# Patient Record
Sex: FEMALE | Race: WHITE
[De-identification: names, ages, dates, MRNs, and addresses within clinical notes are randomized per-mention and may not be internally consistent; named-entity substitution may affect disease eponyms.]

---

## 2017-12-26 ENCOUNTER — HOSPITAL ENCOUNTER (OUTPATIENT)
Dept: HOSPITAL 75 - RAD | Age: 52
End: 2017-12-26
Attending: INTERNAL MEDICINE
Payer: COMMERCIAL

## 2017-12-26 DIAGNOSIS — R51: Primary | ICD-10-CM

## 2017-12-26 DIAGNOSIS — H54.7: ICD-10-CM

## 2017-12-26 DIAGNOSIS — I10: ICD-10-CM

## 2017-12-26 PROCEDURE — 70551 MRI BRAIN STEM W/O DYE: CPT

## 2018-01-19 ENCOUNTER — HOSPITAL ENCOUNTER (OUTPATIENT)
Dept: HOSPITAL 75 - LABNPT | Age: 53
End: 2018-01-19
Attending: INTERNAL MEDICINE
Payer: COMMERCIAL

## 2018-01-19 DIAGNOSIS — R10.32: Primary | ICD-10-CM

## 2018-01-19 PROCEDURE — 87070 CULTURE OTHR SPECIMN AEROBIC: CPT

## 2018-01-19 PROCEDURE — 89051 BODY FLUID CELL COUNT: CPT

## 2018-01-19 PROCEDURE — 87205 SMEAR GRAM STAIN: CPT

## 2018-01-25 LAB
APPEARANCE FLD: COLORLESS
BODY FLUID SOURCE: (no result)
COLOR FLD: CLEAR
LYMPHOCYTES NFR FLD MANUAL: (no result) %
OTHER CELLS FLD MANUAL: (no result) %
RBC # FLD: 1 /UL
WBC # FLD: 4 /UL

## 2019-05-22 ENCOUNTER — HOSPITAL ENCOUNTER (OUTPATIENT)
Dept: HOSPITAL 75 - PREOP | Age: 54
Discharge: HOME | End: 2019-05-22
Attending: ORTHOPAEDIC SURGERY
Payer: MEDICARE

## 2019-05-22 VITALS — BODY MASS INDEX: 31.83 KG/M2 | HEIGHT: 68 IN | WEIGHT: 210 LBS

## 2019-05-22 DIAGNOSIS — Z01.818: Primary | ICD-10-CM

## 2019-09-24 ENCOUNTER — HOSPITAL ENCOUNTER (EMERGENCY)
Dept: HOSPITAL 75 - ER FS | Age: 54
Discharge: ANOTHER HOSPITAL | End: 2019-09-24
Payer: MEDICARE

## 2019-09-24 VITALS — BODY MASS INDEX: 42.93 KG/M2 | WEIGHT: 283.29 LBS | HEIGHT: 67.99 IN

## 2019-09-24 VITALS — DIASTOLIC BLOOD PRESSURE: 71 MMHG | SYSTOLIC BLOOD PRESSURE: 182 MMHG

## 2019-09-24 DIAGNOSIS — Z90.710: ICD-10-CM

## 2019-09-24 DIAGNOSIS — F41.9: ICD-10-CM

## 2019-09-24 DIAGNOSIS — R42: Primary | ICD-10-CM

## 2019-09-24 DIAGNOSIS — N18.6: ICD-10-CM

## 2019-09-24 DIAGNOSIS — Z80.1: ICD-10-CM

## 2019-09-24 DIAGNOSIS — M79.7: ICD-10-CM

## 2019-09-24 DIAGNOSIS — Z79.51: ICD-10-CM

## 2019-09-24 DIAGNOSIS — F32.9: ICD-10-CM

## 2019-09-24 DIAGNOSIS — Z99.2: ICD-10-CM

## 2019-09-24 DIAGNOSIS — Z86.73: ICD-10-CM

## 2019-09-24 DIAGNOSIS — T40.605A: ICD-10-CM

## 2019-09-24 DIAGNOSIS — Z90.89: ICD-10-CM

## 2019-09-24 DIAGNOSIS — Z90.49: ICD-10-CM

## 2019-09-24 DIAGNOSIS — I12.0: ICD-10-CM

## 2019-09-24 DIAGNOSIS — R51: ICD-10-CM

## 2019-09-24 DIAGNOSIS — E03.9: ICD-10-CM

## 2019-09-24 LAB
ALBUMIN SERPL-MCNC: 3.6 GM/DL (ref 3.2–4.5)
ALP SERPL-CCNC: 66 U/L (ref 40–136)
ALT SERPL-CCNC: 18 U/L (ref 0–55)
APTT BLD: 27 SEC (ref 24–35)
BASOPHILS # BLD AUTO: 0 10^3/UL (ref 0–0.1)
BASOPHILS NFR BLD AUTO: 1 % (ref 0–10)
BILIRUB SERPL-MCNC: 0.2 MG/DL (ref 0.1–1)
BUN/CREAT SERPL: 8
CALCIUM SERPL-MCNC: 10.5 MG/DL (ref 8.5–10.1)
CHLORIDE SERPL-SCNC: 99 MMOL/L (ref 98–107)
CO2 SERPL-SCNC: 24 MMOL/L (ref 21–32)
CREAT SERPL-MCNC: 8.43 MG/DL (ref 0.6–1.3)
EOSINOPHIL # BLD AUTO: 0.3 10^3/UL (ref 0–0.3)
EOSINOPHIL NFR BLD AUTO: 4 % (ref 0–10)
ERYTHROCYTE [DISTWIDTH] IN BLOOD BY AUTOMATED COUNT: 13.3 % (ref 10–14.5)
GFR SERPLBLD BASED ON 1.73 SQ M-ARVRAT: 5 ML/MIN
GLUCOSE SERPL-MCNC: 118 MG/DL (ref 70–105)
HCT VFR BLD CALC: 28 % (ref 35–52)
HGB BLD-MCNC: 9 G/DL (ref 11.5–16)
INR PPP: 1 (ref 0.8–1.4)
LYMPHOCYTES # BLD AUTO: 1.7 X 10^3 (ref 1–4)
LYMPHOCYTES NFR BLD AUTO: 26 % (ref 12–44)
MAGNESIUM SERPL-MCNC: 2.7 MG/DL (ref 1.6–2.4)
MANUAL DIFFERENTIAL PERFORMED BLD QL: NO
MCH RBC QN AUTO: 33 PG (ref 25–34)
MCHC RBC AUTO-ENTMCNC: 32 G/DL (ref 32–36)
MCV RBC AUTO: 99 FL (ref 80–99)
MONOCYTES # BLD AUTO: 0.6 X 10^3 (ref 0–1)
MONOCYTES NFR BLD AUTO: 9 % (ref 0–12)
NEUTROPHILS # BLD AUTO: 3.9 X 10^3 (ref 1.8–7.8)
NEUTROPHILS NFR BLD AUTO: 59 % (ref 42–75)
PHOSPHATE SERPL-MCNC: 6.3 MG/DL (ref 2.3–4.7)
PLATELET # BLD: 214 10^3/UL (ref 130–400)
PMV BLD AUTO: 10.3 FL (ref 7.4–10.4)
POTASSIUM SERPL-SCNC: 3.9 MMOL/L (ref 3.6–5)
PROT SERPL-MCNC: 6.5 GM/DL (ref 6.4–8.2)
PROTHROMBIN TIME: 13.3 SEC (ref 12.2–14.7)
SODIUM SERPL-SCNC: 135 MMOL/L (ref 135–145)
WBC # BLD AUTO: 6.6 10^3/UL (ref 4.3–11)

## 2019-09-24 PROCEDURE — 83735 ASSAY OF MAGNESIUM: CPT

## 2019-09-24 PROCEDURE — 70450 CT HEAD/BRAIN W/O DYE: CPT

## 2019-09-24 PROCEDURE — 82962 GLUCOSE BLOOD TEST: CPT

## 2019-09-24 PROCEDURE — 84484 ASSAY OF TROPONIN QUANT: CPT

## 2019-09-24 PROCEDURE — 71045 X-RAY EXAM CHEST 1 VIEW: CPT

## 2019-09-24 PROCEDURE — 93005 ELECTROCARDIOGRAM TRACING: CPT

## 2019-09-24 PROCEDURE — 85610 PROTHROMBIN TIME: CPT

## 2019-09-24 PROCEDURE — 72125 CT NECK SPINE W/O DYE: CPT

## 2019-09-24 PROCEDURE — 85730 THROMBOPLASTIN TIME PARTIAL: CPT

## 2019-09-24 PROCEDURE — 96374 THER/PROPH/DIAG INJ IV PUSH: CPT

## 2019-09-24 PROCEDURE — 36415 COLL VENOUS BLD VENIPUNCTURE: CPT

## 2019-09-24 PROCEDURE — 84100 ASSAY OF PHOSPHORUS: CPT

## 2019-09-24 PROCEDURE — 80053 COMPREHEN METABOLIC PANEL: CPT

## 2019-09-24 PROCEDURE — 85025 COMPLETE CBC W/AUTO DIFF WBC: CPT

## 2019-09-24 PROCEDURE — 96375 TX/PRO/DX INJ NEW DRUG ADDON: CPT

## 2020-06-05 ENCOUNTER — HOSPITAL ENCOUNTER (OUTPATIENT)
Dept: HOSPITAL 75 - PREOP | Age: 55
Discharge: HOME | End: 2020-06-05
Attending: ORTHOPAEDIC SURGERY
Payer: MEDICARE

## 2020-06-05 VITALS — WEIGHT: 283.29 LBS | HEIGHT: 70.08 IN | BODY MASS INDEX: 40.56 KG/M2

## 2020-06-05 DIAGNOSIS — Z01.812: Primary | ICD-10-CM

## 2020-06-05 DIAGNOSIS — Z20.828: ICD-10-CM

## 2020-06-05 DIAGNOSIS — M23.200: ICD-10-CM

## 2020-06-05 PROCEDURE — 87635 SARS-COV-2 COVID-19 AMP PRB: CPT

## 2020-06-10 ENCOUNTER — HOSPITAL ENCOUNTER (OUTPATIENT)
Dept: HOSPITAL 75 - SDC | Age: 55
Discharge: HOME | End: 2020-06-10
Attending: ORTHOPAEDIC SURGERY
Payer: MEDICARE

## 2020-06-10 VITALS — DIASTOLIC BLOOD PRESSURE: 72 MMHG | SYSTOLIC BLOOD PRESSURE: 119 MMHG

## 2020-06-10 VITALS — DIASTOLIC BLOOD PRESSURE: 70 MMHG | SYSTOLIC BLOOD PRESSURE: 110 MMHG

## 2020-06-10 VITALS — DIASTOLIC BLOOD PRESSURE: 64 MMHG | SYSTOLIC BLOOD PRESSURE: 109 MMHG

## 2020-06-10 VITALS — BODY MASS INDEX: 40.56 KG/M2 | HEIGHT: 70.08 IN | WEIGHT: 283.29 LBS

## 2020-06-10 VITALS — DIASTOLIC BLOOD PRESSURE: 78 MMHG | SYSTOLIC BLOOD PRESSURE: 151 MMHG

## 2020-06-10 VITALS — SYSTOLIC BLOOD PRESSURE: 126 MMHG | DIASTOLIC BLOOD PRESSURE: 74 MMHG

## 2020-06-10 VITALS — SYSTOLIC BLOOD PRESSURE: 112 MMHG | DIASTOLIC BLOOD PRESSURE: 68 MMHG

## 2020-06-10 VITALS — SYSTOLIC BLOOD PRESSURE: 109 MMHG | DIASTOLIC BLOOD PRESSURE: 64 MMHG

## 2020-06-10 VITALS — DIASTOLIC BLOOD PRESSURE: 68 MMHG | SYSTOLIC BLOOD PRESSURE: 115 MMHG

## 2020-06-10 VITALS — SYSTOLIC BLOOD PRESSURE: 109 MMHG | DIASTOLIC BLOOD PRESSURE: 69 MMHG

## 2020-06-10 VITALS — DIASTOLIC BLOOD PRESSURE: 69 MMHG | SYSTOLIC BLOOD PRESSURE: 115 MMHG

## 2020-06-10 DIAGNOSIS — I12.9: ICD-10-CM

## 2020-06-10 DIAGNOSIS — Z86.73: ICD-10-CM

## 2020-06-10 DIAGNOSIS — E11.22: ICD-10-CM

## 2020-06-10 DIAGNOSIS — G89.29: ICD-10-CM

## 2020-06-10 DIAGNOSIS — F32.9: ICD-10-CM

## 2020-06-10 DIAGNOSIS — M54.9: ICD-10-CM

## 2020-06-10 DIAGNOSIS — E78.5: ICD-10-CM

## 2020-06-10 DIAGNOSIS — S83.271A: Primary | ICD-10-CM

## 2020-06-10 DIAGNOSIS — Z99.89: ICD-10-CM

## 2020-06-10 DIAGNOSIS — Z98.51: ICD-10-CM

## 2020-06-10 DIAGNOSIS — N18.9: ICD-10-CM

## 2020-06-10 DIAGNOSIS — Z90.89: ICD-10-CM

## 2020-06-10 DIAGNOSIS — K58.9: ICD-10-CM

## 2020-06-10 DIAGNOSIS — F41.9: ICD-10-CM

## 2020-06-10 DIAGNOSIS — M79.7: ICD-10-CM

## 2020-06-10 DIAGNOSIS — G47.33: ICD-10-CM

## 2020-06-10 DIAGNOSIS — Z90.710: ICD-10-CM

## 2020-06-10 DIAGNOSIS — E03.9: ICD-10-CM

## 2020-06-10 DIAGNOSIS — M94.261: ICD-10-CM

## 2020-06-10 DIAGNOSIS — M94.8X6: ICD-10-CM

## 2020-06-10 DIAGNOSIS — Z79.899: ICD-10-CM

## 2020-06-10 LAB
ALBUMIN SERPL-MCNC: 3.4 GM/DL (ref 3.2–4.5)
ALP SERPL-CCNC: 84 U/L (ref 40–136)
ALT SERPL-CCNC: 8 U/L (ref 0–55)
BASOPHILS # BLD AUTO: 0.1 10^3/UL (ref 0–0.1)
BASOPHILS NFR BLD AUTO: 1 % (ref 0–10)
BILIRUB SERPL-MCNC: 0.2 MG/DL (ref 0.1–1)
BUN/CREAT SERPL: 6
CALCIUM SERPL-MCNC: 9.5 MG/DL (ref 8.5–10.1)
CHLORIDE SERPL-SCNC: 101 MMOL/L (ref 98–107)
CO2 SERPL-SCNC: 20 MMOL/L (ref 21–32)
CREAT SERPL-MCNC: 10.44 MG/DL (ref 0.6–1.3)
EOSINOPHIL # BLD AUTO: 0.4 10^3/UL (ref 0–0.3)
EOSINOPHIL NFR BLD AUTO: 5 % (ref 0–10)
ERYTHROCYTE [DISTWIDTH] IN BLOOD BY AUTOMATED COUNT: 14.4 % (ref 10–14.5)
GFR SERPLBLD BASED ON 1.73 SQ M-ARVRAT: 4 ML/MIN
GLUCOSE SERPL-MCNC: 109 MG/DL (ref 70–105)
HCT VFR BLD CALC: 29 % (ref 35–52)
HGB BLD-MCNC: 9.3 G/DL (ref 11.5–16)
LYMPHOCYTES # BLD AUTO: 1.4 X 10^3 (ref 1–4)
LYMPHOCYTES NFR BLD AUTO: 22 % (ref 12–44)
MANUAL DIFFERENTIAL PERFORMED BLD QL: NO
MCH RBC QN AUTO: 33 PG (ref 25–34)
MCHC RBC AUTO-ENTMCNC: 33 G/DL (ref 32–36)
MCV RBC AUTO: 102 FL (ref 80–99)
MONOCYTES # BLD AUTO: 0.7 X 10^3 (ref 0–1)
MONOCYTES NFR BLD AUTO: 11 % (ref 0–12)
NEUTROPHILS # BLD AUTO: 4.1 X 10^3 (ref 1.8–7.8)
NEUTROPHILS NFR BLD AUTO: 62 % (ref 42–75)
PLATELET # BLD: 280 10^3/UL (ref 130–400)
PMV BLD AUTO: 9.8 FL (ref 7.4–10.4)
POTASSIUM SERPL-SCNC: 4.5 MMOL/L (ref 3.6–5)
PROT SERPL-MCNC: 7.2 GM/DL (ref 6.4–8.2)
SODIUM SERPL-SCNC: 137 MMOL/L (ref 135–145)
WBC # BLD AUTO: 6.6 10^3/UL (ref 4.3–11)

## 2020-06-10 PROCEDURE — 85025 COMPLETE CBC W/AUTO DIFF WBC: CPT

## 2020-06-10 PROCEDURE — 36415 COLL VENOUS BLD VENIPUNCTURE: CPT

## 2020-06-10 PROCEDURE — 87081 CULTURE SCREEN ONLY: CPT

## 2020-06-10 PROCEDURE — 80053 COMPREHEN METABOLIC PANEL: CPT

## 2023-11-25 ENCOUNTER — HOSPITAL ENCOUNTER (EMERGENCY)
Facility: HOSPITAL | Age: 58
Discharge: HOME | End: 2023-11-25
Attending: STUDENT IN AN ORGANIZED HEALTH CARE EDUCATION/TRAINING PROGRAM
Payer: MEDICARE

## 2023-11-25 VITALS
WEIGHT: 200 LBS | RESPIRATION RATE: 15 BRPM | OXYGEN SATURATION: 96 % | TEMPERATURE: 99 F | SYSTOLIC BLOOD PRESSURE: 124 MMHG | BODY MASS INDEX: 31.39 KG/M2 | DIASTOLIC BLOOD PRESSURE: 84 MMHG | HEIGHT: 67 IN | HEART RATE: 84 BPM

## 2023-11-25 DIAGNOSIS — M25.551 BILATERAL HIP PAIN: ICD-10-CM

## 2023-11-25 DIAGNOSIS — M25.559 HIP PAIN: ICD-10-CM

## 2023-11-25 DIAGNOSIS — M54.32 SCIATICA OF LEFT SIDE: Primary | ICD-10-CM

## 2023-11-25 DIAGNOSIS — M25.552 BILATERAL HIP PAIN: ICD-10-CM

## 2023-11-25 PROCEDURE — 63600175 PHARM REV CODE 636 W HCPCS: Performed by: STUDENT IN AN ORGANIZED HEALTH CARE EDUCATION/TRAINING PROGRAM

## 2023-11-25 PROCEDURE — 99284 EMERGENCY DEPT VISIT MOD MDM: CPT

## 2023-11-25 PROCEDURE — 96372 THER/PROPH/DIAG INJ SC/IM: CPT | Performed by: STUDENT IN AN ORGANIZED HEALTH CARE EDUCATION/TRAINING PROGRAM

## 2023-11-25 RX ORDER — DIAZEPAM 10 MG/2ML
5 INJECTION INTRAMUSCULAR
Status: COMPLETED | OUTPATIENT
Start: 2023-11-25 | End: 2023-11-25

## 2023-11-25 RX ORDER — OXYCODONE AND ACETAMINOPHEN 5; 325 MG/1; MG/1
1 TABLET ORAL EVERY 6 HOURS PRN
Qty: 12 TABLET | Refills: 0 | Status: SHIPPED | OUTPATIENT
Start: 2023-11-25 | End: 2023-11-29

## 2023-11-25 RX ORDER — KETOROLAC TROMETHAMINE 30 MG/ML
15 INJECTION, SOLUTION INTRAMUSCULAR; INTRAVENOUS
Status: COMPLETED | OUTPATIENT
Start: 2023-11-25 | End: 2023-11-25

## 2023-11-25 RX ORDER — DIAZEPAM 2 MG/1
2 TABLET ORAL EVERY 6 HOURS PRN
Qty: 8 TABLET | Refills: 0 | Status: SHIPPED | OUTPATIENT
Start: 2023-11-25 | End: 2023-11-25

## 2023-11-25 RX ADMIN — KETOROLAC TROMETHAMINE 15 MG: 30 INJECTION, SOLUTION INTRAMUSCULAR at 04:11

## 2023-11-25 RX ADMIN — DIAZEPAM 5 MG: 5 INJECTION, SOLUTION INTRAMUSCULAR; INTRAVENOUS at 04:11

## 2023-11-25 NOTE — DISCHARGE INSTRUCTIONS

## 2023-11-25 NOTE — ED PROVIDER NOTES
Encounter Date: 11/25/2023       History     Chief Complaint   Patient presents with    Hip Pain     Bilateral hip and groin pain for about a month. Saw a doctor for it and gave steroids and pain medications and none of that has help.      58-year-old female with history of sciatica presents for 1 month history of ongoing low back pain with radiation down to the left groin.  The pain is sharp, intermittent and it feels like a muscle spasm.  She has a difficult time walking.  She was seen at urgent care, given Percocet, cyclobenzaprine without relief.  She recently moved here from [facility].  She has not established a PCP here.      Review of patient's allergies indicates:  No Known Allergies  No past medical history on file.  No past surgical history on file.  No family history on file.     Review of Systems   Constitutional:  Negative for activity change, appetite change, chills, fever and unexpected weight change.   HENT:  Negative for dental problem and drooling.    Eyes:  Negative for discharge and itching.   Respiratory:  Negative for cough, chest tightness, shortness of breath, wheezing and stridor.    Cardiovascular:  Negative for chest pain, palpitations and leg swelling.   Gastrointestinal:  Negative for abdominal distention, abdominal pain, diarrhea and nausea.   Genitourinary:  Negative for difficulty urinating, dysuria, frequency and urgency.   Musculoskeletal:  Positive for back pain and myalgias. Negative for gait problem and joint swelling.   Neurological:  Negative for dizziness, syncope, numbness and headaches.   Psychiatric/Behavioral:  Negative for agitation, behavioral problems and confusion.        Physical Exam     Initial Vitals [11/25/23 0140]   BP Pulse Resp Temp SpO2   (!) 153/106 100 20 99 °F (37.2 °C) 98 %      MAP       --         Physical Exam    Nursing note and vitals reviewed.  Constitutional: She appears well-developed and well-nourished. She is not diaphoretic.   HENT:   Head:  Normocephalic and atraumatic.   Mouth/Throat: Oropharynx is clear and moist.   Eyes: EOM are normal. Pupils are equal, round, and reactive to light. Right eye exhibits no discharge. Left eye exhibits no discharge.   Neck: No tracheal deviation present.   Normal range of motion.  Cardiovascular:  Normal rate, regular rhythm and intact distal pulses.           Pulmonary/Chest: No respiratory distress. She has no wheezes. She exhibits no tenderness.   Abdominal: Abdomen is soft. She exhibits no distension. There is no abdominal tenderness.   Musculoskeletal:         General: Tenderness present. No edema. Normal range of motion.      Cervical back: Normal range of motion.      Comments: Paraspinal low back tenderness to palpation  Left medial groin tenderness to palpation over the piriformis     Neurological: She is alert and oriented to person, place, and time. She has normal strength. No cranial nerve deficit or sensory deficit. GCS eye subscore is 4. GCS verbal subscore is 5. GCS motor subscore is 6.   Skin: Skin is warm and dry. No rash noted.   Psychiatric: She has a normal mood and affect. Her behavior is normal. Thought content normal.         ED Course   Procedures  Labs Reviewed - No data to display       Imaging Results              X-Ray Hips Bilateral 2 View Incl AP Pelvis (In process)                      Medications   ketorolac injection 15 mg (15 mg Intramuscular Given 11/25/23 0417)   diazePAM injection 5 mg (5 mg Intramuscular Given 11/25/23 0417)     Medical Decision Making  Differential diagnosis includes sciatica, herniated disc, occult fracture.    The patient's symptoms are most likely due to sciatica. There are no concerning features of bilateral weakness, significant new motor/sensory deficits, saddle anesthesia, or bowel/bladder incontinence to suggest acute cauda equina syndrome. On physical exam, there is no focal midline bony tenderness or evidence of significant trauma to suggest acute  fracture or hematoma. There is no fever, history of recent surgery, or erythema/fluctuance to suggest acute diskitis, infection, or abscess. The patient was treated with supportive care and improved. Will provide short course RX of percocet upon D/C. Discussed symptomatic and supportive care instructions, including use of heating pads, stretching and ROM exercises, improving posture, and slowly resuming activity as tolerated. Counseled on need to follow-up with Dr. Simpson for PT, possible MRI v ortho referral, and further evaluation if symptoms are ongoing, including further imaging as an outpatient if symptoms persist.    Risk  Prescription drug management.                                     Clinical Impression:  Final diagnoses:  [M25.559] Hip pain  [M25.551, M25.552] Bilateral hip pain  [M54.32] Sciatica of left side (Primary)          ED Disposition Condition    Discharge Stable          ED Prescriptions       Medication Sig Dispense Start Date End Date Auth. Provider    diazePAM (VALIUM) 2 MG tablet  (Status: Discontinued) Take 1 tablet (2 mg total) by mouth every 6 (six) hours as needed for Anxiety. 8 tablet 11/25/2023 11/25/2023 Jose Antonio Moreira MD    oxyCODONE-acetaminophen (PERCOCET) 5-325 mg per tablet Take 1 tablet by mouth every 6 (six) hours as needed for Pain. 12 tablet 11/25/2023 11/29/2023 Jose Antonio Moreira MD          Follow-up Information       Follow up With Specialties Details Why Contact Info    Ruben Simpson MD Physical Medicine and Rehabilitation Schedule an appointment as soon as possible for a visit on 11/27/2023 To set up evaluation of your sciatica [address] [place] [address] [address], [address]  [facility] [address]  [phone]               Jose Antonio Moreira MD  11/25/23 5161

## 2023-11-30 ENCOUNTER — OFFICE VISIT (OUTPATIENT)
Dept: FAMILY MEDICINE | Facility: CLINIC | Age: 58
End: 2023-11-30
Payer: MEDICARE

## 2023-11-30 VITALS
RESPIRATION RATE: 18 BRPM | BODY MASS INDEX: 35.16 KG/M2 | DIASTOLIC BLOOD PRESSURE: 68 MMHG | OXYGEN SATURATION: 93 % | WEIGHT: 224 LBS | SYSTOLIC BLOOD PRESSURE: 110 MMHG | TEMPERATURE: 98 F | HEIGHT: 67 IN | HEART RATE: 91 BPM

## 2023-11-30 DIAGNOSIS — I69.314 FRONTAL LOBE AND EXECUTIVE FUNCTION DEFICIT FOLLOWING CEREBRAL INFARCTION: Primary | ICD-10-CM

## 2023-11-30 DIAGNOSIS — Z12.31 SCREENING MAMMOGRAM FOR BREAST CANCER: ICD-10-CM

## 2023-11-30 DIAGNOSIS — M54.32 SCIATICA OF LEFT SIDE: ICD-10-CM

## 2023-11-30 DIAGNOSIS — K21.9 GASTROESOPHAGEAL REFLUX DISEASE, UNSPECIFIED WHETHER ESOPHAGITIS PRESENT: ICD-10-CM

## 2023-11-30 DIAGNOSIS — E03.9 ACQUIRED HYPOTHYROIDISM: ICD-10-CM

## 2023-11-30 DIAGNOSIS — G25.81 RESTLESS LEG: ICD-10-CM

## 2023-11-30 DIAGNOSIS — Z00.00 PREVENTATIVE HEALTH CARE: ICD-10-CM

## 2023-11-30 DIAGNOSIS — Z23 IMMUNIZATION DUE: ICD-10-CM

## 2023-11-30 DIAGNOSIS — Z87.820 HISTORY OF TRAUMATIC BRAIN INJURY: ICD-10-CM

## 2023-11-30 DIAGNOSIS — F31.9 BIPOLAR AFFECTIVE DISORDER, REMISSION STATUS UNSPECIFIED: ICD-10-CM

## 2023-11-30 DIAGNOSIS — S61.412D LACERATION OF LEFT HAND WITHOUT FOREIGN BODY, SUBSEQUENT ENCOUNTER: ICD-10-CM

## 2023-11-30 DIAGNOSIS — I25.119 ATHEROSCLEROSIS OF NATIVE CORONARY ARTERY WITH ANGINA PECTORIS, UNSPECIFIED WHETHER NATIVE OR TRANSPLANTED HEART: ICD-10-CM

## 2023-11-30 DIAGNOSIS — I10 HYPERTENSION, UNSPECIFIED TYPE: ICD-10-CM

## 2023-11-30 PROBLEM — J45.909 ASTHMA: Status: ACTIVE | Noted: 2023-11-30

## 2023-11-30 PROCEDURE — 3008F PR BODY MASS INDEX (BMI) DOCUMENTED: ICD-10-PCS | Mod: CPTII,S$GLB,, | Performed by: FAMILY MEDICINE

## 2023-11-30 PROCEDURE — 90714 TD VACCINE GREATER THAN OR EQUAL TO 7YO PRESERVATIVE FREE IM: ICD-10-PCS | Mod: S$GLB,,, | Performed by: FAMILY MEDICINE

## 2023-11-30 PROCEDURE — 99204 PR OFFICE/OUTPT VISIT, NEW, LEVL IV, 45-59 MIN: ICD-10-PCS | Mod: 25,S$GLB,, | Performed by: FAMILY MEDICINE

## 2023-11-30 PROCEDURE — 1159F PR MEDICATION LIST DOCUMENTED IN MEDICAL RECORD: ICD-10-PCS | Mod: CPTII,S$GLB,, | Performed by: FAMILY MEDICINE

## 2023-11-30 PROCEDURE — 99204 OFFICE O/P NEW MOD 45 MIN: CPT | Mod: 25,S$GLB,, | Performed by: FAMILY MEDICINE

## 2023-11-30 PROCEDURE — 3078F DIAST BP <80 MM HG: CPT | Mod: CPTII,S$GLB,, | Performed by: FAMILY MEDICINE

## 2023-11-30 PROCEDURE — 3078F PR MOST RECENT DIASTOLIC BLOOD PRESSURE < 80 MM HG: ICD-10-PCS | Mod: CPTII,S$GLB,, | Performed by: FAMILY MEDICINE

## 2023-11-30 PROCEDURE — 3074F SYST BP LT 130 MM HG: CPT | Mod: CPTII,S$GLB,, | Performed by: FAMILY MEDICINE

## 2023-11-30 PROCEDURE — 3008F BODY MASS INDEX DOCD: CPT | Mod: CPTII,S$GLB,, | Performed by: FAMILY MEDICINE

## 2023-11-30 PROCEDURE — 90714 TD VACC NO PRESV 7 YRS+ IM: CPT | Mod: S$GLB,,, | Performed by: FAMILY MEDICINE

## 2023-11-30 PROCEDURE — 1159F MED LIST DOCD IN RCRD: CPT | Mod: CPTII,S$GLB,, | Performed by: FAMILY MEDICINE

## 2023-11-30 PROCEDURE — 90471 IMMUNIZATION ADMIN: CPT | Mod: S$GLB,,, | Performed by: FAMILY MEDICINE

## 2023-11-30 PROCEDURE — 3074F PR MOST RECENT SYSTOLIC BLOOD PRESSURE < 130 MM HG: ICD-10-PCS | Mod: CPTII,S$GLB,, | Performed by: FAMILY MEDICINE

## 2023-11-30 PROCEDURE — 90471 TD VACCINE GREATER THAN OR EQUAL TO 7YO PRESERVATIVE FREE IM: ICD-10-PCS | Mod: S$GLB,,, | Performed by: FAMILY MEDICINE

## 2023-11-30 RX ORDER — SEVELAMER CARBONATE 800 MG/1
TABLET, FILM COATED ORAL
Status: CANCELLED | OUTPATIENT
Start: 2023-11-30

## 2023-11-30 RX ORDER — BUDESONIDE AND FORMOTEROL FUMARATE DIHYDRATE 160; 4.5 UG/1; UG/1
AEROSOL RESPIRATORY (INHALATION)
COMMUNITY
Start: 2023-10-07 | End: 2024-01-31 | Stop reason: SDUPTHER

## 2023-11-30 RX ORDER — LEVOTHYROXINE SODIUM 150 UG/1
150 TABLET ORAL
COMMUNITY
End: 2023-11-30 | Stop reason: SDUPTHER

## 2023-11-30 RX ORDER — ROPINIROLE 2 MG/1
2 TABLET, FILM COATED ORAL 3 TIMES DAILY
COMMUNITY
Start: 2023-11-11 | End: 2023-12-18 | Stop reason: SDUPTHER

## 2023-11-30 RX ORDER — PANTOPRAZOLE SODIUM 40 MG/1
40 TABLET, DELAYED RELEASE ORAL DAILY
COMMUNITY
Start: 2023-10-15 | End: 2023-11-30 | Stop reason: SDUPTHER

## 2023-11-30 RX ORDER — QUETIAPINE FUMARATE 25 MG/1
25 TABLET, FILM COATED ORAL NIGHTLY
COMMUNITY
Start: 2023-10-15 | End: 2023-11-30 | Stop reason: SDUPTHER

## 2023-11-30 RX ORDER — TORSEMIDE 100 MG/1
100 TABLET ORAL 2 TIMES DAILY
COMMUNITY
Start: 2023-10-03 | End: 2023-12-18 | Stop reason: SDUPTHER

## 2023-11-30 RX ORDER — GABAPENTIN 300 MG/1
300 CAPSULE ORAL 3 TIMES DAILY
COMMUNITY
Start: 2023-09-22 | End: 2023-11-30 | Stop reason: SDUPTHER

## 2023-11-30 RX ORDER — SEVELAMER CARBONATE 800 MG/1
TABLET, FILM COATED ORAL
COMMUNITY
End: 2024-01-05 | Stop reason: SDUPTHER

## 2023-11-30 RX ORDER — CETIRIZINE HYDROCHLORIDE 10 MG/1
10 TABLET ORAL DAILY
COMMUNITY
End: 2023-11-30 | Stop reason: SDUPTHER

## 2023-11-30 RX ORDER — ATORVASTATIN CALCIUM 40 MG/1
40 TABLET, FILM COATED ORAL
COMMUNITY
Start: 2023-10-30 | End: 2023-12-18 | Stop reason: SDUPTHER

## 2023-11-30 RX ORDER — QUETIAPINE FUMARATE 100 MG/1
100 TABLET, FILM COATED ORAL NIGHTLY
COMMUNITY
Start: 2023-10-31 | End: 2024-02-27 | Stop reason: SDUPTHER

## 2023-11-30 RX ORDER — VENLAFAXINE HYDROCHLORIDE 150 MG/1
150 CAPSULE, EXTENDED RELEASE ORAL
COMMUNITY
Start: 2023-10-03 | End: 2023-11-30 | Stop reason: SDUPTHER

## 2023-11-30 RX ORDER — TRAMADOL HYDROCHLORIDE 50 MG/1
50 TABLET ORAL EVERY 8 HOURS PRN
COMMUNITY
End: 2023-11-30

## 2023-11-30 RX ORDER — MIDODRINE HYDROCHLORIDE 10 MG/1
TABLET ORAL
COMMUNITY
Start: 2023-10-13 | End: 2023-12-18 | Stop reason: SDUPTHER

## 2023-11-30 RX ORDER — PRAMIPEXOLE DIHYDROCHLORIDE 0.5 MG/1
TABLET ORAL 2 TIMES DAILY
COMMUNITY
Start: 2023-10-26 | End: 2023-11-30 | Stop reason: SDUPTHER

## 2023-11-30 RX ORDER — FERRIC CITRATE 210 MG/1
420 TABLET, COATED ORAL 3 TIMES DAILY
COMMUNITY
End: 2024-01-05 | Stop reason: SDUPTHER

## 2023-11-30 RX ORDER — NAPROXEN SODIUM 220 MG/1
81 TABLET, FILM COATED ORAL DAILY
Status: ON HOLD | COMMUNITY
End: 2024-02-28

## 2023-11-30 RX ORDER — CEPHALEXIN 500 MG/1
500 CAPSULE ORAL EVERY 8 HOURS
Qty: 30 CAPSULE | Refills: 0 | Status: SHIPPED | OUTPATIENT
Start: 2023-11-30 | End: 2023-12-10

## 2023-11-30 RX ORDER — TORSEMIDE 100 MG/1
100 TABLET ORAL 2 TIMES DAILY
Status: CANCELLED | OUTPATIENT
Start: 2023-11-30

## 2023-11-30 RX ORDER — AMOXICILLIN 250 MG
1 CAPSULE ORAL DAILY PRN
Status: ON HOLD | COMMUNITY
End: 2024-02-28

## 2023-11-30 RX ORDER — CLOPIDOGREL BISULFATE 75 MG/1
75 TABLET ORAL
COMMUNITY
Start: 2023-11-12 | End: 2023-12-18 | Stop reason: SDUPTHER

## 2023-11-30 RX ORDER — FLUTICASONE PROPIONATE 50 MCG
SPRAY, SUSPENSION (ML) NASAL
COMMUNITY
End: 2023-12-18 | Stop reason: SDUPTHER

## 2023-11-30 RX ORDER — ALBUTEROL SULFATE 90 UG/1
AEROSOL, METERED RESPIRATORY (INHALATION)
COMMUNITY
Start: 2023-06-21 | End: 2024-02-06

## 2023-11-30 RX ORDER — MELATONIN 10 MG
10 CAPSULE ORAL NIGHTLY PRN
Status: ON HOLD | COMMUNITY
End: 2024-02-28

## 2023-11-30 NOTE — PROGRESS NOTES
Subjective:       Patient ID: Eugenia Manuel is a 58 y.o. female.    Chief Complaint: Establish Care and renal failure      Patient is here to get established patient is a longstanding end-stage renal insufficiency patient and has started dialysis has not met her nephrologist yet.  Lab Results       Component                Value               Date                       HGBA1C                   6.3 (H)             08/19/2020                      Allergies and Medications:   Review of patient's allergies indicates:   Allergen Reactions    Lisinopril Anaphylaxis     Current Outpatient Medications   Medication Sig Dispense Refill    albuterol (PROVENTIL/VENTOLIN HFA) 90 mcg/actuation inhaler Inhale into the lungs.      aspirin 81 MG Chew Take 81 mg by mouth once daily.      atorvastatin (LIPITOR) 40 MG tablet Take 40 mg by mouth.      cetirizine (ZYRTEC) 10 MG tablet Take 10 mg by mouth once daily.      clopidogreL (PLAVIX) 75 mg tablet Take 75 mg by mouth.      ferric citrate (AURYXIA) 210 mg iron Tab Take 420 mg by mouth 3 (three) times daily.      fluticasone propionate (FLONASE) 50 mcg/actuation nasal spray by Each Nostril route.      gabapentin (NEURONTIN) 300 MG capsule Take 300 mg by mouth 3 (three) times daily.      levothyroxine (SYNTHROID) 150 MCG tablet Take 150 mcg by mouth.      melatonin 10 mg Cap Take by mouth.      midodrine (PROAMATINE) 10 MG tablet Take by mouth.      pantoprazole (PROTONIX) 40 MG tablet Take 40 mg by mouth once daily.      pramipexole (MIRAPEX) 0.5 MG tablet Take by mouth 2 (two) times a day.      QUEtiapine (SEROQUEL) 100 MG Tab Take 100 mg by mouth every evening.      QUEtiapine (SEROQUEL) 25 MG Tab Take 25 mg by mouth every evening.      rOPINIRole (REQUIP) 2 MG tablet Take 2 mg by mouth 3 (three) times daily.      senna-docusate 8.6-50 mg (PERICOLACE) 8.6-50 mg per tablet Take 1 tablet by mouth daily as needed.      sevelamer carbonate (RENVELA) 800 mg Tab Take by mouth.       SYMBICORT 160-4.5 mcg/actuation HFAA Inhale into the lungs.      torsemide (DEMADEX) 100 MG Tab Take 100 mg by mouth 2 (two) times a day.      venlafaxine (EFFEXOR-XR) 150 MG Cp24 Take 150 mg by mouth.      cephALEXin (KEFLEX) 500 MG capsule Take 1 capsule (500 mg total) by mouth every 8 (eight) hours. for 10 days 30 capsule 0     No current facility-administered medications for this visit.       Family History:   History reviewed. No pertinent family history.    Social History:   Social History     Socioeconomic History    Marital status: Single   Tobacco Use    Smoking status: Never    Smokeless tobacco: Never   Substance and Sexual Activity    Alcohol use: Never    Drug use: Never       Review of Systems    Objective:     Vitals:    11/30/23 1400   BP: 110/68   Pulse: 91   Resp: 18   Temp: 98.4 °F (36.9 °C)        Physical Exam  Vitals and nursing note reviewed.   Constitutional:       General: She is not in acute distress.     Appearance: Normal appearance. She is well-developed and normal weight. She is not ill-appearing, toxic-appearing or diaphoretic.   HENT:      Head: Normocephalic and atraumatic.   Eyes:      Pupils: Pupils are equal, round, and reactive to light.   Cardiovascular:      Rate and Rhythm: Normal rate and regular rhythm.      Heart sounds: Normal heart sounds. No murmur heard.     No friction rub. No gallop.   Pulmonary:      Effort: Pulmonary effort is normal. No respiratory distress.      Breath sounds: Normal breath sounds. No stridor. No wheezing, rhonchi or rales.   Chest:      Chest wall: No tenderness.   Musculoskeletal:        Hands:         Back:       Right lower leg: No edema.      Left lower leg: No edema.   Neurological:      Mental Status: She is alert.   Psychiatric:         Behavior: Behavior normal.         Thought Content: Thought content normal.         Judgment: Judgment normal.         Assessment:       1. 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 Hypertension, unspecified type    3. Atherosclerosis of native coronary artery with angina pectoris, unspecified whether native or transplanted heart    4. History of traumatic brain injury    5. Bipolar affective disorder, remission status unspecified    6. Acquired hypothyroidism    7. Sciatica of left side    8. Preventative health care    9. Laceration of left hand without foreign body, subsequent encounter    10. Immunization due    11. Screening mammogram for breast cancer        Plan:       Eugenia was seen today for establish care and renal failure.    Diagnoses and all orders for this visit:    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    Hypertension, unspecified type    Atherosclerosis of native coronary artery with angina pectoris, unspecified whether native or transplanted heart  -     Lipid Panel; Future  -     Ambulatory referral/consult to Cardiology; Future    History of traumatic brain injury    Bipolar affective disorder, remission status unspecified    Acquired hypothyroidism    Sciatica of left side  -     Ambulatory referral/consult to Physical Medicine Rehab; Future    Preventative health care  -     Hepatitis C Antibody; Future  -     HIV 1/2 Ag/Ab (4th Gen) w/Refl; Future    Laceration of left hand without foreign body, subsequent encounter  -     cephALEXin (KEFLEX) 500 MG capsule; Take 1 capsule (500 mg total) by mouth every 8 (eight) hours. for 10 days  -     Td Vaccine (Adult) - Preservative Free    Immunization due  -     Td Vaccine (Adult) - Preservative Free    Screening mammogram for breast cancer  -     Mammo Digital Screening Bilat; Future         No follow-ups on file.

## 2023-12-03 RX ORDER — VENLAFAXINE HYDROCHLORIDE 150 MG/1
150 CAPSULE, EXTENDED RELEASE ORAL DAILY
Qty: 90 CAPSULE | Refills: 3 | Status: ON HOLD | OUTPATIENT
Start: 2023-12-03 | End: 2024-02-28

## 2023-12-03 RX ORDER — GABAPENTIN 300 MG/1
300 CAPSULE ORAL 3 TIMES DAILY
Qty: 270 CAPSULE | Refills: 1 | Status: ON HOLD | OUTPATIENT
Start: 2023-12-03 | End: 2024-02-28 | Stop reason: HOSPADM

## 2023-12-03 RX ORDER — PANTOPRAZOLE SODIUM 40 MG/1
40 TABLET, DELAYED RELEASE ORAL DAILY
Qty: 90 TABLET | Refills: 1 | Status: ON HOLD | OUTPATIENT
Start: 2023-12-03 | End: 2024-02-28

## 2023-12-03 RX ORDER — CETIRIZINE HYDROCHLORIDE 10 MG/1
10 TABLET ORAL DAILY
Qty: 90 TABLET | Refills: 3 | Status: ON HOLD | OUTPATIENT
Start: 2023-12-03 | End: 2024-02-28

## 2023-12-03 RX ORDER — LEVOTHYROXINE SODIUM 150 UG/1
150 TABLET ORAL
Qty: 90 TABLET | Refills: 3 | Status: ON HOLD | OUTPATIENT
Start: 2023-12-03 | End: 2024-02-28

## 2023-12-03 RX ORDER — PRAMIPEXOLE DIHYDROCHLORIDE 0.5 MG/1
0.5 TABLET ORAL 2 TIMES DAILY
Qty: 180 TABLET | Refills: 1 | Status: SHIPPED | OUTPATIENT
Start: 2023-12-03 | End: 2023-12-18

## 2023-12-03 RX ORDER — QUETIAPINE FUMARATE 25 MG/1
25 TABLET, FILM COATED ORAL NIGHTLY
Qty: 90 TABLET | Refills: 1 | Status: ON HOLD | OUTPATIENT
Start: 2023-12-03 | End: 2024-02-28

## 2023-12-04 ENCOUNTER — TELEPHONE (OUTPATIENT)
Dept: CARDIOLOGY | Facility: CLINIC | Age: 58
End: 2023-12-04

## 2023-12-04 NOTE — TELEPHONE ENCOUNTER
----- Message from Trudy Nam sent at 12/4/2023 12:17 PM CST -----  Contact: self  Type:  Patient Returning Call    Who Called:  Patient  Who Left Message for Patient:  Ese  Does the patient know what this is regarding?:  yes  Best Call Back Number:  [phone]    Additional Information:  Pt is ret a call.Please call back

## 2023-12-04 NOTE — TELEPHONE ENCOUNTER
----- Message from Jennifer Calvin sent at 12/4/2023  9:17 AM CST -----  Hello,    I have pt being referred for Atherosclerosis of native coronary artery with angina pectoris.  I have scanned the referral /records in to media mgr within Epic. Please review and contact for scheduling,thanks!           Jennifer Goodson

## 2023-12-06 ENCOUNTER — TELEPHONE (OUTPATIENT)
Dept: FAMILY MEDICINE | Facility: CLINIC | Age: 58
End: 2023-12-06

## 2023-12-07 ENCOUNTER — TELEPHONE (OUTPATIENT)
Dept: FAMILY MEDICINE | Facility: CLINIC | Age: 58
End: 2023-12-07

## 2023-12-07 RX ORDER — QUETIAPINE FUMARATE 100 MG/1
100 TABLET, FILM COATED ORAL DAILY
Qty: 30 TABLET | Refills: 5 | Status: ON HOLD | OUTPATIENT
Start: 2023-12-07 | End: 2024-02-28

## 2023-12-14 ENCOUNTER — TELEPHONE (OUTPATIENT)
Dept: CARDIOLOGY | Facility: CLINIC | Age: 58
End: 2023-12-14

## 2023-12-14 DIAGNOSIS — I25.119 ATHEROSCLEROSIS OF NATIVE CORONARY ARTERY WITH ANGINA PECTORIS, UNSPECIFIED WHETHER NATIVE OR TRANSPLANTED HEART: ICD-10-CM

## 2023-12-14 DIAGNOSIS — G25.81 RESTLESS LEG: ICD-10-CM

## 2023-12-14 DIAGNOSIS — I95.9 HYPOTENSION, UNSPECIFIED HYPOTENSION TYPE: ICD-10-CM

## 2023-12-14 DIAGNOSIS — I10 HYPERTENSION, UNSPECIFIED TYPE: ICD-10-CM

## 2023-12-14 DIAGNOSIS — Z00.00 PREVENTATIVE HEALTH CARE: ICD-10-CM

## 2023-12-14 DIAGNOSIS — J45.909 ASTHMA, UNSPECIFIED ASTHMA SEVERITY, UNSPECIFIED WHETHER COMPLICATED, UNSPECIFIED WHETHER PERSISTENT: Primary | ICD-10-CM

## 2023-12-14 RX ORDER — ATORVASTATIN CALCIUM 40 MG/1
40 TABLET, FILM COATED ORAL NIGHTLY
Qty: 90 TABLET | Refills: 1 | OUTPATIENT
Start: 2023-12-14

## 2023-12-14 RX ORDER — FLUTICASONE PROPIONATE 50 MCG
1 SPRAY, SUSPENSION (ML) NASAL DAILY
Qty: 16 G | Refills: 1 | OUTPATIENT
Start: 2023-12-14

## 2023-12-14 RX ORDER — MIDODRINE HYDROCHLORIDE 10 MG/1
10 TABLET ORAL EVERY 12 HOURS
Qty: 180 TABLET | Refills: 1 | OUTPATIENT
Start: 2023-12-14

## 2023-12-14 RX ORDER — CLOPIDOGREL BISULFATE 75 MG/1
75 TABLET ORAL DAILY
Qty: 90 TABLET | Refills: 1 | OUTPATIENT
Start: 2023-12-14

## 2023-12-14 RX ORDER — ROPINIROLE 2 MG/1
2 TABLET, FILM COATED ORAL 3 TIMES DAILY
Qty: 270 TABLET | Refills: 1 | OUTPATIENT
Start: 2023-12-14

## 2023-12-14 RX ORDER — TORSEMIDE 100 MG/1
100 TABLET ORAL 2 TIMES DAILY
Qty: 180 TABLET | Refills: 1 | OUTPATIENT
Start: 2023-12-14

## 2023-12-14 NOTE — TELEPHONE ENCOUNTER
----- Message from Mary Albert, Patient Care Assistant sent at 12/14/2023  2:33 PM CST -----  Regarding: appointment  Contact: pt  Type: Needs Medical Advice  Who Called:  pt     Best Call Back Number: [phone] (home)     Additional Information: pt states she would like a callback regarding rescheduling her appointment on 12/22/23. Please call pt to advise. Thanks!

## 2023-12-14 NOTE — TELEPHONE ENCOUNTER
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 Please advise.    Thank you!      fluticasone propionate (FLONASE) 50 mcg/actuation nasal spray    rOPINIRole (REQUIP) 2 MG tablet    clopidogreL (PLAVIX) 75 mg tablet    atorvastatin (LIPITOR) 40 MG tablet    torsemide (DEMADEX) 100 MG Tab    midodrine (PROAMATINE) 10 MG tablet

## 2023-12-15 ENCOUNTER — TELEPHONE (OUTPATIENT)
Dept: CARDIOLOGY | Facility: CLINIC | Age: 58
End: 2023-12-15

## 2023-12-15 NOTE — TELEPHONE ENCOUNTER
----- Message from Melody Duffy sent at 12/15/2023 11:53 AM CST -----  Type: Need Medical Advice  Who Called:Patient  Best callback number: [phone]   Additional Information: Patient called to reschedule her appointment   Please call to further assist, Thanks.

## 2023-12-18 DIAGNOSIS — J45.909 ASTHMA: Primary | ICD-10-CM

## 2023-12-18 DIAGNOSIS — I25.119 ATHEROSCLEROSIS OF NATIVE CORONARY ARTERY WITH ANGINA PECTORIS, UNSPECIFIED WHETHER NATIVE OR TRANSPLANTED HEART: ICD-10-CM

## 2023-12-18 DIAGNOSIS — G25.81 RESTLESS LEG: ICD-10-CM

## 2023-12-18 RX ORDER — ATORVASTATIN CALCIUM 40 MG/1
40 TABLET, FILM COATED ORAL DAILY
Qty: 90 TABLET | Refills: 0 | Status: ON HOLD | OUTPATIENT
Start: 2023-12-18 | End: 2024-02-28

## 2023-12-18 RX ORDER — MIDODRINE HYDROCHLORIDE 10 MG/1
10 TABLET ORAL EVERY 12 HOURS
Qty: 180 TABLET | Refills: 0 | Status: SHIPPED | OUTPATIENT
Start: 2023-12-18 | End: 2024-02-06

## 2023-12-18 RX ORDER — TORSEMIDE 100 MG/1
100 TABLET ORAL 2 TIMES DAILY
Qty: 90 TABLET | Refills: 0 | Status: ON HOLD | OUTPATIENT
Start: 2023-12-18 | End: 2024-02-28

## 2023-12-18 RX ORDER — CLOPIDOGREL BISULFATE 75 MG/1
75 TABLET ORAL DAILY
Qty: 90 TABLET | Refills: 0 | Status: ON HOLD | OUTPATIENT
Start: 2023-12-18 | End: 2024-02-12

## 2023-12-18 RX ORDER — ROPINIROLE 2 MG/1
2 TABLET, FILM COATED ORAL 3 TIMES DAILY
Qty: 90 TABLET | Refills: 0 | Status: ON HOLD | OUTPATIENT
Start: 2023-12-18 | End: 2024-02-28

## 2023-12-18 RX ORDER — FLUTICASONE PROPIONATE 50 MCG
2 SPRAY, SUSPENSION (ML) NASAL DAILY
Qty: 16 G | Refills: 1 | Status: ON HOLD | OUTPATIENT
Start: 2023-12-18 | End: 2024-02-28

## 2023-12-19 ENCOUNTER — TELEPHONE (OUTPATIENT)
Dept: PHYSICAL MEDICINE AND REHAB | Facility: CLINIC | Age: 58
End: 2023-12-19

## 2023-12-19 NOTE — TELEPHONE ENCOUNTER
LVM to schedule pt for apt.    ----- Message from Luz Torres MA sent at 12/15/2023  3:59 PM CST -----    ----- Message -----  From: Rosa Paris  Sent: 12/15/2023  12:06 PM CST  To: [facility] Staff    Type:  Appointment Request    Caller is requesting an appointment.      Name of Caller:  Pt    Symptoms:  M54.32 (ICD-10-CM) - Sciatica of left side    Would the patient rather a call back or a response via [facility]?  Call back    Best Call Back Number:  [phone]    Additional Information:  Pt has a referral.    Please call back to advise. Thanks!

## 2023-12-19 NOTE — TELEPHONE ENCOUNTER
Spoke with pt and was able to get her scheduled     ----- Message from Jennifer Calvin sent at 12/19/2023 12:32 PM CST -----  Hello,    I have pt being referred for physical Medicine and Rehab .  I have scanned the referral /records in to media mgr within Epic. Please review and contact for scheduling,thanks!           Jennifer Goodson

## 2023-12-22 ENCOUNTER — TELEPHONE (OUTPATIENT)
Dept: FAMILY MEDICINE | Facility: CLINIC | Age: 58
End: 2023-12-22

## 2023-12-22 NOTE — TELEPHONE ENCOUNTER
----- Message from Penny Corbett sent at 12/22/2023  2:53 PM CST -----  Contact: self  Type:  Sooner Appointment Request    Caller is requesting a sooner appointment.  Caller declined first available appointment listed below.  Caller will not accept being placed on the waitlist and is requesting a message be sent to doctor.    Name of Caller:  pt  When is the first available appointment?  N/a  Symptoms:  n/a  Would the patient rather a call back or a response via [facility]? call  Best Call Back Number:  [phone]   Additional Information:  please call pt would like to establish with your office

## 2023-12-22 NOTE — TELEPHONE ENCOUNTER
Patient informed no new patient appt come with DR. Castellon. DR. Castellon is no taking Medicaid patient at the moment

## 2024-01-05 DIAGNOSIS — D50.8 IRON DEFICIENCY ANEMIA SECONDARY TO INADEQUATE DIETARY IRON INTAKE: Primary | ICD-10-CM

## 2024-01-05 RX ORDER — FERRIC CITRATE 210 MG/1
420 TABLET, COATED ORAL 3 TIMES DAILY
Qty: 540 TABLET | Refills: 3 | Status: SHIPPED | OUTPATIENT
Start: 2024-01-05

## 2024-01-05 RX ORDER — SEVELAMER CARBONATE 800 MG/1
4000 TABLET, FILM COATED ORAL
Qty: 1350 TABLET | Refills: 3 | Status: SHIPPED | OUTPATIENT
Start: 2024-01-05

## 2024-01-11 ENCOUNTER — TELEPHONE (OUTPATIENT)
Dept: FAMILY MEDICINE | Facility: CLINIC | Age: 59
End: 2024-01-11
Payer: MEDICARE

## 2024-01-26 ENCOUNTER — TELEPHONE (OUTPATIENT)
Dept: FAMILY MEDICINE | Facility: CLINIC | Age: 59
End: 2024-01-26
Payer: MEDICARE

## 2024-01-26 ENCOUNTER — HOSPITAL ENCOUNTER (OUTPATIENT)
Dept: RADIOLOGY | Facility: HOSPITAL | Age: 59
Discharge: HOME | End: 2024-01-26
Attending: FAMILY MEDICINE
Payer: MEDICARE

## 2024-01-26 DIAGNOSIS — Z12.31 SCREENING MAMMOGRAM FOR BREAST CANCER: ICD-10-CM

## 2024-01-26 PROCEDURE — 77067 SCR MAMMO BI INCL CAD: CPT | Mod: TC,PO

## 2024-01-26 NOTE — TELEPHONE ENCOUNTER
Called patient's insurance co.  Did a verbal PA for Gayle.  Insurance co. Will fax a message today.

## 2024-01-29 NOTE — PROGRESS NOTES
Subjective:       Patient ID: Eugenia Manuel is a 58 y.o. female.    Chief Complaint: Cough and Shortness of Breath    Patient is a new patient to me and established patient of Dr. Castro. Patient presents today with wheezing, cough, and shortness of breath. Patient has a history of asthma and recently moved to the area. She is not established with pulmonology.  Her symptoms have worsened over the last 3 weeks.   Patient is obese with a BMI of 35.55 and has multiple co-morbidities.       Cough  This is a new problem. The current episode started 1 to 4 weeks ago. The problem has been waxing and waning. The problem occurs every few minutes. The cough is Non-productive. Associated symptoms include nasal congestion, postnasal drip, rhinorrhea, shortness of breath and wheezing. Pertinent negatives include no chest pain, ear congestion, ear pain, headaches, heartburn or rash. The symptoms are aggravated by stress. She has tried rest and body position changes for the symptoms. The treatment provided moderate relief. Her past medical history is significant for asthma.   Wheezing   This is a new problem. The current episode started 1 to 4 weeks ago. The problem occurs daily. The problem has been waxing and waning. Associated symptoms include coughing, rhinorrhea and shortness of breath. Pertinent negatives include no abdominal pain, chest pain, ear pain, headaches or rash. The symptoms are aggravated by any activity. She has tried rest and beta agonist inhalers for the symptoms. The treatment provided mild relief. Her past medical history is significant for asthma.     Review of Systems   Constitutional:  Positive for activity change and fatigue. Negative for diaphoresis and unexpected weight change.        Obesity   HENT:  Positive for postnasal drip and rhinorrhea. Negative for ear pain and trouble swallowing.    Eyes:  Negative for pain, discharge and itching.   Respiratory:  Positive for cough, shortness of breath and  wheezing.    Cardiovascular:  Negative for chest pain.   Gastrointestinal:  Negative for abdominal distention, abdominal pain and heartburn.   Endocrine: Negative for cold intolerance and heat intolerance.   Genitourinary:  Negative for dysuria, enuresis, flank pain and frequency.   Musculoskeletal:  Negative for gait problem and joint swelling.   Skin:  Negative for color change and rash.   Neurological:  Negative for headaches.   Hematological:  Negative for adenopathy.   Psychiatric/Behavioral:  Negative for behavioral problems, dysphoric mood and sleep disturbance. The patient is not nervous/anxious.        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 No pertinent family history.    Social History     Socioeconomic History    Marital status: Single   Tobacco Use    Smoking status: Never    Smokeless tobacco: Never   Substance and Sexual Activity    Alcohol use: Never    Drug use: Never       Current Outpatient Medications   Medication Sig Dispense Refill    albuterol (PROVENTIL/VENTOLIN  HFA) 90 mcg/actuation inhaler Inhale into the lungs.      aspirin 81 MG Chew Take 81 mg by mouth once daily.      atorvastatin (LIPITOR) 40 MG tablet Take 1 tablet (40 mg total) by mouth once daily. 90 tablet 0    cetirizine (ZYRTEC) 10 MG tablet Take 1 tablet (10 mg total) by mouth once daily. 90 tablet 3    clopidogreL (PLAVIX) 75 mg tablet Take 1 tablet (75 mg total) by mouth once daily. 90 tablet 0    ferric citrate (AURYXIA) 210 mg iron Tab Take 420 mg by mouth 3 (three) times daily. 540 tablet 3    fluticasone propionate (FLONASE) 50 mcg/actuation nasal spray 2 sprays (100 mcg total) by Each Nostril route once daily. 16 g 1    gabapentin (NEURONTIN) 300 MG capsule Take 1 capsule (300 mg total) by mouth 3 (three) times daily. 270 capsule 1    levothyroxine (SYNTHROID) 150 MCG tablet Take 1 tablet (150 mcg total) by mouth before breakfast. 90 tablet 3    melatonin 10 mg Cap Take by mouth.      midodrine (PROAMATINE) 10 MG tablet Take 1 tablet (10 mg total) by mouth every 12 (twelve) hours. 180 tablet 0    pantoprazole (PROTONIX) 40 MG tablet Take 1 tablet (40 mg total) by mouth once daily. 90 tablet 1    pramipexole (MIRAPEX) 0.5 MG tablet Take 0.5 mg by mouth 2 (two) times daily.      QUEtiapine (SEROQUEL) 100 MG Tab Take 100 mg by mouth every evening.      QUEtiapine (SEROQUEL) 25 MG Tab Take 1 tablet (25 mg total) by mouth every evening. 90 tablet 1    rOPINIRole (REQUIP) 2 MG tablet Take 1 tablet (2 mg total) by mouth 3 (three) times daily. 90 tablet 0    senna-docusate 8.6-50 mg (PERICOLACE) 8.6-50 mg per tablet Take 1 tablet by mouth daily as needed.      sevelamer carbonate (RENVELA) 800 mg Tab Take 5 tablets (4,000 mg total) by mouth 3 (three) times daily with meals. 1350 tablet 3    SYMBICORT 160-4.5 mcg/actuation HFAA Inhale into the lungs.      torsemide (DEMADEX) 100 MG Tab Take 1 tablet (100 mg total) by mouth 2 (two) times a day. 90 tablet 0    venlafaxine (EFFEXOR-XR) 150 MG Cp24 Take 1 capsule  "(150 mg total) by mouth once daily. 90 capsule 3    azelastine (ASTELIN) 137 mcg (0.1 %) nasal spray 1 spray (137 mcg total) by Nasal route 2 (two) times daily. 30 mL 2    doxycycline (VIBRA-TABS) 100 MG tablet Take 1 tablet (100 mg total) by mouth 2 (two) times daily. 20 tablet 0    promethazine-dextromethorphan (PROMETHAZINE-DM) 6.25-15 mg/5 mL Syrp Take 5 mLs by mouth nightly as needed (cough). 118 mL 0    QUEtiapine (SEROQUEL) 100 MG Tab Take 1 tablet (100 mg total) by mouth once daily. (Patient not taking: Reported on 1/30/2024) 30 tablet 5     No current facility-administered medications for this visit.       Review of patient's allergies indicates:   Allergen Reactions    Lisinopril Anaphylaxis     Objective:      Blood pressure 134/70, pulse 83, temperature 99.1 °F (37.3 °C), height 5' 7" (1.702 m), weight 103 kg (227 lb), SpO2 (!) 92 %. Body mass index is 35.55 kg/m².   Physical Exam  Constitutional:       General: She is not in acute distress.     Appearance: Normal appearance. She is obese. She is not ill-appearing.   HENT:      Right Ear: External ear normal. A middle ear effusion is present. Tympanic membrane is bulging.      Left Ear: External ear normal. A middle ear effusion is present. Tympanic membrane is bulging.      Nose: Rhinorrhea present. No congestion. Rhinorrhea is clear.      Left Turbinates: Swollen.      Right Sinus: Maxillary sinus tenderness and frontal sinus tenderness present.      Left Sinus: Maxillary sinus tenderness and frontal sinus tenderness present.      Mouth/Throat:      Mouth: Mucous membranes are moist.      Pharynx: Oropharyngeal exudate present. No pharyngeal swelling, posterior oropharyngeal erythema or uvula swelling.   Eyes:      General:         Right eye: No discharge.         Left eye: No discharge.      Extraocular Movements: Extraocular movements intact.      Conjunctiva/sclera: Conjunctivae normal.   Cardiovascular:      Heart sounds: Normal heart sounds. "   Pulmonary:      Effort: Pulmonary effort is normal. No respiratory distress.      Breath sounds: No stridor. Wheezing present. No rhonchi or rales.      Comments: End exp wheezing throughout   Chest:      Chest wall: No tenderness.   Abdominal:      General: Bowel sounds are normal. There is no distension.      Palpations: Abdomen is soft.   Musculoskeletal:         General: No swelling or signs of injury.      Cervical back: Normal range of motion.   Skin:     Coloration: Skin is not jaundiced or pale.      Findings: No bruising or erythema.   Neurological:      Mental Status: She is alert. Mental status is at baseline.             Assessment:       1. Acute cough    2. Moderate persistent asthma with status asthmaticus    3. Acute maxillary sinusitis, recurrence not specified        Plan:       Eugenia was seen today for cough and shortness of breath.    Diagnoses and all orders for this visit:    Acute cough  -     POCT COVID-19 Rapid Screening (neg)  -     POCT Influenza A/B Molecular (neg)  -     promethazine-dextromethorphan (PROMETHAZINE-DM) 6.25-15 mg/5 mL Syrp; Take 5 mLs by mouth nightly as needed (cough).    Moderate persistent asthma with status asthmaticus  -     Ambulatory referral/consult to Pulmonology; Future    Acute maxillary sinusitis, recurrence not specified  -     doxycycline (VIBRA-TABS) 100 MG tablet; Take 1 tablet (100 mg total) by mouth 2 (two) times daily.  -     azelastine (ASTELIN) 137 mcg (0.1 %) nasal spray; 1 spray (137 mcg total) by Nasal route 2 (two) times daily.

## 2024-01-30 ENCOUNTER — OFFICE VISIT (OUTPATIENT)
Dept: FAMILY MEDICINE | Facility: CLINIC | Age: 59
End: 2024-01-30
Payer: MEDICARE

## 2024-01-30 VITALS
SYSTOLIC BLOOD PRESSURE: 134 MMHG | TEMPERATURE: 99 F | HEIGHT: 67 IN | OXYGEN SATURATION: 92 % | BODY MASS INDEX: 35.63 KG/M2 | HEART RATE: 83 BPM | DIASTOLIC BLOOD PRESSURE: 70 MMHG | WEIGHT: 227 LBS

## 2024-01-30 DIAGNOSIS — J01.00 ACUTE MAXILLARY SINUSITIS, RECURRENCE NOT SPECIFIED: ICD-10-CM

## 2024-01-30 DIAGNOSIS — R05.1 ACUTE COUGH: Primary | ICD-10-CM

## 2024-01-30 DIAGNOSIS — J45.42 MODERATE PERSISTENT ASTHMA WITH STATUS ASTHMATICUS: ICD-10-CM

## 2024-01-30 LAB
CTP QC/QA: YES
CTP QC/QA: YES
POC MOLECULAR INFLUENZA A AGN: NEGATIVE
POC MOLECULAR INFLUENZA B AGN: NEGATIVE
SARS-COV-2 RDRP RESP QL NAA+PROBE: NEGATIVE

## 2024-01-30 PROCEDURE — 99215 OFFICE O/P EST HI 40 MIN: CPT | Mod: PBBFAC,PN | Performed by: NURSE PRACTITIONER

## 2024-01-30 PROCEDURE — 87502 INFLUENZA DNA AMP PROBE: CPT | Mod: PBBFAC,PN | Performed by: NURSE PRACTITIONER

## 2024-01-30 PROCEDURE — 87502 INFLUENZA DNA AMP PROBE: CPT | Mod: QW,S$GLB,, | Performed by: NURSE PRACTITIONER

## 2024-01-30 PROCEDURE — 99213 OFFICE O/P EST LOW 20 MIN: CPT | Mod: S$GLB,,, | Performed by: NURSE PRACTITIONER

## 2024-01-30 PROCEDURE — 99999 PR PBB SHADOW E&M-EST. PATIENT-LVL V: CPT | Mod: PBBFAC,,, | Performed by: NURSE PRACTITIONER

## 2024-01-30 PROCEDURE — 87635 SARS-COV-2 COVID-19 AMP PRB: CPT | Mod: PBBFAC,PN | Performed by: NURSE PRACTITIONER

## 2024-01-30 PROCEDURE — 87635 SARS-COV-2 COVID-19 AMP PRB: CPT | Mod: QW,S$GLB,, | Performed by: NURSE PRACTITIONER

## 2024-01-30 RX ORDER — PRAMIPEXOLE DIHYDROCHLORIDE 0.5 MG/1
0.5 TABLET ORAL 2 TIMES DAILY
Status: ON HOLD | COMMUNITY
Start: 2024-01-01 | End: 2024-02-28

## 2024-01-30 RX ORDER — PROMETHAZINE HYDROCHLORIDE AND DEXTROMETHORPHAN HYDROBROMIDE 6.25; 15 MG/5ML; MG/5ML
5 SYRUP ORAL NIGHTLY PRN
Qty: 118 ML | Refills: 0 | Status: SHIPPED | OUTPATIENT
Start: 2024-01-30 | End: 2024-02-27

## 2024-01-30 RX ORDER — DOXYCYCLINE HYCLATE 100 MG
100 TABLET ORAL 2 TIMES DAILY
Qty: 20 TABLET | Refills: 0 | Status: SHIPPED | OUTPATIENT
Start: 2024-01-30 | End: 2024-02-27

## 2024-01-30 RX ORDER — AZELASTINE 1 MG/ML
1 SPRAY, METERED NASAL 2 TIMES DAILY
Qty: 30 ML | Refills: 2 | Status: ON HOLD | OUTPATIENT
Start: 2024-01-30 | End: 2024-02-28

## 2024-01-31 ENCOUNTER — HOSPITAL ENCOUNTER (EMERGENCY)
Facility: HOSPITAL | Age: 59
Discharge: HOME | End: 2024-01-31
Attending: STUDENT IN AN ORGANIZED HEALTH CARE EDUCATION/TRAINING PROGRAM
Payer: MEDICARE

## 2024-01-31 VITALS
HEART RATE: 80 BPM | RESPIRATION RATE: 18 BRPM | HEIGHT: 67 IN | SYSTOLIC BLOOD PRESSURE: 165 MMHG | DIASTOLIC BLOOD PRESSURE: 80 MMHG | OXYGEN SATURATION: 94 % | TEMPERATURE: 98 F | WEIGHT: 200 LBS | BODY MASS INDEX: 31.39 KG/M2
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
## 2024-01-31 DIAGNOSIS — J45.909 ASTHMA, UNSPECIFIED ASTHMA SEVERITY, UNSPECIFIED WHETHER COMPLICATED, UNSPECIFIED WHETHER PERSISTENT: Primary | ICD-10-CM

## 2024-01-31 DIAGNOSIS — S69.90XA WRIST INJURY: ICD-10-CM

## 2024-01-31 DIAGNOSIS — S59.909A ELBOW INJURY: ICD-10-CM

## 2024-01-31 PROCEDURE — 99285 EMERGENCY DEPT VISIT HI MDM: CPT | Mod: 25

## 2024-01-31 PROCEDURE — 12001 RPR S/N/AX/GEN/TRNK 2.5CM/<: CPT

## 2024-01-31 PROCEDURE — 25000003 PHARM REV CODE 250

## 2024-01-31 PROCEDURE — 29125 APPL SHORT ARM SPLINT STATIC: CPT | Mod: RT

## 2024-01-31 RX ORDER — LIDOCAINE HYDROCHLORIDE 10 MG/ML
10 INJECTION, SOLUTION EPIDURAL; INFILTRATION; INTRACAUDAL; PERINEURAL
Status: DISCONTINUED | OUTPATIENT
Start: 2024-01-31 | End: 2024-01-31

## 2024-01-31 RX ORDER — BUDESONIDE AND FORMOTEROL FUMARATE DIHYDRATE 160; 4.5 UG/1; UG/1
1 AEROSOL RESPIRATORY (INHALATION) EVERY 12 HOURS
Qty: 9 G | Refills: 11 | Status: SHIPPED | OUTPATIENT
Start: 2024-01-31 | End: 2024-02-06

## 2024-01-31 RX ORDER — IBUPROFEN 400 MG/1
800 TABLET ORAL
Status: COMPLETED | OUTPATIENT
Start: 2024-01-31 | End: 2024-01-31

## 2024-01-31 RX ADMIN — IBUPROFEN 800 MG: 400 TABLET, FILM COATED ORAL at 09:01

## 2024-01-31 NOTE — Clinical Note
"Eugenia Osborngretchen Manuel was seen and treated in our emergency department on 1/31/2024.  She may return to work on 02/05/2024.       If you have any questions or concerns, please don't hesitate to call.      Gabby Nath NP"

## 2024-02-01 NOTE — DISCHARGE INSTRUCTIONS
Please follow up Orthopedics, Neurosurgery, and your primary care provider.  You need to schedule an outpatient appointment with your primary care provider to follow up on these results: Partially visualized streaky opacities in the right lung apex are nonspecific and could represent aspiration, pneumonia, or interstitial lung disease among numerous possibilities. Consider a follow-up chest CT for further assessment, as this finding extends inferior to the visualized field-of-view.   You also and he was to follow up with your primary Dr. about the incidental finding: Severe atherosclerosis of the aorta.    Please return to the ED for worsening pain, chest pain, shortness breath, difficulty breathing, fever, worsening headaches, lightheaded dizziness, passing out, worsening elbow pain, redness swelling and warmth of the elbow, or any new or worsening concerns.

## 2024-02-01 NOTE — ED PROVIDER NOTES
Encounter Date: 1/31/2024       History     Chief Complaint   Patient presents with    Arm Injury     Ground level trip and fall 30 minutes pta, fell onto left elbow, skin tear present, deformity noted to elbow     Patient is a 58 y.o. female with past medical history of hypertension, hypothyroidism, chronic kidney disease, dialysis patient, previous cervical surgeries, and fibromyalgia who presents to ED via self for concern for trip and fall which began approximately 30 minutes prior to arrival.  Patient reports she was walking when she tripped over a curb and fell onto her left side.  Patient reports her elbow caught most event and she was swelling and pain to the left elbow.  Patient reports she was has pain in the right wrist from trying to catch herself.  Patient endorses that she does not know she hit her head or but reports a headache.  Patient denies loss of consciousness.  Patient reporting back pain after the fall.  Patient reports she was currently on doxycycline for a sinus infection in his had a cough x3 weeks.  Patient denies fever.  Patient was chest pain or shortness of breath.  Patient reports she was to dialysis every Monday Wednesday Friday.  Patient declines wanting any narcotic pain medication.  Patient reports she takes Tylenol and ibuprofen as needed for pain.  Patient is awake and alert in no acute distress.      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  Social History     Tobacco Use    Smoking status: Never    Smokeless tobacco: Never   Substance Use Topics    Alcohol use: Never    Drug use: Never     Review of Systems   Constitutional: Negative.  Negative for fever.   Respiratory: Negative.  Negative for shortness of breath.    Cardiovascular:  Negative for chest pain.   Gastrointestinal: Negative.  Negative for abdominal pain, nausea and vomiting.   Genitourinary: Negative.    Musculoskeletal:  Positive for back pain, joint swelling (left elbow) and neck pain. Negative for neck stiffness.        Right wrist pain   Skin:  Positive for wound. Negative for color change, pallor and rash.   Neurological:  Positive for headaches. Negative for dizziness, seizures, syncope, facial asymmetry, speech difficulty, weakness, light-headedness and numbness.   Hematological:  Does not bruise/bleed easily.   Psychiatric/Behavioral: Negative.         Physical Exam     Initial Vitals [01/31/24 1933]   BP Pulse Resp Temp SpO2   (!) 142/102 91 16 98.3 °F (36.8 °C) (!) 94 %      MAP       --         Physical Exam    Nursing note and vitals reviewed.  Constitutional: She appears well-developed and well-nourished. She is not diaphoretic.  Non-toxic appearance. She does not have a sickly appearance. She does not appear ill. No distress.   HENT:   Head: Normocephalic and atraumatic.   Right Ear: External ear normal.   Left Ear: External ear normal.   Nose: Nose normal.   Eyes: Conjunctivae and EOM  are normal.   Neck: Trachea normal. Neck supple. There are no signs of injury. No crepitus.   Normal range of motion.  Cardiovascular:  Normal rate, regular rhythm, normal heart sounds and intact distal pulses.     Exam reveals no gallop and no friction rub.       No murmur heard.  Pulmonary/Chest: Breath sounds normal. No respiratory distress. She has no wheezes. She has no rhonchi. She has no rales. She exhibits no tenderness.   Musculoskeletal:      Left elbow: Swelling present. No deformity, effusion or lacerations. Decreased range of motion. Tenderness present in radial head.      Right wrist: Tenderness, bony tenderness and snuff box tenderness present. No swelling, deformity, effusion, lacerations or crepitus. Decreased range of motion. Normal pulse.      Left wrist: Normal.      Right hand: Normal.      Left hand: Normal.        Arms:       Cervical back: Normal range of motion and neck supple. Bony tenderness present. No swelling, edema, deformity, erythema, signs of trauma, rigidity, spasms, torticollis, tenderness or crepitus. Pain with movement and spinous process tenderness present. Normal range of motion.      Thoracic back: Bony tenderness present. No swelling, edema, deformity, signs of trauma, lacerations, spasms or tenderness. Normal range of motion.      Lumbar back: Bony tenderness present. No swelling, edema, deformity, signs of trauma, lacerations, spasms or tenderness. Normal range of motion.     Neurological: She is alert and oriented to person, place, and time. She has normal strength. She is not disoriented. Coordination and gait normal. GCS score is 15. GCS eye subscore is 4. GCS verbal subscore is 5. GCS motor subscore is 6.   Skin: Skin is warm and dry. Capillary refill takes less than 2 seconds.   Psychiatric: She has a normal mood and affect. Her behavior is normal. Judgment and thought content normal.         ED Course   Lac Repair    Date/Time: 2/1/2024 12:45 AM    Performed by:  Gabby Nath NP  Authorized by: Sarah Sanchez MD    Consent:     Consent obtained:  Verbal    Consent given by:  Patient    Risks discussed:  Infection, pain, poor cosmetic result and retained foreign body    Alternatives discussed:  No treatment  Universal protocol:     Procedure explained and questions answered to patient or proxy's satisfaction: yes      Patient identity confirmed:  Verbally with patient  Anesthesia:     Anesthesia method:  None  Laceration details:     Location:  Shoulder/arm    Shoulder/arm location:  L lower arm    Length (cm):  2  Pre-procedure details:     Preparation:  Patient was prepped and draped in usual sterile fashion  Exploration:     Hemostasis achieved with:  Direct pressure    Imaging obtained: x-ray      Wound exploration: wound explored through full range of motion and entire depth of wound visualized      Wound extent: no foreign bodies/material noted    Treatment:     Area cleansed with:  Povidone-iodine and saline    Amount of cleaning:  Standard    Irrigation solution:  Sterile saline    Irrigation method:  Syringe  Skin repair:     Repair method:  Steri-Strips    Number of Steri-Strips:  3  Approximation:     Approximation:  Close  Repair type:     Repair type:  Simple  Post-procedure details:     Dressing:  Open (no dressing)    Procedure completion:  Tolerated well, no immediate complications    Labs Reviewed - No data to display       Imaging Results              X-Ray Thoracic Spine AP And Lateral (Final result)  Result time 01/31/24 22:34:41      Final result by Rajani Riggs DO (01/31/24 22:34:41)                   Narrative:    EXAM:  XR Thoracic Spine, 3 Views    CLINICAL HISTORY:  Arm Injury (Ground level trip and fall 30 minutes pta, fell onto left elbow, skin tear present, deformity noted to elbow).    TECHNIQUE:  Frontal, lateral and swimmer's views of the thoracic spine.    COMPARISON:  No relevant prior studies available.    FINDINGS:  Vertebrae:   No acute fracture. No traumatic subluxation. Mineralization appears decreased.  Disc spaces:  Mild to moderate multilevel disc space narrowing with small osteophytes. Cervical spine hardware partially visible.  Soft tissues:  Right upper quadrant surgical clips.    IMPRESSION:  No acute osseous abnormality of the thoracic spine is radiographically apparent.    Electronically signed by:  Trisha Celis MD  01/31/2024 10:34 PM CST Workstation: [id number]                                     X-Ray Lumbar Spine 5 View (Final result)  Result time 01/31/24 22:35:53   Procedure changed from X-Ray Lumbar Spine Ap And Lateral     Final result by Steven Greer MD (01/31/24 22:35:53)                   Narrative:    XR LUMBAR SPINE 4 OR MORE VIEWS    INDICATION: 58 years Female; low back pain    Technique: 4 views of the lumbar spine.    COMPARISON: None.    FINDINGS:  Bones: Normal alignment of the lumbar spine. No acute fracture or subluxation. Severe disc height loss at L4-5 and L5-S1 with endplate sclerosis and endplate spurring. Facet arthropathy at L4-S1. Moderate disc height loss at L3-4.  Soft tissues: Unremarkable.  Incidental findings: Severe atherosclerosis of the aorta.    IMPRESSION:  1.  No acute fracture or subluxation.  2.  Severe degenerative disc disease at L4-5 and L5-S1.    Electronically signed by:  Steven Greer MD  01/31/2024 10:35 PM CST Workstation: [id number]                                     X-Ray Chest PA And Lateral (Final result)  Result time 01/31/24 22:34:25      Final result by Braden Tran MD (01/31/24 22:34:25)                   Narrative:    EXAM DESCRIPTION:    XR CHEST PA AND LATERAL    CLINICAL HISTORY:    58 years  Female; ; Arm Injury (Ground level trip and fall 30 minutes pta, fell onto left elbow, skin tear present, deformity noted to elbow)    COMPARISON:    None    TECHNIQUE:    PA and Lateral views of the chest    FINDINGS:    No support devices visualized    Cardiac silhouette is  within normal limits.    There is no consolidation or pleural disease.    There is no acute osseous process visualized.    IMPRESSION:    No Acute Cardiopulmonary Abnormality      Electronically signed by:  Braden Tran MD  01/31/2024 10:34 PM CST Workstation: [id number]                                     X-Ray Wrist Complete Right (Final result)  Result time 01/31/24 20:46:08      Final result by Steven Greer MD (01/31/24 20:46:08)                   Narrative:    XR WRIST 3 OR MORE VIEWS RIGHT    INDICATION: 58 years Female; Arm Injury    Technique: 4 views of the right wrist.    COMPARISON: None.    FINDINGS:  Bones: No acute fracture or subluxation or significant degenerative changes.  Soft tissues: Unremarkable.  Incidental findings: None.    IMPRESSION:  No acute fracture or subluxation.    Electronically signed by:  Steven Greer MD  01/31/2024 08:46 PM CST Workstation: [id number]                                     X-Ray Elbow Complete Left (Final result)  Result time 01/31/24 20:48:02      Final result by Noe Salas MD (01/31/24 20:48:02)                   Narrative:    EXAM DESCRIPTION:  XR ELBOW COMPLETE 3 VIEW LEFT    CLINICAL HISTORY:  Arm injury, fall.;    COMPARISON:  None.    FINDINGS:  No acute fracture or dislocation. No evidence of an elbow joint effusion. No destructive osseous lesion. Soft tissue swelling posterior to the olecranon.    IMPRESSION:  Soft tissue swelling posterior to the olecranon. No acute osseous findings.    Electronically signed by:  Noe Salas MD  01/31/2024 08:48 PM CST Workstation: [id number]                                     CT Cervical Spine Without Contrast (Final result)  Result time 01/31/24 20:37:55      Final result by Felix Medrano MD (01/31/24 20:37:55)                   Narrative:    CT Head  CT Cervical Spine    INDICATION: Head trauma, neck pain, fall    COMPARISON: None    TECHNIQUE: CT images of the head and cervical spine were obtained  without IV contrast.    This exam was performed according to our departmental dose-optimization program which includes use of Automated Exposure Control, adjustment of the mA and/or kV according to patient size and/or use of iterative reconstruction technique.    FINDINGS:    CT Head:    There is no evidence of an acute intracranial hemorrhage.    There is a chronic lacunar infarct in the left periventricular region on series 3 image 31 measuring approximately 1 cm with associated ex vacuo dilatation of the adjacent left lateral ventricle.    There are confluent and scattered periventricular and subcortical hypodensities, which are nonspecific but can represent chronic microangiopathy among numerous possibilities.    There is a cutaneous nodule measuring 3 mm in the left face on series 3 image 3.    There is no midline shift.    The visualized paranasal sinuses and mastoid air cells are well-aerated.    CT Cervical Spine:    The exam is significantly limited due to includes observation artifact in the mid to lower cervical spine and, to a lesser extent, motion artifact and streak artifact.    Alignment: There is mild retrolisthesis of C3 on C4. This is age indeterminate in the absence of priors.    Bones: There is postsurgical change from anterior cervical discectomy and fusion at C3-C4. One of the interbody grafts extends to the anterior most aspect of the C3-C4 intervertebral region, series 6 image 33.    No gross evidence of an acute cervical spine fracture, within the significant limitations of the exam. There is multilevel degenerative disc disease manifest as disc space narrowing, disc osteophyte complexes, and endplate changes. Additionally, there is apparent fusion of the anterior longitudinal ligament in the mid cervical spine.    There is incomplete fusion of the posterior arch of C1, a normal anatomic variant.    Soft tissues: The visualized neck soft tissues are unremarkable.    Upper thorax: There are  streaky opacities in the right lung apex which are only completely visualized.    IMPRESSION:      1. CT cervical spine is significantly limited by numerous artifacts. There is age indeterminate mild retrolisthesis of C3 on C4. Additionally, one of the interbody grafts extends to the anterior most aspect of the C3-C4 intervertebral region. It is difficult to entirely exclude early or impending hardware failure. Please correlate with prior imaging at an outside facility to assess for acuity versus chronicity, as well as with point tenderness.    2. Partially visualized streaky opacities in the right lung apex are nonspecific and could represent aspiration, pneumonia, or interstitial lung disease among numerous possibilities. Consider a follow-up chest CT for further assessment, as this finding extends inferior to the visualized field-of-view. Additionally, please correlate with any prior outside imaging, if it exists    3. No evidence of an acute intracranial hemorrhage.    4. Confluent and scattered periventricular and subcortical hypodensities are nonspecific but can represent chronic microangiopathy among numerous possible etiologies    5. Cutaneous nodule measuring 3 mm in the left face, please correlate with physical examination    Electronically signed by:  Felix Medrano MD  01/31/2024 08:37 PM CST Workstation: [id number]                                     CT Head Without Contrast (Final result)  Result time 01/31/24 20:37:55      Final result by Felix Medrano MD (01/31/24 20:37:55)                   Narrative:    CT Head  CT Cervical Spine    INDICATION: Head trauma, neck pain, fall    COMPARISON: None    TECHNIQUE: CT images of the head and cervical spine were obtained without IV contrast.    This exam was performed according to our departmental dose-optimization program which includes use of Automated Exposure Control, adjustment of the mA and/or kV according to patient size and/or use of iterative  reconstruction technique.    FINDINGS:    CT Head:    There is no evidence of an acute intracranial hemorrhage.    There is a chronic lacunar infarct in the left periventricular region on series 3 image 31 measuring approximately 1 cm with associated ex vacuo dilatation of the adjacent left lateral ventricle.    There are confluent and scattered periventricular and subcortical hypodensities, which are nonspecific but can represent chronic microangiopathy among numerous possibilities.    There is a cutaneous nodule measuring 3 mm in the left face on series 3 image 3.    There is no midline shift.    The visualized paranasal sinuses and mastoid air cells are well-aerated.    CT Cervical Spine:    The exam is significantly limited due to includes observation artifact in the mid to lower cervical spine and, to a lesser extent, motion artifact and streak artifact.    Alignment: There is mild retrolisthesis of C3 on C4. This is age indeterminate in the absence of priors.    Bones: There is postsurgical change from anterior cervical discectomy and fusion at C3-C4. One of the interbody grafts extends to the anterior most aspect of the C3-C4 intervertebral region, series 6 image 33.    No gross evidence of an acute cervical spine fracture, within the significant limitations of the exam. There is multilevel degenerative disc disease manifest as disc space narrowing, disc osteophyte complexes, and endplate changes. Additionally, there is apparent fusion of the anterior longitudinal ligament in the mid cervical spine.    There is incomplete fusion of the posterior arch of C1, a normal anatomic variant.    Soft tissues: The visualized neck soft tissues are unremarkable.    Upper thorax: There are streaky opacities in the right lung apex which are only completely visualized.    IMPRESSION:      1. CT cervical spine is significantly limited by numerous artifacts. There is age indeterminate mild retrolisthesis of C3 on C4.  Additionally, one of the interbody grafts extends to the anterior most aspect of the C3-C4 intervertebral region. It is difficult to entirely exclude early or impending hardware failure. Please correlate with prior imaging at an outside facility to assess for acuity versus chronicity, as well as with point tenderness.    2. Partially visualized streaky opacities in the right lung apex are nonspecific and could represent aspiration, pneumonia, or interstitial lung disease among numerous possibilities. Consider a follow-up chest CT for further assessment, as this finding extends inferior to the visualized field-of-view. Additionally, please correlate with any prior outside imaging, if it exists    3. No evidence of an acute intracranial hemorrhage.    4. Confluent and scattered periventricular and subcortical hypodensities are nonspecific but can represent chronic microangiopathy among numerous possible etiologies    5. Cutaneous nodule measuring 3 mm in the left face, please correlate with physical examination    Electronically signed by:  Felix Medrano MD  01/31/2024 08:37 PM CST Workstation: [id number]                                     Medications   ibuprofen tablet 800 mg (800 mg Oral Given 1/31/24 2107)     Medical Decision Making  MDM    Patient presents for emergent evaluation of acute mechanical fall that poses a possible threat to life and/or bodily function.    Differential diagnosis included but not limited to fracture, dislocation, sprain, intracranial bleed, skull fracture, spine subluxation, musculoskeletal pain, skin tear, laceration.  In the ED patient found to have acute clear lung sounds bilaterally increased work of breathing.  Patient was history of multiple cervical surgeries that took place in another state.  Patient has pain to palpation cervical spine.  Patient was limited range of motion which reports is her baseline in his not any worse than normal.  Patient reports she always has some  degree of neck pain.  Patient reports after the fall she had a brief episode of numbness and tingling of her right arm but reports this has resolved.  Patient was normal strength in bilateral upper extremities.  Patient has pain to palpation of the left elbow with moderate amount of swelling to the posterior elbow.  Patient has a superficial skin tear noted to the left forearm with no active bleeding.  Patient was normal distal radial pulse and normal cap refill and sensation was normal strength of the left arm.  Patient was limited range of motion of the left elbow due to pain.  Patient has pain to palpation of the right wrist and snuffbox tenderness.  There is no obvious swelling or signs of injury to the right wrist.  Patient has normal cap refill and sensation distally with normal strength of the right hand.  Patient reports headache and reports she was unsure if she hit her head.  Patient was awake and alert and oriented x3 and neuro intact in no acute distress. Patient has pain to palpation of lumbar spine and thoracic spine.  There is no obvious signs of injury noted to patient's back.  Patient able to get up and walk around without any significant difficulty.  Patient has normal gait.    Discharge MDM  I discussed the patient presentation labs, ekg, X-rays, CT findings with my attending Dr. Sanchez.   I discussed patient's CT cervical findings with Dr. Moffett (neurosurgery) who advised that it does not look like patient was having acute hardware failure in her cervical spine and that patient can follow up outpatient.  All incidental findings on patient's imaging discussed with the patient at length and discussed with her that she needs close follow up with her primary care provider, pulmonologist, and cardiologist.  Patient states understanding reports she was appointments with all of her providers later this month.  Patient was managed in the ED with oral ibuprofen x1 dose.  Patient offered Norco and  declined.  Discussed with the patient with her kidney function that she needs to be careful about taking Tylenol and Motrin at home.  Patient reports she only takes it when needed for severe pain and has discussed this with her physician.  Discussed with the patient that Tylenol would be better long-term than Motrin due to her underlying kidney disease.  Patient states understanding.    Patient's skin tear was copiously irrigated with Betadine and normal saline and Steri-Strips applied to the wound.  No obvious signs of foreign body noted within the wound.  I discussed the risk of small retained foreign patient states understanding.  Patient was placed in his sling and given Velcro thumb spica for her right wrist due to having snuffbox tenderness.  Discussed with the patient was aware this can follow up with Orthopedics.  Patient states understanding.  Discussed with the patient to take her elbow out of the sling and range her elbow multiple times a day.  Patient states understanding.  The response to treatment was good.    Patient was discharged in stable condition with close follow up.  Detailed return precautions discussed to return to the ED for worsening pain in his swelling of the joints, fever, worsening headaches, lightheaded dizziness, passing out, chest pain, shortness breath, worsening back pain, or any new or worsening concerns.  Patient states understanding.    NP uses Epic and voice recognition software prone to occasional and minor errors that may persist in the medical record.     Amount and/or Complexity of Data Reviewed  Radiology: ordered and independent interpretation performed.     Details: CT head and cervical spine significant for IMPRESSION: 1. CT cervical spine is significantly limited by numerous artifacts. There is age indeterminate mild retrolisthesis of C3 on C4. Additionally, one of the interbody grafts extends to the anterior most aspect of the C3-C4 intervertebral region. It is  difficult to entirely exclude early or impending hardware failure. Please correlate with prior imaging at an outside facility to assess for acuity versus chronicity, as well as with point tenderness. 2. Partially visualized streaky opacities in the right lung apex are nonspecific and could represent aspiration, pneumonia, or interstitial lung disease among numerous possibilities. Consider a follow-up chest CT for further assessment, as this finding extends inferior to the visualized field-of-view. Additionally, please correlate with any prior outside imaging, if it exists. 3. No evidence of an acute intracranial hemorrhage. 4. Confluent and scattered periventricular and subcortical hypodensities are nonspecific but can represent chronic microangiopathy among numerous possible etiologies. 5. Cutaneous nodule measuring 3 mm in the left face, please correlate with physical examination    X-ray left elbow significant for IMPRESSION: Soft tissue swelling posterior to the olecranon. No acute osseous findings.  X-ray right wrist significant for IMPRESSION: No acute fracture or subluxation.  X-ray chest significant for IMPRESSION: No Acute Cardiopulmonary Abnormality  X-ray lumbar spine IMPRESSION: 1.  No acute fracture or subluxation. 2.  Severe degenerative disc disease at L4-5 and L5-S1.  X-ray thoracic spine significant for IMPRESSION: No acute osseous abnormality of the thoracic spine is radiographically apparent.    Risk  OTC drugs.  Prescription drug management.                                      Clinical Impression:  Final diagnoses:  [S59.909A] Elbow injury  [S69.90XA] Wrist injury  [W19.XXXA] Fall, initial encounter (Primary)          ED Disposition Condition    Discharge Stable          ED Prescriptions    None       Follow-up Information       Follow up With Specialties Details Why Contact Info Additional Information    John Castro MD Family Medicine Schedule an appointment as soon as possible for a  visit  For recheck/continuing care [address]  [address]  [facility] [address]  [phone]       Noe Castillo MD Sports Medicine, Orthopedic Surgery Schedule an appointment as soon as possible for a visit  For recheck/continuing care [address] [place] [address] [address]  [facility] [address]  [phone]       Christie Moffett MD Neurosurgery Schedule an appointment as soon as possible for a visit  For recheck/continuing care [address] [facility] [address]  [phone]       [facility] - Emergency Dept Emergency Medicine  If symptoms worsen [address] [facility] [address]  [phone] 1st floor             Gabby Nath NP  02/01/24 0053       Gabby Nath NP  02/01/24 0053

## 2024-02-06 ENCOUNTER — OFFICE VISIT (OUTPATIENT)
Dept: PULMONOLOGY | Facility: CLINIC | Age: 59
End: 2024-02-06
Payer: MEDICARE

## 2024-02-06 ENCOUNTER — OFFICE VISIT (OUTPATIENT)
Dept: CARDIOLOGY | Facility: CLINIC | Age: 59
End: 2024-02-06
Payer: MEDICARE

## 2024-02-06 ENCOUNTER — TELEPHONE (OUTPATIENT)
Dept: CARDIOLOGY | Facility: CLINIC | Age: 59
End: 2024-02-06

## 2024-02-06 ENCOUNTER — LAB VISIT (OUTPATIENT)
Dept: LAB | Facility: HOSPITAL | Age: 59
End: 2024-02-06
Attending: NURSE PRACTITIONER
Payer: MEDICARE

## 2024-02-06 VITALS
BODY MASS INDEX: 35.9 KG/M2 | SYSTOLIC BLOOD PRESSURE: 152 MMHG | WEIGHT: 229.19 LBS | HEART RATE: 89 BPM | DIASTOLIC BLOOD PRESSURE: 89 MMHG | OXYGEN SATURATION: 93 %

## 2024-02-06 VITALS
WEIGHT: 231.94 LBS | OXYGEN SATURATION: 96 % | HEIGHT: 67 IN | DIASTOLIC BLOOD PRESSURE: 76 MMHG | BODY MASS INDEX: 36.4 KG/M2 | HEART RATE: 86 BPM | SYSTOLIC BLOOD PRESSURE: 144 MMHG

## 2024-02-06 DIAGNOSIS — R06.02 SHORTNESS OF BREATH: ICD-10-CM

## 2024-02-06 DIAGNOSIS — Z95.5 HX OF HEART ARTERY STENT: ICD-10-CM

## 2024-02-06 DIAGNOSIS — R01.1 SYSTOLIC MURMUR: ICD-10-CM

## 2024-02-06 DIAGNOSIS — J45.42 MODERATE PERSISTENT ASTHMA WITH STATUS ASTHMATICUS: Primary | ICD-10-CM

## 2024-02-06 DIAGNOSIS — R07.9 CHEST PAIN, UNSPECIFIED TYPE: ICD-10-CM

## 2024-02-06 DIAGNOSIS — R42 DIZZINESS: ICD-10-CM

## 2024-02-06 DIAGNOSIS — R06.00 DYSPNEA, UNSPECIFIED TYPE: ICD-10-CM

## 2024-02-06 DIAGNOSIS — N18.6 ESRD (END STAGE RENAL DISEASE): ICD-10-CM

## 2024-02-06 DIAGNOSIS — J45.42 MODERATE PERSISTENT ASTHMA WITH STATUS ASTHMATICUS: ICD-10-CM

## 2024-02-06 DIAGNOSIS — I25.110 CORONARY ARTERY DISEASE INVOLVING NATIVE CORONARY ARTERY OF NATIVE HEART WITH UNSTABLE ANGINA PECTORIS: Primary | ICD-10-CM

## 2024-02-06 LAB
BASOPHILS # BLD AUTO: 0.03 K/UL (ref 0–0.2)
BASOPHILS NFR BLD: 0.5 % (ref 0–1.9)
DIFFERENTIAL METHOD BLD: ABNORMAL
EOSINOPHIL # BLD AUTO: 0.1 K/UL (ref 0–0.5)
EOSINOPHIL NFR BLD: 1.9 % (ref 0–8)
ERYTHROCYTE [DISTWIDTH] IN BLOOD BY AUTOMATED COUNT: 13.8 % (ref 11.5–14.5)
HCT VFR BLD AUTO: 28.3 % (ref 37–48.5)
HGB BLD-MCNC: 9.1 G/DL (ref 12–16)
IMM GRANULOCYTES # BLD AUTO: 0.01 K/UL (ref 0–0.04)
IMM GRANULOCYTES NFR BLD AUTO: 0.2 % (ref 0–0.5)
LYMPHOCYTES # BLD AUTO: 1.6 K/UL (ref 1–4.8)
LYMPHOCYTES NFR BLD: 28.2 % (ref 18–48)
MCH RBC QN AUTO: 33.2 PG (ref 27–31)
MCHC RBC AUTO-ENTMCNC: 32.2 G/DL (ref 32–36)
MCV RBC AUTO: 103 FL (ref 82–98)
MONOCYTES # BLD AUTO: 0.4 K/UL (ref 0.3–1)
MONOCYTES NFR BLD: 7 % (ref 4–15)
NEUTROPHILS # BLD AUTO: 3.5 K/UL (ref 1.8–7.7)
NEUTROPHILS NFR BLD: 62.2 % (ref 38–73)
NRBC BLD-RTO: 0 /100 WBC
PLATELET # BLD AUTO: 206 K/UL (ref 150–450)
PMV BLD AUTO: 10.5 FL (ref 9.2–12.9)
RBC # BLD AUTO: 2.74 M/UL (ref 4–5.4)
WBC # BLD AUTO: 5.7 K/UL (ref 3.9–12.7)

## 2024-02-06 PROCEDURE — 93000 ELECTROCARDIOGRAM COMPLETE: CPT | Mod: S$GLB,,, | Performed by: GENERAL PRACTICE

## 2024-02-06 PROCEDURE — 99203 OFFICE O/P NEW LOW 30 MIN: CPT | Mod: S$GLB,,, | Performed by: NURSE PRACTITIONER

## 2024-02-06 PROCEDURE — 82785 ASSAY OF IGE: CPT | Performed by: NURSE PRACTITIONER

## 2024-02-06 PROCEDURE — 99999 PR PBB SHADOW E&M-EST. PATIENT-LVL V: CPT | Mod: PBBFAC,,, | Performed by: INTERNAL MEDICINE

## 2024-02-06 PROCEDURE — 99204 OFFICE O/P NEW MOD 45 MIN: CPT | Mod: S$GLB,,, | Performed by: INTERNAL MEDICINE

## 2024-02-06 PROCEDURE — 36415 COLL VENOUS BLD VENIPUNCTURE: CPT | Performed by: NURSE PRACTITIONER

## 2024-02-06 PROCEDURE — 85025 COMPLETE CBC W/AUTO DIFF WBC: CPT | Performed by: NURSE PRACTITIONER

## 2024-02-06 RX ORDER — SODIUM CHLORIDE 9 MG/ML
INJECTION, SOLUTION INTRAVENOUS ONCE
Status: CANCELLED | OUTPATIENT
Start: 2024-02-06 | End: 2024-02-06

## 2024-02-06 RX ORDER — BUDESONIDE, GLYCOPYRROLATE, AND FORMOTEROL FUMARATE 160; 9; 4.8 UG/1; UG/1; UG/1
2 AEROSOL, METERED RESPIRATORY (INHALATION) 2 TIMES DAILY
Qty: 10.7 G | Refills: 6 | Status: ON HOLD | OUTPATIENT
Start: 2024-02-06 | End: 2024-02-28

## 2024-02-06 RX ORDER — NITROGLYCERIN 0.4 MG/1
0.4 TABLET SUBLINGUAL EVERY 5 MIN PRN
Qty: 10 TABLET | Refills: 5 | Status: ON HOLD | OUTPATIENT
Start: 2024-02-06 | End: 2024-02-28

## 2024-02-06 RX ORDER — MONTELUKAST SODIUM 10 MG/1
10 TABLET ORAL NIGHTLY
Qty: 30 TABLET | Refills: 6 | Status: ON HOLD | OUTPATIENT
Start: 2024-02-06 | End: 2024-02-28

## 2024-02-06 RX ORDER — DIPHENHYDRAMINE HCL 50 MG
50 CAPSULE ORAL
Status: CANCELLED | OUTPATIENT
Start: 2024-02-06

## 2024-02-06 RX ORDER — RANOLAZINE 500 MG/1
500 TABLET, EXTENDED RELEASE ORAL 2 TIMES DAILY
Qty: 180 TABLET | Refills: 3 | Status: SHIPPED | OUTPATIENT
Start: 2024-02-06 | End: 2024-02-27

## 2024-02-06 RX ORDER — MIDODRINE HYDROCHLORIDE 10 MG/1
10 TABLET ORAL DAILY PRN
Qty: 180 TABLET | Refills: 0 | Status: ON HOLD
Start: 2024-02-06 | End: 2024-02-28

## 2024-02-06 RX ORDER — ALBUTEROL SULFATE 90 UG/1
2 AEROSOL, METERED RESPIRATORY (INHALATION) EVERY 6 HOURS PRN
Qty: 18 G | Refills: 6 | Status: ON HOLD | OUTPATIENT
Start: 2024-02-06 | End: 2024-02-28

## 2024-02-06 NOTE — H&P (VIEW-ONLY)
[facility]    Subjective:     Patient ID:  Eugenia Manuel is a 58 y.o. female patient here for evaluation Hospital Follow Up (On 01/31/2024 was at [facility] , had a fall . Patient has one heart stent. Chest pain and SOB  while walking )      HPI:  71-year-old female here to establish care.  Reports history of coronary artery disease status post PCI.  Reports over the past few months has been developing chest tightness requiring her to sit down after walking about 1 block along with shortness of breath.  She has end-stage renal disease and is on dialysis.  Reports getting dizzy on bending down and sitting up.  She has not on any antihypertensive agents, takes p.r.n. midodrine rarely.    Review of Systems   All other systems reviewed and are negative.       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    Social History     Socioeconomic History    Marital status: Single   Tobacco Use    Smoking status: Never    Smokeless tobacco:  Never   Substance and Sexual Activity    Alcohol use: Never    Drug use: Never       Current Outpatient Medications   Medication Sig Dispense Refill    albuterol (PROVENTIL/VENTOLIN HFA) 90 mcg/actuation inhaler Inhale 2 puffs into the lungs every 6 (six) hours as needed. Rescue 18 g 6    aspirin 81 MG Chew Take 81 mg by mouth once daily.      atorvastatin (LIPITOR) 40 MG tablet Take 1 tablet (40 mg total) by mouth once daily. 90 tablet 0    azelastine (ASTELIN) 137 mcg (0.1 %) nasal spray 1 spray (137 mcg total) by Nasal route 2 (two) times daily. 30 mL 2    budesonide-glycopyr-formoterol (BREZTRI AEROSPHERE) 160-9-4.8 mcg/actuation HFAA Inhale 2 puffs into the lungs 2 (two) times daily. 10.7 g 6    cetirizine (ZYRTEC) 10 MG tablet Take 1 tablet (10 mg total) by mouth once daily. 90 tablet 3    clopidogreL (PLAVIX) 75 mg tablet Take 1 tablet (75 mg total) by mouth once daily. 90 tablet 0    doxycycline (VIBRA-TABS) 100 MG tablet Take 1 tablet (100 mg total) by mouth 2 (two) times daily. 20 tablet 0    ferric citrate (AURYXIA) 210 mg iron Tab Take 420 mg by mouth 3 (three) times daily. 540 tablet 3    fluticasone propionate (FLONASE) 50 mcg/actuation nasal spray 2 sprays (100 mcg total) by Each Nostril route once daily. 16 g 1    gabapentin (NEURONTIN) 300 MG capsule Take 1 capsule (300 mg total) by mouth 3 (three) times daily. 270 capsule 1    levothyroxine (SYNTHROID) 150 MCG tablet Take 1 tablet (150 mcg total) by mouth before breakfast. 90 tablet 3    melatonin 10 mg Cap Take by mouth.      montelukast (SINGULAIR) 10 mg tablet Take 1 tablet (10 mg total) by mouth every evening. 30 tablet 6    pantoprazole (PROTONIX) 40 MG tablet Take 1 tablet (40 mg total) by mouth once daily. 90 tablet 1    pramipexole (MIRAPEX) 0.5 MG tablet Take 0.5 mg by mouth 2 (two) times daily.      promethazine-dextromethorphan (PROMETHAZINE-DM) 6.25-15 mg/5 mL Syrp Take 5 mLs by mouth nightly as needed (cough). 118 mL 0    QUEtiapine  (SEROQUEL) 100 MG Tab Take 100 mg by mouth every evening.      QUEtiapine (SEROQUEL) 100 MG Tab Take 1 tablet (100 mg total) by mouth once daily. 30 tablet 5    QUEtiapine (SEROQUEL) 25 MG Tab Take 1 tablet (25 mg total) by mouth every evening. 90 tablet 1    rOPINIRole (REQUIP) 2 MG tablet Take 1 tablet (2 mg total) by mouth 3 (three) times daily. 90 tablet 0    senna-docusate 8.6-50 mg (PERICOLACE) 8.6-50 mg per tablet Take 1 tablet by mouth daily as needed.      sevelamer carbonate (RENVELA) 800 mg Tab Take 5 tablets (4,000 mg total) by mouth 3 (three) times daily with meals. 1350 tablet 3    torsemide (DEMADEX) 100 MG Tab Take 1 tablet (100 mg total) by mouth 2 (two) times a day. 90 tablet 0    venlafaxine (EFFEXOR-XR) 150 MG Cp24 Take 1 capsule (150 mg total) by mouth once daily. 90 capsule 3    midodrine (PROAMATINE) 10 MG tablet Take 1 tablet (10 mg total) by mouth daily as needed (hypotension). 180 tablet 0    nitroGLYCERIN (NITROSTAT) 0.4 MG SL tablet Place 1 tablet (0.4 mg total) under the tongue every 5 (five) minutes as needed for Chest pain. 10 tablet 5    ranolazine (RANEXA) 500 MG Tb12 Take 1 tablet (500 mg total) by mouth 2 (two) times daily. 180 tablet 3     No current facility-administered medications for this visit.       Review of patient's allergies indicates:   Allergen Reactions    Lisinopril Anaphylaxis         Objective:        Vitals:    02/06/24 1328   BP: (!) 144/76   Pulse: 86       Physical Exam  Vitals reviewed.   Constitutional:       Appearance: Normal appearance.   HENT:      Mouth/Throat:      Mouth: Mucous membranes are moist.   Eyes:      General: Scleral icterus: ranexa.      Extraocular Movements: Extraocular movements intact.      Pupils: Pupils are equal, round, and reactive to light.   Cardiovascular:      Rate and Rhythm: Normal rate and regular rhythm.      Pulses: Normal pulses.      Heart sounds: Murmur heard.      No gallop.   Pulmonary:      Effort: Pulmonary effort is  "normal.      Breath sounds: Normal breath sounds.   Abdominal:      General: Bowel sounds are normal.      Palpations: Abdomen is soft.   Musculoskeletal:         General: Normal range of motion.   Skin:     General: Skin is warm and dry.   Neurological:      General: No focal deficit present.      Mental Status: She is alert and oriented to person, place, and time.   Psychiatric:         Mood and Affect: Mood normal.         LIPIDS - LAST 2   No results found for: "CHOL", "HDL", "LDLCALC", "TRIG", "CHOLHDL"    CBC - LAST 2  Lab Results   Component Value Date    WBC 5.70 02/06/2024    RBC 2.74 (L) 02/06/2024    HGB 9.1 (L) 02/06/2024    HCT 28.3 (L) 02/06/2024     (H) 02/06/2024    MCH 33.2 (H) 02/06/2024    MCHC 32.2 02/06/2024    RDW 13.8 02/06/2024     02/06/2024    MPV 10.5 02/06/2024    GRAN 3.5 02/06/2024    GRAN 62.2 02/06/2024    LYMPH 1.6 02/06/2024    LYMPH 28.2 02/06/2024    MONO 0.4 02/06/2024    MONO 7.0 02/06/2024    BASO 0.03 02/06/2024    NRBC 0 02/06/2024       CHEMISTRY & LIVER FUNCTION - LAST 2  No results found for: "NA", "K", "CL", "CO2", "ANIONGAP", "BUN", "CREATININE", "GLU", "CALCIUM", "PH", "MG", "ALBUMIN", "PROT", "ALKPHOS", "ALT", "AST", "BILITOT"     CARDIAC PROFILE - LAST 2  No results found for: "BNP", "CPK", "CPKMB", "LDH", "TROPONINI"     COAGULATION - LAST 2  No results found for: "LABPT", "INR", "APTT"    ENDOCRINE & PSA - LAST 2  Lab Results   Component Value Date    HGBA1C 6.3 (H) 08/19/2020        ECHOCARDIOGRAM RESULTS  No results found for this or any previous visit.      CURRENT/PREVIOUS VISIT EKG  No results found for this or any previous visit.  No valid procedures specified.   No results found for this or any previous visit.    No valid procedures specified.        Assessment:       1. Coronary artery disease involving native coronary artery of native heart with unstable angina pectoris    2. Hx of heart artery stent    3. Systolic murmur    4. Dizziness    5. " Chest pain, unspecified type    6. Shortness of breath           Plan:       Coronary artery disease involving native coronary artery of native heart with unstable angina pectoris  Comments:  Presentation was HF. Stent was done 2-3 years ago. Had another 50% blockage. EKG today has mild ST depressions  Orders:  -     IN OFFICE EKG 12-LEAD (to [facility])  -     Full code; Standing  -     Case Request-Cath Lab: Angiogram, Coronary, with Left Heart Cath; Standing  -     Height and weight; Standing  -     Insert 2 peripheral IVs; Standing  -     Void; Standing  -     Clip and Prep Right Radial, Right Groin, Left Groin; Standing  -     Verify Modification of Diabetic Agents; Standing  -     Verify informed consent; Standing  -     Vital signs; Standing  -     Cardiac Monitoring - Adult; Standing  -     Pulse Oximetry Q4H; Standing  -     Diet NPO; Standing  -     CBC auto differential; Standing  -     Basic metabolic panel; Standing  -     EKG 12-lead; Standing  -     Echo; Future  -     nitroGLYCERIN (NITROSTAT) 0.4 MG SL tablet; Place 1 tablet (0.4 mg total) under the tongue every 5 (five) minutes as needed for Chest pain.  Dispense: 10 tablet; Refill: 5  -     ranolazine (RANEXA) 500 MG Tb12; Take 1 tablet (500 mg total) by mouth 2 (two) times daily.  Dispense: 180 tablet; Refill: 3    Hx of heart artery stent  Comments:  Presentation was HF. Stent was done 2-3 years ago. Had another 50% blockage.  Orders:  -     Full code; Standing  -     Height and weight; Standing  -     Insert 2 peripheral IVs; Standing  -     Void; Standing  -     Clip and Prep Right Radial, Right Groin, Left Groin; Standing  -     Verify Modification of Diabetic Agents; Standing  -     Verify informed consent; Standing  -     Vital signs; Standing  -     Cardiac Monitoring - Adult; Standing  -     Pulse Oximetry Q4H; Standing  -     Diet NPO; Standing  -     CBC auto differential; Standing  -     Basic metabolic panel; Standing  -     EKG 12-lead;  Standing  -     Echo; Future    Systolic murmur    Dizziness  Comments:  gets dizzy on bending down.    Chest pain, unspecified type    Shortness of breath  -     Full code; Standing  -     Case Request-Cath Lab: Angiogram, Coronary, with Left Heart Cath; Standing  -     Height and weight; Standing  -     Insert 2 peripheral IVs; Standing  -     Void; Standing  -     Clip and Prep Right Radial, Right Groin, Left Groin; Standing  -     Verify Modification of Diabetic Agents; Standing  -     Verify informed consent; Standing  -     Vital signs; Standing  -     Cardiac Monitoring - Adult; Standing  -     Pulse Oximetry Q4H; Standing  -     Diet NPO; Standing  -     CBC auto differential; Standing  -     Basic metabolic panel; Standing  -     EKG 12-lead; Standing  -     Echo; Future    Other orders  -     0.9%  NaCl infusion  -     diphenhydrAMINE capsule 50 mg  -     midodrine (PROAMATINE) 10 MG tablet; Take 1 tablet (10 mg total) by mouth daily as needed (hypotension).  Dispense: 180 tablet; Refill: 0      Discussed with patient regarding further evaluation of her angina aberrant chest discomfort.  Continue with aspirin, statin.  Add Ranexa.  Can not add other antianginals given dizziness and hypotension.  Discussed with her further evaluation with an angiogram including risks and benefits and need for dual antiplatelet therapy.  Patient verbalized understanding.  Will proceed with angiogram for Monday.  He was systolic murmur, will try to get an echocardiogram prior to the angiogram on day of angiogram.    Follow up in about 2 months (around 4/6/2024) for post angiogram and echo.          MD [facility] [place]  Francois

## 2024-02-06 NOTE — TELEPHONE ENCOUNTER
PROC 2/12 2 8A PREOP 2/8 @ 10A    I called and left this message on patients voice mail

## 2024-02-06 NOTE — PATIENT INSTRUCTIONS
PFT , 6 minute walk   CBC, IgE  Start Brestri 2 puffs twice a day, rinse after you use  use a spacer   Refill albuterol  Finish your antibiotics   Singulair 10mg  at night

## 2024-02-06 NOTE — PROGRESS NOTES
SUBJECTIVE:    Patient ID: Eugenia Manuel is a 58 y.o. female.    Chief Complaint: New Patient (New Patient/Referred by Claire Bright for Asthma/Trouble breathing, wheezing, she also is a dialysis patient)    HPI  Patient here today to establish care for Asthma. She states she moved here recently from [place], has had asthma for years. She is an ESRD patient, gets dialysis 3 days a week.  She has used Symbicort as a rescue inhaler at her previous MD in [place]. However, recently she started using it regularly twice a day with a spacer. She has had on and off asthma exacerbations in the past 2 months since moving here.  She does feel allergies are a trigger.  She does wheeze and cough. She is being treated for ear infection currently with Doxycycline by her PCP.  She feels her breathing is better since starting the Symbicort regularly but not at baseline yet. She is a never smoker.    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     Social History:   Marital Status:  Single  Occupation: Data Unavailable  Alcohol History:  reports no history of alcohol use.  Tobacco History:  reports that she has never smoked. She has never used smokeless tobacco.  Drug History:  reports no history of drug use.    Review of patient's allergies indicates:   Allergen Reactions    Lisinopril Anaphylaxis       Current Outpatient Medications   Medication Sig Dispense Refill    aspirin 81 MG Chew Take 81 mg by mouth once daily.      atorvastatin (LIPITOR) 40 MG tablet Take 1 tablet (40 mg total) by mouth once daily. 90 tablet 0    azelastine (ASTELIN) 137 mcg (0.1 %) nasal spray 1 spray (137 mcg total) by Nasal route 2 (two) times daily. 30 mL 2    cetirizine (ZYRTEC) 10 MG tablet Take 1 tablet (10 mg total) by mouth once daily. 90 tablet 3    clopidogreL (PLAVIX) 75 mg tablet Take 1 tablet (75 mg total) by mouth once daily. 90 tablet 0    doxycycline (VIBRA-TABS) 100 MG tablet Take 1 tablet (100 mg total) by mouth 2 (two) times daily. 20 tablet 0    ferric citrate (AURYXIA) 210 mg iron Tab Take 420 mg by mouth 3 (three) times daily. 540 tablet 3    fluticasone propionate (FLONASE) 50 mcg/actuation nasal spray 2 sprays (100 mcg total) by Each Nostril route once daily. 16 g 1    gabapentin (NEURONTIN) 300 MG capsule Take 1 capsule (300 mg total) by mouth 3 (three) times daily. 270 capsule 1    levothyroxine (SYNTHROID) 150 MCG tablet Take 1 tablet (150 mcg total) by mouth before breakfast. 90 tablet 3    melatonin 10 mg Cap Take by mouth.      midodrine (PROAMATINE) 10 MG tablet Take 1 tablet (10 mg total) by mouth every 12 (twelve) hours. 180 tablet 0    pantoprazole (PROTONIX) 40 MG tablet Take 1 tablet (40 mg total) by mouth once daily. 90 tablet 1    pramipexole (MIRAPEX) 0.5 MG tablet Take 0.5 mg by mouth 2 (two) times daily.      QUEtiapine (SEROQUEL) 100 MG Tab Take 100 mg by mouth every evening.      QUEtiapine (SEROQUEL) 25 MG Tab Take 1 tablet (25 mg total) by mouth every evening. 90 tablet 1     rOPINIRole (REQUIP) 2 MG tablet Take 1 tablet (2 mg total) by mouth 3 (three) times daily. 90 tablet 0    senna-docusate 8.6-50 mg (PERICOLACE) 8.6-50 mg per tablet Take 1 tablet by mouth daily as needed.      sevelamer carbonate (RENVELA) 800 mg Tab Take 5 tablets (4,000 mg total) by mouth 3 (three) times daily with meals. 1350 tablet 3    venlafaxine (EFFEXOR-XR) 150 MG Cp24 Take 1 capsule (150 mg total) by mouth once daily. 90 capsule 3    albuterol (PROVENTIL/VENTOLIN HFA) 90 mcg/actuation inhaler Inhale 2 puffs into the lungs every 6 (six) hours as needed. Rescue 18 g 6    budesonide-glycopyr-formoterol (BREZTRI AEROSPHERE) 160-9-4.8 mcg/actuation HFAA Inhale 2 puffs into the lungs 2 (two) times daily. 10.7 g 6    montelukast (SINGULAIR) 10 mg tablet Take 1 tablet (10 mg total) by mouth every evening. 30 tablet 6    promethazine-dextromethorphan (PROMETHAZINE-DM) 6.25-15 mg/5 mL Syrp Take 5 mLs by mouth nightly as needed (cough). (Patient not taking: Reported on 2/6/2024) 118 mL 0    QUEtiapine (SEROQUEL) 100 MG Tab Take 1 tablet (100 mg total) by mouth once daily. (Patient not taking: Reported on 2/6/2024) 30 tablet 5    torsemide (DEMADEX) 100 MG Tab Take 1 tablet (100 mg total) by mouth 2 (two) times a day. (Patient not taking: Reported on 2/6/2024) 90 tablet 0     No current facility-administered medications for this visit.       Last Chest xray 01/2024  :MPRESSION:     No Acute Cardiopulmonary Abnormality       Review of Systems  General: Feeling Well.  Eyes: Vision is good.  ENT:  No sinusitis or pharyngitis.   Heart:: HTN  Lungs: increased, dyspnea, and wheeze over last several weeks  GI: No Nausea, vomiting, constipation, diarrhea, or reflux.  : ESRD, does urinate   Musculoskeletal: No joint pain or myalgias.  Skin: No lesions or rashes.  Neuro: No headaches or neuropathy.  Lymph: No edema or adenopathy.  Psych: No anxiety or depression.  Endo: No weight change.    OBJECTIVE:      BP (!) 152/89 (BP  Location: Left arm, Patient Position: Sitting, BP Method: Medium (Manual))   Pulse 89   Wt 104 kg (229 lb 3.2 oz)   SpO2 (!) 93%   BMI 35.90 kg/m²     Physical Exam  GENERAL: Older patient in no distress.  HEENT: Pupils equal and reactive. Extraocular movements intact. Nose intact.      Pharynx moist. Left ear with effusion, right ear dull light reflex   NECK: Supple.   HEART: Regular rate and rhythm. Murmur   LUNGS: Clear to auscultation and percussion. Lung excursion symmetrical. No change in fremitus. No adventitial noises.  ABDOMEN: Bowel sounds present. Non-tender, no masses palpated.  EXTREMITIES: Normal muscle tone and joint movement, no cyanosis or clubbing.   LYMPHATICS: No adenopathy palpated, no edema.  SKIN: Dry, intact, no lesions.   NEURO: Cranial nerves II-XII intact. Motor strength 5/5 bilaterally, upper and lower extremities.  PSYCH: Appropriate affect.    Assessment:       1. Moderate persistent asthma with status asthmaticus    2. ESRD (end stage renal disease)    3. Dyspnea, unspecified type          Plan:          PFT , 6 minute walk   CBC, IgE  Start Brestri 2 puffs twice a day, rinse after you use  use a spacer   Refill albuterol  Finish your antibiotics   Singulair 10mg  at night   Follow up in about 4 weeks (around 3/5/2024).

## 2024-02-06 NOTE — PROGRESS NOTES
[facility]    Subjective:     Patient ID:  Eugenia Manuel is a 58 y.o. female patient here for evaluation Hospital Follow Up (On 01/31/2024 was at [facility] , had a fall . Patient has one heart stent. Chest pain and SOB  while walking )      HPI:  71-year-old female here to establish care.  Reports history of coronary artery disease status post PCI.  Reports over the past few months has been developing chest tightness requiring her to sit down after walking about 1 block along with shortness of breath.  She has end-stage renal disease and is on dialysis.  Reports getting dizzy on bending down and sitting up.  She has not on any antihypertensive agents, takes p.r.n. midodrine rarely.    Review of Systems   All other systems reviewed and are negative.       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    Social History     Socioeconomic History    Marital status: Single   Tobacco Use    Smoking status: Never    Smokeless tobacco:  Never   Substance and Sexual Activity    Alcohol use: Never    Drug use: Never       Current Outpatient Medications   Medication Sig Dispense Refill    albuterol (PROVENTIL/VENTOLIN HFA) 90 mcg/actuation inhaler Inhale 2 puffs into the lungs every 6 (six) hours as needed. Rescue 18 g 6    aspirin 81 MG Chew Take 81 mg by mouth once daily.      atorvastatin (LIPITOR) 40 MG tablet Take 1 tablet (40 mg total) by mouth once daily. 90 tablet 0    azelastine (ASTELIN) 137 mcg (0.1 %) nasal spray 1 spray (137 mcg total) by Nasal route 2 (two) times daily. 30 mL 2    budesonide-glycopyr-formoterol (BREZTRI AEROSPHERE) 160-9-4.8 mcg/actuation HFAA Inhale 2 puffs into the lungs 2 (two) times daily. 10.7 g 6    cetirizine (ZYRTEC) 10 MG tablet Take 1 tablet (10 mg total) by mouth once daily. 90 tablet 3    clopidogreL (PLAVIX) 75 mg tablet Take 1 tablet (75 mg total) by mouth once daily. 90 tablet 0    doxycycline (VIBRA-TABS) 100 MG tablet Take 1 tablet (100 mg total) by mouth 2 (two) times daily. 20 tablet 0    ferric citrate (AURYXIA) 210 mg iron Tab Take 420 mg by mouth 3 (three) times daily. 540 tablet 3    fluticasone propionate (FLONASE) 50 mcg/actuation nasal spray 2 sprays (100 mcg total) by Each Nostril route once daily. 16 g 1    gabapentin (NEURONTIN) 300 MG capsule Take 1 capsule (300 mg total) by mouth 3 (three) times daily. 270 capsule 1    levothyroxine (SYNTHROID) 150 MCG tablet Take 1 tablet (150 mcg total) by mouth before breakfast. 90 tablet 3    melatonin 10 mg Cap Take by mouth.      montelukast (SINGULAIR) 10 mg tablet Take 1 tablet (10 mg total) by mouth every evening. 30 tablet 6    pantoprazole (PROTONIX) 40 MG tablet Take 1 tablet (40 mg total) by mouth once daily. 90 tablet 1    pramipexole (MIRAPEX) 0.5 MG tablet Take 0.5 mg by mouth 2 (two) times daily.      promethazine-dextromethorphan (PROMETHAZINE-DM) 6.25-15 mg/5 mL Syrp Take 5 mLs by mouth nightly as needed (cough). 118 mL 0    QUEtiapine  (SEROQUEL) 100 MG Tab Take 100 mg by mouth every evening.      QUEtiapine (SEROQUEL) 100 MG Tab Take 1 tablet (100 mg total) by mouth once daily. 30 tablet 5    QUEtiapine (SEROQUEL) 25 MG Tab Take 1 tablet (25 mg total) by mouth every evening. 90 tablet 1    rOPINIRole (REQUIP) 2 MG tablet Take 1 tablet (2 mg total) by mouth 3 (three) times daily. 90 tablet 0    senna-docusate 8.6-50 mg (PERICOLACE) 8.6-50 mg per tablet Take 1 tablet by mouth daily as needed.      sevelamer carbonate (RENVELA) 800 mg Tab Take 5 tablets (4,000 mg total) by mouth 3 (three) times daily with meals. 1350 tablet 3    torsemide (DEMADEX) 100 MG Tab Take 1 tablet (100 mg total) by mouth 2 (two) times a day. 90 tablet 0    venlafaxine (EFFEXOR-XR) 150 MG Cp24 Take 1 capsule (150 mg total) by mouth once daily. 90 capsule 3    midodrine (PROAMATINE) 10 MG tablet Take 1 tablet (10 mg total) by mouth daily as needed (hypotension). 180 tablet 0    nitroGLYCERIN (NITROSTAT) 0.4 MG SL tablet Place 1 tablet (0.4 mg total) under the tongue every 5 (five) minutes as needed for Chest pain. 10 tablet 5    ranolazine (RANEXA) 500 MG Tb12 Take 1 tablet (500 mg total) by mouth 2 (two) times daily. 180 tablet 3     No current facility-administered medications for this visit.       Review of patient's allergies indicates:   Allergen Reactions    Lisinopril Anaphylaxis         Objective:        Vitals:    02/06/24 1328   BP: (!) 144/76   Pulse: 86       Physical Exam  Vitals reviewed.   Constitutional:       Appearance: Normal appearance.   HENT:      Mouth/Throat:      Mouth: Mucous membranes are moist.   Eyes:      General: Scleral icterus: ranexa.      Extraocular Movements: Extraocular movements intact.      Pupils: Pupils are equal, round, and reactive to light.   Cardiovascular:      Rate and Rhythm: Normal rate and regular rhythm.      Pulses: Normal pulses.      Heart sounds: Murmur heard.      No gallop.   Pulmonary:      Effort: Pulmonary effort is  "normal.      Breath sounds: Normal breath sounds.   Abdominal:      General: Bowel sounds are normal.      Palpations: Abdomen is soft.   Musculoskeletal:         General: Normal range of motion.   Skin:     General: Skin is warm and dry.   Neurological:      General: No focal deficit present.      Mental Status: She is alert and oriented to person, place, and time.   Psychiatric:         Mood and Affect: Mood normal.         LIPIDS - LAST 2   No results found for: "CHOL", "HDL", "LDLCALC", "TRIG", "CHOLHDL"    CBC - LAST 2  Lab Results   Component Value Date    WBC 5.70 02/06/2024    RBC 2.74 (L) 02/06/2024    HGB 9.1 (L) 02/06/2024    HCT 28.3 (L) 02/06/2024     (H) 02/06/2024    MCH 33.2 (H) 02/06/2024    MCHC 32.2 02/06/2024    RDW 13.8 02/06/2024     02/06/2024    MPV 10.5 02/06/2024    GRAN 3.5 02/06/2024    GRAN 62.2 02/06/2024    LYMPH 1.6 02/06/2024    LYMPH 28.2 02/06/2024    MONO 0.4 02/06/2024    MONO 7.0 02/06/2024    BASO 0.03 02/06/2024    NRBC 0 02/06/2024       CHEMISTRY & LIVER FUNCTION - LAST 2  No results found for: "NA", "K", "CL", "CO2", "ANIONGAP", "BUN", "CREATININE", "GLU", "CALCIUM", "PH", "MG", "ALBUMIN", "PROT", "ALKPHOS", "ALT", "AST", "BILITOT"     CARDIAC PROFILE - LAST 2  No results found for: "BNP", "CPK", "CPKMB", "LDH", "TROPONINI"     COAGULATION - LAST 2  No results found for: "LABPT", "INR", "APTT"    ENDOCRINE & PSA - LAST 2  Lab Results   Component Value Date    HGBA1C 6.3 (H) 08/19/2020        ECHOCARDIOGRAM RESULTS  No results found for this or any previous visit.      CURRENT/PREVIOUS VISIT EKG  No results found for this or any previous visit.  No valid procedures specified.   No results found for this or any previous visit.    No valid procedures specified.        Assessment:       1. Coronary artery disease involving native coronary artery of native heart with unstable angina pectoris    2. Hx of heart artery stent    3. Systolic murmur    4. Dizziness    5. " Chest pain, unspecified type    6. Shortness of breath           Plan:       Coronary artery disease involving native coronary artery of native heart with unstable angina pectoris  Comments:  Presentation was HF. Stent was done 2-3 years ago. Had another 50% blockage. EKG today has mild ST depressions  Orders:  -     IN OFFICE EKG 12-LEAD (to [facility])  -     Full code; Standing  -     Case Request-Cath Lab: Angiogram, Coronary, with Left Heart Cath; Standing  -     Height and weight; Standing  -     Insert 2 peripheral IVs; Standing  -     Void; Standing  -     Clip and Prep Right Radial, Right Groin, Left Groin; Standing  -     Verify Modification of Diabetic Agents; Standing  -     Verify informed consent; Standing  -     Vital signs; Standing  -     Cardiac Monitoring - Adult; Standing  -     Pulse Oximetry Q4H; Standing  -     Diet NPO; Standing  -     CBC auto differential; Standing  -     Basic metabolic panel; Standing  -     EKG 12-lead; Standing  -     Echo; Future  -     nitroGLYCERIN (NITROSTAT) 0.4 MG SL tablet; Place 1 tablet (0.4 mg total) under the tongue every 5 (five) minutes as needed for Chest pain.  Dispense: 10 tablet; Refill: 5  -     ranolazine (RANEXA) 500 MG Tb12; Take 1 tablet (500 mg total) by mouth 2 (two) times daily.  Dispense: 180 tablet; Refill: 3    Hx of heart artery stent  Comments:  Presentation was HF. Stent was done 2-3 years ago. Had another 50% blockage.  Orders:  -     Full code; Standing  -     Height and weight; Standing  -     Insert 2 peripheral IVs; Standing  -     Void; Standing  -     Clip and Prep Right Radial, Right Groin, Left Groin; Standing  -     Verify Modification of Diabetic Agents; Standing  -     Verify informed consent; Standing  -     Vital signs; Standing  -     Cardiac Monitoring - Adult; Standing  -     Pulse Oximetry Q4H; Standing  -     Diet NPO; Standing  -     CBC auto differential; Standing  -     Basic metabolic panel; Standing  -     EKG 12-lead;  Standing  -     Echo; Future    Systolic murmur    Dizziness  Comments:  gets dizzy on bending down.    Chest pain, unspecified type    Shortness of breath  -     Full code; Standing  -     Case Request-Cath Lab: Angiogram, Coronary, with Left Heart Cath; Standing  -     Height and weight; Standing  -     Insert 2 peripheral IVs; Standing  -     Void; Standing  -     Clip and Prep Right Radial, Right Groin, Left Groin; Standing  -     Verify Modification of Diabetic Agents; Standing  -     Verify informed consent; Standing  -     Vital signs; Standing  -     Cardiac Monitoring - Adult; Standing  -     Pulse Oximetry Q4H; Standing  -     Diet NPO; Standing  -     CBC auto differential; Standing  -     Basic metabolic panel; Standing  -     EKG 12-lead; Standing  -     Echo; Future    Other orders  -     0.9%  NaCl infusion  -     diphenhydrAMINE capsule 50 mg  -     midodrine (PROAMATINE) 10 MG tablet; Take 1 tablet (10 mg total) by mouth daily as needed (hypotension).  Dispense: 180 tablet; Refill: 0      Discussed with patient regarding further evaluation of her angina aberrant chest discomfort.  Continue with aspirin, statin.  Add Ranexa.  Can not add other antianginals given dizziness and hypotension.  Discussed with her further evaluation with an angiogram including risks and benefits and need for dual antiplatelet therapy.  Patient verbalized understanding.  Will proceed with angiogram for Monday.  He was systolic murmur, will try to get an echocardiogram prior to the angiogram on day of angiogram.    Follow up in about 2 months (around 4/6/2024) for post angiogram and echo.          MD [facility] [place]  [place]

## 2024-02-07 ENCOUNTER — TELEPHONE (OUTPATIENT)
Dept: CARDIOLOGY | Facility: CLINIC | Age: 59
End: 2024-02-07
Payer: MEDICARE

## 2024-02-07 NOTE — TELEPHONE ENCOUNTER
----- Message from Raymundo Alvarez sent at 2/7/2024  3:40 PM CST -----  Contact: Self  Type: Needs Medical Advice  Who Called:  Patient    Best Call Back Number: [phone]   Additional Information:  Called to speak with office. States Lost/deleted message about procedure/has additional questions, does not have access to [employer]. Please call.

## 2024-02-08 ENCOUNTER — TELEPHONE (OUTPATIENT)
Dept: CARDIOLOGY | Facility: HOSPITAL | Age: 59
End: 2024-02-08

## 2024-02-08 NOTE — TELEPHONE ENCOUNTER
I called this patient and her pre op time , and date . Also her angiogram time and date. I also left the heart center number if any questions.

## 2024-02-09 ENCOUNTER — CLINICAL SUPPORT (OUTPATIENT)
Dept: CARDIOLOGY | Facility: HOSPITAL | Age: 59
End: 2024-02-09
Attending: INTERNAL MEDICINE
Payer: MEDICARE

## 2024-02-09 ENCOUNTER — HOSPITAL ENCOUNTER (OUTPATIENT)
Dept: PREADMISSION TESTING | Facility: HOSPITAL | Age: 59
Discharge: HOME | End: 2024-02-09
Attending: INTERNAL MEDICINE
Payer: MEDICARE

## 2024-02-09 VITALS — HEIGHT: 67 IN | WEIGHT: 231 LBS | BODY MASS INDEX: 36.26 KG/M2

## 2024-02-09 VITALS
DIASTOLIC BLOOD PRESSURE: 72 MMHG | RESPIRATION RATE: 18 BRPM | HEART RATE: 76 BPM | TEMPERATURE: 98 F | SYSTOLIC BLOOD PRESSURE: 110 MMHG | OXYGEN SATURATION: 96 %

## 2024-02-09 DIAGNOSIS — Z95.5 HX OF HEART ARTERY STENT: ICD-10-CM

## 2024-02-09 DIAGNOSIS — R06.02 SHORTNESS OF BREATH: ICD-10-CM

## 2024-02-09 DIAGNOSIS — R07.89 OTHER CHEST PAIN: ICD-10-CM

## 2024-02-09 DIAGNOSIS — I25.110 CORONARY ARTERY DISEASE INVOLVING NATIVE CORONARY ARTERY OF NATIVE HEART WITH UNSTABLE ANGINA PECTORIS: ICD-10-CM

## 2024-02-09 LAB
ANION GAP SERPL CALC-SCNC: 12 MMOL/L (ref 8–16)
AORTIC ROOT ANNULUS: 3.1 CM
AORTIC VALVE CUSP SEPERATION: 1.5 CM
ASCENDING AORTA: 2.9 CM
AV INDEX (PROSTH): 0.54
AV MEAN GRADIENT: 10 MMHG
AV PEAK GRADIENT: 18 MMHG
AV VALVE AREA BY VELOCITY RATIO: 1.6 CM²
AV VALVE AREA: 1.7 CM²
AV VELOCITY RATIO: 0.51
BASOPHILS # BLD AUTO: 0.05 K/UL (ref 0–0.2)
BASOPHILS NFR BLD: 1.1 % (ref 0–1.9)
BSA FOR ECHO PROCEDURE: 2.23 M2
BUN SERPL-MCNC: 14 MG/DL (ref 6–20)
CALCIUM SERPL-MCNC: 8.8 MG/DL (ref 8.7–10.5)
CHLORIDE SERPL-SCNC: 94 MMOL/L (ref 95–110)
CO2 SERPL-SCNC: 32 MMOL/L (ref 23–29)
CREAT SERPL-MCNC: 3.5 MG/DL (ref 0.5–1.4)
CV ECHO LV RWT: 0.51 CM
DIFFERENTIAL METHOD BLD: ABNORMAL
DOP CALC AO PEAK VEL: 2.14 M/S
DOP CALC AO VTI: 42.9 CM
DOP CALC LVOT AREA: 3.1 CM2
DOP CALC LVOT DIAMETER: 2 CM
DOP CALC LVOT PEAK VEL: 1.09 M/S
DOP CALC LVOT STROKE VOLUME: 72.85 CM3
DOP CALC MV VTI: 30.7 CM
DOP CALCLVOT PEAK VEL VTI: 23.2 CM
E WAVE DECELERATION TIME: 229 MSEC
E/A RATIO: 1.07
E/E' RATIO: 10.24 M/S
ECHO LV POSTERIOR WALL: 1.09 CM (ref 0.6–1.1)
EOSINOPHIL # BLD AUTO: 0.2 K/UL (ref 0–0.5)
EOSINOPHIL NFR BLD: 3.4 % (ref 0–8)
ERYTHROCYTE [DISTWIDTH] IN BLOOD BY AUTOMATED COUNT: 13.7 % (ref 11.5–14.5)
EST. GFR  (NO RACE VARIABLE): 14.6 ML/MIN/1.73 M^2
FRACTIONAL SHORTENING: 38 % (ref 28–44)
GLUCOSE SERPL-MCNC: 86 MG/DL (ref 70–110)
HCT VFR BLD AUTO: 31 % (ref 37–48.5)
HGB BLD-MCNC: 9.9 G/DL (ref 12–16)
IMM GRANULOCYTES # BLD AUTO: 0.02 K/UL (ref 0–0.04)
IMM GRANULOCYTES NFR BLD AUTO: 0.4 % (ref 0–0.5)
INTERVENTRICULAR SEPTUM: 1.03 CM (ref 0.6–1.1)
IVC DIAMETER: 1.52 CM
LEFT ATRIUM SIZE: 4.1 CM
LEFT ATRIUM VOLUME INDEX MOD: 26.4 ML/M2
LEFT ATRIUM VOLUME MOD: 56.7 CM3
LEFT INTERNAL DIMENSION IN SYSTOLE: 2.67 CM (ref 2.1–4)
LEFT VENTRICLE DIASTOLIC VOLUME INDEX: 38.84 ML/M2
LEFT VENTRICLE DIASTOLIC VOLUME: 83.5 ML
LEFT VENTRICLE MASS INDEX: 72 G/M2
LEFT VENTRICLE SYSTOLIC VOLUME INDEX: 12.2 ML/M2
LEFT VENTRICLE SYSTOLIC VOLUME: 26.3 ML
LEFT VENTRICULAR INTERNAL DIMENSION IN DIASTOLE: 4.31 CM (ref 3.5–6)
LEFT VENTRICULAR MASS: 155.17 G
LV LATERAL E/E' RATIO: 9.67 M/S
LV SEPTAL E/E' RATIO: 10.88 M/S
LVOT MG: 3 MMHG
LVOT MV: 0.75 CM/S
LYMPHOCYTES # BLD AUTO: 1.7 K/UL (ref 1–4.8)
LYMPHOCYTES NFR BLD: 35.4 % (ref 18–48)
MCH RBC QN AUTO: 32.8 PG (ref 27–31)
MCHC RBC AUTO-ENTMCNC: 31.9 G/DL (ref 32–36)
MCV RBC AUTO: 103 FL (ref 82–98)
MONOCYTES # BLD AUTO: 0.4 K/UL (ref 0.3–1)
MONOCYTES NFR BLD: 7.7 % (ref 4–15)
MV MEAN GRADIENT: 3 MMHG
MV PEAK A VEL: 0.81 M/S
MV PEAK E VEL: 0.87 M/S
MV PEAK GRADIENT: 6 MMHG
MV STENOSIS PRESSURE HALF TIME: 73 MS
MV VALVE AREA BY CONTINUITY EQUATION: 2.37 CM2
MV VALVE AREA P 1/2 METHOD: 3.01 CM2
NEUTROPHILS # BLD AUTO: 2.4 K/UL (ref 1.8–7.7)
NEUTROPHILS NFR BLD: 52 % (ref 38–73)
NRBC BLD-RTO: 0 /100 WBC
OHS LV EJECTION FRACTION SIMPSONS BIPLANE MOD: 61 %
PISA TR MAX VEL: 2.1 M/S
PLATELET # BLD AUTO: 251 K/UL (ref 150–450)
PMV BLD AUTO: 10.5 FL (ref 9.2–12.9)
POTASSIUM SERPL-SCNC: 3.5 MMOL/L (ref 3.5–5.1)
PV MV: 0.72 M/S
PV PEAK GRADIENT: 4 MMHG
PV PEAK VELOCITY: 1.03 M/S
RA PRESSURE ESTIMATED: 3 MMHG
RBC # BLD AUTO: 3.02 M/UL (ref 4–5.4)
RV TB RVSP: 5 MMHG
RV TISSUE DOPPLER FREE WALL SYSTOLIC VELOCITY 1 (APICAL 4 CHAMBER VIEW): 10.8 CM/S
SINUS: 2.94 CM
SODIUM SERPL-SCNC: 138 MMOL/L (ref 136–145)
STJ: 2.57 CM
TDI LATERAL: 0.09 M/S
TDI SEPTAL: 0.08 M/S
TDI: 0.09 M/S
TR MAX PG: 18 MMHG
TRICUSPID ANNULAR PLANE SYSTOLIC EXCURSION: 1.56 CM
TV REST PULMONARY ARTERY PRESSURE: 21 MMHG
WBC # BLD AUTO: 4.69 K/UL (ref 3.9–12.7)
Z-SCORE OF LEFT VENTRICULAR DIMENSION IN END DIASTOLE: -4.82
Z-SCORE OF LEFT VENTRICULAR DIMENSION IN END SYSTOLE: -3.68

## 2024-02-09 PROCEDURE — 80048 BASIC METABOLIC PNL TOTAL CA: CPT | Performed by: INTERNAL MEDICINE

## 2024-02-09 PROCEDURE — 93010 ELECTROCARDIOGRAM REPORT: CPT | Mod: ,,, | Performed by: GENERAL PRACTICE

## 2024-02-09 PROCEDURE — 93005 ELECTROCARDIOGRAM TRACING: CPT | Performed by: GENERAL PRACTICE

## 2024-02-09 PROCEDURE — 93306 TTE W/DOPPLER COMPLETE: CPT

## 2024-02-09 PROCEDURE — 93306 TTE W/DOPPLER COMPLETE: CPT | Mod: 26,,, | Performed by: INTERNAL MEDICINE

## 2024-02-09 PROCEDURE — 85025 COMPLETE CBC W/AUTO DIFF WBC: CPT | Performed by: INTERNAL MEDICINE

## 2024-02-09 PROCEDURE — 36415 COLL VENOUS BLD VENIPUNCTURE: CPT | Performed by: INTERNAL MEDICINE

## 2024-02-09 NOTE — DISCHARGE INSTRUCTIONS
Your procedure is scheduled for:Feb. 12          Arrive thru the [facility] entrance at: 6 am     Nothing to eat or drink after midnight the night before your procedure.  Do not take any medications the morning of your procedure  Bring all your medications with you in the original pill bottles from pharmacy.  If you take blood thinners, ask your doctor if you should stop taking them.    Do your chlorhexidine wash the night before and morning of your procedure.  If you use a CPAP or BiPAP at home, please bring it with you the day of your procedure.  Make arrangements for someone you know to drive you home after your procedure. Taxi and Uber are not acceptable.         Any questions call the [facility] at [phone]

## 2024-02-09 NOTE — PRE-PROCEDURE INSTRUCTIONS
Preadmit assessment complete. Review of pt health history and home medications.  Preop education per AVS. Pt voiced understanding      Patient takes plavix - states was not told to stop or continue - secure chat to Dr Carlisle and Precious De La Cruz - phone call to [facility]  Mery - [facility] patient should continue plavix - and not take any meds am of surgery but bring all meds in bottles am of procedure. - pt verbalized understanding .

## 2024-02-11 LAB — IGE: 62 IU/ML (ref 6–495)

## 2024-02-12 ENCOUNTER — TELEPHONE (OUTPATIENT)
Dept: PHYSICAL MEDICINE AND REHAB | Facility: CLINIC | Age: 59
End: 2024-02-12
Payer: MEDICARE

## 2024-02-12 ENCOUNTER — HOSPITAL ENCOUNTER (OUTPATIENT)
Facility: HOSPITAL | Age: 59
Discharge: HOME | End: 2024-02-12
Attending: INTERNAL MEDICINE | Admitting: INTERNAL MEDICINE
Payer: MEDICARE

## 2024-02-12 VITALS
SYSTOLIC BLOOD PRESSURE: 138 MMHG | DIASTOLIC BLOOD PRESSURE: 65 MMHG | BODY MASS INDEX: 36.26 KG/M2 | OXYGEN SATURATION: 93 % | WEIGHT: 231 LBS | HEART RATE: 80 BPM | HEIGHT: 67 IN | RESPIRATION RATE: 18 BRPM

## 2024-02-12 DIAGNOSIS — I25.119 ATHEROSCLEROSIS OF NATIVE CORONARY ARTERY WITH ANGINA PECTORIS, UNSPECIFIED WHETHER NATIVE OR TRANSPLANTED HEART: ICD-10-CM

## 2024-02-12 DIAGNOSIS — Z95.5 HX OF HEART ARTERY STENT: ICD-10-CM

## 2024-02-12 DIAGNOSIS — R06.02 SHORTNESS OF BREATH: ICD-10-CM

## 2024-02-12 DIAGNOSIS — I25.10 CAD (CORONARY ARTERY DISEASE): ICD-10-CM

## 2024-02-12 DIAGNOSIS — I25.110 CORONARY ARTERY DISEASE INVOLVING NATIVE CORONARY ARTERY OF NATIVE HEART WITH UNSTABLE ANGINA PECTORIS: ICD-10-CM

## 2024-02-12 LAB
POC ACTIVATED CLOTTING TIME K: 234 SEC (ref 74–137)
POC ACTIVATED CLOTTING TIME K: 244 SEC (ref 74–137)
SAMPLE: ABNORMAL
SAMPLE: ABNORMAL

## 2024-02-12 PROCEDURE — 99152 MOD SED SAME PHYS/QHP 5/>YRS: CPT | Performed by: INTERNAL MEDICINE

## 2024-02-12 PROCEDURE — 93458 L HRT ARTERY/VENTRICLE ANGIO: CPT | Mod: 26,59,, | Performed by: INTERNAL MEDICINE

## 2024-02-12 PROCEDURE — C1725 CATH, TRANSLUMIN NON-LASER: HCPCS | Performed by: INTERNAL MEDICINE

## 2024-02-12 PROCEDURE — C9600 PERC DRUG-EL COR STENT SING: HCPCS | Mod: RC | Performed by: INTERNAL MEDICINE

## 2024-02-12 PROCEDURE — 63600175 PHARM REV CODE 636 W HCPCS: Performed by: INTERNAL MEDICINE

## 2024-02-12 PROCEDURE — 25500020 PHARM REV CODE 255: Performed by: INTERNAL MEDICINE

## 2024-02-12 PROCEDURE — 92978 ENDOLUMINL IVUS OCT C 1ST: CPT | Mod: 26,RC,, | Performed by: INTERNAL MEDICINE

## 2024-02-12 PROCEDURE — 93005 ELECTROCARDIOGRAM TRACING: CPT | Performed by: INTERNAL MEDICINE

## 2024-02-12 PROCEDURE — C1769 GUIDE WIRE: HCPCS | Performed by: INTERNAL MEDICINE

## 2024-02-12 PROCEDURE — 93010 ELECTROCARDIOGRAM REPORT: CPT | Mod: ,,, | Performed by: INTERNAL MEDICINE

## 2024-02-12 PROCEDURE — C1760 CLOSURE DEV, VASC: HCPCS | Performed by: INTERNAL MEDICINE

## 2024-02-12 PROCEDURE — 92928 PRQ TCAT PLMT NTRAC ST 1 LES: CPT | Mod: RC,,, | Performed by: INTERNAL MEDICINE

## 2024-02-12 PROCEDURE — 92978 ENDOLUMINL IVUS OCT C 1ST: CPT | Mod: RC | Performed by: INTERNAL MEDICINE

## 2024-02-12 PROCEDURE — C1887 CATHETER, GUIDING: HCPCS | Performed by: INTERNAL MEDICINE

## 2024-02-12 PROCEDURE — 99153 MOD SED SAME PHYS/QHP EA: CPT | Performed by: INTERNAL MEDICINE

## 2024-02-12 PROCEDURE — C1874 STENT, COATED/COV W/DEL SYS: HCPCS | Performed by: INTERNAL MEDICINE

## 2024-02-12 PROCEDURE — C1894 INTRO/SHEATH, NON-LASER: HCPCS | Performed by: INTERNAL MEDICINE

## 2024-02-12 PROCEDURE — 99152 MOD SED SAME PHYS/QHP 5/>YRS: CPT | Mod: ,,, | Performed by: INTERNAL MEDICINE

## 2024-02-12 PROCEDURE — C1753 CATH, INTRAVAS ULTRASOUND: HCPCS | Performed by: INTERNAL MEDICINE

## 2024-02-12 PROCEDURE — 93458 L HRT ARTERY/VENTRICLE ANGIO: CPT | Mod: 59 | Performed by: INTERNAL MEDICINE

## 2024-02-12 PROCEDURE — 25000003 PHARM REV CODE 250: Performed by: INTERNAL MEDICINE

## 2024-02-12 PROCEDURE — 27201423 OPTIME MED/SURG SUP & DEVICES STERILE SUPPLY: Performed by: INTERNAL MEDICINE

## 2024-02-12 PROCEDURE — 25000003 PHARM REV CODE 250

## 2024-02-12 DEVICE — ANGIO-SEAL VIP VASCULAR CLOSURE DEVICE
Type: IMPLANTABLE DEVICE | Site: CHEST | Status: FUNCTIONAL
Brand: ANGIO-SEAL

## 2024-02-12 DEVICE — STENT ONYXNG30015UX ONYX 3.00X15RX
Type: IMPLANTABLE DEVICE | Site: CHEST | Status: FUNCTIONAL
Brand: ONYX FRONTIER™

## 2024-02-12 RX ORDER — IODIXANOL 320 MG/ML
INJECTION, SOLUTION INTRAVASCULAR
Status: DISCONTINUED | OUTPATIENT
Start: 2024-02-12 | End: 2024-02-12 | Stop reason: HOSPADM

## 2024-02-12 RX ORDER — CLOPIDOGREL BISULFATE 75 MG/1
75 TABLET ORAL DAILY
Qty: 90 TABLET | Refills: 3 | Status: ON HOLD | OUTPATIENT
Start: 2024-02-12 | End: 2024-02-28

## 2024-02-12 RX ORDER — MIDAZOLAM HYDROCHLORIDE 1 MG/ML
INJECTION INTRAMUSCULAR; INTRAVENOUS
Status: DISCONTINUED | OUTPATIENT
Start: 2024-02-12 | End: 2024-02-12 | Stop reason: HOSPADM

## 2024-02-12 RX ORDER — DIPHENHYDRAMINE HCL 25 MG
CAPSULE ORAL
Status: COMPLETED
Start: 2024-02-12 | End: 2024-02-12

## 2024-02-12 RX ORDER — NAPROXEN SODIUM 220 MG/1
TABLET, FILM COATED ORAL
Status: DISCONTINUED | OUTPATIENT
Start: 2024-02-12 | End: 2024-02-12 | Stop reason: HOSPADM

## 2024-02-12 RX ORDER — DIPHENHYDRAMINE HCL 25 MG
50 CAPSULE ORAL
Status: DISCONTINUED | OUTPATIENT
Start: 2024-02-12 | End: 2024-02-12 | Stop reason: HOSPADM

## 2024-02-12 RX ORDER — NITROGLYCERIN 0.4 MG/1
0.4 TABLET SUBLINGUAL EVERY 5 MIN PRN
Status: DISCONTINUED | OUTPATIENT
Start: 2024-02-12 | End: 2024-02-12 | Stop reason: HOSPADM

## 2024-02-12 RX ORDER — FENTANYL CITRATE 50 UG/ML
INJECTION, SOLUTION INTRAMUSCULAR; INTRAVENOUS
Status: DISCONTINUED | OUTPATIENT
Start: 2024-02-12 | End: 2024-02-12 | Stop reason: HOSPADM

## 2024-02-12 RX ORDER — HEPARIN SODIUM 10000 [USP'U]/ML
INJECTION, SOLUTION INTRAVENOUS; SUBCUTANEOUS
Status: DISCONTINUED | OUTPATIENT
Start: 2024-02-12 | End: 2024-02-12 | Stop reason: HOSPADM

## 2024-02-12 RX ORDER — LIDOCAINE HYDROCHLORIDE 10 MG/ML
INJECTION INFILTRATION; PERINEURAL
Status: DISCONTINUED | OUTPATIENT
Start: 2024-02-12 | End: 2024-02-12 | Stop reason: HOSPADM

## 2024-02-12 RX ORDER — NITROGLYCERIN 5 MG/ML
INJECTION, SOLUTION INTRAVENOUS
Status: DISCONTINUED | OUTPATIENT
Start: 2024-02-12 | End: 2024-02-12 | Stop reason: HOSPADM

## 2024-02-12 RX ORDER — SODIUM CHLORIDE 9 MG/ML
INJECTION, SOLUTION INTRAVENOUS ONCE
Status: COMPLETED | OUTPATIENT
Start: 2024-02-12 | End: 2024-02-12

## 2024-02-12 RX ORDER — ACETAMINOPHEN 325 MG/1
650 TABLET ORAL EVERY 4 HOURS PRN
Status: DISCONTINUED | OUTPATIENT
Start: 2024-02-12 | End: 2024-02-12 | Stop reason: HOSPADM

## 2024-02-12 RX ORDER — ONDANSETRON 4 MG/1
8 TABLET, ORALLY DISINTEGRATING ORAL EVERY 8 HOURS PRN
Status: DISCONTINUED | OUTPATIENT
Start: 2024-02-12 | End: 2024-02-12 | Stop reason: HOSPADM

## 2024-02-12 RX ADMIN — DIPHENHYDRAMINE HYDROCHLORIDE 50 MG: 25 CAPSULE ORAL at 06:02

## 2024-02-12 RX ADMIN — Medication 50 MG: at 06:02

## 2024-02-12 RX ADMIN — SODIUM CHLORIDE: 9 INJECTION, SOLUTION INTRAVENOUS at 07:02

## 2024-02-12 NOTE — Clinical Note
130 ml of contrast were injected throughout the case. 170 mL of contrast was the total wasted during the case. 300 mL was the total amount used during the case.

## 2024-02-12 NOTE — DISCHARGE INSTRUCTIONS
Post angiogram discharge care  You have a puncture site in your right groin  You can remove your current dressing in 24 hours and take a shower. Showers only for the next week. Do not sit in a bathtub or pool of water 7 days until the puncture site is healed.   Gently clean the puncture site daily using soap and water while showering, dry thoroughly and cover with a Band-Aid. Make sure the Band-Aid and puncture site stay clean and dry.  Inspect the site daily for tenderness, discharge or signs of infection.   Observe the puncture site for signs of bleeding, obvious oozing, formation on knot under skin, or bruising. If any of these were to occur you should immediately lie down flat and apply firm pressure at the insertion site for 10 minutes. If bleeding or swelling does not stop, call 911! Do NOT try to drive yourself to the hospital.   Drink at least 6-8 glasses of clear liquids durin the next 24 hours to flush out the dye.  You must not be alone for the next 24 hours.   You may experience soreness or tenderness that my last for 1 week.   You may have mild oozing from the puncture site and/or possible bruising that could last up to 2 weeks.  You may also have a small lump form that may last up to 6 weeks.       Activity  Do not drive or operate an automobile or hazardous machinery for 24 hours  Do note engage in sports for 1 week.  Limit lifting over 10 pounds for the nest week, or until puncture site heals.  Limit you activities for the next 48 hours. Avoid excessive bending or squatting.  You may resume normal activity in 2-3 days, let pain be your guide.    Call your Doctor immediately if you experience any of the following:  Chest pain, palpitations, shortness of breath, excessive dizziness, fainting, nausea or vomiting.  Any signs of infection: redness, warmth, swelling, pain, drainage (other than a little blood on Band-Aid), chills, poorly healing puncture site, fever greater than 101.0 F  Change in color,  coolness, swelling, numbness, or pain to the involved extremity  Significant bleeding from the puncture site.

## 2024-02-14 ENCOUNTER — OFFICE VISIT (OUTPATIENT)
Dept: PHYSICAL MEDICINE AND REHAB | Facility: CLINIC | Age: 59
End: 2024-02-14
Payer: MEDICARE

## 2024-02-14 VITALS
BODY MASS INDEX: 36.26 KG/M2 | HEIGHT: 67 IN | WEIGHT: 231 LBS | DIASTOLIC BLOOD PRESSURE: 70 MMHG | HEART RATE: 83 BPM | SYSTOLIC BLOOD PRESSURE: 116 MMHG

## 2024-02-14 DIAGNOSIS — M54.9 DORSALGIA, UNSPECIFIED: Primary | ICD-10-CM

## 2024-02-14 DIAGNOSIS — M54.32 SCIATICA OF LEFT SIDE: ICD-10-CM

## 2024-02-14 PROCEDURE — 99999 PR PBB SHADOW E&M-EST. PATIENT-LVL III: CPT | Mod: PBBFAC,,, | Performed by: STUDENT IN AN ORGANIZED HEALTH CARE EDUCATION/TRAINING PROGRAM

## 2024-02-14 PROCEDURE — 99204 OFFICE O/P NEW MOD 45 MIN: CPT | Mod: S$GLB,,, | Performed by: STUDENT IN AN ORGANIZED HEALTH CARE EDUCATION/TRAINING PROGRAM

## 2024-02-14 RX ORDER — HYDROCODONE BITARTRATE AND ACETAMINOPHEN 10; 325 MG/1; MG/1
1 TABLET ORAL EVERY 6 HOURS PRN
Qty: 42 TABLET | Refills: 0 | Status: ON HOLD | OUTPATIENT
Start: 2024-02-14 | End: 2024-02-28 | Stop reason: HOSPADM

## 2024-02-14 NOTE — PROGRESS NOTES
PHYSICAL MEDICINE AND REHABILITATION  New Patient Consult:    Subjective:   Chief Complaint:    Chief Complaint   Patient presents with    Back Pain     Sciatica of the left side     HPI: Eugenia Manuel is a 58 y.o. 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 She was sent to me for consultation for Back Pain (Sciatica of the left side)    Today,  She reports chronic low back pain that radiates into the groin.  Pain is rated 8/10.  It is located in the lower back.  The pain is described as grabbing, deep and sharp.  The pain is lasted approximately 8 months.  Pain is made worse with sitting, standing, walking, flexing and lifting.  It is improved with sleeping.  She has a tried injections as well as therapy without sustained relief.   Recent X-rays of the lumbar spine revealed severe degenerative changes at L4-L5 and L5-S1. She denies any bladder or bowel issues but does endorse some fatigue and weakness as well as balance/ gait issues and stiffness.    She also complains of pain bt her shoulder blades for the past 3 months. This pain a chronic and intermittent issue for her. The pain remains in the back and does not radiate into her arms. She describes the pain as deep, sharp and  electric. Pain is worse with flexing and lifting.     For pain control, she takes ibuprofen which provided mild temporary relief. She is on dialysis and is unable to take high amounts of NSAID's. She recalls going to aquatherapy with temporary relief of her pain. She has had injections in the past although these were simply IM injections in the ED. She also recalls having TPIs in the past without temporary relief for approx 2-3 wks. She recalls going to formal PT approx 1 year ago with worsening pain and no improvement. She is on gabapentin 300 mg TID and recalls being on this for a couple of years. She remains 300 mg TID due to her kidneys. She has been on tramadol in the past without improvement in her pain.     Review of Systems  Fatigue, joint swelling, balance/gait issues/ joint stiffness/swelling    Imaging/Diagnostic Studies  XR Thoracic Spine, 3 Views     CLINICAL HISTORY:  Arm Injury (Ground level trip and fall 30 minutes pta, fell onto left elbow, skin tear present, deformity noted to elbow).     TECHNIQUE:  Frontal, lateral and swimmer's views of the thoracic spine.     COMPARISON:  No relevant prior studies available.     FINDINGS:  Vertebrae:  No acute fracture. No traumatic subluxation. Mineralization appears decreased.  Disc spaces:  Mild to moderate multilevel disc space narrowing with small osteophytes. Cervical spine hardware partially visible.  Soft tissues:  Right upper quadrant surgical clips.     IMPRESSION:  No acute osseous abnormality of the thoracic spine is radiographically apparent.     Electronically signed by:  Trisha Celis MD  01/31/2024    XR LUMBAR SPINE 4 OR MORE VIEWS     INDICATION: 58 years Female; low back pain     Technique: 4 views of the lumbar spine.     COMPARISON: None.     FINDINGS:  Bones: Normal alignment of the lumbar spine. No acute fracture or subluxation. Severe disc height loss at L4-5 and L5-S1 with endplate sclerosis and endplate spurring. Facet arthropathy  at L4-S1. Moderate disc height loss at L3-4.  Soft tissues: Unremarkable.  Incidental findings: Severe atherosclerosis of the aorta.     IMPRESSION:  1.  No acute fracture or subluxation.  2.  Severe degenerative disc disease at L4-5 and L5-S1.    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     Generalized edema     GERD (gastroesophageal reflux disease)     Heart attack 2021    History of traumatic brain injury     Hypertension     Hypothyroidism     Insomnia     Muscle weakness (generalized)     Restless leg syndrome     Senile dementia, uncomplicated     Sleep apnea, unspecified     Unsteadiness on feet        Past Surgical History:   Procedure Laterality Date    ADENOIDECTOMY      ANGIOGRAM, CORONARY, WITH LEFT HEART CATHETERIZATION N/A 2/12/2024    Procedure: Angiogram, Coronary, with Left Heart Cath;  Surgeon: Cristian Carlisle MD;  Location: [facility] CATH/EP LAB;  Service: Cardiology;  Laterality: N/A;    APPENDECTOMY      AV FISTULA PLACEMENT Left     CHOLECYSTECTOMY      CORONARY STENT PLACEMENT  2021    IVUS (INTRAVASCULAR ULTRASOUND) N/A 2/12/2024    Procedure: ULTRASOUND, INTRAVASCULAR;  Surgeon: Cristian Carlisle MD;  Location: [facility] CATH/EP LAB;  Service: Cardiology;  Laterality: N/A;    KNEE SURGERY      knee arthroplasty    LEFT HEART CATHETERIZATION  2021    NECK SURGERY      PARTIAL HYSTERECTOMY      STENT, DRUG ELUTING, MULTI VESSEL, CORONARY  2/12/2024    Procedure: Stent, Drug Eluting, Multi Vessel,  Coronary;  Surgeon: Cristian Carlisle MD;  Location: [facility] CATH/EP LAB;  Service: Cardiology;;    TONSILLECTOMY         Review of patient's allergies indicates:   Allergen Reactions    Lisinopril Anaphylaxis       Current Outpatient Medications   Medication Sig Dispense Refill    albuterol (PROVENTIL/VENTOLIN HFA) 90 mcg/actuation inhaler Inhale 2 puffs into the lungs every 6 (six) hours as needed. Rescue 18 g 6    aspirin 81 MG Chew Take 81 mg by mouth once daily.      atorvastatin (LIPITOR) 40 MG tablet Take 1 tablet (40 mg total) by mouth once daily. 90 tablet 0    azelastine (ASTELIN) 137 mcg (0.1 %) nasal spray 1 spray (137 mcg total) by Nasal route 2 (two) times daily. 30 mL 2    budesonide-glycopyr-formoterol (BREZTRI AEROSPHERE) 160-9-4.8 mcg/actuation HFAA Inhale 2 puffs into the lungs 2 (two) times daily. 10.7 g 6    cetirizine (ZYRTEC) 10 MG tablet Take 1 tablet (10 mg total) by mouth once daily. 90 tablet 3    clopidogreL (PLAVIX) 75 mg tablet Take 1 tablet (75 mg total) by mouth once daily. 90 tablet 3    doxycycline (VIBRA-TABS) 100 MG tablet Take 1 tablet (100 mg total) by mouth 2 (two) times daily. 20 tablet 0    ferric citrate (AURYXIA) 210 mg iron Tab Take 420 mg by mouth 3 (three) times daily. 540 tablet 3    fluticasone propionate (FLONASE) 50 mcg/actuation nasal spray 2 sprays (100 mcg total) by Each Nostril route once daily. 16 g 1    gabapentin (NEURONTIN) 300 MG capsule Take 1 capsule (300 mg total) by mouth 3 (three) times daily. 270 capsule 1    levothyroxine (SYNTHROID) 150 MCG tablet Take 1 tablet (150 mcg total) by mouth before breakfast. 90 tablet 3    melatonin 10 mg Cap Take by mouth.      midodrine (PROAMATINE) 10 MG tablet Take 1 tablet (10 mg total) by mouth daily as needed (hypotension). 180 tablet 0    montelukast (SINGULAIR) 10 mg tablet Take 1 tablet (10 mg total) by mouth every evening. 30 tablet 6    nitroGLYCERIN (NITROSTAT) 0.4 MG SL tablet Place 1 tablet (0.4 mg total) under  "the tongue every 5 (five) minutes as needed for Chest pain. 10 tablet 5    pantoprazole (PROTONIX) 40 MG tablet Take 1 tablet (40 mg total) by mouth once daily. 90 tablet 1    pramipexole (MIRAPEX) 0.5 MG tablet Take 0.5 mg by mouth 2 (two) times daily.      promethazine-dextromethorphan (PROMETHAZINE-DM) 6.25-15 mg/5 mL Syrp Take 5 mLs by mouth nightly as needed (cough). 118 mL 0    QUEtiapine (SEROQUEL) 100 MG Tab Take 100 mg by mouth every evening.      QUEtiapine (SEROQUEL) 100 MG Tab Take 1 tablet (100 mg total) by mouth once daily. 30 tablet 5    QUEtiapine (SEROQUEL) 25 MG Tab Take 1 tablet (25 mg total) by mouth every evening. 90 tablet 1    ranolazine (RANEXA) 500 MG Tb12 Take 1 tablet (500 mg total) by mouth 2 (two) times daily. 180 tablet 3    rOPINIRole (REQUIP) 2 MG tablet Take 1 tablet (2 mg total) by mouth 3 (three) times daily. 90 tablet 0    senna-docusate 8.6-50 mg (PERICOLACE) 8.6-50 mg per tablet Take 1 tablet by mouth daily as needed.      sevelamer carbonate (RENVELA) 800 mg Tab Take 5 tablets (4,000 mg total) by mouth 3 (three) times daily with meals. 1350 tablet 3    torsemide (DEMADEX) 100 MG Tab Take 1 tablet (100 mg total) by mouth 2 (two) times a day. 90 tablet 0    venlafaxine (EFFEXOR-XR) 150 MG Cp24 Take 1 capsule (150 mg total) by mouth once daily. 90 capsule 3    HYDROcodone-acetaminophen (NORCO)  mg per tablet Take 1 tablet by mouth every 6 (six) hours as needed for Pain. 42 tablet 0     No current facility-administered medications for this visit.       History reviewed. No pertinent family history.    Social History     Socioeconomic History    Marital status: Single   Tobacco Use    Smoking status: Never    Smokeless tobacco: Never   Substance and Sexual Activity    Alcohol use: Never    Drug use: Never         Objective:    /70 (BP Location: Right arm, Patient Position: Sitting, BP Method: Large (Automatic))   Pulse 83   Ht 5' 7" (1.702 m)   Wt 104.8 kg (231 lb)   " BMI 36.18 kg/m²   Physical Exam  Vitals and nursing note reviewed.   Constitutional:       Appearance: Normal appearance.   HENT:      Head: Normocephalic.   Eyes:      Extraocular Movements: Extraocular movements intact.   Cardiovascular:      Rate and Rhythm: Normal rate.      Pulses: Normal pulses.   Pulmonary:      Effort: Pulmonary effort is normal.   Abdominal:      General: Abdomen is flat.   Musculoskeletal:      Lumbar back: Negative right straight leg raise test and negative left straight leg raise test.   Skin:     General: Skin is warm and dry.   Neurological:      General: No focal deficit present.      Mental Status: She is alert and oriented to person, place, and time. Mental status is at baseline.   Psychiatric:         Mood and Affect: Mood normal.         Behavior: Behavior normal.         Thought Content: Thought content normal.        Back Exam     Tenderness   The patient is experiencing tenderness in the thoracic and lumbar.    Muscle Strength   Right Quadriceps:  5/5   Left Quadriceps:  5/5   Right Hamstrings:  5/5   Left Hamstrings:  5/5     Tests   Straight leg raise right: negative  Straight leg raise left: negative    Reflexes   Patellar:  normal  Achilles:  normal    Other   Sensation: normal  Gait: normal     Comments:  No clonus  + facet loading bilaterally               Assessment:       ICD-10-CM ICD-9-CM    1. Dorsalgia, unspecified  M54.9 724.5 MRI Lumbar Spine Without Contrast      HYDROcodone-acetaminophen (NORCO)  mg per tablet      2. Sciatica of left side  M54.32 724.3 Ambulatory referral/consult to Physical Medicine Rehab      MRI Lumbar Spine Without Contrast      HYDROcodone-acetaminophen (NORCO)  mg per tablet            Plan:   1. Dorsalgia, unspecified  -     MRI Lumbar Spine Without Contrast; Future; Expected date: 02/14/2024  -     HYDROcodone-acetaminophen (NORCO)  mg per tablet; Take 1 tablet by mouth every 6 (six) hours as needed for Pain.   Dispense: 42 tablet; Refill: 0    2. Sciatica of left side  Assessment & Plan:  Reviewed x-rays of the thoracic and lumbar spine.  Seven day supply of Norco 10 mg p.r.n.  queried and consistent with patient's history.  Continue gabapentin 300 mg p.o. t.I.d..She can not go higher on gabapentin due to her history of end-stage renal disease   Continue ibuprofen over-the-counter.She has been told to avoid   Stronger anti-inflammatories due to her history of end-stage renal disease   She has attempted tramadol in the past with no relief.    MRI of the lumbar spine due to lack of resolution of pain despite conservative measures.  She has been through both land-based therapy as well as aquatherapy with insufficient relief.   Return to clinic in 3-4 weeks.  Consider trigger point injection of the bilateral rhomboids /thoracic paraspinal musculature with the use of steroids.  Dependent on the results of her MRI of the lumbar spine, likely scheduled for epidural.  Her pain primarily radiates into the left groin      Orders:  -     Ambulatory referral/consult to Physical Medicine Rehab  -     MRI Lumbar Spine Without Contrast; Future; Expected date: 02/14/2024  -     HYDROcodone-acetaminophen (NORCO)  mg per tablet; Take 1 tablet by mouth every 6 (six) hours as needed for Pain.  Dispense: 42 tablet; Refill: 0        Mike Heard MD  Physical Medicine & Rehabilitation     Disclaimer:  This note may have been prepared using voice recognition software, it may have not been extensively proofed, as such there could be errors within the text such as sound alike errors.  Contact the author of this note for clarification.

## 2024-02-14 NOTE — ASSESSMENT & PLAN NOTE
Reviewed x-rays of the thoracic and lumbar spine.  Seven day supply of Norco 10 mg p.r.n.  queried and consistent with patient's history.  Continue gabapentin 300 mg p.o. t.I.d..She can not go higher on gabapentin due to her history of end-stage renal disease   Continue ibuprofen over-the-counter.She has been told to avoid   Stronger anti-inflammatories due to her history of end-stage renal disease   She has attempted tramadol in the past with no relief.    MRI of the lumbar spine due to lack of resolution of pain despite conservative measures.  She has been through both land-based therapy as well as aquatherapy with insufficient relief.   Return to clinic in 3-4 weeks.  Consider trigger point injection of the bilateral rhomboids /thoracic paraspinal musculature with the use of steroids.  Dependent on the results of her MRI of the lumbar spine, likely scheduled for epidural.  Her pain primarily radiates into the left groin

## 2024-02-15 LAB
OHS QRS DURATION: 102 MS
OHS QTC CALCULATION: 492 MS

## 2024-02-16 ENCOUNTER — TELEPHONE (OUTPATIENT)
Dept: CARDIOLOGY | Facility: CLINIC | Age: 59
End: 2024-02-16
Payer: MEDICARE

## 2024-02-16 LAB
OHS QRS DURATION: 100 MS
OHS QTC CALCULATION: 460 MS

## 2024-02-16 NOTE — TELEPHONE ENCOUNTER
I called this patient , and left a message on patient's voice mail . If you fell you are having side effects , it is okay to stop the medication . Please call me back on Monday   Dr Carlisle will be in the office next week . Which side effects ?     She feels really bad , since she stated this medication.

## 2024-02-16 NOTE — TELEPHONE ENCOUNTER
----- Message from Emerson Shaw sent at 2/16/2024  2:29 PM CST -----  Regarding: return call  Contact: patient  Type:  Patient Returning Call    Who Called:patient  Who Left Message for Patient:office nurse  Does the patient know what this is regarding?:ranolazine (RANEXA) 500 MG Tb12// patient feels she is having bad side affects due to this medication  Would the patient rather a call back or a response via [facility]? Please advise  Best Call Back Number:[phone]  Additional Information:

## 2024-02-20 ENCOUNTER — TELEPHONE (OUTPATIENT)
Dept: CARDIOLOGY | Facility: CLINIC | Age: 59
End: 2024-02-20
Payer: MEDICARE

## 2024-02-20 NOTE — TELEPHONE ENCOUNTER
----- Message from Alina Balderas sent at 2/20/2024  9:01 AM CST -----  Contact: self  Pt is requesting a call back from Precious in regards to her new medicine and the issue she had, please call back at [phone] and thanks

## 2024-02-20 NOTE — TELEPHONE ENCOUNTER
I called this patient . She states she feels better , being off the ranexa . She has an appointment in April . If she wants to be seen sooner she will call the office.

## 2024-02-21 ENCOUNTER — TELEPHONE (OUTPATIENT)
Dept: PHYSICAL MEDICINE AND REHAB | Facility: CLINIC | Age: 59
End: 2024-02-21
Payer: MEDICARE

## 2024-02-21 NOTE — TELEPHONE ENCOUNTER
Patient called stating she has chronic low back pain that radiates into the groin. Pain is rated 10/10. She is not having any relief since LOV on 2/14 and is considering injections based on provider response and advisement. Please advise as to what the next steps would be for patient regarding care.       Angella Bran LPN

## 2024-02-22 ENCOUNTER — TELEPHONE (OUTPATIENT)
Dept: PHYSICAL MEDICINE AND REHAB | Facility: CLINIC | Age: 59
End: 2024-02-22
Payer: MEDICARE

## 2024-02-22 NOTE — TELEPHONE ENCOUNTER
Please set her up for a visit to have a TPI asap. If she is able to get the MRI of the lumbar spine, there may be something seen on MRI that can be addressed with interventional pain. I ordered this at her last clinic visit.   Thank you,   Dr. KARSTEN Heard

## 2024-02-22 NOTE — TELEPHONE ENCOUNTER
Called LVM for pt in regards to changing apt to sooner date for discussing possible trigger point injection with Dr. Heard

## 2024-02-22 NOTE — TELEPHONE ENCOUNTER
Can Trisha schedule this patient to come in [facility]? I can only schedule in April and further out... Also she will be having an MRI of the LUMBAR spine on Wednesday, 2/28. So her appt would need to be within that time frame so you all can discuss results maybe..(Totally up to you)..      Angella Bran LPN

## 2024-02-27 ENCOUNTER — HOSPITAL ENCOUNTER (OUTPATIENT)
Facility: HOSPITAL | Age: 59
Discharge: HOME | End: 2024-02-28
Attending: EMERGENCY MEDICINE | Admitting: STUDENT IN AN ORGANIZED HEALTH CARE EDUCATION/TRAINING PROGRAM
Payer: MEDICARE

## 2024-02-27 DIAGNOSIS — R07.9 CHEST PAIN: ICD-10-CM

## 2024-02-27 DIAGNOSIS — D64.9 ANEMIA, UNSPECIFIED TYPE: ICD-10-CM

## 2024-02-27 DIAGNOSIS — I25.110 CORONARY ARTERY DISEASE INVOLVING NATIVE CORONARY ARTERY OF NATIVE HEART WITH UNSTABLE ANGINA PECTORIS: ICD-10-CM

## 2024-02-27 DIAGNOSIS — Z99.2 STAGE 5 CHRONIC KIDNEY DISEASE ON CHRONIC DIALYSIS: ICD-10-CM

## 2024-02-27 DIAGNOSIS — J01.00 ACUTE MAXILLARY SINUSITIS, RECURRENCE NOT SPECIFIED: ICD-10-CM

## 2024-02-27 DIAGNOSIS — F31.9 BIPOLAR AFFECTIVE DISORDER, REMISSION STATUS UNSPECIFIED: ICD-10-CM

## 2024-02-27 DIAGNOSIS — N18.6 STAGE 5 CHRONIC KIDNEY DISEASE ON CHRONIC DIALYSIS: ICD-10-CM

## 2024-02-27 DIAGNOSIS — R55 SYNCOPE, UNSPECIFIED SYNCOPE TYPE: Primary | ICD-10-CM

## 2024-02-27 DIAGNOSIS — K21.9 GASTROESOPHAGEAL REFLUX DISEASE, UNSPECIFIED WHETHER ESOPHAGITIS PRESENT: ICD-10-CM

## 2024-02-27 DIAGNOSIS — J45.909 ASTHMA: ICD-10-CM

## 2024-02-27 DIAGNOSIS — D50.8 IRON DEFICIENCY ANEMIA SECONDARY TO INADEQUATE DIETARY IRON INTAKE: ICD-10-CM

## 2024-02-27 DIAGNOSIS — G25.81 RESTLESS LEG: ICD-10-CM

## 2024-02-27 DIAGNOSIS — E03.9 ACQUIRED HYPOTHYROIDISM: ICD-10-CM

## 2024-02-27 DIAGNOSIS — I25.119 ATHEROSCLEROSIS OF NATIVE CORONARY ARTERY WITH ANGINA PECTORIS, UNSPECIFIED WHETHER NATIVE OR TRANSPLANTED HEART: ICD-10-CM

## 2024-02-27 PROBLEM — R50.9 FEVER: Status: ACTIVE | Noted: 2024-02-27

## 2024-02-27 LAB
ALBUMIN SERPL BCP-MCNC: 4.2 G/DL (ref 3.5–5.2)
ALP SERPL-CCNC: 213 U/L (ref 55–135)
ALT SERPL W/O P-5'-P-CCNC: 9 U/L (ref 10–44)
ANION GAP SERPL CALC-SCNC: 12 MMOL/L (ref 8–16)
AST SERPL-CCNC: 13 U/L (ref 10–40)
BACTERIA #/AREA URNS HPF: ABNORMAL /HPF
BASOPHILS # BLD AUTO: 0.05 K/UL (ref 0–0.2)
BASOPHILS NFR BLD: 0.5 % (ref 0–1.9)
BILIRUB SERPL-MCNC: 0.4 MG/DL (ref 0.1–1)
BILIRUB UR QL STRIP: NEGATIVE
BNP SERPL-MCNC: 237 PG/ML (ref 0–99)
BUN SERPL-MCNC: 48 MG/DL (ref 6–20)
CALCIUM SERPL-MCNC: 9.3 MG/DL (ref 8.7–10.5)
CHLORIDE SERPL-SCNC: 92 MMOL/L (ref 95–110)
CLARITY UR: CLEAR
CO2 SERPL-SCNC: 31 MMOL/L (ref 23–29)
COLOR UR: COLORLESS
CREAT SERPL-MCNC: 10.4 MG/DL (ref 0.5–1.4)
DIFFERENTIAL METHOD BLD: ABNORMAL
EOSINOPHIL # BLD AUTO: 0.1 K/UL (ref 0–0.5)
EOSINOPHIL NFR BLD: 1.3 % (ref 0–8)
ERYTHROCYTE [DISTWIDTH] IN BLOOD BY AUTOMATED COUNT: 13.6 % (ref 11.5–14.5)
EST. GFR  (NO RACE VARIABLE): 4 ML/MIN/1.73 M^2
GLUCOSE SERPL-MCNC: 128 MG/DL (ref 70–110)
GLUCOSE UR QL STRIP: ABNORMAL
HCT VFR BLD AUTO: 28.2 % (ref 37–48.5)
HGB BLD-MCNC: 9.1 G/DL (ref 12–16)
HGB UR QL STRIP: ABNORMAL
HYALINE CASTS #/AREA URNS LPF: 0 /LPF
IMM GRANULOCYTES # BLD AUTO: 0.03 K/UL (ref 0–0.04)
IMM GRANULOCYTES NFR BLD AUTO: 0.3 % (ref 0–0.5)
INFLUENZA A, MOLECULAR: NEGATIVE
INFLUENZA B, MOLECULAR: NEGATIVE
INR PPP: 1 (ref 0.8–1.2)
KETONES UR QL STRIP: NEGATIVE
LACTATE SERPL-SCNC: 1.4 MMOL/L (ref 0.5–1.9)
LEUKOCYTE ESTERASE UR QL STRIP: NEGATIVE
LYMPHOCYTES # BLD AUTO: 1 K/UL (ref 1–4.8)
LYMPHOCYTES NFR BLD: 10.4 % (ref 18–48)
MAGNESIUM SERPL-MCNC: 2.2 MG/DL (ref 1.6–2.6)
MCH RBC QN AUTO: 33.7 PG (ref 27–31)
MCHC RBC AUTO-ENTMCNC: 32.3 G/DL (ref 32–36)
MCV RBC AUTO: 104 FL (ref 82–98)
MICROSCOPIC COMMENT: ABNORMAL
MONOCYTES # BLD AUTO: 0.5 K/UL (ref 0.3–1)
MONOCYTES NFR BLD: 4.7 % (ref 4–15)
NEUTROPHILS # BLD AUTO: 7.9 K/UL (ref 1.8–7.7)
NEUTROPHILS NFR BLD: 82.8 % (ref 38–73)
NITRITE UR QL STRIP: NEGATIVE
NRBC BLD-RTO: 0 /100 WBC
PH UR STRIP: 8 [PH] (ref 5–8)
PLATELET # BLD AUTO: 205 K/UL (ref 150–450)
PMV BLD AUTO: 10.4 FL (ref 9.2–12.9)
POTASSIUM SERPL-SCNC: 5.2 MMOL/L (ref 3.5–5.1)
PROT SERPL-MCNC: 7.5 G/DL (ref 6–8.4)
PROT UR QL STRIP: ABNORMAL
PROTHROMBIN TIME: 10.9 SEC (ref 9–12.5)
RBC # BLD AUTO: 2.7 M/UL (ref 4–5.4)
RBC #/AREA URNS HPF: >100 /HPF (ref 0–4)
SARS-COV-2 RDRP RESP QL NAA+PROBE: NEGATIVE
SODIUM SERPL-SCNC: 135 MMOL/L (ref 136–145)
SP GR UR STRIP: 1 (ref 1–1.03)
SPECIMEN SOURCE: NORMAL
T4 FREE SERPL-MCNC: 0.63 NG/DL (ref 0.71–1.51)
TROPONIN I SERPL HS-MCNC: 20.8 PG/ML (ref 0–14.9)
TROPONIN I SERPL HS-MCNC: 23.1 PG/ML (ref 0–14.9)
TSH SERPL DL<=0.005 MIU/L-ACNC: 0.29 UIU/ML (ref 0.34–5.6)
UNIDENT CRYS URNS QL MICRO: 2
URN SPEC COLLECT METH UR: ABNORMAL
UROBILINOGEN UR STRIP-ACNC: NEGATIVE EU/DL
WBC # BLD AUTO: 9.51 K/UL (ref 3.9–12.7)
WBC #/AREA URNS HPF: 2 /HPF (ref 0–5)
YEAST URNS QL MICRO: ABNORMAL

## 2024-02-27 PROCEDURE — 86706 HEP B SURFACE ANTIBODY: CPT | Performed by: INTERNAL MEDICINE

## 2024-02-27 PROCEDURE — 83605 ASSAY OF LACTIC ACID: CPT | Performed by: EMERGENCY MEDICINE

## 2024-02-27 PROCEDURE — 99900031 HC PATIENT EDUCATION (STAT)

## 2024-02-27 PROCEDURE — U0002 COVID-19 LAB TEST NON-CDC: HCPCS | Performed by: EMERGENCY MEDICINE

## 2024-02-27 PROCEDURE — 36415 COLL VENOUS BLD VENIPUNCTURE: CPT | Performed by: STUDENT IN AN ORGANIZED HEALTH CARE EDUCATION/TRAINING PROGRAM

## 2024-02-27 PROCEDURE — 85610 PROTHROMBIN TIME: CPT | Performed by: EMERGENCY MEDICINE

## 2024-02-27 PROCEDURE — 87502 INFLUENZA DNA AMP PROBE: CPT | Performed by: EMERGENCY MEDICINE

## 2024-02-27 PROCEDURE — 94640 AIRWAY INHALATION TREATMENT: CPT

## 2024-02-27 PROCEDURE — 99285 EMERGENCY DEPT VISIT HI MDM: CPT | Mod: 25

## 2024-02-27 PROCEDURE — 25000242 PHARM REV CODE 250 ALT 637 W/ HCPCS: Performed by: STUDENT IN AN ORGANIZED HEALTH CARE EDUCATION/TRAINING PROGRAM

## 2024-02-27 PROCEDURE — 84484 ASSAY OF TROPONIN QUANT: CPT | Performed by: EMERGENCY MEDICINE

## 2024-02-27 PROCEDURE — G0257 UNSCHED DIALYSIS ESRD PT HOS: HCPCS

## 2024-02-27 PROCEDURE — G0378 HOSPITAL OBSERVATION PER HR: HCPCS

## 2024-02-27 PROCEDURE — 87040 BLOOD CULTURE FOR BACTERIA: CPT | Mod: 59 | Performed by: STUDENT IN AN ORGANIZED HEALTH CARE EDUCATION/TRAINING PROGRAM

## 2024-02-27 PROCEDURE — 83735 ASSAY OF MAGNESIUM: CPT | Performed by: EMERGENCY MEDICINE

## 2024-02-27 PROCEDURE — 83880 ASSAY OF NATRIURETIC PEPTIDE: CPT | Performed by: EMERGENCY MEDICINE

## 2024-02-27 PROCEDURE — 87340 HEPATITIS B SURFACE AG IA: CPT | Performed by: INTERNAL MEDICINE

## 2024-02-27 PROCEDURE — 85025 COMPLETE CBC W/AUTO DIFF WBC: CPT | Performed by: EMERGENCY MEDICINE

## 2024-02-27 PROCEDURE — 93005 ELECTROCARDIOGRAM TRACING: CPT | Performed by: GENERAL PRACTICE

## 2024-02-27 PROCEDURE — 80053 COMPREHEN METABOLIC PANEL: CPT | Performed by: EMERGENCY MEDICINE

## 2024-02-27 PROCEDURE — 93010 ELECTROCARDIOGRAM REPORT: CPT | Mod: ,,, | Performed by: GENERAL PRACTICE

## 2024-02-27 PROCEDURE — 84443 ASSAY THYROID STIM HORMONE: CPT | Performed by: STUDENT IN AN ORGANIZED HEALTH CARE EDUCATION/TRAINING PROGRAM

## 2024-02-27 PROCEDURE — 25000003 PHARM REV CODE 250: Performed by: STUDENT IN AN ORGANIZED HEALTH CARE EDUCATION/TRAINING PROGRAM

## 2024-02-27 PROCEDURE — 90935 HEMODIALYSIS ONE EVALUATION: CPT

## 2024-02-27 PROCEDURE — 94761 N-INVAS EAR/PLS OXIMETRY MLT: CPT

## 2024-02-27 PROCEDURE — 84439 ASSAY OF FREE THYROXINE: CPT | Performed by: STUDENT IN AN ORGANIZED HEALTH CARE EDUCATION/TRAINING PROGRAM

## 2024-02-27 PROCEDURE — 81001 URINALYSIS AUTO W/SCOPE: CPT | Performed by: EMERGENCY MEDICINE

## 2024-02-27 RX ORDER — ONDANSETRON 4 MG/1
8 TABLET, ORALLY DISINTEGRATING ORAL EVERY 8 HOURS PRN
Status: DISCONTINUED | OUTPATIENT
Start: 2024-02-27 | End: 2024-02-29 | Stop reason: HOSPADM

## 2024-02-27 RX ORDER — SODIUM,POTASSIUM PHOSPHATES 280-250MG
2 POWDER IN PACKET (EA) ORAL
Status: DISCONTINUED | OUTPATIENT
Start: 2024-02-27 | End: 2024-02-29 | Stop reason: HOSPADM

## 2024-02-27 RX ORDER — NALOXONE HCL 0.4 MG/ML
0.02 VIAL (ML) INJECTION
Status: DISCONTINUED | OUTPATIENT
Start: 2024-02-27 | End: 2024-02-29 | Stop reason: HOSPADM

## 2024-02-27 RX ORDER — PRAMIPEXOLE DIHYDROCHLORIDE 0.5 MG/1
0.5 TABLET ORAL NIGHTLY
Status: DISCONTINUED | OUTPATIENT
Start: 2024-02-27 | End: 2024-02-29 | Stop reason: HOSPADM

## 2024-02-27 RX ORDER — ALUMINUM HYDROXIDE, MAGNESIUM HYDROXIDE, AND SIMETHICONE 1200; 120; 1200 MG/30ML; MG/30ML; MG/30ML
30 SUSPENSION ORAL 4 TIMES DAILY PRN
Status: DISCONTINUED | OUTPATIENT
Start: 2024-02-27 | End: 2024-02-29 | Stop reason: HOSPADM

## 2024-02-27 RX ORDER — ATORVASTATIN CALCIUM 40 MG/1
40 TABLET, FILM COATED ORAL DAILY
Status: DISCONTINUED | OUTPATIENT
Start: 2024-02-28 | End: 2024-02-29 | Stop reason: HOSPADM

## 2024-02-27 RX ORDER — GLUCAGON 1 MG
1 KIT INJECTION
Status: DISCONTINUED | OUTPATIENT
Start: 2024-02-27 | End: 2024-02-29 | Stop reason: HOSPADM

## 2024-02-27 RX ORDER — HYDROCODONE BITARTRATE AND ACETAMINOPHEN 10; 325 MG/1; MG/1
1 TABLET ORAL EVERY 6 HOURS PRN
Status: DISCONTINUED | OUTPATIENT
Start: 2024-02-27 | End: 2024-02-29 | Stop reason: HOSPADM

## 2024-02-27 RX ORDER — TALC
6 POWDER (GRAM) TOPICAL NIGHTLY PRN
Status: DISCONTINUED | OUTPATIENT
Start: 2024-02-27 | End: 2024-02-29 | Stop reason: HOSPADM

## 2024-02-27 RX ORDER — MUPIROCIN 20 MG/G
OINTMENT TOPICAL 2 TIMES DAILY
Status: DISCONTINUED | OUTPATIENT
Start: 2024-02-27 | End: 2024-02-29 | Stop reason: HOSPADM

## 2024-02-27 RX ORDER — IBUPROFEN 200 MG
16 TABLET ORAL
Status: DISCONTINUED | OUTPATIENT
Start: 2024-02-27 | End: 2024-02-29 | Stop reason: HOSPADM

## 2024-02-27 RX ORDER — ARFORMOTEROL TARTRATE 15 UG/2ML
15 SOLUTION RESPIRATORY (INHALATION) 2 TIMES DAILY
Status: DISCONTINUED | OUTPATIENT
Start: 2024-02-27 | End: 2024-02-29 | Stop reason: HOSPADM

## 2024-02-27 RX ORDER — GABAPENTIN 300 MG/1
300 CAPSULE ORAL NIGHTLY
Status: DISCONTINUED | OUTPATIENT
Start: 2024-02-27 | End: 2024-02-29 | Stop reason: HOSPADM

## 2024-02-27 RX ORDER — NAPROXEN SODIUM 220 MG/1
81 TABLET, FILM COATED ORAL DAILY
Status: DISCONTINUED | OUTPATIENT
Start: 2024-02-28 | End: 2024-02-29 | Stop reason: HOSPADM

## 2024-02-27 RX ORDER — PANTOPRAZOLE SODIUM 40 MG/1
40 TABLET, DELAYED RELEASE ORAL
Status: DISCONTINUED | OUTPATIENT
Start: 2024-02-28 | End: 2024-02-29 | Stop reason: HOSPADM

## 2024-02-27 RX ORDER — CLOPIDOGREL BISULFATE 75 MG/1
75 TABLET ORAL DAILY
Status: DISCONTINUED | OUTPATIENT
Start: 2024-02-28 | End: 2024-02-29 | Stop reason: HOSPADM

## 2024-02-27 RX ORDER — BUDESONIDE 0.5 MG/2ML
0.5 INHALANT ORAL EVERY 12 HOURS
Status: DISCONTINUED | OUTPATIENT
Start: 2024-02-27 | End: 2024-02-29 | Stop reason: HOSPADM

## 2024-02-27 RX ORDER — SODIUM CHLORIDE 0.9 % (FLUSH) 0.9 %
10 SYRINGE (ML) INJECTION EVERY 12 HOURS PRN
Status: DISCONTINUED | OUTPATIENT
Start: 2024-02-27 | End: 2024-02-29 | Stop reason: HOSPADM

## 2024-02-27 RX ORDER — HYDROCODONE BITARTRATE AND ACETAMINOPHEN 5; 325 MG/1; MG/1
1 TABLET ORAL EVERY 6 HOURS PRN
Status: DISCONTINUED | OUTPATIENT
Start: 2024-02-27 | End: 2024-02-29 | Stop reason: HOSPADM

## 2024-02-27 RX ORDER — ACETAMINOPHEN 325 MG/1
650 TABLET ORAL EVERY 6 HOURS PRN
Status: DISCONTINUED | OUTPATIENT
Start: 2024-02-27 | End: 2024-02-29 | Stop reason: HOSPADM

## 2024-02-27 RX ORDER — VENLAFAXINE HYDROCHLORIDE 37.5 MG/1
150 CAPSULE, EXTENDED RELEASE ORAL DAILY
Status: DISCONTINUED | OUTPATIENT
Start: 2024-02-27 | End: 2024-02-29 | Stop reason: HOSPADM

## 2024-02-27 RX ORDER — IBUPROFEN 200 MG
24 TABLET ORAL
Status: DISCONTINUED | OUTPATIENT
Start: 2024-02-27 | End: 2024-02-29 | Stop reason: HOSPADM

## 2024-02-27 RX ORDER — LANOLIN ALCOHOL/MO/W.PET/CERES
800 CREAM (GRAM) TOPICAL
Status: DISCONTINUED | OUTPATIENT
Start: 2024-02-27 | End: 2024-02-29 | Stop reason: HOSPADM

## 2024-02-27 RX ORDER — SODIUM CHLORIDE 9 MG/ML
INJECTION, SOLUTION INTRAVENOUS ONCE
Status: CANCELLED | OUTPATIENT
Start: 2024-02-27 | End: 2024-02-27

## 2024-02-27 RX ADMIN — GABAPENTIN 300 MG: 300 CAPSULE ORAL at 10:02

## 2024-02-27 RX ADMIN — MUPIROCIN 1 G: 20 OINTMENT TOPICAL at 10:02

## 2024-02-27 RX ADMIN — BUDESONIDE 0.5 MG: 0.5 INHALANT RESPIRATORY (INHALATION) at 07:02

## 2024-02-27 RX ADMIN — ARFORMOTEROL TARTRATE 15 MCG: 15 SOLUTION RESPIRATORY (INHALATION) at 07:02

## 2024-02-27 RX ADMIN — VENLAFAXINE HYDROCHLORIDE 150 MG: 37.5 CAPSULE, EXTENDED RELEASE ORAL at 03:02

## 2024-02-27 NOTE — H&P
[facility] - Emergency Dept  Hospital Medicine  History & Physical    Patient Name: Eugenia Manuel  MRN: [id number]  Patient Class: OP- Observation  Admission Date: 2/27/2024  Attending Physician: Scott Berg MD   Primary Care Provider: John Castro MD         Patient information was obtained from patient, past medical records, and ER records.     Subjective:     Principal Problem:Syncope    Chief Complaint:   Chief Complaint   Patient presents with    Loss of Consciousness     4 syncopal episodes    Fall     Multiple falls over the past week    Fatigue        HPI: 58F with PMH HTN, ESRD on HD, asthma, CAD w/ stent, bipolar disorder, hypothyroid, RLS, hx CVA, and dementia presents from the dialysis center due to being unable to hold still for her session. It was also reported she fainted multiple times at the dialysis and also on the way to ER with EMS. She reports feeling weak for about 1 week and feels like she is going to pass out every time she stands up. Her labs do not reveal an etiology for her symptoms. Mild hyperkalemia K 5.2.  CT head shows an old infarct without anything acute. CXR is clear. Admitted to hospital medicine service for continued evaluation and management.    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     Generalized edema     GERD (gastroesophageal reflux disease)     Heart attack 2021    History of traumatic brain injury     Hypertension     Hypothyroidism     Insomnia      Muscle weakness (generalized)     Restless leg syndrome     Senile dementia, uncomplicated     Sleep apnea, unspecified     Unsteadiness on feet        Past Surgical History:   Procedure Laterality Date    ADENOIDECTOMY      ANGIOGRAM, CORONARY, WITH LEFT HEART CATHETERIZATION N/A 2/12/2024    Procedure: Angiogram, Coronary, with Left Heart Cath;  Surgeon: Cristian Carlisle MD;  Location: [facility] CATH/EP LAB;  Service: Cardiology;  Laterality: N/A;    APPENDECTOMY      AV FISTULA PLACEMENT Left     CHOLECYSTECTOMY      CORONARY STENT PLACEMENT  2021    IVUS (INTRAVASCULAR ULTRASOUND) N/A 2/12/2024    Procedure: ULTRASOUND, INTRAVASCULAR;  Surgeon: Cristian Carlisle MD;  Location: [facility] CATH/EP LAB;  Service: Cardiology;  Laterality: N/A;    KNEE SURGERY      knee arthroplasty    LEFT HEART CATHETERIZATION  2021    NECK SURGERY      PARTIAL HYSTERECTOMY      STENT, DRUG ELUTING, MULTI VESSEL, CORONARY  2/12/2024    Procedure: Stent, Drug Eluting, Multi Vessel, Coronary;  Surgeon: Cristian Carlisle MD;  Location: [facility] CATH/EP LAB;  Service: Cardiology;;    TONSILLECTOMY         Review of patient's allergies indicates:   Allergen Reactions    Lisinopril Anaphylaxis       No current facility-administered medications on file prior to encounter.     Current Outpatient Medications on File Prior to Encounter   Medication Sig    albuterol (PROVENTIL/VENTOLIN HFA) 90 mcg/actuation inhaler Inhale 2 puffs into the lungs every 6 (six) hours as needed. Rescue (Patient taking differently: Inhale 2 puffs into the lungs every 6 (six) hours as needed for Wheezing or Shortness of Breath. Rescue)    aspirin 81 MG Chew Take 81 mg by mouth once daily.    atorvastatin (LIPITOR) 40 MG tablet Take 1 tablet (40 mg total) by mouth once daily.    azelastine (ASTELIN) 137 mcg (0.1 %) nasal spray 1 spray (137 mcg total) by Nasal route 2 (two) times daily.    budesonide-glycopyr-formoterol (BREZTRI AEROSPHERE) 160-9-4.8 mcg/actuation HFAA Inhale 2 puffs  into the lungs 2 (two) times daily.    cetirizine (ZYRTEC) 10 MG tablet Take 1 tablet (10 mg total) by mouth once daily.    clopidogreL (PLAVIX) 75 mg tablet Take 1 tablet (75 mg total) by mouth once daily.    doxycycline (VIBRA-TABS) 100 MG tablet Take 1 tablet (100 mg total) by mouth 2 (two) times daily.    ferric citrate (AURYXIA) 210 mg iron Tab Take 420 mg by mouth 3 (three) times daily.    fluticasone propionate (FLONASE) 50 mcg/actuation nasal spray 2 sprays (100 mcg total) by Each Nostril route once daily.    gabapentin (NEURONTIN) 300 MG capsule Take 1 capsule (300 mg total) by mouth 3 (three) times daily.    HYDROcodone-acetaminophen (NORCO)  mg per tablet Take 1 tablet by mouth every 6 (six) hours as needed for Pain.    levothyroxine (SYNTHROID) 150 MCG tablet Take 1 tablet (150 mcg total) by mouth before breakfast.    melatonin 10 mg Cap Take by mouth.    midodrine (PROAMATINE) 10 MG tablet Take 1 tablet (10 mg total) by mouth daily as needed (hypotension).    montelukast (SINGULAIR) 10 mg tablet Take 1 tablet (10 mg total) by mouth every evening.    nitroGLYCERIN (NITROSTAT) 0.4 MG SL tablet Place 1 tablet (0.4 mg total) under the tongue every 5 (five) minutes as needed for Chest pain.    pantoprazole (PROTONIX) 40 MG tablet Take 1 tablet (40 mg total) by mouth once daily.    pramipexole (MIRAPEX) 0.5 MG tablet Take 0.5 mg by mouth 2 (two) times daily.    promethazine-dextromethorphan (PROMETHAZINE-DM) 6.25-15 mg/5 mL Syrp Take 5 mLs by mouth nightly as needed (cough).    QUEtiapine (SEROQUEL) 100 MG Tab Take 1 tablet (100 mg total) by mouth once daily.    QUEtiapine (SEROQUEL) 25 MG Tab Take 1 tablet (25 mg total) by mouth every evening.    rOPINIRole (REQUIP) 2 MG tablet Take 1 tablet (2 mg total) by mouth 3 (three) times daily.    senna-docusate 8.6-50 mg (PERICOLACE) 8.6-50 mg per tablet Take 1 tablet by mouth daily as needed.    sevelamer carbonate (RENVELA) 800 mg Tab Take 5 tablets (4,000  mg total) by mouth 3 (three) times daily with meals.    torsemide (DEMADEX) 100 MG Tab Take 1 tablet (100 mg total) by mouth 2 (two) times a day.    venlafaxine (EFFEXOR-XR) 150 MG Cp24 Take 1 capsule (150 mg total) by mouth once daily.    [DISCONTINUED] QUEtiapine (SEROQUEL) 100 MG Tab Take 100 mg by mouth every evening.    [DISCONTINUED] ranolazine (RANEXA) 500 MG Tb12 Take 1 tablet (500 mg total) by mouth 2 (two) times daily.     Family History    None       Tobacco Use    Smoking status: Never    Smokeless tobacco: Never   Substance and Sexual Activity    Alcohol use: Never    Drug use: Never    Sexual activity: Not on file     Review of Systems   Constitutional:  Positive for fatigue. Negative for chills and fever.   Respiratory:  Negative for shortness of breath.    Cardiovascular:  Negative for chest pain.   Gastrointestinal:  Negative for abdominal pain, constipation, diarrhea, nausea and vomiting.   Musculoskeletal:  Positive for back pain.   Neurological:  Positive for light-headedness.     Objective:     Vital Signs (Most Recent):  Temp: 98.6 °F (37 °C) (02/27/24 1345)  Pulse: 90 (02/27/24 1345)  Resp: 16 (02/27/24 1345)  BP: 132/74 (02/27/24 1345)  SpO2: 97 % (02/27/24 1345) Vital Signs (24h Range):  Temp:  [98.4 °F (36.9 °C)-100.5 °F (38.1 °C)] 98.6 °F (37 °C)  Pulse:  [83-93] 90  Resp:  [14-16] 16  SpO2:  [96 %-98 %] 97 %  BP: (112-164)/(70-82) 132/74     Weight: 102 kg (224 lb 13.9 oz)  Body mass index is 35.22 kg/m².     Physical Exam  Vitals reviewed.   Constitutional:       General: She is not in acute distress.  HENT:      Head: Normocephalic and atraumatic.   Cardiovascular:      Rate and Rhythm: Normal rate and regular rhythm.      Heart sounds: Normal heart sounds.   Pulmonary:      Effort: Pulmonary effort is normal. No respiratory distress.      Breath sounds: Normal breath sounds. No wheezing, rhonchi or rales.   Abdominal:      General: Bowel sounds are normal. There is no distension.       Palpations: Abdomen is soft.      Tenderness: There is no abdominal tenderness.   Neurological:      General: No focal deficit present.      Mental Status: She is alert and oriented to person, place, and time. Mental status is at baseline.   Psychiatric:         Mood and Affect: Affect normal.         Behavior: Behavior normal.         Thought Content: Thought content normal.                Significant Labs: All pertinent labs within the past 24 hours have been reviewed.    Significant Imaging: I have reviewed all pertinent imaging results/findings within the past 24 hours.    Admission on 02/27/2024   Component Date Value Ref Range Status    QRS Duration 02/27/2024 90  ms In process    [facility] QTC Calculation 02/27/2024 435  ms In process    WBC 02/27/2024 9.51  3.90 - 12.70 K/uL Final    RBC 02/27/2024 2.70 (L)  4.00 - 5.40 M/uL Final    Hemoglobin 02/27/2024 9.1 (L)  12.0 - 16.0 g/dL Final    Hematocrit 02/27/2024 28.2 (L)  37.0 - 48.5 % Final    MCV 02/27/2024 104 (H)  82 - 98 fL Final    MCH 02/27/2024 33.7 (H)  27.0 - 31.0 pg Final    MCHC 02/27/2024 32.3  32.0 - 36.0 g/dL Final    RDW 02/27/2024 13.6  11.5 - 14.5 % Final    Platelets 02/27/2024 205  150 - 450 K/uL Final    MPV 02/27/2024 10.4  9.2 - 12.9 fL Final    Immature Granulocytes 02/27/2024 0.3  0.0 - 0.5 % Final    Gran # (ANC) 02/27/2024 7.9 (H)  1.8 - 7.7 K/uL Final    Immature Grans (Abs) 02/27/2024 0.03  0.00 - 0.04 K/uL Final    Comment: Mild elevation in immature granulocytes is non specific and   can be seen in a variety of conditions including stress response,   acute inflammation, trauma and pregnancy. Correlation with other   laboratory and clinical findings is essential.      Lymph # 02/27/2024 1.0  1.0 - 4.8 K/uL Final    Mono # 02/27/2024 0.5  0.3 - 1.0 K/uL Final    Eos # 02/27/2024 0.1  0.0 - 0.5 K/uL Final    Baso # 02/27/2024 0.05  0.00 - 0.20 K/uL Final    nRBC 02/27/2024 0  0 /100 WBC Final    Gran % 02/27/2024 82.8 (H)  38.0 - 73.0 %  Final    Lymph % 02/27/2024 10.4 (L)  18.0 - 48.0 % Final    Mono % 02/27/2024 4.7  4.0 - 15.0 % Final    Eosinophil % 02/27/2024 1.3  0.0 - 8.0 % Final    Basophil % 02/27/2024 0.5  0.0 - 1.9 % Final    Differential Method 02/27/2024 Automated   Final    Sodium 02/27/2024 135 (L)  136 - 145 mmol/L Final    Potassium 02/27/2024 5.2 (H)  3.5 - 5.1 mmol/L Final    Chloride 02/27/2024 92 (L)  95 - 110 mmol/L Final    CO2 02/27/2024 31 (H)  23 - 29 mmol/L Final    Glucose 02/27/2024 128 (H)  70 - 110 mg/dL Final    BUN 02/27/2024 48 (H)  6 - 20 mg/dL Final    Creatinine 02/27/2024 10.4 (H)  0.5 - 1.4 mg/dL Final    Calcium 02/27/2024 9.3  8.7 - 10.5 mg/dL Final    Total Protein 02/27/2024 7.5  6.0 - 8.4 g/dL Final    Albumin 02/27/2024 4.2  3.5 - 5.2 g/dL Final    Total Bilirubin 02/27/2024 0.4  0.1 - 1.0 mg/dL Final    Comment: For infants and newborns, interpretation of results should be based  on gestational age, weight and in agreement with clinical  observations.    Premature Infant recommended reference ranges:  Up to 24 hours.............<8.0 mg/dL  Up to 48 hours............<12.0 mg/dL  3-5 days..................<15.0 mg/dL  6-29 days.................<15.0 mg/dL      Alkaline Phosphatase 02/27/2024 213 (H)  55 - 135 U/L Final    AST 02/27/2024 13  10 - 40 U/L Final    ALT 02/27/2024 9 (L)  10 - 44 U/L Final    eGFR 02/27/2024 4.0 (A)  >60 mL/min/1.73 m^2 Final    Anion Gap 02/27/2024 12  8 - 16 mmol/L Final    BNP 02/27/2024 237 (H)  0 - 99 pg/mL Final    Values of less than 100 pg/ml are consistent with non-CHF populations.    Magnesium 02/27/2024 2.2  1.6 - 2.6 mg/dL Final    Troponin I High Sensitivity 02/27/2024 23.1 (H)  0.0 - 14.9 pg/mL Final    Comment: Troponin results differ between methods. Do not use   results between Troponin methods interchangeably.    Alkaline Phospatase levels above 400 U/L may   cause false positive results.    Access hsTnI should not be used for patients taking   Asfotase marcell  (Strensiq).      Prothrombin Time 02/27/2024 10.9  9.0 - 12.5 sec Final    INR 02/27/2024 1.0  0.8 - 1.2 Final    Comment: Coumadin Therapy:  2.0 - 3.0 for INR for all indicators except mechanical heart valves  and antiphospholipid syndromes which should use 2.5 - 3.5.      Influenza A, Molecular 02/27/2024 Negative  Negative Final    Influenza B, Molecular 02/27/2024 Negative  Negative Final    Flu A & B Source 02/27/2024 Nasal swab   Final    SARS-CoV-2 RNA, Amplification, Qual 02/27/2024 Negative  Negative Final    Comment: This test utilizes isothermal nucleic acid amplification technology   to   detect the SARS-CoV-2 RdRp nucleic acid segment. The analytical   sensitivity   (limit of detection) is 500 copies/swab.     A POSITIVE result is indicative of the presence of SARS-CoV-2 RNA;   clinical   correlation with patient history and other diagnostic information is   necessary to determine patient infection status.    A NEGATIVE result means that SARS-CoV-2 nucleic acids are not present   above   the limit of detection. A NEGATIVE result should be treated as   presumptive.   It does not rule out the possibility of COVID-19 and should not be   the sole   basis for treatment decisions. If COVID-19 is strongly suspected   based on   clinical and exposure history, re-testing using an alternate   molecular assay   should be considered.     This test is only for use under the Food and Drug Administration s   Emergency   Use Authorization (EUA).     Commercial kits are provided by [employer]. [employer]                           e   characteristics of the EUA have been independently verified by   [facility]  _________________________________________________________________   The authorized Fact Sheet for Healthcare Providers and the authorized   Fact   Sheet for Patients of the ID NOW COVID-19 are available on the FDA   website:   https://www.fda.gov/media/771765/download    https://www.fda.gov/media/529265/download      Lactate (Lactic Acid) 02/27/2024 1.4  0.5 - 1.9 mmol/L Final    Comment: Falsely low lactic acid results can be found in samples   containing >=13.0 mg/dL total bilirubin and/or >=3.5 mg/dL   direct bilirubin.      Specimen UA 02/27/2024 Urine, Clean Catch   Final    Color, UA 02/27/2024 Colorless (A)  Yellow, Straw, Alejandra Final    Appearance, UA 02/27/2024 Clear  Clear Final    pH, UA 02/27/2024 8.0  5.0 - 8.0 Final    Specific Gravity, UA 02/27/2024 1.005  1.005 - 1.030 Final    Protein, UA 02/27/2024 1+ (A)  Negative Final    Comment: Recommend a 24 hour urine protein or a urine   protein/creatinine ratio if globulin induced proteinuria is  clinically suspected.      Glucose, UA 02/27/2024 Trace (A)  Negative Final    Ketones, UA 02/27/2024 Negative  Negative Final    Bilirubin (UA) 02/27/2024 Negative  Negative Final    Occult Blood UA 02/27/2024 3+ (A)  Negative Final    Nitrite, UA 02/27/2024 Negative  Negative Final    Urobilinogen, UA 02/27/2024 Negative  Negative EU/dL Final    Leukocytes, UA 02/27/2024 Negative  Negative Final    Troponin I High Sensitivity 02/27/2024 20.8 (H)  0.0 - 14.9 pg/mL Final    Comment: Troponin results differ between methods. Do not use   results between Troponin methods interchangeably.    Alkaline Phospatase levels above 400 U/L may   cause false positive results.    Access hsTnI should not be used for patients taking   Asfotase marcell (Strensiq).      RBC, UA 02/27/2024 >100 (H)  0 - 4 /hpf Final    WBC, UA 02/27/2024 2  0 - 5 /hpf Final    Bacteria 02/27/2024 None  None-Occ /hpf Final    Yeast, UA 02/27/2024 Rare (A)  None Final    Hyaline Casts, UA 02/27/2024 0  0-1/lpf /lpf Final    Unclass Frida UA 02/27/2024 2  None-Moderate Final    Microscopic Comment 02/27/2024 SEE COMMENT   Final    Comment: Other formed elements not mentioned in the report are not   present in the microscopic examination.            Imaging Results               US Carotid Bilateral (No Result on File)                      CT Head Without Contrast (Final result)  Result time 02/27/24 09:00:32      Final result by Jacob Mckay Jr., MD (02/27/24 09:00:32)                   Narrative:    CMS MANDATED QUALITY DATA - CT RADIATION  436    All CT scans at this facility utilize dose modulation, iterative reconstruction, and/or weight based dosing when appropriate to reduce radiation dose to as low as reasonably achievable.      CT of THE HEAD WITHOUT CONTRAST    HISTORY: Syncopal episode. Loss of consciousness.    Technical factors:   Spiral acquisition of the brain was generated at 5 mm thickness from the skull base to the skull vertex in helical fashion in the absence of intravenous contrast.  Additional coronal and sagittal reconstructed images were also included and reviewed.    FINDINGS:  No evidence of intracranial hemorrhage, masses, mass effect, or midline shift.    Chronic infarct involving the left periventricular space just lateral to the region of the caudate lobe appears stable with evidence of minimal expansion involving the left lateral ventricle anterior horn. No additional areas of vascular insult are observed. There is evidence of focal area of chronic volume loss along the left periventricular margin that appears stable upon comparison.    Generalized age-appropriate cerebral and with evidence of confluent low-density changes throughout the subcortical and periventricular white matter on the basis of age-appropriate microtraumatic changes.    There is no evidence of masses, mass effect, or midline shift.    The orbits and retro-orbital compartments are well-maintained.    There are atheromatous calcifications throughout the region of carotid siphon and supraclinoid ICAs bilaterally.    IMPRESSION:  1. Small infarct involving the left caudate lobe appears stable upon comparison.  2. No CT evidence of acute intracranial pathology.  3. Chronic cerebral  atrophy and age appropriate chronic myelopathic changes.    Electronically signed by:  Jacob Mckay MD  02/27/2024 09:00 AM CST Workstation: [id number]                                     X-Ray Chest AP Portable (Final result)  Result time 02/27/24 07:58:52      Final result by Franco Scott MD (02/27/24 07:58:52)                   Narrative:    Chest single view    Clinical data:Syncope. Comparison to January 2024.    FINDINGS: AP view of the chest demonstrates no cardiac, pulmonary, or osseous abnormalities.    IMPRESSION:  1. Normal chest single view.    Electronically signed by:  Franco Scott MD  02/27/2024 07:58 AM CST Workstation: [id number]                                    Assessment/Plan:     * Syncope  Unclear etiology at this time  CTH NAF, recent echo NAF  - check orthostatic vitals  - check CUS  - tele  - PT/OT  - holding ranexa, requip, seroquel, decrease gabapentin    ESRD (end stage renal disease)  Mild hyperK  - HD per nephro    Fever  100.5 in ER. Only other symptoms are fatigue and chronic back pains  No obvious source of infection  Covid/flu negative. CXR clear.  - obtain blood cultures  - trend condition    Asthma  Stable  - maintenance nebs  - duoneb prn    Hypothyroidism  No recent TSH  - check TSH  - continue home levothyroxine    Bipolar disorder  Stable  - continue venlafaxine  - hold seroquel, unclear if taking    Coronary artery disease involving native coronary artery of native heart with unstable angina pectoris  Stable, recent stent placed  - continue asa, plavix, statin  - hold ranexa - may be contributing to syncope    Hypertension  Chronic, controlled. She may be getting hypotensive at home  - hold her torsemide    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  Dementia   - delirium precautions      VTE Risk Mitigation (From admission, onward)           Ordered     IP VTE HIGH RISK PATIENT  Once         02/27/24 1353     Place sequential  compression device  Until discontinued         02/27/24 1353                       On 02/27/2024, patient should be placed in hospital observation services under my care.        Scott Berg MD  Department of Hospital Medicine  [facility] - Emergency Dept

## 2024-02-27 NOTE — ED PROVIDER NOTES
Encounter Date: 2/27/2024       History     Chief Complaint   Patient presents with    Loss of Consciousness     4 syncopal episodes    Fall     Multiple falls over the past week    Fatigue     58-year-old female who has a history of hypertension, chronic kidney disease on hemodialysis for the last 6-8 months and who previously was on peritoneal dialysis, is brought to emergency room from her dialysis unit with a history that she was unable to keep still to allow for dialysis.  The patient reportedly has had multiple falls recently but denies any head injury.  He only complains of some soreness in her left hip from falls.  The patient was witnessed to have had a couple of syncopal episodes this morning by EMS.  She denied any complaints of any palpitations.  She did state that she has had some complaint of headache chest pain and shortness of breath.  She also admits that she took her normal 4:00 a.m. medications today.  She admits that she still make urine.  Bowel habits have been normal.  She has not aware of any known anemia.  No visual changes.      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 CATH/EP LAB;  Service: Cardiology;  Laterality: N/A;    APPENDECTOMY      AV FISTULA PLACEMENT Left     CHOLECYSTECTOMY      CORONARY STENT PLACEMENT  2021    IVUS (INTRAVASCULAR ULTRASOUND) N/A 2/12/2024    Procedure: ULTRASOUND, INTRAVASCULAR;  Surgeon: Cristian Carlisle MD;  Location: [facility] CATH/EP LAB;  Service: Cardiology;  Laterality: N/A;    KNEE SURGERY      knee arthroplasty    LEFT HEART CATHETERIZATION  2021    NECK SURGERY      PARTIAL HYSTERECTOMY      STENT, DRUG ELUTING, MULTI VESSEL, CORONARY  2/12/2024    Procedure: Stent, Drug Eluting, Multi Vessel, Coronary;  Surgeon: Cristian Carlisle MD;  Location: [facility] CATH/EP LAB;  Service: Cardiology;;    TONSILLECTOMY       No family history on file.  Social History     Tobacco Use    Smoking status: Never    Smokeless tobacco: Never   Substance Use Topics    Alcohol use: Never    Drug use: Never     Review of Systems   Constitutional:  Positive for activity change. Negative for diaphoresis and fever.   HENT: Negative.  Negative for sore throat.    Respiratory:  Negative for cough, chest tightness and shortness of breath.    Cardiovascular:  Positive for chest pain. Negative for palpitations and leg swelling.   Gastrointestinal:  Negative for abdominal pain, nausea and vomiting.   Genitourinary:  Positive for decreased urine volume. Negative for dysuria.   Musculoskeletal:  Negative for back pain and neck pain.   Skin: Negative.  Negative for rash.   Neurological:  Positive for syncope and headaches. Negative for weakness.   Hematological:  Does not bruise/bleed easily.       Physical Exam     Initial Vitals   BP Pulse Resp Temp SpO2    02/27/24 0723 02/27/24 0723 02/27/24 0723 02/27/24 0725 02/27/24 0723   (!) 146/79 92 14 (!) 100.5 °F (38.1 °C) 96 %      MAP       --                Physical Exam    Vitals reviewed.  Constitutional: She appears well-developed and well-nourished. She is not diaphoretic. No distress.   Anxious and intermittently tremulous   HENT:   Head: Normocephalic and atraumatic.   Nose: Nose normal.   Mouth/Throat: Oropharynx is clear and moist. No oropharyngeal exudate.   Eyes: Conjunctivae are normal. Pupils are equal, round, and reactive to light.   Neck: Neck supple. No JVD present.   Normal range of motion.  Cardiovascular:  Normal rate, regular rhythm, normal heart sounds and intact distal pulses.     Exam reveals no gallop and no friction rub.       No murmur heard.  Pulmonary/Chest: Breath sounds normal. No respiratory distress. She has no wheezes. She has no rhonchi. She has no rales. She exhibits no tenderness.   Abdominal: Abdomen is soft. Bowel sounds are normal. She exhibits no distension. There is no abdominal tenderness. There is no rebound and no guarding.   Musculoskeletal:         General: No tenderness or edema. Normal range of motion.      Cervical back: Normal range of motion and neck supple.      Comments: AV fistula left bicep area with a good thrill     Lymphadenopathy:     She has no cervical adenopathy.   Neurological: She is alert and oriented to person, place, and time. She has normal strength. No cranial nerve deficit or sensory deficit. GCS score is 15. GCS eye subscore is 4. GCS verbal subscore is 5. GCS motor subscore is 6.   Skin: Skin is warm and dry. Capillary refill takes less than 2 seconds. No rash noted. No erythema. No pallor.   Psychiatric: She has a normal mood and affect. Her behavior is normal. Judgment and thought content normal.         ED Course   Procedures  Labs Reviewed   CBC W/ AUTO DIFFERENTIAL - Abnormal; Notable for the following components:       Result Value    RBC 2.70  (*)     Hemoglobin 9.1 (*)     Hematocrit 28.2 (*)      (*)     MCH 33.7 (*)     Gran # (ANC) 7.9 (*)     Gran % 82.8 (*)     Lymph % 10.4 (*)     All other components within normal limits   COMPREHENSIVE METABOLIC PANEL - Abnormal; Notable for the following components:    Sodium 135 (*)     Potassium 5.2 (*)     Chloride 92 (*)     CO2 31 (*)     Glucose 128 (*)     BUN 48 (*)     Creatinine 10.4 (*)     Alkaline Phosphatase 213 (*)     ALT 9 (*)     eGFR 4.0 (*)     All other components within normal limits   B-TYPE NATRIURETIC PEPTIDE - Abnormal; Notable for the following components:     (*)     All other components within normal limits   TROPONIN I HIGH SENSITIVITY - Abnormal; Notable for the following components:    Troponin I High Sensitivity 23.1 (*)     All other components within normal limits   URINALYSIS, REFLEX TO URINE CULTURE - Abnormal; Notable for the following components:    Color, UA Colorless (*)     Protein, UA 1+ (*)     Glucose, UA Trace (*)     Occult Blood UA 3+ (*)     All other components within normal limits    Narrative:     Specimen Source->Urine   TROPONIN I HIGH SENSITIVITY - Abnormal; Notable for the following components:    Troponin I High Sensitivity 20.8 (*)     All other components within normal limits   URINALYSIS MICROSCOPIC - Abnormal; Notable for the following components:    RBC, UA >100 (*)     Yeast, UA Rare (*)     All other components within normal limits    Narrative:     Specimen Source->Urine   MAGNESIUM   PROTIME-INR   INFLUENZA A AND B ANTIGEN    Narrative:     Specimen Source->Nasopharyngeal Swab   SARS-COV-2 RNA AMPLIFICATION, QUAL   LACTIC ACID, PLASMA   HEPATITIS B SURFACE ANTIGEN   HEPATITIS B SURFACE ANTIBODY        ECG Results              EKG 12-lead (In process)        Collection Time Result Time QRS Duration OHS QTC Calculation    02/27/24 07:33:37 02/27/24 07:35:07 90 435                     In process by Interface, Lab In [facility] (02/27/24  07:35:11)                   Narrative:    Test Reason : R07.9,    Vent. Rate : 091 BPM     Atrial Rate : 091 BPM     P-R Int : 208 ms          QRS Dur : 090 ms      QT Int : 354 ms       P-R-T Axes : 018 075 002 degrees     QTc Int : 435 ms    Normal sinus rhythm  Normal ECG  When compared with ECG of 12-FEB-2024 10:32,  T wave inversion now evident in Inferior leads    Referred By:             Confirmed By:                                   Imaging Results              CT Head Without Contrast (Final result)  Result time 02/27/24 09:00:32      Final result by Jacob Mckay Jr., MD (02/27/24 09:00:32)                   Narrative:    CMS MANDATED QUALITY DATA - CT RADIATION  436    All CT scans at this facility utilize dose modulation, iterative reconstruction, and/or weight based dosing when appropriate to reduce radiation dose to as low as reasonably achievable.      CT of THE HEAD WITHOUT CONTRAST    HISTORY: Syncopal episode. Loss of consciousness.    Technical factors:   Spiral acquisition of the brain was generated at 5 mm thickness from the skull base to the skull vertex in helical fashion in the absence of intravenous contrast.  Additional coronal and sagittal reconstructed images were also included and reviewed.    FINDINGS:  No evidence of intracranial hemorrhage, masses, mass effect, or midline shift.    Chronic infarct involving the left periventricular space just lateral to the region of the caudate lobe appears stable with evidence of minimal expansion involving the left lateral ventricle anterior horn. No additional areas of vascular insult are observed. There is evidence of focal area of chronic volume loss along the left periventricular margin that appears stable upon comparison.    Generalized age-appropriate cerebral and with evidence of confluent low-density changes throughout the subcortical and periventricular white matter on the basis of age-appropriate microtraumatic changes.    There is  no evidence of masses, mass effect, or midline shift.    The orbits and retro-orbital compartments are well-maintained.    There are atheromatous calcifications throughout the region of carotid siphon and supraclinoid ICAs bilaterally.    IMPRESSION:  1. Small infarct involving the left caudate lobe appears stable upon comparison.  2. No CT evidence of acute intracranial pathology.  3. Chronic cerebral atrophy and age appropriate chronic myelopathic changes.    Electronically signed by:  Jacob Mckay MD  02/27/2024 09:00 AM CST Workstation: [id number]                                     X-Ray Chest AP Portable (Final result)  Result time 02/27/24 07:58:52      Final result by Franco Scott MD (02/27/24 07:58:52)                   Narrative:    Chest single view    Clinical data:Syncope. Comparison to January 2024.    FINDINGS: AP view of the chest demonstrates no cardiac, pulmonary, or osseous abnormalities.    IMPRESSION:  1. Normal chest single view.    Electronically signed by:  Franco Scott MD  02/27/2024 07:58 AM CST Workstation: [id number]                                     Medications   mupirocin 2 % ointment (has no administration in time range)     Medical Decision Making  Amount and/or Complexity of Data Reviewed  Labs: ordered.  Radiology: ordered.    Risk  Decision regarding hospitalization.              Attending Attestation:             Attending ED Notes:   58-year-old female who had presented from her dialysis unit with a history that she was unable to get dialysis today because of her unwillingness to keep still.  The patient reportedly had a syncopal episode at dialysis as well as in the EMS unit enroute.  Upon my assessment the patient was awake alert and attentive but there were intermittent episodes of her writhing on bed.  Never did witnessed syncopal episode.  The ED evaluation showed an EKG with a sinus rhythm with a rate of 91.  Vital signs were normal.  No fever.  CT scan of  the head did not show any acute abnormalities and she was neurologically nonfocal.  Labs showed a potassium of 5.2 and a BUN and creatinine of 48 and 10.4.  Urinalysis showed greater than 100 red cells per high-power field.  CBC had a white count of 9.5 but a low H&H of 9.1 and 28.2.  Chest x-ray was normal.  During the ED course I did speak to Dr. Lee covering for Dr. Dumont and will be seeing the patient in consult.  A consult also placed to Hospital Medicine for admission.                               Clinical Impression:  Final diagnoses:  [R07.9] Chest pain  [R55] Syncope, unspecified syncope type (Primary)  [N18.6, Z99.2] Stage 5 chronic kidney disease on chronic dialysis          ED Disposition Condition    Admit Stable                Rebel Verma Jr., MD  02/27/24 [id number]

## 2024-02-27 NOTE — ASSESSMENT & PLAN NOTE
100.5 in ER. Only other symptoms are fatigue and chronic back pains  No obvious source of infection  Covid/flu negative. CXR clear.  - obtain blood cultures  - trend condition

## 2024-02-27 NOTE — PHARMACY MED REC
"              .        Admission Medication History     The home medication history was taken by Tricia Boyd.    You may go to "Admission" then "Reconcile Home Medications" tabs to review and/or act upon these items.     The home medication list has been updated by the Pharmacy department.   Please read ALL comments highlighted in yellow.   Please address this information as you see fit.    Feel free to contact us if you have any questions or require assistance.        Medications listed below were obtained from: Patient/family and Analytic software- sifonr  No current facility-administered medications on file prior to encounter.     Current Outpatient Medications on File Prior to Encounter   Medication Sig Dispense Refill    albuterol (PROVENTIL/VENTOLIN HFA) 90 mcg/actuation inhaler Inhale 2 puffs into the lungs every 6 (six) hours as needed. Rescue (Patient taking differently: Inhale 2 puffs into the lungs every 6 (six) hours as needed for Wheezing or Shortness of Breath. Rescue) 18 g 6    aspirin 81 MG Chew Take 81 mg by mouth once daily.      atorvastatin (LIPITOR) 40 MG tablet Take 1 tablet (40 mg total) by mouth once daily. 90 tablet 0    azelastine (ASTELIN) 137 mcg (0.1 %) nasal spray 1 spray (137 mcg total) by Nasal route 2 (two) times daily. 30 mL 2    budesonide-glycopyr-formoterol (BREZTRI AEROSPHERE) 160-9-4.8 mcg/actuation HFAA Inhale 2 puffs into the lungs 2 (two) times daily. 10.7 g 6    cetirizine (ZYRTEC) 10 MG tablet Take 1 tablet (10 mg total) by mouth once daily. 90 tablet 3    clopidogreL (PLAVIX) 75 mg tablet Take 1 tablet (75 mg total) by mouth once daily. 90 tablet 3    ferric citrate (AURYXIA) 210 mg iron Tab Take 420 mg by mouth 3 (three) times daily. 540 tablet 3    fluticasone propionate (FLONASE) 50 mcg/actuation nasal spray 2 sprays (100 mcg total) by Each Nostril route once daily. 16 g 1    gabapentin (NEURONTIN) 300 MG capsule Take 1 capsule (300 mg total) by mouth 3 (three) " times daily. 270 capsule 1    levothyroxine (SYNTHROID) 150 MCG tablet Take 1 tablet (150 mcg total) by mouth before breakfast. 90 tablet 3    melatonin 10 mg Cap Take 10 mg by mouth nightly as needed.      midodrine (PROAMATINE) 10 MG tablet Take 1 tablet (10 mg total) by mouth daily as needed (hypotension). 180 tablet 0    montelukast (SINGULAIR) 10 mg tablet Take 1 tablet (10 mg total) by mouth every evening. 30 tablet 6    nitroGLYCERIN (NITROSTAT) 0.4 MG SL tablet Place 1 tablet (0.4 mg total) under the tongue every 5 (five) minutes as needed for Chest pain. 10 tablet 5    pantoprazole (PROTONIX) 40 MG tablet Take 1 tablet (40 mg total) by mouth once daily. 90 tablet 1    pramipexole (MIRAPEX) 0.5 MG tablet Take 0.5 mg by mouth 2 (two) times daily.      QUEtiapine (SEROQUEL) 100 MG Tab Take 1 tablet (100 mg total) by mouth once daily. 30 tablet 5    QUEtiapine (SEROQUEL) 25 MG Tab Take 1 tablet (25 mg total) by mouth every evening. 90 tablet 1    rOPINIRole (REQUIP) 2 MG tablet Take 1 tablet (2 mg total) by mouth 3 (three) times daily. 90 tablet 0    senna-docusate 8.6-50 mg (PERICOLACE) 8.6-50 mg per tablet Take 1 tablet by mouth daily as needed for Constipation.      sevelamer carbonate (RENVELA) 800 mg Tab Take 5 tablets (4,000 mg total) by mouth 3 (three) times daily with meals. 1350 tablet 3    torsemide (DEMADEX) 100 MG Tab Take 1 tablet (100 mg total) by mouth 2 (two) times a day. 90 tablet 0    venlafaxine (EFFEXOR-XR) 150 MG Cp24 Take 1 capsule (150 mg total) by mouth once daily. 90 capsule 3    HYDROcodone-acetaminophen (NORCO)  mg per tablet Take 1 tablet by mouth every 6 (six) hours as needed for Pain. (Patient not taking: Reported on 2/27/2024) 42 tablet 0    [DISCONTINUED] doxycycline (VIBRA-TABS) 100 MG tablet Take 1 tablet (100 mg total) by mouth 2 (two) times daily. 20 tablet 0    [DISCONTINUED] promethazine-dextromethorphan (PROMETHAZINE-DM) 6.25-15 mg/5 mL Syrp Take 5 mLs by mouth  nightly as needed (cough). 118 mL 0    [DISCONTINUED] QUEtiapine (SEROQUEL) 100 MG Tab Take 100 mg by mouth every evening.      [DISCONTINUED] ranolazine (RANEXA) 500 MG Tb12 Take 1 tablet (500 mg total) by mouth 2 (two) times daily. 180 tablet 3       Potential issues to be addressed PRIOR TO DISCHARGE  Patient reported not taking the following medications: (Hydrocodone). These medications remain on the home medication list. Please address accordingly.     Tricia Boyd  EXT 1924

## 2024-02-27 NOTE — CONSULTS
Consult Note  Nephrology    Consult Requested By: Rebel Verma Jr.,*    Reason for Consult:   ESRD, dependent on dialysis    SUBJECTIVE:     History of Present Illness:   57 y/o female patient known to our practice, has out patient dialysis 3x wk on TTS schedule.  She presented to her unit this am for her regularly scheduled tx, but requested to go to ER.  C/o weakness and falling, involuntary muscle spasms.  Nephrology is consulted for dialysis orders.    2/27  HD orders placed, pt seen and examined in ER.  Has low grade temp, BP has been all over the place, initial labs show mildly elevated K+, mild hyponatremia.  --pt does not appear volume overloaded.  CXR clear.    Assessment/plan:    ESRD  Mild hyperkalemia, hyponatremia  SHPT  Anemia of chronic dz  HTN    --HD per pt's TTS schedule.  Orders placed.  Dose all medications for dialysis  --electrolytes will improve w/HD  --continue renvela with meals  --epo w/HD when BP more stable  --has had labile pressures in ER, d/w bedside nurse.  Cont home meds.    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     Generalized edema     GERD (gastroesophageal reflux disease)     Heart attack 2021    History of traumatic brain injury     Hypertension     Hypothyroidism     Insomnia     Muscle weakness (generalized)     Restless leg syndrome     Senile dementia, uncomplicated     Sleep apnea, unspecified     Unsteadiness on feet      Past Surgical History:   Procedure  Laterality Date    ADENOIDECTOMY      ANGIOGRAM, CORONARY, WITH LEFT HEART CATHETERIZATION N/A 2/12/2024    Procedure: Angiogram, Coronary, with Left Heart Cath;  Surgeon: Cristian Carlisle MD;  Location: [facility] CATH/EP LAB;  Service: Cardiology;  Laterality: N/A;    APPENDECTOMY      AV FISTULA PLACEMENT Left     CHOLECYSTECTOMY      CORONARY STENT PLACEMENT  2021    IVUS (INTRAVASCULAR ULTRASOUND) N/A 2/12/2024    Procedure: ULTRASOUND, INTRAVASCULAR;  Surgeon: Cristian Carlisle MD;  Location: [facility] CATH/EP LAB;  Service: Cardiology;  Laterality: N/A;    KNEE SURGERY      knee arthroplasty    LEFT HEART CATHETERIZATION  2021    NECK SURGERY      PARTIAL HYSTERECTOMY      STENT, DRUG ELUTING, MULTI VESSEL, CORONARY  2/12/2024    Procedure: Stent, Drug Eluting, Multi Vessel, Coronary;  Surgeon: Cristian Carlisle MD;  Location: [facility] CATH/EP LAB;  Service: Cardiology;;    TONSILLECTOMY       No family history on file.  Social History     Tobacco Use    Smoking status: Never    Smokeless tobacco: Never   Substance Use Topics    Alcohol use: Never    Drug use: Never       Review of patient's allergies indicates:   Allergen Reactions    Lisinopril Anaphylaxis        Review of Systems:  C/o weakness, frequent falls, muscle spasms    OBJECTIVE:     Vital Signs Range (Last 24H):  Temp:  [100.5 °F (38.1 °C)]   Pulse:  [92]   Resp:  [14]   BP: (146)/(79)   SpO2:  [96 %]     Physical Exam:  General- NAD noted  HEENT- WNL  Neck- supple  CV- Regular rate and rhythm  Resp- Lungs CTA Bilaterally, No increased WOB  GI- Non tender/non-distended, BS normoactive x4 quads  Extrem- No cyanosis, clubbing, edema.  Derm- skin w/d  Neuro-  No flap.     Body mass index is 35.22 kg/m².    Laboratory:  CBC:   Recent Labs   Lab 02/27/24  0731   WBC 9.51   RBC 2.70*   HGB 9.1*   HCT 28.2*      *   MCH 33.7*   MCHC 32.3     CMP:   Recent Labs   Lab 02/27/24  0731   *   CALCIUM 9.3   ALBUMIN 4.2   PROT 7.5   *   K 5.2*   CO2 31*    CL 92*   BUN 48*   CREATININE 10.4*   ALKPHOS 213*   ALT 9*   AST 13   BILITOT 0.4       Diagnostic Results:  Labs: Reviewed  X-Ray: Reviewed      ASSESSMENT/PLAN:     There are no hospital problems to display for this patient.        Thank you for allowing us to participate in the care of your patient. We will follow the patient and provide recommendations as needed.      Time spent seeing patient( greater than 1/2 spent in direct contact) :     Dinah Howe NP

## 2024-02-27 NOTE — PROGRESS NOTES
HD tolerated well  Net UF 3L    Pt educated on fluid intake       02/27/24 1345   Handoff Report   Received From Dilia Garcia   Given To Mery   Vital Signs   Temp 98.6 °F (37 °C)   Temp Source Oral   Pulse 90   Resp 16   SpO2 97 %   Pulse Oximetry Type Intermittent   Device (Oxygen Therapy) room air   /74   BP Location Right arm   BP Method Automatic   Patient Position Lying   Assessments (Pre/Post)   Date Hepatitis Profile Obtained 02/27/24   Blood Liters Processed (BLP) 63.4   Transport Modality stretcher   Level of Consciousness (AVPU) alert   Dialyzer Clearance moderately streaked   Post-Hemodialysis Assessment   Rinseback Volume (mL) 250 mL   Blood Volume Processed (Liters) 63.4 L   Dialyzer Clearance Moderately streaked   Duration of Treatment 180 minutes   Additional Fluid Intake (mL) 500 mL   Total UF (mL) 3500 mL   Net Fluid Removal 3000   Patient Response to Treatment Tolerated well   Arterial bleeding stop time (min) 5 min   Venous bleeding stop time (min) 5 min   Post-Hemodialysis Comments Tx complete, no issues

## 2024-02-27 NOTE — HPI
58F with PMH HTN, ESRD on HD, asthma, CAD w/ stent, bipolar disorder, hypothyroid, RLS, hx CVA, and dementia presents from the dialysis center due to being unable to hold still for her session. It was also reported she fainted multiple times at the dialysis and also on the way to ER with EMS. She reports feeling weak for about 1 week and feels like she is going to pass out every time she stands up. Her labs do not reveal an etiology for her symptoms. Mild hyperkalemia K 5.2.  CT head shows an old infarct without anything acute. CXR is clear. Admitted to hospital medicine service for continued evaluation and management.

## 2024-02-27 NOTE — ASSESSMENT & PLAN NOTE
Stable, recent stent placed  - continue asa, plavix, statin  - hold ranexa - may be contributing to syncope

## 2024-02-27 NOTE — ASSESSMENT & PLAN NOTE
Unclear etiology at this time  CTH NAF, recent echo NAF  - check orthostatic vitals  - check CUS  - tele  - PT/OT  - holding ranexa, requip, seroquel, decrease gabapentin

## 2024-02-27 NOTE — SUBJECTIVE & OBJECTIVE
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     Generalized edema     GERD (gastroesophageal reflux disease)     Heart attack 2021    History of traumatic brain injury     Hypertension     Hypothyroidism     Insomnia     Muscle weakness (generalized)     Restless leg syndrome     Senile dementia, uncomplicated     Sleep apnea, unspecified     Unsteadiness on feet        Past Surgical History:   Procedure Laterality Date    ADENOIDECTOMY      ANGIOGRAM, CORONARY, WITH LEFT HEART CATHETERIZATION N/A 2/12/2024    Procedure: Angiogram, Coronary, with Left Heart Cath;  Surgeon: Cristian Carlisle MD;  Location: [facility] CATH/EP LAB;  Service: Cardiology;  Laterality: N/A;    APPENDECTOMY      AV FISTULA PLACEMENT Left     CHOLECYSTECTOMY      CORONARY STENT PLACEMENT  2021    IVUS (INTRAVASCULAR ULTRASOUND) N/A 2/12/2024    Procedure: ULTRASOUND, INTRAVASCULAR;  Surgeon: Cristian Carlisle MD;  Location: [facility] CATH/EP LAB;  Service: Cardiology;  Laterality: N/A;    KNEE SURGERY      knee arthroplasty    LEFT HEART CATHETERIZATION  2021    NECK SURGERY      PARTIAL HYSTERECTOMY      STENT, DRUG ELUTING, MULTI VESSEL, CORONARY  2/12/2024    Procedure: Stent, Drug Eluting, Multi Vessel, Coronary;  Surgeon: Cristian Carlisle MD;  Location: [facility] CATH/EP LAB;  Service: Cardiology;;    TONSILLECTOMY         Review of patient's allergies indicates:   Allergen Reactions    Lisinopril Anaphylaxis       No current facility-administered medications  on file prior to encounter.     Current Outpatient Medications on File Prior to Encounter   Medication Sig    albuterol (PROVENTIL/VENTOLIN HFA) 90 mcg/actuation inhaler Inhale 2 puffs into the lungs every 6 (six) hours as needed. Rescue (Patient taking differently: Inhale 2 puffs into the lungs every 6 (six) hours as needed for Wheezing or Shortness of Breath. Rescue)    aspirin 81 MG Chew Take 81 mg by mouth once daily.    atorvastatin (LIPITOR) 40 MG tablet Take 1 tablet (40 mg total) by mouth once daily.    azelastine (ASTELIN) 137 mcg (0.1 %) nasal spray 1 spray (137 mcg total) by Nasal route 2 (two) times daily.    budesonide-glycopyr-formoterol (BREZTRI AEROSPHERE) 160-9-4.8 mcg/actuation HFAA Inhale 2 puffs into the lungs 2 (two) times daily.    cetirizine (ZYRTEC) 10 MG tablet Take 1 tablet (10 mg total) by mouth once daily.    clopidogreL (PLAVIX) 75 mg tablet Take 1 tablet (75 mg total) by mouth once daily.    doxycycline (VIBRA-TABS) 100 MG tablet Take 1 tablet (100 mg total) by mouth 2 (two) times daily.    ferric citrate (AURYXIA) 210 mg iron Tab Take 420 mg by mouth 3 (three) times daily.    fluticasone propionate (FLONASE) 50 mcg/actuation nasal spray 2 sprays (100 mcg total) by Each Nostril route once daily.    gabapentin (NEURONTIN) 300 MG capsule Take 1 capsule (300 mg total) by mouth 3 (three) times daily.    HYDROcodone-acetaminophen (NORCO)  mg per tablet Take 1 tablet by mouth every 6 (six) hours as needed for Pain.    levothyroxine (SYNTHROID) 150 MCG tablet Take 1 tablet (150 mcg total) by mouth before breakfast.    melatonin 10 mg Cap Take by mouth.    midodrine (PROAMATINE) 10 MG tablet Take 1 tablet (10 mg total) by mouth daily as needed (hypotension).    montelukast (SINGULAIR) 10 mg tablet Take 1 tablet (10 mg total) by mouth every evening.    nitroGLYCERIN (NITROSTAT) 0.4 MG SL tablet Place 1 tablet (0.4 mg total) under the tongue every 5 (five) minutes as needed for Chest pain.     pantoprazole (PROTONIX) 40 MG tablet Take 1 tablet (40 mg total) by mouth once daily.    pramipexole (MIRAPEX) 0.5 MG tablet Take 0.5 mg by mouth 2 (two) times daily.    promethazine-dextromethorphan (PROMETHAZINE-DM) 6.25-15 mg/5 mL Syrp Take 5 mLs by mouth nightly as needed (cough).    QUEtiapine (SEROQUEL) 100 MG Tab Take 1 tablet (100 mg total) by mouth once daily.    QUEtiapine (SEROQUEL) 25 MG Tab Take 1 tablet (25 mg total) by mouth every evening.    rOPINIRole (REQUIP) 2 MG tablet Take 1 tablet (2 mg total) by mouth 3 (three) times daily.    senna-docusate 8.6-50 mg (PERICOLACE) 8.6-50 mg per tablet Take 1 tablet by mouth daily as needed.    sevelamer carbonate (RENVELA) 800 mg Tab Take 5 tablets (4,000 mg total) by mouth 3 (three) times daily with meals.    torsemide (DEMADEX) 100 MG Tab Take 1 tablet (100 mg total) by mouth 2 (two) times a day.    venlafaxine (EFFEXOR-XR) 150 MG Cp24 Take 1 capsule (150 mg total) by mouth once daily.    [DISCONTINUED] QUEtiapine (SEROQUEL) 100 MG Tab Take 100 mg by mouth every evening.    [DISCONTINUED] ranolazine (RANEXA) 500 MG Tb12 Take 1 tablet (500 mg total) by mouth 2 (two) times daily.     Family History    None       Tobacco Use    Smoking status: Never    Smokeless tobacco: Never   Substance and Sexual Activity    Alcohol use: Never    Drug use: Never    Sexual activity: Not on file     Review of Systems   Constitutional:  Positive for fatigue. Negative for chills and fever.   Respiratory:  Negative for shortness of breath.    Cardiovascular:  Negative for chest pain.   Gastrointestinal:  Negative for abdominal pain, constipation, diarrhea, nausea and vomiting.   Musculoskeletal:  Positive for back pain.   Neurological:  Positive for light-headedness.     Objective:     Vital Signs (Most Recent):  Temp: 98.6 °F (37 °C) (02/27/24 1345)  Pulse: 90 (02/27/24 1345)  Resp: 16 (02/27/24 1345)  BP: 132/74 (02/27/24 1345)  SpO2: 97 % (02/27/24 1345) Vital Signs (24h  Range):  Temp:  [98.4 °F (36.9 °C)-100.5 °F (38.1 °C)] 98.6 °F (37 °C)  Pulse:  [83-93] 90  Resp:  [14-16] 16  SpO2:  [96 %-98 %] 97 %  BP: (112-164)/(70-82) 132/74     Weight: 102 kg (224 lb 13.9 oz)  Body mass index is 35.22 kg/m².     Physical Exam  Vitals reviewed.   Constitutional:       General: She is not in acute distress.  HENT:      Head: Normocephalic and atraumatic.   Cardiovascular:      Rate and Rhythm: Normal rate and regular rhythm.      Heart sounds: Normal heart sounds.   Pulmonary:      Effort: Pulmonary effort is normal. No respiratory distress.      Breath sounds: Normal breath sounds. No wheezing, rhonchi or rales.   Abdominal:      General: Bowel sounds are normal. There is no distension.      Palpations: Abdomen is soft.      Tenderness: There is no abdominal tenderness.   Neurological:      General: No focal deficit present.      Mental Status: She is alert and oriented to person, place, and time. Mental status is at baseline.   Psychiatric:         Mood and Affect: Affect normal.         Behavior: Behavior normal.         Thought Content: Thought content normal.                Significant Labs: All pertinent labs within the past 24 hours have been reviewed.    Significant Imaging: I have reviewed all pertinent imaging results/findings within the past 24 hours.    Admission on 02/27/2024   Component Date Value Ref Range Status    QRS Duration 02/27/2024 90  ms In process    [facility] QTC Calculation 02/27/2024 435  ms In process    WBC 02/27/2024 9.51  3.90 - 12.70 K/uL Final    RBC 02/27/2024 2.70 (L)  4.00 - 5.40 M/uL Final    Hemoglobin 02/27/2024 9.1 (L)  12.0 - 16.0 g/dL Final    Hematocrit 02/27/2024 28.2 (L)  37.0 - 48.5 % Final    MCV 02/27/2024 104 (H)  82 - 98 fL Final    MCH 02/27/2024 33.7 (H)  27.0 - 31.0 pg Final    MCHC 02/27/2024 32.3  32.0 - 36.0 g/dL Final    RDW 02/27/2024 13.6  11.5 - 14.5 % Final    Platelets 02/27/2024 205  150 - 450 K/uL Final    MPV 02/27/2024 10.4  9.2 -  12.9 fL Final    Immature Granulocytes 02/27/2024 0.3  0.0 - 0.5 % Final    Gran # (ANC) 02/27/2024 7.9 (H)  1.8 - 7.7 K/uL Final    Immature Grans (Abs) 02/27/2024 0.03  0.00 - 0.04 K/uL Final    Comment: Mild elevation in immature granulocytes is non specific and   can be seen in a variety of conditions including stress response,   acute inflammation, trauma and pregnancy. Correlation with other   laboratory and clinical findings is essential.      Lymph # 02/27/2024 1.0  1.0 - 4.8 K/uL Final    Mono # 02/27/2024 0.5  0.3 - 1.0 K/uL Final    Eos # 02/27/2024 0.1  0.0 - 0.5 K/uL Final    Baso # 02/27/2024 0.05  0.00 - 0.20 K/uL Final    nRBC 02/27/2024 0  0 /100 WBC Final    Gran % 02/27/2024 82.8 (H)  38.0 - 73.0 % Final    Lymph % 02/27/2024 10.4 (L)  18.0 - 48.0 % Final    Mono % 02/27/2024 4.7  4.0 - 15.0 % Final    Eosinophil % 02/27/2024 1.3  0.0 - 8.0 % Final    Basophil % 02/27/2024 0.5  0.0 - 1.9 % Final    Differential Method 02/27/2024 Automated   Final    Sodium 02/27/2024 135 (L)  136 - 145 mmol/L Final    Potassium 02/27/2024 5.2 (H)  3.5 - 5.1 mmol/L Final    Chloride 02/27/2024 92 (L)  95 - 110 mmol/L Final    CO2 02/27/2024 31 (H)  23 - 29 mmol/L Final    Glucose 02/27/2024 128 (H)  70 - 110 mg/dL Final    BUN 02/27/2024 48 (H)  6 - 20 mg/dL Final    Creatinine 02/27/2024 10.4 (H)  0.5 - 1.4 mg/dL Final    Calcium 02/27/2024 9.3  8.7 - 10.5 mg/dL Final    Total Protein 02/27/2024 7.5  6.0 - 8.4 g/dL Final    Albumin 02/27/2024 4.2  3.5 - 5.2 g/dL Final    Total Bilirubin 02/27/2024 0.4  0.1 - 1.0 mg/dL Final    Comment: For infants and newborns, interpretation of results should be based  on gestational age, weight and in agreement with clinical  observations.    Premature Infant recommended reference ranges:  Up to 24 hours.............<8.0 mg/dL  Up to 48 hours............<12.0 mg/dL  3-5 days..................<15.0 mg/dL  6-29 days.................<15.0 mg/dL      Alkaline Phosphatase 02/27/2024  213 (H)  55 - 135 U/L Final    AST 02/27/2024 13  10 - 40 U/L Final    ALT 02/27/2024 9 (L)  10 - 44 U/L Final    eGFR 02/27/2024 4.0 (A)  >60 mL/min/1.73 m^2 Final    Anion Gap 02/27/2024 12  8 - 16 mmol/L Final    BNP 02/27/2024 237 (H)  0 - 99 pg/mL Final    Values of less than 100 pg/ml are consistent with non-CHF populations.    Magnesium 02/27/2024 2.2  1.6 - 2.6 mg/dL Final    Troponin I High Sensitivity 02/27/2024 23.1 (H)  0.0 - 14.9 pg/mL Final    Comment: Troponin results differ between methods. Do not use   results between Troponin methods interchangeably.    Alkaline Phospatase levels above 400 U/L may   cause false positive results.    Access hsTnI should not be used for patients taking   Asfotase marcell (Strensiq).      Prothrombin Time 02/27/2024 10.9  9.0 - 12.5 sec Final    INR 02/27/2024 1.0  0.8 - 1.2 Final    Comment: Coumadin Therapy:  2.0 - 3.0 for INR for all indicators except mechanical heart valves  and antiphospholipid syndromes which should use 2.5 - 3.5.      Influenza A, Molecular 02/27/2024 Negative  Negative Final    Influenza B, Molecular 02/27/2024 Negative  Negative Final    Flu A & B Source 02/27/2024 Nasal swab   Final    SARS-CoV-2 RNA, Amplification, Qual 02/27/2024 Negative  Negative Final    Comment: This test utilizes isothermal nucleic acid amplification technology   to   detect the SARS-CoV-2 RdRp nucleic acid segment. The analytical   sensitivity   (limit of detection) is 500 copies/swab.     A POSITIVE result is indicative of the presence of SARS-CoV-2 RNA;   clinical   correlation with patient history and other diagnostic information is   necessary to determine patient infection status.    A NEGATIVE result means that SARS-CoV-2 nucleic acids are not present   above   the limit of detection. A NEGATIVE result should be treated as   presumptive.   It does not rule out the possibility of COVID-19 and should not be   the sole   basis for treatment decisions. If COVID-19 is  strongly suspected   based on   clinical and exposure history, re-testing using an alternate   molecular assay   should be considered.     This test is only for use under the Food and Drug Administration s   Emergency   Use Authorization (EUA).     Commercial kits are provided by [employer]. [employer]                           e   characteristics of the EUA have been independently verified by   [facility]  _________________________________________________________________   The authorized Fact Sheet for Healthcare Providers and the authorized   Fact   Sheet for Patients of the ID NOW COVID-19 are available on the FDA   website:   https://www.fda.gov/media/663289/download   https://www.fda.gov/media/488694/download      Lactate (Lactic Acid) 02/27/2024 1.4  0.5 - 1.9 mmol/L Final    Comment: Falsely low lactic acid results can be found in samples   containing >=13.0 mg/dL total bilirubin and/or >=3.5 mg/dL   direct bilirubin.      Specimen UA 02/27/2024 Urine, Clean Catch   Final    Color, UA 02/27/2024 Colorless (A)  Yellow, Straw, Alejandra Final    Appearance, UA 02/27/2024 Clear  Clear Final    pH, UA 02/27/2024 8.0  5.0 - 8.0 Final    Specific Gravity, UA 02/27/2024 1.005  1.005 - 1.030 Final    Protein, UA 02/27/2024 1+ (A)  Negative Final    Comment: Recommend a 24 hour urine protein or a urine   protein/creatinine ratio if globulin induced proteinuria is  clinically suspected.      Glucose, UA 02/27/2024 Trace (A)  Negative Final    Ketones, UA 02/27/2024 Negative  Negative Final    Bilirubin (UA) 02/27/2024 Negative  Negative Final    Occult Blood UA 02/27/2024 3+ (A)  Negative Final    Nitrite, UA 02/27/2024 Negative  Negative Final    Urobilinogen, UA 02/27/2024 Negative  Negative EU/dL Final    Leukocytes, UA 02/27/2024 Negative  Negative Final    Troponin I High Sensitivity 02/27/2024 20.8 (H)  0.0 - 14.9 pg/mL Final    Comment: Troponin results differ between methods. Do  not use   results between Troponin methods interchangeably.    Alkaline Phospatase levels above 400 U/L may   cause false positive results.    Access hsTnI should not be used for patients taking   Asfotase marcell (Strensiq).      RBC, UA 02/27/2024 >100 (H)  0 - 4 /hpf Final    WBC, UA 02/27/2024 2  0 - 5 /hpf Final    Bacteria 02/27/2024 None  None-Occ /hpf Final    Yeast, UA 02/27/2024 Rare (A)  None Final    Hyaline Casts, UA 02/27/2024 0  0-1/lpf /lpf Final    Unclass Frida UA 02/27/2024 2  None-Moderate Final    Microscopic Comment 02/27/2024 SEE COMMENT   Final    Comment: Other formed elements not mentioned in the report are not   present in the microscopic examination.            Imaging Results              US Carotid Bilateral (No Result on File)                      CT Head Without Contrast (Final result)  Result time 02/27/24 09:00:32      Final result by Jacob Mckay Jr., MD (02/27/24 09:00:32)                   Narrative:    CMS MANDATED QUALITY DATA - CT RADIATION  436    All CT scans at this facility utilize dose modulation, iterative reconstruction, and/or weight based dosing when appropriate to reduce radiation dose to as low as reasonably achievable.      CT of THE HEAD WITHOUT CONTRAST    HISTORY: Syncopal episode. Loss of consciousness.    Technical factors:   Spiral acquisition of the brain was generated at 5 mm thickness from the skull base to the skull vertex in helical fashion in the absence of intravenous contrast.  Additional coronal and sagittal reconstructed images were also included and reviewed.    FINDINGS:  No evidence of intracranial hemorrhage, masses, mass effect, or midline shift.    Chronic infarct involving the left periventricular space just lateral to the region of the caudate lobe appears stable with evidence of minimal expansion involving the left lateral ventricle anterior horn. No additional areas of vascular insult are observed. There is evidence of focal area of  chronic volume loss along the left periventricular margin that appears stable upon comparison.    Generalized age-appropriate cerebral and with evidence of confluent low-density changes throughout the subcortical and periventricular white matter on the basis of age-appropriate microtraumatic changes.    There is no evidence of masses, mass effect, or midline shift.    The orbits and retro-orbital compartments are well-maintained.    There are atheromatous calcifications throughout the region of carotid siphon and supraclinoid ICAs bilaterally.    IMPRESSION:  1. Small infarct involving the left caudate lobe appears stable upon comparison.  2. No CT evidence of acute intracranial pathology.  3. Chronic cerebral atrophy and age appropriate chronic myelopathic changes.    Electronically signed by:  Jacob Mckay MD  02/27/2024 09:00 AM CST Workstation: [id number]                                     X-Ray Chest AP Portable (Final result)  Result time 02/27/24 07:58:52      Final result by Franco Scott MD (02/27/24 07:58:52)                   Narrative:    Chest single view    Clinical data:Syncope. Comparison to January 2024.    FINDINGS: AP view of the chest demonstrates no cardiac, pulmonary, or osseous abnormalities.    IMPRESSION:  1. Normal chest single view.    Electronically signed by:  Franco Scott MD  02/27/2024 07:58 AM CST Workstation: [id number]

## 2024-02-28 VITALS
DIASTOLIC BLOOD PRESSURE: 62 MMHG | BODY MASS INDEX: 34.1 KG/M2 | RESPIRATION RATE: 17 BRPM | TEMPERATURE: 98 F | HEART RATE: 78 BPM | HEIGHT: 68 IN | WEIGHT: 225 LBS | OXYGEN SATURATION: 98 % | SYSTOLIC BLOOD PRESSURE: 151 MMHG

## 2024-02-28 PROBLEM — R50.9 FEVER: Status: RESOLVED | Noted: 2024-02-27 | Resolved: 2024-02-28

## 2024-02-28 LAB
ANION GAP SERPL CALC-SCNC: 12 MMOL/L (ref 8–16)
BUN SERPL-MCNC: 36 MG/DL (ref 6–20)
CALCIUM SERPL-MCNC: 10 MG/DL (ref 8.7–10.5)
CHLORIDE SERPL-SCNC: 101 MMOL/L (ref 95–110)
CO2 SERPL-SCNC: 21 MMOL/L (ref 23–29)
CREAT SERPL-MCNC: 7.6 MG/DL (ref 0.5–1.4)
ERYTHROCYTE [DISTWIDTH] IN BLOOD BY AUTOMATED COUNT: 13.7 % (ref 11.5–14.5)
EST. GFR  (NO RACE VARIABLE): 5.8 ML/MIN/1.73 M^2
GLUCOSE SERPL-MCNC: 106 MG/DL (ref 70–110)
HCT VFR BLD AUTO: 28.5 % (ref 37–48.5)
HGB BLD-MCNC: 9 G/DL (ref 12–16)
MCH RBC QN AUTO: 33.3 PG (ref 27–31)
MCHC RBC AUTO-ENTMCNC: 31.6 G/DL (ref 32–36)
MCV RBC AUTO: 106 FL (ref 82–98)
PLATELET # BLD AUTO: 203 K/UL (ref 150–450)
PMV BLD AUTO: 10 FL (ref 9.2–12.9)
POTASSIUM SERPL-SCNC: 4.9 MMOL/L (ref 3.5–5.1)
RBC # BLD AUTO: 2.7 M/UL (ref 4–5.4)
SODIUM SERPL-SCNC: 134 MMOL/L (ref 136–145)
WBC # BLD AUTO: 5.18 K/UL (ref 3.9–12.7)

## 2024-02-28 PROCEDURE — 99900035 HC TECH TIME PER 15 MIN (STAT)

## 2024-02-28 PROCEDURE — 94761 N-INVAS EAR/PLS OXIMETRY MLT: CPT

## 2024-02-28 PROCEDURE — 85027 COMPLETE CBC AUTOMATED: CPT | Performed by: STUDENT IN AN ORGANIZED HEALTH CARE EDUCATION/TRAINING PROGRAM

## 2024-02-28 PROCEDURE — 25000242 PHARM REV CODE 250 ALT 637 W/ HCPCS: Performed by: STUDENT IN AN ORGANIZED HEALTH CARE EDUCATION/TRAINING PROGRAM

## 2024-02-28 PROCEDURE — 94640 AIRWAY INHALATION TREATMENT: CPT | Mod: XB

## 2024-02-28 PROCEDURE — 80048 BASIC METABOLIC PNL TOTAL CA: CPT | Performed by: STUDENT IN AN ORGANIZED HEALTH CARE EDUCATION/TRAINING PROGRAM

## 2024-02-28 PROCEDURE — 36415 COLL VENOUS BLD VENIPUNCTURE: CPT | Performed by: STUDENT IN AN ORGANIZED HEALTH CARE EDUCATION/TRAINING PROGRAM

## 2024-02-28 PROCEDURE — 25000003 PHARM REV CODE 250: Performed by: STUDENT IN AN ORGANIZED HEALTH CARE EDUCATION/TRAINING PROGRAM

## 2024-02-28 PROCEDURE — G0378 HOSPITAL OBSERVATION PER HR: HCPCS

## 2024-02-28 RX ORDER — ATORVASTATIN CALCIUM 40 MG/1
40 TABLET, FILM COATED ORAL DAILY
Qty: 90 TABLET | Refills: 0
Start: 2024-02-28

## 2024-02-28 RX ORDER — MELATONIN 10 MG
10 CAPSULE ORAL NIGHTLY PRN
Start: 2024-02-28

## 2024-02-28 RX ORDER — QUETIAPINE FUMARATE 100 MG/1
100 TABLET, FILM COATED ORAL ONCE
Status: COMPLETED | OUTPATIENT
Start: 2024-02-28 | End: 2024-02-28

## 2024-02-28 RX ORDER — TALC
6 POWDER (GRAM) TOPICAL NIGHTLY PRN
Refills: 0
Start: 2024-02-28 | End: 2024-05-01 | Stop reason: HOSPADM

## 2024-02-28 RX ORDER — VENLAFAXINE HYDROCHLORIDE 150 MG/1
150 CAPSULE, EXTENDED RELEASE ORAL DAILY
Qty: 90 CAPSULE | Refills: 3
Start: 2024-02-28 | End: 2025-02-27

## 2024-02-28 RX ORDER — TORSEMIDE 100 MG/1
100 TABLET ORAL 2 TIMES DAILY
Qty: 90 TABLET | Refills: 0
Start: 2024-02-28

## 2024-02-28 RX ORDER — CLOPIDOGREL BISULFATE 75 MG/1
75 TABLET ORAL DAILY
Qty: 90 TABLET | Refills: 3
Start: 2024-02-28 | End: 2025-02-27

## 2024-02-28 RX ORDER — LEVOTHYROXINE SODIUM 150 UG/1
150 TABLET ORAL
Qty: 90 TABLET | Refills: 3
Start: 2024-02-28 | End: 2025-02-27

## 2024-02-28 RX ORDER — NAPROXEN SODIUM 220 MG/1
81 TABLET, FILM COATED ORAL DAILY
Refills: 0
Start: 2024-02-28

## 2024-02-28 RX ORDER — PANTOPRAZOLE SODIUM 40 MG/1
40 TABLET, DELAYED RELEASE ORAL DAILY
Qty: 90 TABLET | Refills: 1 | Status: ON HOLD
Start: 2024-02-28 | End: 2024-05-20

## 2024-02-28 RX ORDER — MONTELUKAST SODIUM 10 MG/1
10 TABLET ORAL NIGHTLY
Qty: 30 TABLET | Refills: 6
Start: 2024-02-28

## 2024-02-28 RX ORDER — CETIRIZINE HYDROCHLORIDE 10 MG/1
10 TABLET ORAL DAILY
Qty: 90 TABLET | Refills: 3
Start: 2024-02-28 | End: 2025-02-27

## 2024-02-28 RX ORDER — AZELASTINE 1 MG/ML
1 SPRAY, METERED NASAL 2 TIMES DAILY
Qty: 30 ML | Refills: 2
Start: 2024-02-28 | End: 2025-02-27

## 2024-02-28 RX ORDER — QUETIAPINE FUMARATE 25 MG/1
25 TABLET, FILM COATED ORAL NIGHTLY
Qty: 90 TABLET | Refills: 1
Start: 2024-02-28 | End: 2024-05-30 | Stop reason: SDUPTHER

## 2024-02-28 RX ORDER — QUETIAPINE FUMARATE 100 MG/1
100 TABLET, FILM COATED ORAL DAILY
Qty: 30 TABLET | Refills: 5
Start: 2024-02-28 | End: 2025-02-27

## 2024-02-28 RX ORDER — ROPINIROLE 1 MG/1
2 TABLET, FILM COATED ORAL ONCE
Status: DISCONTINUED | OUTPATIENT
Start: 2024-02-28 | End: 2024-02-29 | Stop reason: HOSPADM

## 2024-02-28 RX ORDER — ROPINIROLE 2 MG/1
2 TABLET, FILM COATED ORAL 3 TIMES DAILY
Qty: 90 TABLET | Refills: 0
Start: 2024-02-28 | End: 2024-03-14 | Stop reason: SDUPTHER

## 2024-02-28 RX ORDER — ALBUTEROL SULFATE 90 UG/1
2 AEROSOL, METERED RESPIRATORY (INHALATION) EVERY 6 HOURS PRN
Qty: 18 G | Refills: 6 | Status: SHIPPED | OUTPATIENT
Start: 2024-02-28

## 2024-02-28 RX ORDER — FLUTICASONE PROPIONATE 50 MCG
2 SPRAY, SUSPENSION (ML) NASAL DAILY
Qty: 16 G | Refills: 1
Start: 2024-02-28

## 2024-02-28 RX ORDER — FERRIC CITRATE 210 MG/1
420 TABLET, COATED ORAL 3 TIMES DAILY
Qty: 540 TABLET | Refills: 3
Start: 2024-02-28

## 2024-02-28 RX ORDER — NITROGLYCERIN 0.4 MG/1
0.4 TABLET SUBLINGUAL EVERY 5 MIN PRN
Qty: 10 TABLET | Refills: 5
Start: 2024-02-28 | End: 2025-02-27

## 2024-02-28 RX ORDER — MIDODRINE HYDROCHLORIDE 10 MG/1
10 TABLET ORAL DAILY PRN
Qty: 180 TABLET | Refills: 0
Start: 2024-02-28

## 2024-02-28 RX ORDER — PRAMIPEXOLE DIHYDROCHLORIDE 0.5 MG/1
0.5 TABLET ORAL 2 TIMES DAILY
Start: 2024-02-28 | End: 2024-03-14

## 2024-02-28 RX ORDER — AMOXICILLIN 250 MG
1 CAPSULE ORAL DAILY PRN
Start: 2024-02-28

## 2024-02-28 RX ORDER — BUDESONIDE, GLYCOPYRROLATE, AND FORMOTEROL FUMARATE 160; 9; 4.8 UG/1; UG/1; UG/1
2 AEROSOL, METERED RESPIRATORY (INHALATION) 2 TIMES DAILY
Qty: 10.7 G | Refills: 6
Start: 2024-02-28

## 2024-02-28 RX ORDER — GABAPENTIN 300 MG/1
300 CAPSULE ORAL NIGHTLY
Qty: 30 CAPSULE | Refills: 11 | Status: SHIPPED | OUTPATIENT
Start: 2024-02-28 | End: 2025-02-27

## 2024-02-28 RX ORDER — SEVELAMER CARBONATE 800 MG/1
4000 TABLET, FILM COATED ORAL
Qty: 1350 TABLET | Refills: 3
Start: 2024-02-28

## 2024-02-28 RX ADMIN — VENLAFAXINE HYDROCHLORIDE 150 MG: 37.5 CAPSULE, EXTENDED RELEASE ORAL at 08:02

## 2024-02-28 RX ADMIN — QUETIAPINE 100 MG: 100 TABLET ORAL at 01:02

## 2024-02-28 RX ADMIN — ARFORMOTEROL TARTRATE 15 MCG: 15 SOLUTION RESPIRATORY (INHALATION) at 07:02

## 2024-02-28 RX ADMIN — CLOPIDOGREL BISULFATE 75 MG: 75 TABLET, FILM COATED ORAL at 08:02

## 2024-02-28 RX ADMIN — BUDESONIDE 0.5 MG: 0.5 INHALANT RESPIRATORY (INHALATION) at 07:02

## 2024-02-28 RX ADMIN — PANTOPRAZOLE SODIUM 40 MG: 40 TABLET, DELAYED RELEASE ORAL at 06:02

## 2024-02-28 RX ADMIN — HYDROCODONE BITARTRATE AND ACETAMINOPHEN 1 TABLET: 10; 325 TABLET ORAL at 06:02

## 2024-02-28 RX ADMIN — MUPIROCIN 1 G: 20 OINTMENT TOPICAL at 08:02

## 2024-02-28 RX ADMIN — HYDROCODONE BITARTRATE AND ACETAMINOPHEN 1 TABLET: 5; 325 TABLET ORAL at 06:02

## 2024-02-28 RX ADMIN — ATORVASTATIN CALCIUM 40 MG: 40 TABLET, FILM COATED ORAL at 08:02

## 2024-02-28 RX ADMIN — LEVOTHYROXINE SODIUM 150 MCG: 0.1 TABLET ORAL at 06:02

## 2024-02-28 RX ADMIN — ASPIRIN 81 MG 81 MG: 81 TABLET ORAL at 08:02

## 2024-02-28 NOTE — PROGRESS NOTES
"Progress Note  Nephrology    Consult Requested By: April Lucas MD    Reason for Consult:   ESRD, dependent on dialysis    SUBJECTIVE:     History of Present Illness:   59 y/o female patient known to our practice, has out patient dialysis 3x wk on TTS schedule.  She presented to her unit this am for her regularly scheduled tx, but requested to go to ER.  C/o weakness and falling, involuntary muscle spasms.  Nephrology is consulted for dialysis orders.    2/27  HD orders placed, pt seen and examined in ER.  Has low grade temp, BP has been all over the place, initial labs show mildly elevated K+, mild hyponatremia.  --pt does not appear volume overloaded.  CXR clear.  2/28  VSS, no new lab results yet today--BMP and CBC are ordered for today.  3L off w/HD yesterday, pt jacobo well.  No complaints, states, "I'm back to my normal self today".    Assessment/plan:    ESRD  Mild hyperkalemia, hyponatremia  SHPT  Anemia of chronic dz  HTN    --HD per pt's TTS schedule.  Orders placed.  Dose all medications for dialysis  --electrolytes will improve w/HD  --continue renvela with meals  --epo w/HD when BP more stable  --has had labile pressures in ER, d/w bedside nurse.  Cont home meds.    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     Generalized edema     GERD (gastroesophageal reflux disease)     Heart attack 2021    History of traumatic brain injury     Hypertension     Hypothyroidism     Insomnia     Muscle " weakness (generalized)     Restless leg syndrome     Senile dementia, uncomplicated     Sleep apnea, unspecified     Unsteadiness on feet      Past Surgical History:   Procedure Laterality Date    ADENOIDECTOMY      ANGIOGRAM, CORONARY, WITH LEFT HEART CATHETERIZATION N/A 2/12/2024    Procedure: Angiogram, Coronary, with Left Heart Cath;  Surgeon: Cristian Carlisle MD;  Location: [facility] CATH/EP LAB;  Service: Cardiology;  Laterality: N/A;    APPENDECTOMY      AV FISTULA PLACEMENT Left     CHOLECYSTECTOMY      CORONARY STENT PLACEMENT  2021    IVUS (INTRAVASCULAR ULTRASOUND) N/A 2/12/2024    Procedure: ULTRASOUND, INTRAVASCULAR;  Surgeon: Cristian Carlisle MD;  Location: [facility] CATH/EP LAB;  Service: Cardiology;  Laterality: N/A;    KNEE SURGERY      knee arthroplasty    LEFT HEART CATHETERIZATION  2021    NECK SURGERY      PARTIAL HYSTERECTOMY      STENT, DRUG ELUTING, MULTI VESSEL, CORONARY  2/12/2024    Procedure: Stent, Drug Eluting, Multi Vessel, Coronary;  Surgeon: Cristian Carlisle MD;  Location: [facility] CATH/EP LAB;  Service: Cardiology;;    TONSILLECTOMY       No family history on file.  Social History     Tobacco Use    Smoking status: Never    Smokeless tobacco: Never   Substance Use Topics    Alcohol use: Never    Drug use: Never       Review of patient's allergies indicates:   Allergen Reactions    Lisinopril Anaphylaxis        Review of Systems:  C/o weakness, frequent falls, muscle spasms    OBJECTIVE:     Vital Signs Range (Last 24H):  Temp:  [98.3 °F (36.8 °C)-98.6 °F (37 °C)]   Pulse:  [75-98]   Resp:  [15-18]   BP: (112-173)/(66-82)   SpO2:  [93 %-98 %]     Physical Exam:  General- NAD noted  HEENT- WNL  Neck- supple  CV- Regular rate and rhythm  Resp- Lungs CTA Bilaterally, No increased WOB  GI- Non tender/non-distended, BS normoactive x4 quads  Extrem- No cyanosis, clubbing, edema.  Derm- skin w/d  Neuro-  No flap.     Body mass index is 34.21 kg/m².    Laboratory:  CBC:   Recent Labs   Lab 02/27/24  0742    WBC 9.51   RBC 2.70*   HGB 9.1*   HCT 28.2*      *   MCH 33.7*   MCHC 32.3       CMP:   Recent Labs   Lab 02/27/24  0731   *   CALCIUM 9.3   ALBUMIN 4.2   PROT 7.5   *   K 5.2*   CO2 31*   CL 92*   BUN 48*   CREATININE 10.4*   ALKPHOS 213*   ALT 9*   AST 13   BILITOT 0.4         Diagnostic Results:  Labs: Reviewed  X-Ray: Reviewed      ASSESSMENT/PLAN:     Active Hospital Problems    Diagnosis  POA    *Syncope [R55]  Yes    Fever [R50.9]  Yes    ESRD (end stage renal disease) [N18.6]  Yes    Bipolar disorder [F31.9]  Yes    Hypothyroidism [E03.9]  Yes    Asthma [J45.909]  Yes    Coronary artery disease involving native coronary artery of native heart with unstable angina pectoris [I25.110]  Yes    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           Thank you for allowing us to participate in the care of your patient. We will follow the patient and provide recommendations as needed.      Time spent seeing patient( greater than 1/2 spent in direct contact) :     Dinah Howe NP

## 2024-02-28 NOTE — PLAN OF CARE
[facility]  Initial Discharge Assessment       Primary Care Provider: John Castro MD    Admission Diagnosis: Syncope, unspecified syncope type [R55]    Admission Date: 2/27/2024  Expected Discharge Date: 2/28/2024     completed discharge assessment with Pt at bedside. Pt AAOx4s. Pt lives at home with friend lori. No poa. Demographics, PCP, and insurance verified. PCP last seen 1 year ago; has appointment tomorrow. No Coumadin. No home health. Pt does dialysis at [facility]. Pt completes ADLs with the assistance of a cane and nebulizer. Pt to discharge home via family transport. Pt has no other needs to be addressed at this time.    Transition of Care Barriers: None    [facility] / Plan: [employer] [facility] SNP / Product Type: Medicare Advantage /     Extended Emergency Contact Information  Primary Emergency Contact: Lori [place] [address] [place]  Mobile Phone: [phone]  Relation: Other  Preferred language: English   needed? No    Discharge Plan A: Home with family  Discharge Plan B: Home with family      [id number] [place], [place] - [address] AT [facility] & [address] [address]  Phone: [phone] Fax: [phone]      Initial Assessment (most recent)       Adult Discharge Assessment - 02/28/24 0957          Discharge Assessment    Assessment Type Discharge Planning Assessment     Confirmed/corrected address, phone number and insurance Yes     Confirmed Demographics Correct on Facesheet     Source of Information patient     Does patient/caregiver understand observation status Yes     Communicated VERO with patient/caregiver Date not available/Unable to determine     Reason For Admission Syncope     People in Home friend(s)     Facility Arrived From: home     Do you expect to return to your current living situation? Yes     Do you have  help at home or someone to help you manage your care at home? Yes     Who are your caregiver(s) and their phone number(s)? Kinjal Cruz (Other)  [phone]     Prior to hospitilization cognitive status: Alert/Oriented     Current cognitive status: Alert/Oriented     Walking or Climbing Stairs Difficulty yes     Walking or Climbing Stairs ambulation difficulty, requires equipment     Mobility Management Cane     Dressing/Bathing Difficulty no     Home Accessibility wheelchair accessible     Home Layout Able to live on 1st floor     Equipment Currently Used at Home cane, straight;nebulizer     Readmission within 30 days? No     Patient currently being followed by outpatient case management? No     Do you currently have service(s) that help you manage your care at home? No     Do you take prescription medications? Yes     Do you have prescription coverage? Yes     Coverage PHN SNP     Do you have any problems affording any of your prescribed medications? No     Is the patient taking medications as prescribed? yes     Who is going to help you get home at discharge? Kinjal Cruz (Other)  [phone]     How do you get to doctors appointments? family or friend will provide     Are you on dialysis? Yes     Dialysis Name and Scheduled days [facility]     Do you take coumadin? No     Discharge Plan A Home with family     Discharge Plan B Home with family     DME Needed Upon Discharge  none     Discharge Plan discussed with: Patient     Transition of Care Barriers None

## 2024-02-28 NOTE — CARE UPDATE
02/28/24 0726   Patient Assessment/Suction   Level of Consciousness (AVPU) alert   Respiratory Effort Normal;Unlabored   Expansion/Accessory Muscles/Retractions no use of accessory muscles   RUL Breath Sounds Anterior:;wheezes, inspiratory   Rhythm/Pattern, Respiratory unlabored   PRE-TX-O2   Device (Oxygen Therapy) room air   SpO2 (!) 94 %   Pulse Oximetry Type Intermittent   $ Pulse Oximetry - Multiple Charge Pulse Oximetry - Multiple   Pulse 75   Resp 16   Aerosol Therapy   $ Aerosol Therapy Charges Aerosol Treatment  (brpvana)   Daily Review of Necessity (SVN) completed   Respiratory Treatment Status (SVN) given   Treatment Route (SVN) mouthpiece;oxygen   Patient Position (SVN) HOB elevated   Post Treatment Assessment (SVN) breath sounds improved;increased aeration;vital signs unchanged   Signs of Intolerance (SVN) none   Respiratory Evaluation   $ Care Plan Tech Time 15 min

## 2024-02-28 NOTE — PLAN OF CARE
Pt was explained ALEMAN. Pt verbalized understanding of ALEMAN and signed. ALEMAN scanned to .   02/28/24 1042   ALEMAN Message   Medicare Outpatient and Observation Notification regarding financial responsibility Given to patient/caregiver;Explained to patient/caregiver;Signed/date by patient/caregiver   Date ALEMAN was signed 02/28/24   Time ALEMAN was signed [id number]

## 2024-02-29 LAB
HBV SURFACE AB SER QL: REACTIVE
HBV SURFACE AG SERPL QL IA: NEGATIVE

## 2024-02-29 NOTE — DISCHARGE SUMMARY
[facility] Medicine  Discharge Summary      Patient Name: Eugenia Manuel  MRN: [id number]  [facility]: [id number]  Patient Class: OP- Observation  Admission Date: 2/27/2024  Hospital Length of Stay: 0 days  Discharge Date and Time: 02/28/2024  Attending Physician: April Lucas MD   Discharging Provider: April Lucas MD  Primary Care Provider: John Castro MD    Primary Care Team: Networked reference to record PCT     HPI:   58F with PMH HTN, ESRD on HD, asthma, CAD w/ stent, bipolar disorder, hypothyroid, RLS, hx CVA, and dementia presents from the dialysis center due to being unable to hold still for her session. It was also reported she fainted multiple times at the dialysis and also on the way to ER with EMS. She reports feeling weak for about 1 week and feels like she is going to pass out every time she stands up. Her labs do not reveal an etiology for her symptoms. Mild hyperkalemia K 5.2.  CT head shows an old infarct without anything acute. CXR is clear. Admitted to hospital medicine service for continued evaluation and management.    * No surgery found *      Hospital Course:   58F with PMH HTN, ESRD on HD, asthma, CAD w/ stent, bipolar disorder, hypothyroid, RLS, hx CVA, and dementia presents from the dialysis center due to being unable to hold still for her session. It was also reported she fainted multiple times at the dialysis and also on the way to ER with EMS. She reports feeling weak for about 1 week and feels like she is going to pass out every time she stands up. Her labs do not reveal an etiology for her symptoms. Mild hyperkalemia K 5.2.  CT head shows an old infarct without anything acute. CXR is clear. Admitted to hospital medicine service for continued evaluation and management.    Patient underwent orthostatics, EKG, carotid u/s, TSH, FT4, blood culture, UA. Nothing points to cause of syncope. When I tried to approach all her pain and other CNS acting meds she  quickly responded that it could not be the cause because she has been on these meds for a long time. Her caretaker that is pacing the room was aggressively telling me that the medications are definitely not the cause of her symptoms. Neither will allow for a logical discussion. Didn't have any further recommendation other than follow up PCP and cardiologist if symptoms return. Patient discharged.         Goals of Care Treatment Preferences:  Code Status: Full Code      Consults:     No new Assessment & Plan notes have been filed under this hospital service since the last note was generated.  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    Specialty: Family Medicine  Contact information:  [address] [place] [id number]  [phone]                           Patient Instructions:      Diet renal     Notify your health care provider if you experience any of the following:  persistent dizziness, light-headedness, or visual disturbances     Notify your health care provider if you experience any of the following:  increased confusion or weakness     Notify your health care provider if you experience any  of the following:  difficulty breathing or increased cough     Notify your health care provider if you experience any of the following:  persistent nausea and vomiting or diarrhea     Activity as tolerated       Significant Diagnostic Studies: N/A    Pending Diagnostic Studies:       Procedure Component Value Units Date/Time    Hepatitis B surface antibody [[id number]] Collected: 02/27/24 1318    Order Status: Sent Lab Status: In process Updated: 02/27/24 1334    Specimen: Blood     Hepatitis B surface antigen [[id number]] Collected: 02/27/24 1318    Order Status: Sent Lab Status: In process Updated: 02/27/24 1334    Specimen: Blood            Medications:  Reconciled Home Medications:      Medication List        CHANGE how you take these medications      albuterol 90 mcg/actuation inhaler  Commonly known as: PROVENTIL/VENTOLIN HFA  Inhale 2 puffs into the lungs every 6 (six) hours as needed. Rescue  What changed: reasons to take this     gabapentin 300 MG capsule  Commonly known as: NEURONTIN  Take 1 capsule (300 mg total) by mouth every evening.  What changed: when to take this     * melatonin 10 mg Cap  Take 10 mg by mouth nightly as needed.  What changed: Another medication with the same name was added. Make sure you understand how and when to take each.     * melatonin 3 mg tablet  Commonly known as: MELATIN  Take 2 tablets (6 mg total) by mouth nightly as needed for Insomnia.  What changed: You were already taking a medication with the same name, and this prescription was added. Make sure you understand how and when to take each.           * This list has 2 medication(s) that are the same as other medications prescribed for you. Read the directions carefully, and ask your doctor or other care provider to review them with you.                CONTINUE taking these medications      aspirin 81 MG Chew  Take 1 tablet (81 mg total) by mouth once daily.     atorvastatin 40 MG tablet  Commonly known as: LIPITOR  Take  1 tablet (40 mg total) by mouth once daily.     AURYXIA 210 mg iron Tab  Generic drug: ferric citrate  Take 420 mg by mouth 3 (three) times daily.     azelastine 137 mcg (0.1 %) nasal spray  Commonly known as: ASTELIN  1 spray (137 mcg total) by Nasal route 2 (two) times daily.     BREZTRI AEROSPHERE 160-9-4.8 mcg/actuation Hfaa  Generic drug: budesonide-glycopyr-formoterol  Inhale 2 puffs into the lungs 2 (two) times daily.     cetirizine 10 MG tablet  Commonly known as: ZYRTEC  Take 1 tablet (10 mg total) by mouth once daily.     clopidogreL 75 mg tablet  Commonly known as: PLAVIX  Take 1 tablet (75 mg total) by mouth once daily.     fluticasone propionate 50 mcg/actuation nasal spray  Commonly known as: FLONASE  2 sprays (100 mcg total) by Each Nostril route once daily.     levothyroxine 150 MCG tablet  Commonly known as: SYNTHROID  Take 1 tablet (150 mcg total) by mouth before breakfast.     midodrine 10 MG tablet  Commonly known as: PROAMATINE  Take 1 tablet (10 mg total) by mouth daily as needed (hypotension).     montelukast 10 mg tablet  Commonly known as: SINGULAIR  Take 1 tablet (10 mg total) by mouth every evening.     nitroGLYCERIN 0.4 MG SL tablet  Commonly known as: NITROSTAT  Place 1 tablet (0.4 mg total) under the tongue every 5 (five) minutes as needed for Chest pain.     pantoprazole 40 MG tablet  Commonly known as: PROTONIX  Take 1 tablet (40 mg total) by mouth once daily.     pramipexole 0.5 MG tablet  Commonly known as: MIRAPEX  Take 1 tablet (0.5 mg total) by mouth 2 (two) times daily.     * QUEtiapine 25 MG Tab  Commonly known as: SEROQUEL  Take 1 tablet (25 mg total) by mouth every evening.     * QUEtiapine 100 MG Tab  Commonly known as: SEROQUEL  Take 1 tablet (100 mg total) by mouth once daily.     rOPINIRole 2 MG tablet  Commonly known as: REQUIP  Take 1 tablet (2 mg total) by mouth 3 (three) times daily.     senna-docusate 8.6-50 mg 8.6-50 mg per tablet  Commonly known as:  PERICOLACE  Take 1 tablet by mouth daily as needed for Constipation.     sevelamer carbonate 800 mg Tab  Commonly known as: RENVELA  Take 5 tablets (4,000 mg total) by mouth 3 (three) times daily with meals.     torsemide 100 MG Tab  Commonly known as: DEMADEX  Take 1 tablet (100 mg total) by mouth 2 (two) times a day.     venlafaxine 150 MG Cp24  Commonly known as: EFFEXOR-XR  Take 1 capsule (150 mg total) by mouth once daily.           * This list has 2 medication(s) that are the same as other medications prescribed for you. Read the directions carefully, and ask your doctor or other care provider to review them with you.                STOP taking these medications      HYDROcodone-acetaminophen  mg per tablet  Commonly known as: NORCO              Indwelling Lines/Drains at time of discharge:   Lines/Drains/Airways       None                   Time spent on the discharge of patient: 40 minutes         April Lucas MD  Department of Hospital Medicine  [facility]

## 2024-02-29 NOTE — NURSING
Patient discharge to home via private car with family. Instructed to f/u with PCP or return to ER if symptoms worsen. Pt verbalizes understanding.

## 2024-02-29 NOTE — HOSPITAL COURSE
58F with PMH HTN, ESRD on HD, asthma, CAD w/ stent, bipolar disorder, hypothyroid, RLS, hx CVA, and dementia presents from the dialysis center due to being unable to hold still for her session. It was also reported she fainted multiple times at the dialysis and also on the way to ER with EMS. She reports feeling weak for about 1 week and feels like she is going to pass out every time she stands up. Her labs do not reveal an etiology for her symptoms. Mild hyperkalemia K 5.2.  CT head shows an old infarct without anything acute. CXR is clear. Admitted to hospital medicine service for continued evaluation and management.    Patient underwent orthostatics, EKG, carotid u/s, TSH, FT4, blood culture, UA. Nothing points to cause of syncope. When I tried to approach all her pain and other CNS acting meds she quickly responded that it could not be the cause because she has been on these meds for a long time. Her caretaker that is pacing the room was aggressively telling me that the medications are definitely not the cause of her symptoms. Neither will allow for a logical discussion. Didn't have any further recommendation other than follow up PCP and cardiologist if symptoms return. Patient discharged.

## 2024-03-03 LAB
BACTERIA BLD CULT: NORMAL
BACTERIA BLD CULT: NORMAL

## 2024-03-05 ENCOUNTER — PATIENT OUTREACH (OUTPATIENT)
Dept: ADMINISTRATIVE | Facility: CLINIC | Age: 59
End: 2024-03-05
Payer: MEDICARE

## 2024-03-05 ENCOUNTER — PATIENT MESSAGE (OUTPATIENT)
Dept: ADMINISTRATIVE | Facility: CLINIC | Age: 59
End: 2024-03-05
Payer: MEDICARE

## 2024-03-05 NOTE — PROGRESS NOTES
C3 nurse attempted to contact Eugenia Manuel for a TCC post hospital discharge follow up call. No answer. Left voicemail with callback information. The patient does not have a scheduled HOSFU appointment. Message sent to PCP staff for assistance with scheduling visit with patient.

## 2024-03-07 NOTE — PROGRESS NOTES
C3 nurse attempted to contact Eugenia Manuel for a TCC post hospital discharge follow up call. No answer. Left voicemail with callback information. The patient has a scheduled HOSFU appointment with John Castro MD on 03/08/2024 @ 8419.

## 2024-03-08 ENCOUNTER — OFFICE VISIT (OUTPATIENT)
Dept: FAMILY MEDICINE | Facility: CLINIC | Age: 59
End: 2024-03-08
Payer: MEDICARE

## 2024-03-08 ENCOUNTER — HOSPITAL ENCOUNTER (OUTPATIENT)
Dept: RADIOLOGY | Facility: HOSPITAL | Age: 59
Discharge: HOME | End: 2024-03-08
Attending: STUDENT IN AN ORGANIZED HEALTH CARE EDUCATION/TRAINING PROGRAM
Payer: MEDICARE

## 2024-03-08 VITALS
HEART RATE: 95 BPM | OXYGEN SATURATION: 97 % | RESPIRATION RATE: 18 BRPM | DIASTOLIC BLOOD PRESSURE: 80 MMHG | WEIGHT: 223 LBS | BODY MASS INDEX: 33.8 KG/M2 | SYSTOLIC BLOOD PRESSURE: 128 MMHG | TEMPERATURE: 98 F | HEIGHT: 68 IN

## 2024-03-08 DIAGNOSIS — M54.32 SCIATICA OF LEFT SIDE: ICD-10-CM

## 2024-03-08 DIAGNOSIS — N18.5 CHRONIC RENAL FAILURE, STAGE 5: ICD-10-CM

## 2024-03-08 DIAGNOSIS — R53.82 CHRONIC FATIGUE: Primary | ICD-10-CM

## 2024-03-08 DIAGNOSIS — Z12.11 COLON CANCER SCREENING: ICD-10-CM

## 2024-03-08 DIAGNOSIS — M54.9 DORSALGIA, UNSPECIFIED: ICD-10-CM

## 2024-03-08 PROCEDURE — 72148 MRI LUMBAR SPINE W/O DYE: CPT | Mod: TC

## 2024-03-08 PROCEDURE — 99213 OFFICE O/P EST LOW 20 MIN: CPT | Mod: S$GLB,,, | Performed by: FAMILY MEDICINE

## 2024-03-08 PROCEDURE — 99999 PR PBB SHADOW E&M-EST. PATIENT-LVL V: CPT | Mod: PBBFAC,,, | Performed by: FAMILY MEDICINE

## 2024-03-08 NOTE — PROGRESS NOTES
Subjective:       Patient ID: Eugenia Manuel is a 58 y.o. female.    Chief Complaint: renal failure (3 mth f/u )      Is here as a follow-up on chronic renal insufficiency she is now on dialysis followed by Dr. Dumont.  She has a left upper arm shunt but she feels is not functioning properly  Lab Results       Component                Value               Date                       WBC                      5.18                02/28/2024                 HGB                      9.0 (L)             02/28/2024                 HCT                      28.5 (L)            02/28/2024                 PLT                      203                 02/28/2024                 ALT                      9 (L)               02/27/2024                 AST                      13                  02/27/2024                 NA                       134 (L)             02/28/2024                 K                        4.9                 02/28/2024                 CL                       101                 02/28/2024                 CREATININE               7.6 (H)             02/28/2024                 BUN                      36 (H)              02/28/2024                 CO2                      21 (L)              02/28/2024                 TSH                      0.293 (L)           02/27/2024                 INR                      1.0                 02/27/2024                 HGBA1C                   6.3 (H)             08/19/2020          \  Patient was hospitalized in February with exacerbation renal failure near-syncope        Allergies and Medications:   Review of patient's allergies indicates:   Allergen Reactions    Lisinopril Anaphylaxis     Current Outpatient Medications   Medication Sig Dispense Refill    albuterol (PROVENTIL/VENTOLIN HFA) 90 mcg/actuation inhaler Inhale 2 puffs into the lungs every 6 (six) hours as needed. Rescue 18 g 6    aspirin 81 MG Chew Take 1 tablet (81 mg total) by mouth once daily.  0     atorvastatin (LIPITOR) 40 MG tablet Take 1 tablet (40 mg total) by mouth once daily. 90 tablet 0    azelastine (ASTELIN) 137 mcg (0.1 %) nasal spray 1 spray (137 mcg total) by Nasal route 2 (two) times daily. 30 mL 2    budesonide-glycopyr-formoterol (BREZTRI AEROSPHERE) 160-9-4.8 mcg/actuation HFAA Inhale 2 puffs into the lungs 2 (two) times daily. 10.7 g 6    cetirizine (ZYRTEC) 10 MG tablet Take 1 tablet (10 mg total) by mouth once daily. 90 tablet 3    clopidogreL (PLAVIX) 75 mg tablet Take 1 tablet (75 mg total) by mouth once daily. 90 tablet 3    ferric citrate (AURYXIA) 210 mg iron Tab Take 420 mg by mouth 3 (three) times daily. 540 tablet 3    fluticasone propionate (FLONASE) 50 mcg/actuation nasal spray 2 sprays (100 mcg total) by Each Nostril route once daily. 16 g 1    gabapentin (NEURONTIN) 300 MG capsule Take 1 capsule (300 mg total) by mouth every evening. (Patient taking differently: Take 300 mg by mouth 3 (three) times daily.) 30 capsule 11    levothyroxine (SYNTHROID) 150 MCG tablet Take 1 tablet (150 mcg total) by mouth before breakfast. 90 tablet 3    melatonin (MELATIN) 3 mg tablet Take 2 tablets (6 mg total) by mouth nightly as needed for Insomnia.  0    melatonin 10 mg Cap Take 10 mg by mouth nightly as needed.      midodrine (PROAMATINE) 10 MG tablet Take 1 tablet (10 mg total) by mouth daily as needed (hypotension). 180 tablet 0    montelukast (SINGULAIR) 10 mg tablet Take 1 tablet (10 mg total) by mouth every evening. 30 tablet 6    nitroGLYCERIN (NITROSTAT) 0.4 MG SL tablet Place 1 tablet (0.4 mg total) under the tongue every 5 (five) minutes as needed for Chest pain. 10 tablet 5    pantoprazole (PROTONIX) 40 MG tablet Take 1 tablet (40 mg total) by mouth once daily. 90 tablet 1    pramipexole (MIRAPEX) 0.5 MG tablet Take 1 tablet (0.5 mg total) by mouth 2 (two) times daily.      QUEtiapine (SEROQUEL) 100 MG Tab Take 1 tablet (100 mg total) by mouth once daily. (Patient taking differently:  Take 100 mg by mouth nightly.) 30 tablet 5    QUEtiapine (SEROQUEL) 25 MG Tab Take 1 tablet (25 mg total) by mouth every evening. (Patient taking differently: Take 25 mg by mouth once daily.) 90 tablet 1    rOPINIRole (REQUIP) 2 MG tablet Take 1 tablet (2 mg total) by mouth 3 (three) times daily. 90 tablet 0    senna-docusate 8.6-50 mg (PERICOLACE) 8.6-50 mg per tablet Take 1 tablet by mouth daily as needed for Constipation.      sevelamer carbonate (RENVELA) 800 mg Tab Take 5 tablets (4,000 mg total) by mouth 3 (three) times daily with meals. 1350 tablet 3    torsemide (DEMADEX) 100 MG Tab Take 1 tablet (100 mg total) by mouth 2 (two) times a day. 90 tablet 0    venlafaxine (EFFEXOR-XR) 150 MG Cp24 Take 1 capsule (150 mg total) by mouth once daily. 90 capsule 3     No current facility-administered medications for this visit.       Family History:   No family history on file.    Social History:   Social History     Socioeconomic History    Marital status: Single   Tobacco Use    Smoking status: Never    Smokeless tobacco: Never   Substance and Sexual Activity    Alcohol use: Never    Drug use: Never       Review of Systems    Objective:     Vitals:    03/08/24 1521   BP: 128/80   Pulse: 95   Resp: 18   Temp: 97.6 °F (36.4 °C)        Physical Exam  Vitals and nursing note reviewed.   Constitutional:       General: She is not in acute distress.     Appearance: Normal appearance. She is well-developed and normal weight. She is not ill-appearing, toxic-appearing or diaphoretic.   HENT:      Head: Normocephalic and atraumatic.   Eyes:      Pupils: Pupils are equal, round, and reactive to light.   Cardiovascular:      Rate and Rhythm: Normal rate and regular rhythm.      Heart sounds: Normal heart sounds. No murmur heard.     No friction rub. No gallop.   Pulmonary:      Effort: Pulmonary effort is normal. No respiratory distress.      Breath sounds: Normal breath sounds. No stridor. No wheezing, rhonchi or rales.    Chest:      Chest wall: No tenderness.   Musculoskeletal:      Right lower leg: No edema.      Left lower leg: No edema.      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  She does point out that her shunt on her left arm is less vibratory thrill than it has had she is concerned about the lower end of it being clotted.   Neurological:      Mental Status: She is alert.   Psychiatric:         Behavior: Behavior normal.         Thought Content: Thought content normal.         Judgment: Judgment normal.         Assessment:       1. Chronic fatigue    2. Chronic renal failure, stage 5    3. Colon cancer screening        Plan:       Eugenia was seen today for renal failure.    Diagnoses and all orders for this visit:    Chronic fatigue    Chronic renal failure, stage 5    Colon cancer screening         Follow up in about 6 months (around 9/8/2024).

## 2024-03-11 ENCOUNTER — HOSPITAL ENCOUNTER (OUTPATIENT)
Dept: PULMONOLOGY | Facility: HOSPITAL | Age: 59
Discharge: HOME | End: 2024-03-11
Attending: NURSE PRACTITIONER
Payer: MEDICARE

## 2024-03-11 VITALS — HEIGHT: 68 IN | WEIGHT: 223 LBS | BODY MASS INDEX: 33.8 KG/M2

## 2024-03-11 DIAGNOSIS — R06.00 DYSPNEA, UNSPECIFIED TYPE: ICD-10-CM

## 2024-03-11 PROCEDURE — 94618 PULMONARY STRESS TESTING: CPT

## 2024-03-11 NOTE — CARE UPDATE
"   03/11/24 0910   6MW Test   Ordering Provider MOHAN Jarquin   Performing nurse/tech/RT Anita Cho, HARVINDER   Diagnosis Qualify for Oxygen   Height 5' 8" (1.727 m)   Weight 101.2 kg (223 lb)   BMI (Calculated) 33.9   Predicted Distance Meters (Calculated) 470.3 meters   6MWT Status completed without stopping   Patient Reported Dyspnea   Was O2 used? Yes   Delivery Method Cannula;Continuous Flow   6MW Distance walked (feet) 1200 feet   Distance walked (meters) 365.76 meters   Did patient stop? No   Type of assistive device(s) used? no assistive devices   Is extra documentation required for this patient? Yes   Pre-Exercise   Oxygen Saturation 95 %   Supplemental Oxygen Room Air   Heart Rate 84 bpm   Swathi Dyspnea Rating  nothing at all   Exercise 1 Minute   Oxygen Saturation 94 %   Supplemental Oxygen Room Air   Heart Rate 92 bpm   Exercise 2 Minutes   Oxygen Saturation 93 %   Supplemental Oxygen Room Air   Heart Rate 99 bpm   Exercise 3 Minutes   Oxygen Saturation 88 %   Supplemental Oxygen 1 L/M   Heart Rate 98 bpm   Exercise 4 Minutes   Oxygen Saturation 96 %   Supplemental Oxygen 1 L/M   Heart Rate 99 bpm   Exercise 5 Minutes   Oxygen Saturation 97 %   Supplemental Oxygen 1 L/M   Heart Rate 98 bpm   Post Exercise   Oxygen Saturation 99 %   Supplemental Oxygen 1 L/M   Heart Rate 96 bpm   Swathi Dyspnea Rating  light   Recovery   Oxygen Saturation 99 %   Supplemental Oxygen 1 L/M   Heart Rate 83 bpm   Swathi Dyspnea Rating  light   Interpretation   Is procedure ready for interpretation? Yes   Did the patient stop or pause? No   Total Time Walked (Calculated) 360 seconds   Total Laps Walked 6   Final Partial Lap Distance (feet) 0 feet   Total Distance Feet (Calculated) 1200 feet   Total Distance Meters (Calculated) 365.76 meters   Percentage of Predicted (Calculated) 77.77   Peak VO2 (Calculated) 14.95   Mets 4.27   Comments This is a hypoxemic 6MWT. Patient did qualify for oxygen at 1L while ambulating.   Oxygen " Qualification   Oxygen Qualification? Yes

## 2024-03-12 ENCOUNTER — PATIENT MESSAGE (OUTPATIENT)
Dept: ADMINISTRATIVE | Facility: HOSPITAL | Age: 59
End: 2024-03-12
Payer: MEDICARE

## 2024-03-12 ENCOUNTER — TELEPHONE (OUTPATIENT)
Dept: FAMILY MEDICINE | Facility: CLINIC | Age: 59
End: 2024-03-12
Payer: MEDICARE

## 2024-03-12 NOTE — TELEPHONE ENCOUNTER
----- Message from John Castro MD sent at 3/12/2024  8:07 AM CDT -----  Lab reviewed: all OK. Please notify pt. Continue pres meds.

## 2024-03-13 ENCOUNTER — TELEPHONE (OUTPATIENT)
Dept: PULMONOLOGY | Facility: CLINIC | Age: 59
End: 2024-03-13

## 2024-03-13 DIAGNOSIS — R06.00 DYSPNEA, UNSPECIFIED TYPE: Primary | ICD-10-CM

## 2024-03-13 DIAGNOSIS — N18.6 ESRD (END STAGE RENAL DISEASE): ICD-10-CM

## 2024-03-13 DIAGNOSIS — J96.11 CHRONIC HYPOXEMIC RESPIRATORY FAILURE: ICD-10-CM

## 2024-03-13 DIAGNOSIS — G25.81 RESTLESS LEG: ICD-10-CM

## 2024-03-13 RX ORDER — ROPINIROLE 2 MG/1
2 TABLET, FILM COATED ORAL 3 TIMES DAILY
Qty: 90 TABLET | Refills: 0 | Status: CANCELLED
Start: 2024-03-13

## 2024-03-13 NOTE — TELEPHONE ENCOUNTER
6 minute walk is hypoxemic needs to be on 1 liter.  Replaced order for PFT do not see where it ws done.  Tried to call patient but voicemail is full can't leave message

## 2024-03-14 ENCOUNTER — PATIENT MESSAGE (OUTPATIENT)
Dept: PULMONOLOGY | Facility: CLINIC | Age: 59
End: 2024-03-14

## 2024-03-14 DIAGNOSIS — G25.81 RESTLESS LEG: ICD-10-CM

## 2024-03-14 RX ORDER — ROPINIROLE 2 MG/1
2 TABLET, FILM COATED ORAL 3 TIMES DAILY
Qty: 90 TABLET | Refills: 1 | Status: SHIPPED | OUTPATIENT
Start: 2024-03-14 | End: 2024-05-06 | Stop reason: SDUPTHER

## 2024-03-19 ENCOUNTER — PATIENT MESSAGE (OUTPATIENT)
Dept: PULMONOLOGY | Facility: CLINIC | Age: 59
End: 2024-03-19

## 2024-03-22 LAB
OHS QRS DURATION: 90 MS
OHS QTC CALCULATION: 435 MS

## 2024-03-28 LAB
OHS QRS DURATION: 90 MS
OHS QTC CALCULATION: 473 MS

## 2024-04-01 ENCOUNTER — OFFICE VISIT (OUTPATIENT)
Dept: GASTROENTEROLOGY | Facility: CLINIC | Age: 59
End: 2024-04-01
Payer: MEDICARE

## 2024-04-01 VITALS
HEART RATE: 81 BPM | DIASTOLIC BLOOD PRESSURE: 82 MMHG | WEIGHT: 225.06 LBS | SYSTOLIC BLOOD PRESSURE: 175 MMHG | HEIGHT: 68 IN | BODY MASS INDEX: 34.11 KG/M2

## 2024-04-01 DIAGNOSIS — Z86.010 HISTORY OF COLON POLYPS: ICD-10-CM

## 2024-04-01 DIAGNOSIS — R19.7 DIARRHEA, UNSPECIFIED TYPE: ICD-10-CM

## 2024-04-01 DIAGNOSIS — R11.0 NAUSEA: ICD-10-CM

## 2024-04-01 DIAGNOSIS — K58.1 IRRITABLE BOWEL SYNDROME WITH CONSTIPATION: Primary | ICD-10-CM

## 2024-04-01 DIAGNOSIS — K21.9 GASTROESOPHAGEAL REFLUX DISEASE, UNSPECIFIED WHETHER ESOPHAGITIS PRESENT: ICD-10-CM

## 2024-04-01 DIAGNOSIS — K62.5 RECTAL BLEEDING: ICD-10-CM

## 2024-04-01 DIAGNOSIS — R68.81 EARLY SATIETY: ICD-10-CM

## 2024-04-01 DIAGNOSIS — R19.8 IRREGULAR BOWEL HABITS: ICD-10-CM

## 2024-04-01 DIAGNOSIS — R12 WATERBRASH: ICD-10-CM

## 2024-04-01 PROCEDURE — 99999 PR PBB SHADOW E&M-EST. PATIENT-LVL V: CPT | Mod: PBBFAC,,,

## 2024-04-01 PROCEDURE — 99214 OFFICE O/P EST MOD 30 MIN: CPT | Mod: S$GLB,,,

## 2024-04-01 RX ORDER — ONDANSETRON 4 MG/1
4 TABLET, ORALLY DISINTEGRATING ORAL ONCE AS NEEDED
Qty: 1 TABLET | Refills: 0 | Status: SHIPPED | OUTPATIENT
Start: 2024-04-01

## 2024-04-01 NOTE — PROGRESS NOTES
Subjective:       Patient ID: Eugenia Manuel is a 58 y.o. female Body mass index is 34.22 kg/m².    Chief Complaint: Constipation (Or diarrhea)    This patient is new to me.  Referring Provider: Dr. John Castro for colonoscopy, constipation.           GI Problem  The primary symptoms include nausea (reports mild nausea the past few days), diarrhea (reports diarrhea that started this week with the nausea, denies any antibiotic use, no ill contacts, no suspect food intake, was hospitalized in February, no changes in medications, has not had a laxative in a week, still takes daily stool softener) and hematochezia (intermittent BRBPR streaks on tissue not in stool or in toilet when straining with Bowel movements last episode a week ago). Primary symptoms do not include fever, weight loss, fatigue, abdominal pain, vomiting, melena, hematemesis, jaundice or dysuria.   Daily occurrences: Reports having 2-3 BMs per day rates stool type 7 on [place] stool scale reports she is typically constipated but this week has noticed loose stools.   The illness is also significant for constipation (hx of IBS-C daily has tried daily MiraLax and fiber with no relief, last laxative a week ago has a BM every other day,strains, does not feel empty,rates stool type 1-3 on [place] stool scale, currently taking senna daily w/ a Dulcolax laxative almost daily). The illness does not include dysphagia or bloating. Associated symptoms comments: Pt reports she recently moved down here from [place], with hx chronic renal insufficiency she is now on dialysis followed by Dr. Dumont.  Pt followed by pulm for hx of asthma, followed by Cardiology for hx of CAD hx of heart attack, patient reports last colonoscopy was about 10 years ago in [place] reports history of colon polyps denies any family history of colon cancer  . Significant associated medical issues include GERD. Associated medical issues do not include inflammatory bowel disease,  gallstones, liver disease, alcohol abuse, PUD, gastric bypass, bowel resection, irritable bowel syndrome, hemorrhoids or diverticulitis.   Gastroesophageal Reflux  She complains of early satiety (reports lately has been only eating once a day reports feeling full easy), nausea (reports mild nausea the past few days) and water brash. She reports no abdominal pain, no belching, no chest pain, no choking, no coughing, no dysphagia, no globus sensation, no heartburn or no hoarse voice. This is a chronic problem. The current episode started more than 1 year ago. The problem occurs occasionally. The heartburn is located in the substernum. The heartburn is of moderate intensity. The heartburn wakes her from sleep. The heartburn does not limit her activity. Nothing aggravates the symptoms. Pertinent negatives include no anemia, fatigue, melena, muscle weakness, orthopnea or weight loss. Risk factors include hiatal hernia. She has tried a PPI (has taken pantoprazole 40mg orally daily has not taken PPI in over a month) for the symptoms. Past procedures include an EGD. Past procedures do not include an abdominal ultrasound, esophageal manometry, esophageal pH monitoring, H. pylori antibody titer or a UGI. Past invasive treatments do not include gastroplasty, gastroplication or reflux surgery.       Review of Systems   Constitutional:  Negative for fatigue, fever and weight loss.   HENT:  Negative for hoarse voice.    Respiratory:  Negative for cough and choking.    Cardiovascular:  Negative for chest pain.   Gastrointestinal:  Positive for constipation (hx of IBS-C daily has tried daily MiraLax and fiber with no relief, last laxative a week ago has a BM every other day,strains, does not feel empty,rates stool type 1-3 on [place] stool scale, currently taking senna daily w/ a Dulcolax laxative almost daily), diarrhea (reports diarrhea that started this week with the nausea, denies any antibiotic use, no ill contacts, no suspect  food intake, was hospitalized in February, no changes in medications, has not had a laxative in a week, still takes daily stool softener), hematochezia (intermittent BRBPR streaks on tissue not in stool or in toilet when straining with Bowel movements last episode a week ago) and nausea (reports mild nausea the past few days). Negative for abdominal pain, bloating, dysphagia, heartburn, hematemesis, jaundice, melena and vomiting.   Genitourinary:  Negative for dysuria.   Musculoskeletal:  Negative for muscle weakness.         No LMP recorded. Patient has had a hysterectomy.  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     Generalized edema     GERD (gastroesophageal reflux disease)     Heart attack 2021    History of traumatic brain injury     Hypertension     Hypothyroidism     Insomnia     Muscle weakness (generalized)     Restless leg syndrome     Senile dementia, uncomplicated     Sleep apnea, unspecified     Unsteadiness on feet      Past Surgical History:   Procedure Laterality Date    ADENOIDECTOMY      ANGIOGRAM, CORONARY, WITH LEFT HEART CATHETERIZATION N/A 02/12/2024    Procedure: Angiogram, Coronary, with Left Heart Cath;  Surgeon: Cristian Carlisle MD;  Location: [facility] CATH/EP LAB;  Service: Cardiology;  Laterality: N/A;    APPENDECTOMY      AV FISTULA PLACEMENT Left     CHOLECYSTECTOMY      COLONOSCOPY      10 years ago in [place]    CORONARY STENT PLACEMENT  2021    IVUS (INTRAVASCULAR  ULTRASOUND) N/A 02/12/2024    Procedure: ULTRASOUND, INTRAVASCULAR;  Surgeon: Cristian Carlisle MD;  Location: [facility] CATH/EP LAB;  Service: Cardiology;  Laterality: N/A;    KNEE SURGERY      knee arthroplasty    LEFT HEART CATHETERIZATION  2021    NECK SURGERY      PARTIAL HYSTERECTOMY      STENT, DRUG ELUTING, MULTI VESSEL, CORONARY  02/12/2024    Procedure: Stent, Drug Eluting, Multi Vessel, Coronary;  Surgeon: Cristian Carlisle MD;  Location: [facility] CATH/EP LAB;  Service: Cardiology;;    TONSILLECTOMY       History reviewed. No pertinent family history.  Social History     Tobacco Use    Smoking status: Never    Smokeless tobacco: Never   Substance Use Topics    Alcohol use: Never    Drug use: Never     Wt Readings from Last 10 Encounters:   04/01/24 102.1 kg (225 lb 1.4 oz)   03/11/24 101.2 kg (223 lb)   03/08/24 101.2 kg (223 lb)   02/27/24 102.1 kg (225 lb)   02/14/24 104.8 kg (231 lb)   02/12/24 104.8 kg (231 lb)   02/09/24 104.8 kg (231 lb)   02/06/24 105.2 kg (231 lb 14.8 oz)   02/06/24 104 kg (229 lb 3.2 oz)   01/31/24 90.7 kg (200 lb)     Lab Results   Component Value Date    WBC 5.18 02/28/2024    HGB 9.0 (L) 02/28/2024    HCT 28.5 (L) 02/28/2024     (H) 02/28/2024     02/28/2024     CMP  Sodium   Date Value Ref Range Status   02/28/2024 134 (L) 136 - 145 mmol/L Final     Potassium   Date Value Ref Range Status   02/28/2024 4.9 3.5 - 5.1 mmol/L Final     Chloride   Date Value Ref Range Status   02/28/2024 101 95 - 110 mmol/L Final     CO2   Date Value Ref Range Status   02/28/2024 21 (L) 23 - 29 mmol/L Final     Glucose   Date Value Ref Range Status   02/28/2024 106 70 - 110 mg/dL Final     BUN   Date Value Ref Range Status   02/28/2024 36 (H) 6 - 20 mg/dL Final     Creatinine   Date Value Ref Range Status   02/28/2024 7.6 (H) 0.5 - 1.4 mg/dL Final     Calcium   Date Value Ref Range Status   02/28/2024 10.0 8.7 - 10.5 mg/dL Final     Total Protein   Date Value Ref Range Status   02/27/2024 7.5 6.0  "- 8.4 g/dL Final     Albumin   Date Value Ref Range Status   02/27/2024 4.2 3.5 - 5.2 g/dL Final     Total Bilirubin   Date Value Ref Range Status   02/27/2024 0.4 0.1 - 1.0 mg/dL Final     Comment:     For infants and newborns, interpretation of results should be based  on gestational age, weight and in agreement with clinical  observations.    Premature Infant recommended reference ranges:  Up to 24 hours.............<8.0 mg/dL  Up to 48 hours............<12.0 mg/dL  3-5 days..................<15.0 mg/dL  6-29 days.................<15.0 mg/dL       Alkaline Phosphatase   Date Value Ref Range Status   02/27/2024 213 (H) 55 - 135 U/L Final     AST   Date Value Ref Range Status   02/27/2024 13 10 - 40 U/L Final     ALT   Date Value Ref Range Status   02/27/2024 9 (L) 10 - 44 U/L Final     Anion Gap   Date Value Ref Range Status   02/28/2024 12 8 - 16 mmol/L Final     No results found for: "LIPASE"  No results found for: "LIPASERES"  Lab Results   Component Value Date    TSH 0.293 (L) 02/27/2024       Reviewed prior medical records including radiology report of CT abdomen pelvis 08/19/2020 & endoscopy history (see surgical history/procedures).    Objective:      Physical Exam    Assessment:       1. Irritable bowel syndrome with constipation    2. Irregular bowel habits    3. Rectal bleeding    4. Diarrhea, unspecified type    5. History of colon polyps    6. Gastroesophageal reflux disease, unspecified whether esophagitis present    7. Waterbrash    8. Nausea    9. Early satiety        Plan:       Irritable bowel syndrome with constipation  -    START linaCLOtide (LINZESS) 72 mcg Cap capsule; Take 1 capsule (72 mcg total) by mouth before breakfast.  Dispense: 30 capsule; Refill: 2  -Recommend daily exercise as tolerated, adequate water intake (six 8-oz glasses of water daily), and high fiber diet. OTC fiber supplements are recommended if diet does not reach daily fiber goal (20-30 grams daily), such as Metamucil, " Citrucel, or FiberCon (take as directed, separate from other oral medications by >2 hours).  -Recommend trying OTC MiraLax once daily (17g PO) as directed  -If no improvement with above recommendations, try intermittently dosed Dulcolax OTC as directed (every 3-4 days) PRN to facilitate bowel movements  -If no relief with this, consider adding a emollient laxative (castor oil or mineral oil) +/- enema  -If still no improvement with these measures, call/follow-up    Irregular bowel habits  -     X-Ray Abdomen AP 1 View; Future; Expected date: 04/01/2024  - schedule Colonoscopy, discussed procedure with the patient, including risks and benefits, patient verbalized understanding    Rectal bleeding   - schedule Colonoscopy, discussed procedure with the patient, including risks and benefits, patient verbalized understanding  - discussed about different etiologies that can cause rectal bleeding, such as but not limited to diverticulosis, polyps, colon mass, colon inflammation or infection, anal fissure or hemorrhoids.    -start on constipation regimen    Diarrhea, unspecified type   -if diarrhea continues complete stool set   -possibly experienced constipation with overflow   -xray of abdomen ordered   - recommend OTC probiotic, such as Florastor or Culturelle, taken as directed on packaging  - avoid lactose, alcohol, & caffeine  - avoid known triggers    History of colon polyps   -patient is scheduled for colonoscopy    Gastroesophageal reflux disease, unspecified whether esophagitis present,  Waterbrash   - schedule EGD, discussed procedure with patient, including risks and benefits, patient verbalized understanding   -start on pantoprazole 40 mg orally daily   -Take PPI 30min-1hr before eating breakfast  -Educated patient on lifestyle modifications to help control/reduce reflux/abdominal pain including: avoid large meals, avoid eating within 2-3 hours of bedtime (avoid late night eating & lying down soon after eating),  elevate head of bed if nocturnal symptoms are present, smoking cessation (if current smoker), & weight loss (if overweight).   -Educated to avoid known foods which trigger reflux symptoms & to minimize/avoid high-fat foods, chocolate, caffeine, citrus, alcohol, & tomato products.  -Advised to avoid/limit use of NSAID's, since they can cause GI upset, bleeding, and/or ulcers. If needed, take with food.     Nausea, Early satiety   - schedule EGD, discussed procedure with patient, including risks and benefits, patient verbalized understanding  -  START   ondansetron (ZOFRAN-ODT) 4 MG TbDL; Take 1 tablet (4 mg total) by mouth 1 (one) time if needed (nausea).  Dispense: 1 tablet; Refill: 0  -start on PPI  -follow GERD lifestyle modifications  -consider gastric emptying study      Follow up if symptoms worsen or fail to improve.      If no improvement in symptoms or symptoms worsen, call/follow-up at clinic or go to ER.       [facility] - GASTROENTEROLOGY  [facility], [place]     Dictation software program was used for this note. Please expect some simple typographical  errors in this note.    Encounter includes face to face time and non-face to face time preparing to see the patient (eg, review of tests), obtaining and/or reviewing separately obtained history, documenting clinical information in the electronic or other health record, independently interpreting results (not separately reported) and communicating results to the patient/family/caregiver, or care coordination (not separately reported).

## 2024-04-02 ENCOUNTER — TELEPHONE (OUTPATIENT)
Dept: CARDIOLOGY | Facility: CLINIC | Age: 59
End: 2024-04-02

## 2024-04-02 ENCOUNTER — TELEPHONE (OUTPATIENT)
Dept: CARDIOLOGY | Facility: CLINIC | Age: 59
End: 2024-04-02
Payer: MEDICARE

## 2024-04-02 NOTE — LETTER
2024    Eugenia Manuel  [address]  [place] [address]             [facility] [place]  [address]  [address]  [place] [address]  Phone: [phone]  Fax: [phone] Patient: Eugenia Manuel  : 1965  Referring Doctor: Dr Conti  Procedure : EGD 2024 , and Colonoscopy 2024    Date of Last Stent:  Date of Last Office Visit: 2024    Current Outpatient Medications   Medication Sig    albuterol (PROVENTIL/VENTOLIN HFA) 90 mcg/actuation inhaler Inhale 2 puffs into the lungs every 6 (six) hours as needed. Rescue    aspirin 81 MG Chew Take 1 tablet (81 mg total) by mouth once daily. (Patient not taking: Reported on 2024)    atorvastatin (LIPITOR) 40 MG tablet Take 1 tablet (40 mg total) by mouth once daily.    azelastine (ASTELIN) 137 mcg (0.1 %) nasal spray 1 spray (137 mcg total) by Nasal route 2 (two) times daily.    budesonide-glycopyr-formoterol (BREZTRI AEROSPHERE) 160-9-4.8 mcg/actuation HFAA Inhale 2 puffs into the lungs 2 (two) times daily.    cetirizine (ZYRTEC) 10 MG tablet Take 1 tablet (10 mg total) by mouth once daily.    clopidogreL (PLAVIX) 75 mg tablet Take 1 tablet (75 mg total) by mouth once daily.    ferric citrate (AURYXIA) 210 mg iron Tab Take 420 mg by mouth 3 (three) times daily.    fluticasone propionate (FLONASE) 50 mcg/actuation nasal spray 2 sprays (100 mcg total) by Each Nostril route once daily.    gabapentin (NEURONTIN) 300 MG capsule Take 1 capsule (300 mg total) by mouth every evening. (Patient not taking: Reported on 2024)    levothyroxine (SYNTHROID) 150 MCG tablet Take 1 tablet (150 mcg total) by mouth before breakfast.    linaCLOtide (LINZESS) 72 mcg Cap capsule Take 1 capsule (72 mcg total) by mouth before breakfast.    melatonin (MELATIN) 3 mg tablet Take 2 tablets (6 mg total) by mouth nightly as needed for Insomnia. (Patient not taking: Reported on 2024)    melatonin 10  mg Cap Take 10 mg by mouth nightly as needed.    midodrine (PROAMATINE) 10 MG tablet Take 1 tablet (10 mg total) by mouth daily as needed (hypotension). (Patient not taking: Reported on 4/1/2024)    montelukast (SINGULAIR) 10 mg tablet Take 1 tablet (10 mg total) by mouth every evening.    nitroGLYCERIN (NITROSTAT) 0.4 MG SL tablet Place 1 tablet (0.4 mg total) under the tongue every 5 (five) minutes as needed for Chest pain.    ondansetron (ZOFRAN-ODT) 4 MG TbDL Take 1 tablet (4 mg total) by mouth 1 (one) time if needed (nausea).    pantoprazole (PROTONIX) 40 MG tablet Take 1 tablet (40 mg total) by mouth once daily. (Patient not taking: Reported on 4/1/2024)    QUEtiapine (SEROQUEL) 100 MG Tab Take 1 tablet (100 mg total) by mouth once daily. (Patient taking differently: Take 100 mg by mouth nightly.)    QUEtiapine (SEROQUEL) 25 MG Tab Take 1 tablet (25 mg total) by mouth every evening. (Patient taking differently: Take 25 mg by mouth once daily.)    rOPINIRole (REQUIP) 2 MG tablet Take 1 tablet (2 mg total) by mouth 3 (three) times daily.    senna-docusate 8.6-50 mg (PERICOLACE) 8.6-50 mg per tablet Take 1 tablet by mouth daily as needed for Constipation.    sevelamer carbonate (RENVELA) 800 mg Tab Take 5 tablets (4,000 mg total) by mouth 3 (three) times daily with meals.    torsemide (DEMADEX) 100 MG Tab Take 1 tablet (100 mg total) by mouth 2 (two) times a day.    venlafaxine (EFFEXOR-XR) 150 MG Cp24 Take 1 capsule (150 mg total) by mouth once daily.     No current facility-administered medications for this visit.       This patient has been assessed for risk factors for clearance of surgery with the following stipulations:    ___ No contraindications  ___ Recommendations for antiplatelet/anticoagulant medications:  ___ Hold Plavix _5__ days prior to the procedure   ___ Cleared for surgery with the following contraindications/precautions:  ___ Low risk , ___ Moderate risk ___ , High risk ___ .   ___ Not cleared  for surgery due to the following reasons:      If you have any questions regarding the above, please contact my office at [phone].    Sincerely,

## 2024-04-02 NOTE — TELEPHONE ENCOUNTER
----- Message from Laureano Rizvi LPN sent at 4/1/2024  4:13 PM CDT -----  Regarding: clearance  Ms. Manuel has a egd on 5/1 and colonoscopy on 5/20 scheduled with Dr. Conti. Please send clearance to hold Plavix for 5 days?     If you have any questions or concerns, please don't hesitate to call.    Sincerely,  MICAH Tinsley  [phone]

## 2024-04-05 ENCOUNTER — TELEPHONE (OUTPATIENT)
Dept: PULMONOLOGY | Facility: CLINIC | Age: 59
End: 2024-04-05

## 2024-04-05 ENCOUNTER — TELEPHONE (OUTPATIENT)
Dept: GASTROENTEROLOGY | Facility: CLINIC | Age: 59
End: 2024-04-05
Payer: MEDICARE

## 2024-04-05 NOTE — TELEPHONE ENCOUNTER
Left message to have patient be aware [facility] has tried several attempts to contacting her to deliver Oxygen.  Gave her their phone number to call them.  If she has any questions please call us [phone].

## 2024-04-05 NOTE — TELEPHONE ENCOUNTER
----- Message from Alina Boydgreer sent at 4/5/2024  9:39 AM CDT -----  Contact: self  Pt is req a call back from the nurse she started taking the linaCLOtide (LINZESS) 72 mcg Cap capsule on Tuesday 4/2/24 and two days ago she started feeling nauseated  and then yesterday evening she started throwing up until the wee hours.  Please call back to let her know what MS Nataly recommends at [phone] and thank you.

## 2024-04-05 NOTE — TELEPHONE ENCOUNTER
"After attempting to reach pt on multiple attempts, finally made contact with pt. Pt states she is having abdominal pain, has been in bed most of the day and was having nausea/vomiting during "wee hours." Advised patient several times during call to go to ER as CHANTAL Rodriguez is out of the office until Wednesday (4/10/24). Pt verbalized understanding but stated that she is "stubborn" in going to ER and will not go unless symptoms worsen. Patient requested that I send message of her symptoms on to CHANTAL Rodriguez although again I made her aware CHANTAL Rodriguez is out of office until Wednesday of next week. Again, pt verbalized understanding.   "

## 2024-04-05 NOTE — TELEPHONE ENCOUNTER
----- Message from Maddy Kingsley sent at 4/5/2024 10:40 AM CDT -----  Regarding: Return Call  Patient Returning Call      Who Called: Pt     Who Left Message for Patient: Olena     Does the patient know what this is regarding?: Yes     Would the patient rather a call back or a response via [facility]?  Call back    Best Call Back Number: [phone]      Additional Information:  Please Advise - Thank you

## 2024-04-05 NOTE — TELEPHONE ENCOUNTER
Attempted to contact pt; however, had to leave [facility] stating NP Nataly is out of the office and requested pt call back. Left contact number.

## 2024-04-05 NOTE — TELEPHONE ENCOUNTER
----- Message from Alina Boydgreer sent at 4/5/2024  9:39 AM CDT -----  Contact: self  Pt is req a call back from the nurse she started taking the linaCLOtide (LINZESS) 72 mcg Cap capsule on Tuesday 4/2/24 and two days ago she started feeling nauseated  and then yesterday evening she started throwing up until the wee hours.  Please call back to let her know what MS Nataly recommends at [phone] and thank you.

## 2024-04-05 NOTE — TELEPHONE ENCOUNTER
Attempted to call pt back after receiving message; however, call went to voicemail so left voicemail message along with my call back number.

## 2024-04-09 NOTE — TELEPHONE ENCOUNTER
Attempted to call patient back but automated recording states she is not accepting calls at this time; no option to leave voicemail message. Noted pt is not on portal.

## 2024-04-09 NOTE — TELEPHONE ENCOUNTER
Attempted to call patient again before end of clinic day; however, same message received that caller is not accepting calls at this time and to try my call later. Mailing contact letter to patient.

## 2024-04-29 ENCOUNTER — HOSPITAL ENCOUNTER (INPATIENT)
Facility: HOSPITAL | Age: 59
LOS: 2 days | Discharge: HOME | DRG: 388 | End: 2024-05-01
Attending: EMERGENCY MEDICINE | Admitting: HOSPITALIST
Payer: MEDICARE

## 2024-04-29 DIAGNOSIS — N18.6 ESRD (END STAGE RENAL DISEASE) ON DIALYSIS: ICD-10-CM

## 2024-04-29 DIAGNOSIS — E87.5 HYPERKALEMIA: ICD-10-CM

## 2024-04-29 DIAGNOSIS — N18.6 ESRD (END STAGE RENAL DISEASE): ICD-10-CM

## 2024-04-29 DIAGNOSIS — K56.609 SMALL BOWEL OBSTRUCTION: ICD-10-CM

## 2024-04-29 DIAGNOSIS — K56.609 SBO (SMALL BOWEL OBSTRUCTION): ICD-10-CM

## 2024-04-29 DIAGNOSIS — R11.2 NAUSEA AND VOMITING, UNSPECIFIED VOMITING TYPE: ICD-10-CM

## 2024-04-29 DIAGNOSIS — Z99.2 ESRD (END STAGE RENAL DISEASE) ON DIALYSIS: ICD-10-CM

## 2024-04-29 DIAGNOSIS — J18.9 ATYPICAL PNEUMONIA: Primary | ICD-10-CM

## 2024-04-29 DIAGNOSIS — K85.90 ACUTE PANCREATITIS WITHOUT INFECTION OR NECROSIS, UNSPECIFIED PANCREATITIS TYPE: ICD-10-CM

## 2024-04-29 LAB
ALBUMIN SERPL BCP-MCNC: 4.7 G/DL (ref 3.5–5.2)
ALP SERPL-CCNC: 309 U/L (ref 55–135)
ALT SERPL W/O P-5'-P-CCNC: 23 U/L (ref 10–44)
ANION GAP SERPL CALC-SCNC: 27 MMOL/L (ref 8–16)
AST SERPL-CCNC: 23 U/L (ref 10–40)
BACTERIA #/AREA URNS HPF: NORMAL /HPF
BASOPHILS # BLD AUTO: 0.06 K/UL (ref 0–0.2)
BASOPHILS NFR BLD: 0.4 % (ref 0–1.9)
BILIRUB SERPL-MCNC: 0.7 MG/DL (ref 0.1–1)
BILIRUB UR QL STRIP: NEGATIVE
BUN SERPL-MCNC: 55 MG/DL (ref 6–20)
CALCIUM SERPL-MCNC: 10.6 MG/DL (ref 8.7–10.5)
CHLORIDE SERPL-SCNC: 85 MMOL/L (ref 95–110)
CLARITY UR: CLEAR
CO2 SERPL-SCNC: 26 MMOL/L (ref 23–29)
COLOR UR: YELLOW
CREAT SERPL-MCNC: 10.7 MG/DL (ref 0.5–1.4)
DIFFERENTIAL METHOD BLD: ABNORMAL
EOSINOPHIL # BLD AUTO: 0 K/UL (ref 0–0.5)
EOSINOPHIL NFR BLD: 0.1 % (ref 0–8)
ERYTHROCYTE [DISTWIDTH] IN BLOOD BY AUTOMATED COUNT: 14.1 % (ref 11.5–14.5)
EST. GFR  (NO RACE VARIABLE): 4 ML/MIN/1.73 M^2
GLUCOSE SERPL-MCNC: 146 MG/DL (ref 70–110)
GLUCOSE UR QL STRIP: ABNORMAL
HCT VFR BLD AUTO: 39 % (ref 37–48.5)
HGB BLD-MCNC: 13.2 G/DL (ref 12–16)
HGB UR QL STRIP: ABNORMAL
HYALINE CASTS #/AREA URNS LPF: 0 /LPF
IMM GRANULOCYTES # BLD AUTO: 0.06 K/UL (ref 0–0.04)
IMM GRANULOCYTES NFR BLD AUTO: 0.4 % (ref 0–0.5)
KETONES UR QL STRIP: NEGATIVE
LEUKOCYTE ESTERASE UR QL STRIP: NEGATIVE
LIPASE SERPL-CCNC: 165 U/L (ref 4–60)
LYMPHOCYTES # BLD AUTO: 0.7 K/UL (ref 1–4.8)
LYMPHOCYTES NFR BLD: 4.9 % (ref 18–48)
MAGNESIUM SERPL-MCNC: 2.1 MG/DL (ref 1.6–2.6)
MCH RBC QN AUTO: 33.2 PG (ref 27–31)
MCHC RBC AUTO-ENTMCNC: 33.8 G/DL (ref 32–36)
MCV RBC AUTO: 98 FL (ref 82–98)
MICROSCOPIC COMMENT: NORMAL
MONOCYTES # BLD AUTO: 0.5 K/UL (ref 0.3–1)
MONOCYTES NFR BLD: 3.5 % (ref 4–15)
NEUTROPHILS # BLD AUTO: 13.2 K/UL (ref 1.8–7.7)
NEUTROPHILS NFR BLD: 90.7 % (ref 38–73)
NITRITE UR QL STRIP: NEGATIVE
NRBC BLD-RTO: 0 /100 WBC
PH UR STRIP: 8 [PH] (ref 5–8)
PLATELET # BLD AUTO: 239 K/UL (ref 150–450)
PMV BLD AUTO: 11.1 FL (ref 9.2–12.9)
POCT GLUCOSE: 171 MG/DL (ref 70–110)
POCT GLUCOSE: 196 MG/DL (ref 70–110)
POTASSIUM SERPL-SCNC: 6.5 MMOL/L (ref 3.5–5.1)
PROT SERPL-MCNC: 9.6 G/DL (ref 6–8.4)
PROT UR QL STRIP: ABNORMAL
RBC # BLD AUTO: 3.97 M/UL (ref 4–5.4)
RBC #/AREA URNS HPF: 1 /HPF (ref 0–4)
SODIUM SERPL-SCNC: 138 MMOL/L (ref 136–145)
SP GR UR STRIP: 1.01 (ref 1–1.03)
SQUAMOUS #/AREA URNS HPF: 2 /HPF
URN SPEC COLLECT METH UR: ABNORMAL
UROBILINOGEN UR STRIP-ACNC: NEGATIVE EU/DL
WBC # BLD AUTO: 14.57 K/UL (ref 3.9–12.7)
WBC #/AREA URNS HPF: 1 /HPF (ref 0–5)

## 2024-04-29 PROCEDURE — 96365 THER/PROPH/DIAG IV INF INIT: CPT

## 2024-04-29 PROCEDURE — 82962 GLUCOSE BLOOD TEST: CPT

## 2024-04-29 PROCEDURE — 80053 COMPREHEN METABOLIC PANEL: CPT | Performed by: EMERGENCY MEDICINE

## 2024-04-29 PROCEDURE — 11000001 HC ACUTE MED/SURG PRIVATE ROOM

## 2024-04-29 PROCEDURE — 99285 EMERGENCY DEPT VISIT HI MDM: CPT | Mod: 25

## 2024-04-29 PROCEDURE — 27000221 HC OXYGEN, UP TO 24 HOURS

## 2024-04-29 PROCEDURE — 36415 COLL VENOUS BLD VENIPUNCTURE: CPT | Performed by: EMERGENCY MEDICINE

## 2024-04-29 PROCEDURE — 25000242 PHARM REV CODE 250 ALT 637 W/ HCPCS: Performed by: EMERGENCY MEDICINE

## 2024-04-29 PROCEDURE — 63600175 PHARM REV CODE 636 W HCPCS: Performed by: EMERGENCY MEDICINE

## 2024-04-29 PROCEDURE — 94640 AIRWAY INHALATION TREATMENT: CPT

## 2024-04-29 PROCEDURE — 81000 URINALYSIS NONAUTO W/SCOPE: CPT | Performed by: EMERGENCY MEDICINE

## 2024-04-29 PROCEDURE — 93010 ELECTROCARDIOGRAM REPORT: CPT | Mod: ,,, | Performed by: GENERAL PRACTICE

## 2024-04-29 PROCEDURE — 25000003 PHARM REV CODE 250: Performed by: INTERNAL MEDICINE

## 2024-04-29 PROCEDURE — 85025 COMPLETE CBC W/AUTO DIFF WBC: CPT | Performed by: EMERGENCY MEDICINE

## 2024-04-29 PROCEDURE — 36415 COLL VENOUS BLD VENIPUNCTURE: CPT

## 2024-04-29 PROCEDURE — 93005 ELECTROCARDIOGRAM TRACING: CPT

## 2024-04-29 PROCEDURE — 83690 ASSAY OF LIPASE: CPT | Performed by: EMERGENCY MEDICINE

## 2024-04-29 PROCEDURE — 94760 N-INVAS EAR/PLS OXIMETRY 1: CPT

## 2024-04-29 PROCEDURE — 25000003 PHARM REV CODE 250: Mod: JZ,JG | Performed by: EMERGENCY MEDICINE

## 2024-04-29 PROCEDURE — 96375 TX/PRO/DX INJ NEW DRUG ADDON: CPT

## 2024-04-29 PROCEDURE — 83735 ASSAY OF MAGNESIUM: CPT | Performed by: EMERGENCY MEDICINE

## 2024-04-29 PROCEDURE — 84132 ASSAY OF SERUM POTASSIUM: CPT

## 2024-04-29 RX ORDER — ALBUTEROL SULFATE 2.5 MG/.5ML
10 SOLUTION RESPIRATORY (INHALATION)
Status: COMPLETED | OUTPATIENT
Start: 2024-04-29 | End: 2024-04-29

## 2024-04-29 RX ORDER — ATORVASTATIN CALCIUM 40 MG/1
40 TABLET, FILM COATED ORAL DAILY
Status: DISCONTINUED | OUTPATIENT
Start: 2024-04-30 | End: 2024-05-01 | Stop reason: HOSPADM

## 2024-04-29 RX ORDER — TORSEMIDE 20 MG/1
100 TABLET ORAL 2 TIMES DAILY
Status: DISCONTINUED | OUTPATIENT
Start: 2024-04-29 | End: 2024-04-29

## 2024-04-29 RX ORDER — VENLAFAXINE HYDROCHLORIDE 37.5 MG/1
150 CAPSULE, EXTENDED RELEASE ORAL DAILY
Status: DISCONTINUED | OUTPATIENT
Start: 2024-04-30 | End: 2024-05-01 | Stop reason: HOSPADM

## 2024-04-29 RX ORDER — INDOMETHACIN 25 MG/1
50 CAPSULE ORAL
Status: COMPLETED | OUTPATIENT
Start: 2024-04-29 | End: 2024-04-29

## 2024-04-29 RX ORDER — MUPIROCIN 20 MG/G
OINTMENT TOPICAL 2 TIMES DAILY
Status: DISCONTINUED | OUTPATIENT
Start: 2024-04-29 | End: 2024-05-01 | Stop reason: HOSPADM

## 2024-04-29 RX ORDER — SODIUM CHLORIDE 0.9 % (FLUSH) 0.9 %
10 SYRINGE (ML) INJECTION EVERY 12 HOURS PRN
Status: DISCONTINUED | OUTPATIENT
Start: 2024-04-29 | End: 2024-05-01 | Stop reason: HOSPADM

## 2024-04-29 RX ORDER — CALCIUM GLUCONATE 20 MG/ML
1 INJECTION, SOLUTION INTRAVENOUS
Status: COMPLETED | OUTPATIENT
Start: 2024-04-29 | End: 2024-04-29

## 2024-04-29 RX ORDER — SODIUM CHLORIDE 9 MG/ML
INJECTION, SOLUTION INTRAVENOUS ONCE
OUTPATIENT
Start: 2024-04-29 | End: 2024-04-29

## 2024-04-29 RX ORDER — ONDANSETRON HYDROCHLORIDE 2 MG/ML
4 INJECTION, SOLUTION INTRAVENOUS
Status: COMPLETED | OUTPATIENT
Start: 2024-04-29 | End: 2024-04-29

## 2024-04-29 RX ORDER — SEVELAMER CARBONATE 800 MG/1
4000 TABLET, FILM COATED ORAL
Status: CANCELLED | OUTPATIENT
Start: 2024-04-30

## 2024-04-29 RX ORDER — TORSEMIDE 20 MG/1
100 TABLET ORAL 2 TIMES DAILY
Status: DISCONTINUED | OUTPATIENT
Start: 2024-04-29 | End: 2024-05-01 | Stop reason: HOSPADM

## 2024-04-29 RX ORDER — ROPINIROLE 1 MG/1
2 TABLET, FILM COATED ORAL 3 TIMES DAILY
Status: DISCONTINUED | OUTPATIENT
Start: 2024-04-30 | End: 2024-05-01 | Stop reason: HOSPADM

## 2024-04-29 RX ORDER — IPRATROPIUM BROMIDE AND ALBUTEROL SULFATE 2.5; .5 MG/3ML; MG/3ML
3 SOLUTION RESPIRATORY (INHALATION) EVERY 6 HOURS PRN
Status: DISCONTINUED | OUTPATIENT
Start: 2024-04-29 | End: 2024-04-30

## 2024-04-29 RX ORDER — MORPHINE SULFATE 2 MG/ML
2 INJECTION, SOLUTION INTRAMUSCULAR; INTRAVENOUS EVERY 6 HOURS PRN
Status: DISCONTINUED | OUTPATIENT
Start: 2024-04-29 | End: 2024-05-01 | Stop reason: HOSPADM

## 2024-04-29 RX ORDER — GLUCAGON 1 MG
1 KIT INJECTION
Status: DISCONTINUED | OUTPATIENT
Start: 2024-04-29 | End: 2024-05-01 | Stop reason: HOSPADM

## 2024-04-29 RX ORDER — LEVOTHYROXINE SODIUM 150 UG/1
150 TABLET ORAL
Status: DISCONTINUED | OUTPATIENT
Start: 2024-04-30 | End: 2024-05-01 | Stop reason: HOSPADM

## 2024-04-29 RX ORDER — ALUMINUM HYDROXIDE, MAGNESIUM HYDROXIDE, AND SIMETHICONE 1200; 120; 1200 MG/30ML; MG/30ML; MG/30ML
30 SUSPENSION ORAL 4 TIMES DAILY PRN
Status: DISCONTINUED | OUTPATIENT
Start: 2024-04-29 | End: 2024-05-01 | Stop reason: HOSPADM

## 2024-04-29 RX ORDER — NALOXONE HCL 0.4 MG/ML
0.02 VIAL (ML) INJECTION
Status: DISCONTINUED | OUTPATIENT
Start: 2024-04-29 | End: 2024-05-01 | Stop reason: HOSPADM

## 2024-04-29 RX ORDER — IBUPROFEN 200 MG
16 TABLET ORAL
Status: DISCONTINUED | OUTPATIENT
Start: 2024-04-29 | End: 2024-05-01 | Stop reason: HOSPADM

## 2024-04-29 RX ORDER — IBUPROFEN 200 MG
24 TABLET ORAL
Status: DISCONTINUED | OUTPATIENT
Start: 2024-04-29 | End: 2024-05-01 | Stop reason: HOSPADM

## 2024-04-29 RX ORDER — SODIUM CHLORIDE 9 MG/ML
INJECTION, SOLUTION INTRAVENOUS
OUTPATIENT
Start: 2024-04-29

## 2024-04-29 RX ORDER — TALC
9 POWDER (GRAM) TOPICAL NIGHTLY PRN
Status: DISCONTINUED | OUTPATIENT
Start: 2024-04-29 | End: 2024-05-01 | Stop reason: HOSPADM

## 2024-04-29 RX ORDER — ACETAMINOPHEN 325 MG/1
650 TABLET ORAL EVERY 4 HOURS PRN
Status: DISCONTINUED | OUTPATIENT
Start: 2024-04-29 | End: 2024-05-01 | Stop reason: HOSPADM

## 2024-04-29 RX ORDER — CLOPIDOGREL BISULFATE 75 MG/1
75 TABLET ORAL DAILY
Status: DISCONTINUED | OUTPATIENT
Start: 2024-04-30 | End: 2024-04-29

## 2024-04-29 RX ADMIN — ALBUTEROL SULFATE 10 MG: 2.5 SOLUTION RESPIRATORY (INHALATION) at 08:04

## 2024-04-29 RX ADMIN — INSULIN HUMAN 5 UNITS: 100 INJECTION, SOLUTION PARENTERAL at 08:04

## 2024-04-29 RX ADMIN — ONDANSETRON 4 MG: 2 INJECTION INTRAMUSCULAR; INTRAVENOUS at 07:04

## 2024-04-29 RX ADMIN — CALCIUM GLUCONATE 1 G: 20 INJECTION, SOLUTION INTRAVENOUS at 08:04

## 2024-04-29 RX ADMIN — MUPIROCIN 1 G: 20 OINTMENT TOPICAL at 11:04

## 2024-04-29 RX ADMIN — DEXTROSE MONOHYDRATE 25 G: 25 INJECTION, SOLUTION INTRAVENOUS at 08:04

## 2024-04-29 RX ADMIN — SODIUM BICARBONATE 50 MEQ: 84 INJECTION, SOLUTION INTRAVENOUS at 08:04

## 2024-04-29 NOTE — Clinical Note
Diagnosis: SBO (small bowel obstruction) [519688]   Future Attending Provider: DIA WU [[id number]]   Reason for IP Medical Treatment  (Clinical interventions that can only be accomplished in the IP setting? ) :: Treatment for inpatient acute life threatening illness   Estimated Length of Stay:: 2 midnights   I certify that Inpatient services for greater than or equal to 2 midnights are medically necessary:: Yes   Plans for Post-Acute care--if anticipated (pick the single best option):: A. No post acute care anticipated at this time   Special Needs:: No Special Needs [1]   Admit to which facility:: [facility] [[id number]]

## 2024-04-30 PROBLEM — E87.5 HYPERKALEMIA: Status: ACTIVE | Noted: 2024-04-30

## 2024-04-30 PROBLEM — J18.9 ATYPICAL PNEUMONIA: Status: ACTIVE | Noted: 2024-04-30

## 2024-04-30 PROBLEM — K56.609 SMALL BOWEL OBSTRUCTION: Status: ACTIVE | Noted: 2024-04-30

## 2024-04-30 LAB
ANION GAP SERPL CALC-SCNC: 26 MMOL/L (ref 8–16)
BASOPHILS # BLD AUTO: 0.03 K/UL (ref 0–0.2)
BASOPHILS NFR BLD: 0.3 % (ref 0–1.9)
BUN SERPL-MCNC: 60 MG/DL (ref 6–20)
CALCIUM SERPL-MCNC: 9.7 MG/DL (ref 8.7–10.5)
CHLORIDE SERPL-SCNC: 90 MMOL/L (ref 95–110)
CO2 SERPL-SCNC: 24 MMOL/L (ref 23–29)
CREAT SERPL-MCNC: 11 MG/DL (ref 0.5–1.4)
DIFFERENTIAL METHOD BLD: ABNORMAL
EOSINOPHIL # BLD AUTO: 0 K/UL (ref 0–0.5)
EOSINOPHIL NFR BLD: 0.2 % (ref 0–8)
ERYTHROCYTE [DISTWIDTH] IN BLOOD BY AUTOMATED COUNT: 14.6 % (ref 11.5–14.5)
EST. GFR  (NO RACE VARIABLE): 4 ML/MIN/1.73 M^2
GLUCOSE SERPL-MCNC: 95 MG/DL (ref 70–110)
HCT VFR BLD AUTO: 34 % (ref 37–48.5)
HGB BLD-MCNC: 11.4 G/DL (ref 12–16)
IMM GRANULOCYTES # BLD AUTO: 0.04 K/UL (ref 0–0.04)
IMM GRANULOCYTES NFR BLD AUTO: 0.4 % (ref 0–0.5)
LACTATE SERPL-SCNC: 2.5 MMOL/L (ref 0.5–2.2)
LACTATE SERPL-SCNC: 2.5 MMOL/L (ref 0.5–2.2)
LYMPHOCYTES # BLD AUTO: 1.7 K/UL (ref 1–4.8)
LYMPHOCYTES NFR BLD: 15 % (ref 18–48)
MAGNESIUM SERPL-MCNC: 2 MG/DL (ref 1.6–2.6)
MCH RBC QN AUTO: 33.4 PG (ref 27–31)
MCHC RBC AUTO-ENTMCNC: 33.5 G/DL (ref 32–36)
MCV RBC AUTO: 100 FL (ref 82–98)
MONOCYTES # BLD AUTO: 0.6 K/UL (ref 0.3–1)
MONOCYTES NFR BLD: 5.3 % (ref 4–15)
NEUTROPHILS # BLD AUTO: 8.7 K/UL (ref 1.8–7.7)
NEUTROPHILS NFR BLD: 78.8 % (ref 38–73)
NRBC BLD-RTO: 0 /100 WBC
OHS QRS DURATION: 92 MS
OHS QTC CALCULATION: 462 MS
PHOSPHATE SERPL-MCNC: 6.3 MG/DL (ref 2.7–4.5)
PLATELET # BLD AUTO: 198 K/UL (ref 150–450)
PMV BLD AUTO: 10.8 FL (ref 9.2–12.9)
POCT GLUCOSE: 102 MG/DL (ref 70–110)
POCT GLUCOSE: 120 MG/DL (ref 70–110)
POCT GLUCOSE: 80 MG/DL (ref 70–110)
POCT GLUCOSE: 84 MG/DL (ref 70–110)
POCT GLUCOSE: 94 MG/DL (ref 70–110)
POTASSIUM SERPL-SCNC: 4.8 MMOL/L (ref 3.5–5.1)
POTASSIUM SERPL-SCNC: 5.4 MMOL/L (ref 3.5–5.1)
PROCALCITONIN SERPL IA-MCNC: 1.76 NG/ML (ref 0–0.5)
RBC # BLD AUTO: 3.41 M/UL (ref 4–5.4)
SODIUM SERPL-SCNC: 140 MMOL/L (ref 136–145)
WBC # BLD AUTO: 11.03 K/UL (ref 3.9–12.7)

## 2024-04-30 PROCEDURE — 25000003 PHARM REV CODE 250: Performed by: EMERGENCY MEDICINE

## 2024-04-30 PROCEDURE — 99900035 HC TECH TIME PER 15 MIN (STAT)

## 2024-04-30 PROCEDURE — 25000242 PHARM REV CODE 250 ALT 637 W/ HCPCS

## 2024-04-30 PROCEDURE — 85025 COMPLETE CBC W/AUTO DIFF WBC: CPT

## 2024-04-30 PROCEDURE — 94761 N-INVAS EAR/PLS OXIMETRY MLT: CPT

## 2024-04-30 PROCEDURE — 99900031 HC PATIENT EDUCATION (STAT)

## 2024-04-30 PROCEDURE — 87040 BLOOD CULTURE FOR BACTERIA: CPT

## 2024-04-30 PROCEDURE — 90935 HEMODIALYSIS ONE EVALUATION: CPT

## 2024-04-30 PROCEDURE — 83735 ASSAY OF MAGNESIUM: CPT

## 2024-04-30 PROCEDURE — 5A1D70Z PERFORMANCE OF URINARY FILTRATION, INTERMITTENT, LESS THAN 6 HOURS PER DAY: ICD-10-PCS | Performed by: INTERNAL MEDICINE

## 2024-04-30 PROCEDURE — 63600175 PHARM REV CODE 636 W HCPCS

## 2024-04-30 PROCEDURE — 84100 ASSAY OF PHOSPHORUS: CPT

## 2024-04-30 PROCEDURE — 27000221 HC OXYGEN, UP TO 24 HOURS

## 2024-04-30 PROCEDURE — 25000003 PHARM REV CODE 250

## 2024-04-30 PROCEDURE — 25000242 PHARM REV CODE 250 ALT 637 W/ HCPCS: Performed by: HOSPITALIST

## 2024-04-30 PROCEDURE — 11000001 HC ACUTE MED/SURG PRIVATE ROOM

## 2024-04-30 PROCEDURE — 25500020 PHARM REV CODE 255: Performed by: SURGERY

## 2024-04-30 PROCEDURE — 83605 ASSAY OF LACTIC ACID: CPT

## 2024-04-30 PROCEDURE — 36415 COLL VENOUS BLD VENIPUNCTURE: CPT

## 2024-04-30 PROCEDURE — 80048 BASIC METABOLIC PNL TOTAL CA: CPT

## 2024-04-30 PROCEDURE — 83605 ASSAY OF LACTIC ACID: CPT | Mod: 91

## 2024-04-30 PROCEDURE — 84145 PROCALCITONIN (PCT): CPT

## 2024-04-30 PROCEDURE — 25000003 PHARM REV CODE 250: Performed by: INTERNAL MEDICINE

## 2024-04-30 PROCEDURE — 99223 1ST HOSP IP/OBS HIGH 75: CPT | Mod: ,,, | Performed by: SURGERY

## 2024-04-30 PROCEDURE — 94640 AIRWAY INHALATION TREATMENT: CPT

## 2024-04-30 RX ORDER — SODIUM CHLORIDE 9 MG/ML
INJECTION, SOLUTION INTRAVENOUS ONCE
OUTPATIENT
Start: 2024-04-30 | End: 2024-04-30

## 2024-04-30 RX ORDER — LEVOFLOXACIN 5 MG/ML
500 INJECTION, SOLUTION INTRAVENOUS
Status: DISCONTINUED | OUTPATIENT
Start: 2024-05-02 | End: 2024-05-01 | Stop reason: HOSPADM

## 2024-04-30 RX ORDER — IPRATROPIUM BROMIDE AND ALBUTEROL SULFATE 2.5; .5 MG/3ML; MG/3ML
3 SOLUTION RESPIRATORY (INHALATION)
Status: DISCONTINUED | OUTPATIENT
Start: 2024-04-30 | End: 2024-05-01 | Stop reason: HOSPADM

## 2024-04-30 RX ORDER — LEVOFLOXACIN 5 MG/ML
750 INJECTION, SOLUTION INTRAVENOUS ONCE
Qty: 150 ML | Refills: 0 | Status: COMPLETED | OUTPATIENT
Start: 2024-04-30 | End: 2024-04-30

## 2024-04-30 RX ORDER — ARFORMOTEROL TARTRATE 15 UG/2ML
15 SOLUTION RESPIRATORY (INHALATION) 2 TIMES DAILY
Status: DISCONTINUED | OUTPATIENT
Start: 2024-04-30 | End: 2024-05-01 | Stop reason: HOSPADM

## 2024-04-30 RX ORDER — BUDESONIDE 0.5 MG/2ML
0.5 INHALANT ORAL 2 TIMES DAILY
Status: DISCONTINUED | OUTPATIENT
Start: 2024-04-30 | End: 2024-05-01 | Stop reason: HOSPADM

## 2024-04-30 RX ORDER — SODIUM CHLORIDE 9 MG/ML
INJECTION, SOLUTION INTRAVENOUS
OUTPATIENT
Start: 2024-04-30

## 2024-04-30 RX ORDER — BUDESONIDE 0.5 MG/2ML
0.5 INHALANT ORAL
Status: DISCONTINUED | OUTPATIENT
Start: 2024-04-30 | End: 2024-04-30

## 2024-04-30 RX ORDER — HEPARIN SODIUM 5000 [USP'U]/ML
5000 INJECTION, SOLUTION INTRAVENOUS; SUBCUTANEOUS
OUTPATIENT
Start: 2024-04-30

## 2024-04-30 RX ADMIN — BUDESONIDE INHALATION 0.5 MG: 0.5 SUSPENSION RESPIRATORY (INHALATION) at 06:04

## 2024-04-30 RX ADMIN — IPRATROPIUM BROMIDE AND ALBUTEROL SULFATE 3 ML: 2.5; .5 SOLUTION RESPIRATORY (INHALATION) at 01:04

## 2024-04-30 RX ADMIN — BUDESONIDE INHALATION 0.5 MG: 0.5 SUSPENSION RESPIRATORY (INHALATION) at 07:04

## 2024-04-30 RX ADMIN — ARFORMOTEROL TARTRATE 15 MCG: 15 SOLUTION RESPIRATORY (INHALATION) at 06:04

## 2024-04-30 RX ADMIN — LEVOTHYROXINE SODIUM 150 MCG: 150 TABLET ORAL at 05:04

## 2024-04-30 RX ADMIN — MORPHINE SULFATE 2 MG: 2 INJECTION, SOLUTION INTRAMUSCULAR; INTRAVENOUS at 05:04

## 2024-04-30 RX ADMIN — LEVOFLOXACIN 750 MG: 750 INJECTION, SOLUTION INTRAVENOUS at 04:04

## 2024-04-30 RX ADMIN — MUPIROCIN 1 G: 20 OINTMENT TOPICAL at 08:04

## 2024-04-30 RX ADMIN — SODIUM CHLORIDE 250 ML: 9 INJECTION, SOLUTION INTRAVENOUS at 12:04

## 2024-04-30 RX ADMIN — ARFORMOTEROL TARTRATE 15 MCG: 15 SOLUTION RESPIRATORY (INHALATION) at 07:04

## 2024-04-30 RX ADMIN — MUPIROCIN 1 G: 20 OINTMENT TOPICAL at 10:04

## 2024-04-30 RX ADMIN — IPRATROPIUM BROMIDE AND ALBUTEROL SULFATE 3 ML: 2.5; .5 SOLUTION RESPIRATORY (INHALATION) at 06:04

## 2024-04-30 RX ADMIN — ROPINIROLE HYDROCHLORIDE 2 MG: 1 TABLET, FILM COATED ORAL at 08:04

## 2024-04-30 RX ADMIN — TORSEMIDE 100 MG: 20 TABLET ORAL at 08:04

## 2024-04-30 RX ADMIN — IPRATROPIUM BROMIDE AND ALBUTEROL SULFATE 3 ML: 2.5; .5 SOLUTION RESPIRATORY (INHALATION) at 07:04

## 2024-04-30 RX ADMIN — DIATRIZOATE MEGLUMINE AND DIATRIZOATE SODIUM 100 ML: 660; 100 LIQUID ORAL; RECTAL at 12:04

## 2024-04-30 NOTE — CARE UPDATE
04/30/24 0641   Patient Assessment/Suction   Level of Consciousness (AVPU) alert   Respiratory Effort Normal;Unlabored   Expansion/Accessory Muscles/Retractions no use of accessory muscles;expansion symmetric;no retractions   All Lung Fields Breath Sounds diminished;crackles   Rhythm/Pattern, Respiratory unlabored;pattern regular;depth regular   Cough Frequency no cough   PRE-TX-O2   Device (Oxygen Therapy) nasal cannula   $ Is the patient on Low Flow Oxygen? Yes   Flow (L/min) (Oxygen Therapy) 2   SpO2 96 %   Pulse Oximetry Type Intermittent   $ Pulse Oximetry - Multiple Charge Pulse Oximetry - Multiple   Pulse 81   Resp 18   Aerosol Therapy   $ Aerosol Therapy Charges Aerosol Treatment   Daily Review of Necessity (SVN) completed   Respiratory Treatment Status (SVN) given   Treatment Route (SVN) mask   Patient Position HOB elevated   Post Treatment Assessment (SVN) increased aeration   Signs of Intolerance (SVN) none   Breath Sounds Post-Respiratory Treatment   Throughout All Fields Post-Treatment All Fields   Throughout All Fields Post-Treatment aeration increased   Post-treatment Heart Rate (beats/min) 84   Post-treatment Resp Rate (breaths/min) 18         Aerosol treatments q6 w/a with Duoneb. BID with Pulmicort and Brovana.

## 2024-04-30 NOTE — PLAN OF CARE
Problem: Adult Inpatient Plan of Care  Goal: Plan of Care Review  Outcome: Progressing  Goal: Patient-Specific Goal (Individualized)  Outcome: Progressing  Goal: Optimal Comfort and Wellbeing  Outcome: Progressing     Problem: Hemodialysis  Goal: Safe, Effective Therapy Delivery  Outcome: Progressing  Goal: Effective Tissue Perfusion  Outcome: Progressing  Goal: Absence of Infection Signs and Symptoms  Outcome: Progressing

## 2024-04-30 NOTE — PROGRESS NOTES
[facility] Medicine  Progress Note    Patient Name: Eugenia Manuel  MRN: [id number]  Patient Class: IP- Inpatient   Admission Date: 4/29/2024  Length of Stay: 1 days  Attending Physician: Aranza Espinoza MD  Primary Care Provider: John Castro MD        Subjective:     Principal Problem:Small bowel obstruction        HPI:  Eugenia Manuel is a 58 year old female with a past medical history of end-stage renal disease HD on T/TH/SAT, asthma, CAD w/stent, bipolar disorder, hypothyroidism, MI, CVA, restless legs syndrome, and hypertension who presents with complaints of severe intermittent epigastric cramping pain associated with decreased appetite, nausea, and vomiting for a few hours. Denies chest pain, shortness on breath, fever, chills, dizziness, headache, hemoptysis, or hematochezia.  She reports last bowel movement was 3 days ago and last dialysis on Saturday.  ED workup reveals CT abdomen/pelvis small-bowel obstruction and concerning for atypical pneumonia. CBC with leukocytosis 14.5.  BMP reflective of ESRD with hyperkalemia 6.5.  Her potassium was shifted in ED repeated at 4.8.  Elevated Lipase 165, and AST/ALT within normal limits.  Nephrology and general surgery consult placed. Admitted to hospital medicine for further management and treatment.              Overview/Hospital Course:  58 year old female admitted with abdominal pain.  CT abdomen/pelvis showed small bowel obstruction and concern for atypical pneumonia.  Patient was made NPO and general surgery was consulted.  CBC with leukocytosis on admission, lactic acid 2.5, repeat pending.  Patient was started on levaquin.  Patient is ESRD on dialysis Tuesday, Thursday, Saturday.  Nephrology was consulted for dialysis management.      Interval History: Patient seen and examined.  NAD.  Complains of LLQ/LUQ pain.  Denies flatulence.  Denies nausea or vomiting.  Patient NPO, awaiting general surgery recs.  HD scheduled for  today.      Review of Systems   Constitutional:  Negative for fatigue.   Respiratory:  Positive for shortness of breath (intermittent). Negative for chest tightness.    Cardiovascular:  Negative for chest pain and palpitations.   Gastrointestinal:  Positive for abdominal pain. Negative for abdominal distention, nausea and vomiting.   Genitourinary:  Negative for difficulty urinating.   Neurological:  Negative for headaches.     Objective:     Vital Signs (Most Recent):  Temp: 97.9 °F (36.6 °C) (04/30/24 0810)  Pulse: 88 (04/30/24 0810)  Resp: 16 (04/30/24 0810)  BP: 130/63 (04/30/24 0810)  SpO2: (!) 94 % (04/30/24 0810) Vital Signs (24h Range):  Temp:  [97.7 °F (36.5 °C)-98.8 °F (37.1 °C)] 97.9 °F (36.6 °C)  Pulse:  [77-98] 88  Resp:  [16-19] 16  SpO2:  [92 %-99 %] 94 %  BP: (115-153)/(58-88) 130/63     Weight: 102.3 kg (225 lb 8.5 oz)  Body mass index is 35.32 kg/m².    Intake/Output Summary (Last 24 hours) at 4/30/2024 1128  Last data filed at 4/30/2024 0613  Gross per 24 hour   Intake 110 ml   Output --   Net 110 ml         Physical Exam  Vitals and nursing note reviewed.   Constitutional:       General: She is not in acute distress.     Appearance: Normal appearance.   HENT:      Head: Normocephalic.      Mouth/Throat:      Mouth: Mucous membranes are moist.      Pharynx: Oropharynx is clear.   Eyes:      Pupils: Pupils are equal, round, and reactive to light.   Cardiovascular:      Rate and Rhythm: Normal rate and regular rhythm.      Heart sounds: Normal heart sounds.   Pulmonary:      Effort: Pulmonary effort is normal. No respiratory distress.      Breath sounds: Normal breath sounds.   Abdominal:      Palpations: Abdomen is soft.      Tenderness: There is abdominal tenderness (LLQ/LUQ).      Comments: Hypoactive bowel sounds   Skin:     General: Skin is warm.      Comments:  Left upper AV fistula bruit and thrill noted   Neurological:      General: No focal deficit present.      Mental Status: She is alert  and oriented to person, place, and time.   Psychiatric:         Mood and Affect: Mood normal.             Significant Labs: All pertinent labs within the past 24 hours have been reviewed.  Recent Lab Results  (Last 5 results in the past 24 hours)        04/30/24  1124   04/30/24  0448   04/30/24  0113   04/29/24  2342   04/29/24  2245        Procalcitonin   1.76  Comment: The Procalcitonin test is intended to aid in the risk assessment of   critically ill patients on their first day of ICU admission for   progression   to severe sepsis and septic shock.    A result of <0.50 ng/mL is associated with a low risk of severe   sepsis   and/or septic shock.  This does not exclude localized infection.    A result between 0.50 ng/mL and 2.0 ng/mL should be interpreted with   consideration of the patient's history and is recommended to retest   within 6   to 24 hours.        A result of >2.0 ng/mL is associated with a high risk of severe   sepsis   and/or septic shock.    Procalcitonin may not be accurate among patients with   localized  infection, recent trauma or major surgery, immunosuppressed   state,  invasive fungal infection, renal dysfunction. Decisions   regarding  initiation or continuation of antibiotic therapy should not be   based  solely on procalcitonin levels.               Anion Gap   26             Appearance, UA         Clear       Bacteria, UA         Rare       Baso #   0.03             Basophil %   0.3             Bilirubin (UA)         Negative       BUN   60             Calcium   9.7             Chloride   90             CO2   24             Color, UA         Yellow       Creatinine   11.0             Differential Method   Automated             eGFR   4             Eos #   0.0             Eos %   0.2             Glucose   95             Glucose, UA         Trace       Gran # (ANC)   8.7             Gran %   78.8             Hematocrit   34.0             Hemoglobin   11.4             Hyaline Casts, UA          0       Immature Grans (Abs)   0.04  Comment: Mild elevation in immature granulocytes is non specific and   can be seen in a variety of conditions including stress response,   acute inflammation, trauma and pregnancy. Correlation with other   laboratory and clinical findings is essential.               Immature Granulocytes   0.4             Ketones, UA         Negative       Lactic Acid Level   2.5  Comment: Falsely low lactic acid results can be found in samples   containing >=13.0 mg/dL total bilirubin and/or >=3.5 mg/dL   direct bilirubin.               Leukocyte Esterase, UA         Negative       Lymph #   1.7             Lymph %   15.0             Magnesium    2.0             MCH   33.4             MCHC   33.5             MCV   100             Microscopic Comment         SEE COMMENT  Comment: Other formed elements not mentioned in the report are not   present in the microscopic examination.          Mono #   0.6             Mono %   5.3             MPV   10.8             NITRITE UA         Negative       nRBC   0             Blood, UA         Trace       pH, UA         8.0       Phosphorus Level   6.3             Platelet Count   198             POCT Glucose 84     94           Potassium   5.4     4.8         Protein, UA         3+  Comment: Recommend a 24 hour urine protein or a urine   protein/creatinine ratio if globulin induced proteinuria is  clinically suspected.         RBC   3.41             RBC, UA         1       RDW   14.6             Sodium   140             Specific [place], UA         1.010       Specimen UA         Urine, Clean Catch       Squam Epithel, UA         2       UROBILINOGEN UA         Negative       WBC, UA         1       WBC   11.03                                    Significant Imaging: I have reviewed all pertinent imaging results/findings within the past 24 hours.    Assessment/Plan:      * Small bowel obstruction  -Consult General Surgery  -NPO   -analgesics and  antiemetics      Atypical pneumonia  -concern for atypical pneumonia on CT  -procalcitonin elevated  -lactic acid 2.5, repeat pending  -blood culture pending  -started on renal dosing levaquin on dialysis days        Hyperkalemia  This patient has hyperkalemia which is uncontrolled. We will monitor for arrhythmias with EKG or continuous telemetry. We will treat the hyperkalemia with dialysis. The likely etiology of the hyperkalemia is ESRD.  The patients latest potassium has been reviewed and the results are listed below  Recent Labs   Lab 04/30/24  0448   K 5.4*                 ESRD (end stage renal disease)  Creatine stable for now. BMP reviewed- noted Estimated Creatinine Clearance: 6.9 mL/min (A) (based on SCr of 11 mg/dL (H)). according to latest data. Based on current GFR, CKD stage is end stage.  Monitor UOP and serial BMP and adjust therapy as needed. Renally dose meds. Avoid nephrotoxic medications and procedures.    -Consult Nephrology HD Tues/Thurs/Sat    Coronary artery disease involving native coronary artery of native heart with unstable angina pectoris  Patient with known CAD s/p stent placement, which is controlled Will NOT continue Plavix and monitor for S/Sx of angina/ACS. Continue to monitor on telemetry.     -Hold Plavix due to SBO and GS consult    Hypertension  Chronic, controlled. Latest blood pressure and vitals reviewed-     Temp:  [97.7 °F (36.5 °C)-98.8 °F (37.1 °C)]   Pulse:  [77-98]   Resp:  [16-19]   BP: (115-153)/(58-88)   SpO2:  [92 %-99 %] .   Home meds for hypertension were reviewed and noted below.   Hypertension Medications               nitroGLYCERIN (NITROSTAT) 0.4 MG SL tablet Place 1 tablet (0.4 mg total) under the tongue every 5 (five) minutes as needed for Chest pain.    torsemide (DEMADEX) 100 MG Tab Take 1 tablet (100 mg total) by mouth 2 (two) times a day.            While in the hospital, will manage blood pressure as follows; Continue home antihypertensive  regimen    Will utilize p.r.n. blood pressure medication only if patient's blood pressure greater than 180/110 and she develops symptoms such as worsening chest pain or shortness of breath.      VTE Risk Mitigation (From admission, onward)           Ordered     IP VTE HIGH RISK PATIENT  Once         04/29/24 2237     Place sequential compression device  Until discontinued         04/29/24 2237                    Discharge Planning   VERO: 5/2/2024     Code Status: Full Code   Is the patient medically ready for discharge?:     Reason for patient still in hospital (select all that apply): Patient trending condition and Consult recommendations                     MOHAN Wesley  Department of Hospital Medicine   [facility]/Surg

## 2024-04-30 NOTE — NURSING
[id number] Contacted Dr. Lee, made him aware that the STAT dialysis wasn't done last night at the ED. And he will make the order today.

## 2024-04-30 NOTE — CONSULTS
[facility]/Surg  General Surgery  Consult Note    Patient Name: Eugenia Manuel  MRN: [id number]  Code Status: Full Code  Admission Date: 4/29/2024  Hospital Length of Stay: 1 days  Attending Physician: Aranza Espinoza MD  Primary Care Provider: John Castro MD    Patient information was obtained from patient and ER records.     Inpatient consult to General Surgery  Consult performed by: Johnny Lezama MD  Consult ordered by: Meena Allen DNP  Reason for consult: sbo  Assessment/Recommendations: Gg challenge        Subjective:     Principal Problem: Small bowel obstruction    History of Present Illness: 57 y/o female presents with on going vomiting for the last 1-2 days.  Mild associated pain.  No diarrhea.  Has constipation normally.  No recent bm, no flatus. No vomiting currently feels a little better. Multiple abdominal surgeries in the past.    Current Facility-Administered Medications   Medication Dose Route Frequency Provider Last Rate Last Admin    diatrizoate meglumineand-diatrizoate sodium (GASTROVIEW) 66-10 % solution             acetaminophen tablet 650 mg  650 mg Oral Q4H PRN Meena Allen DNP        albuterol-ipratropium 2.5 mg-0.5 mg/3 mL nebulizer solution 3 mL  3 mL Nebulization Q6H WAKE Meena Allen DNP   3 mL at 04/30/24 0641    aluminum-magnesium hydroxide-simethicone 200-200-20 mg/5 mL suspension 30 mL  30 mL Oral QID PRN Meena Allen DNP        arformoteroL nebulizer solution 15 mcg  15 mcg Nebulization BID Meena Allen DNP   15 mcg at 04/30/24 0644    atorvastatin tablet 40 mg  40 mg Oral Daily Meena Allen DNP        budesonide nebulizer solution 0.5 mg  0.5 mg Nebulization BID Aranza Espinoza MD   0.5 mg at 04/30/24 0641    dextrose 10% bolus 125 mL 125 mL  12.5 g Intravenous PRN Meena Allen DNP        dextrose 10% bolus 250 mL 250 mL  25 g Intravenous PRN Meena Allen DNP        glucagon (human recombinant) injection 1 mg  1 mg  Intramuscular PRN Meena Allen DNP        glucose chewable tablet 16 g  16 g Oral PRN Meena Allen DNP        glucose chewable tablet 24 g  24 g Oral PRN Meena Allen DNP        [START ON 5/2/2024] levoFLOXacin 500 mg/100 mL IVPB 500 mg  500 mg Intravenous Q48H Meena Allen DNP        levoFLOXacin 750 mg/150 mL IVPB 750 mg  750 mg Intravenous Once Meena Allen DNP        levothyroxine tablet 150 mcg  150 mcg Oral Before breakfast Meena Allen DNP   150 mcg at 04/30/24 0553    melatonin tablet 9 mg  9 mg Oral Nightly PRN Meena Allen DNP        morphine injection 2 mg  2 mg Intravenous Q6H PRN Meena Allen DNP        mupirocin 2 % ointment   Nasal BID Clinton Lee MD   1 g at 04/30/24 1009    naloxone 0.4 mg/mL injection 0.02 mg  0.02 mg Intravenous PRN Meena Allen DNP        rOPINIRole tablet 2 mg  2 mg Oral TID Meena Allen DNP        sodium chloride 0.9% flush 10 mL  10 mL Intravenous Q12H PRN Meena Allen DNP        torsemide tablet 100 mg  100 mg Oral BID Meena Allen DNP        venlafaxine 24 hr capsule 150 mg  150 mg Oral Daily Meena Allen DNP           Review of patient's allergies indicates:   Allergen Reactions    Lisinopril Anaphylaxis       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     Generalized edema     GERD (gastroesophageal reflux disease)     Heart attack 2021    History of traumatic brain injury     Hypertension      Hypothyroidism     Insomnia     Muscle weakness (generalized)     Restless leg syndrome     Senile dementia, uncomplicated     Sleep apnea, unspecified     Unsteadiness on feet      Past Surgical History:   Procedure Laterality Date    ADENOIDECTOMY      ANGIOGRAM, CORONARY, WITH LEFT HEART CATHETERIZATION N/A 02/12/2024    Procedure: Angiogram, Coronary, with Left Heart Cath;  Surgeon: Cristian Carlisle MD;  Location: [facility] CATH/EP LAB;  Service: Cardiology;  Laterality: N/A;    APPENDECTOMY      AV FISTULA PLACEMENT Left     CHOLECYSTECTOMY      COLONOSCOPY      10 years ago in [place]    CORONARY STENT PLACEMENT  2021    IVUS (INTRAVASCULAR ULTRASOUND) N/A 02/12/2024    Procedure: ULTRASOUND, INTRAVASCULAR;  Surgeon: Cristian Carlisle MD;  Location: [facility] CATH/EP LAB;  Service: Cardiology;  Laterality: N/A;    KNEE SURGERY      knee arthroplasty    LEFT HEART CATHETERIZATION  2021    NECK SURGERY      PARTIAL HYSTERECTOMY      STENT, DRUG ELUTING, MULTI VESSEL, CORONARY  02/12/2024    Procedure: Stent, Drug Eluting, Multi Vessel, Coronary;  Surgeon: Cristian Carlisle MD;  Location: [facility] CATH/EP LAB;  Service: Cardiology;;    TONSILLECTOMY       Family History    None       Tobacco Use    Smoking status: Never    Smokeless tobacco: Never   Substance and Sexual Activity    Alcohol use: Never    Drug use: Never    Sexual activity: Not on file     Review of Systems   Constitutional:  Negative for fatigue.   Respiratory:  Positive for shortness of breath (intermittent). Negative for chest tightness.    Cardiovascular:  Negative for chest pain and palpitations.   Gastrointestinal:  Positive for abdominal pain. Negative for abdominal distention, nausea and vomiting.   Genitourinary:  Negative for difficulty urinating.   Neurological:  Negative for headaches.   All other systems reviewed and are negative.    Objective:     Vital Signs (Most Recent):  Temp: 98.3 °F (36.8 °C) (04/30/24 1201)  Pulse: 75 (04/30/24 1201)  Resp: 17  (04/30/24 1201)  BP: 125/66 (04/30/24 1201)  SpO2: 95 % (04/30/24 1201) Vital Signs (24h Range):  Temp:  [97.7 °F (36.5 °C)-98.8 °F (37.1 °C)] 98.3 °F (36.8 °C)  Pulse:  [75-98] 75  Resp:  [16-19] 17  SpO2:  [92 %-99 %] 95 %  BP: (115-153)/(58-88) 125/66     Weight: 102.3 kg (225 lb 8.5 oz)  Body mass index is 35.32 kg/m².     Physical Exam  Vitals and nursing note reviewed.   Constitutional:       General: She is not in acute distress.     Appearance: Normal appearance.   HENT:      Head: Normocephalic.      Mouth/Throat:      Mouth: Mucous membranes are moist.      Pharynx: Oropharynx is clear.   Eyes:      Pupils: Pupils are equal, round, and reactive to light.   Cardiovascular:      Rate and Rhythm: Normal rate and regular rhythm.      Heart sounds: Normal heart sounds.   Pulmonary:      Effort: Pulmonary effort is normal. No respiratory distress.      Breath sounds: Normal breath sounds.   Abdominal:      General: There is no distension.      Palpations: Abdomen is soft. There is no mass.      Tenderness: There is no abdominal tenderness. There is no guarding.      Hernia: No hernia is present.      Comments: Well healed lower midline scar   Skin:     General: Skin is warm.      Comments:  Left upper AV fistula bruit and thrill noted   Neurological:      General: No focal deficit present.      Mental Status: She is alert and oriented to person, place, and time.   Psychiatric:         Mood and Affect: Mood normal.            I have reviewed all pertinent lab results within the past 24 hours.  CBC:   Recent Labs   Lab 04/30/24  0448   WBC 11.03   RBC 3.41*   HGB 11.4*   HCT 34.0*      *   MCH 33.4*   MCHC 33.5     CMP:   Recent Labs   Lab 04/29/24  1841 04/29/24  2342 04/30/24  0448   *  --  95   CALCIUM 10.6*  --  9.7   ALBUMIN 4.7  --   --    PROT 9.6*  --   --      --  140   K 6.5*   < > 5.4*   CO2 26  --  24   CL 85*  --  90*   BUN 55*  --  60*   CREATININE 10.7*  --  11.0*    ALKPHOS 309*  --   --    ALT 23  --   --    AST 23  --   --    BILITOT 0.7  --   --     < > = values in this interval not displayed.       Significant Diagnostics:  I have reviewed all pertinent imaging results/findings within the past 24 hours.  CT: I have reviewed all pertinent results/findings within the past 24 hours and my personal findings are:  mildly dilated loops of small bowel with decompression distally.  Possible transition point in rlq     Assessment/Plan:     * Small bowel obstruction  Gastro graffin challenge  Conservative management for now- continue gi rest  Not vomiting so can avoid ngt  Ok for ice chips and sips of cl    Spoke with primary team        VTE Risk Mitigation (From admission, onward)           Ordered     IP VTE HIGH RISK PATIENT  Once         04/29/24 2237     Place sequential compression device  Until discontinued         04/29/24 2237                    Thank you for your consult. I will follow-up with patient. Please contact us if you have any additional questions.    Johnny Lezama MD  General Surgery  [place]/Surg

## 2024-04-30 NOTE — ASSESSMENT & PLAN NOTE
Gastro graffin challenge  Conservative management for now- continue gi rest  Not vomiting so can avoid ngt  Ok for ice chips and sips of cl    Spoke with primary team

## 2024-04-30 NOTE — CONSULTS
INPATIENT NEPHROLOGY CONSULT   [facility]    Patient Name: Eugenia Manuel  Date: 04/30/2024    Reason for consultation: ESRD    Chief Complaint:   Chief Complaint   Patient presents with    Vomiting     Started 0600  / EGD scheduled , off plavix since Friday        History of Present Illness:  Eugenia Manuel is a 58 year old female with a past medical history of end-stage renal disease HD on T/TH/SAT, asthma, CAD w/stent, bipolar disorder, hypothyroidism, MI, CVA, restless legs syndrome, and hypertension who presents with complaints of severe intermittent epigastric cramping pain associated with decreased appetite, nausea, and vomiting for a few hours. Denies chest pain, shortness on breath, fever, chills, dizziness, headache, hemoptysis, or hematochezia. She reports last bowel movement was 3 days ago and last dialysis on Saturday. ED workup reveals CT abdomen/pelvis small-bowel obstruction and concerning for atypical pneumonia. We are consulted for dialysis.    Interval History:  4/30- VSS, on 2L NC, HD today, hasn't had much food/water, may be near/below dry weight- usual UF 3-3.5L- will check today what she can tolerate today, intermittent nausea, mild episodic abd pain, no BMs/flatus    Plan of Care:    Assessment:  SBO/Atypical PNA  ESRD on HD TTS  HTN  Hyperkalemia  SHPT  Anemia of CKD    Plan:    - dose meds for CrCl < 10/HD  - ordered HD TTS  - UF 1-3L  - renal diet, 1.5L fluid restriction when able  - adjust dialysate  - resume binders with meals when able  - no acute SANJANA needs    Thank you for allowing us to participate in this patient's care. We will continue to follow.    Vital Signs:  Temp Readings from Last 3 Encounters:   04/30/24 97.9 °F (36.6 °C)   03/08/24 97.6 °F (36.4 °C) (Oral)   02/28/24 97.8 °F (36.6 °C) (Oral)       Pulse Readings from Last 3 Encounters:   04/30/24 88   04/01/24 81   03/08/24 95       BP Readings from Last 3 Encounters:   04/30/24 130/63   04/01/24 (!)  175/82   03/08/24 128/80       Weight:  Wt Readings from Last 3 Encounters:   04/30/24 102.3 kg (225 lb 8.5 oz)   04/01/24 102.1 kg (225 lb 1.4 oz)   03/11/24 101.2 kg (223 lb)       Past Medical & Surgical History:  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     Generalized edema     GERD (gastroesophageal reflux disease)     Heart attack 2021    History of traumatic brain injury     Hypertension     Hypothyroidism     Insomnia     Muscle weakness (generalized)     Restless leg syndrome     Senile dementia, uncomplicated     Sleep apnea, unspecified     Unsteadiness on feet        Past Surgical History:   Procedure Laterality Date    ADENOIDECTOMY      ANGIOGRAM, CORONARY, WITH LEFT HEART CATHETERIZATION N/A 02/12/2024    Procedure: Angiogram, Coronary, with Left Heart Cath;  Surgeon: Cristian Carlisle MD;  Location: [facility] CATH/EP LAB;  Service: Cardiology;  Laterality: N/A;    APPENDECTOMY      AV FISTULA PLACEMENT Left     CHOLECYSTECTOMY      COLONOSCOPY      10 years ago in [place]    CORONARY STENT PLACEMENT  2021    IVUS (INTRAVASCULAR ULTRASOUND) N/A 02/12/2024    Procedure: ULTRASOUND, INTRAVASCULAR;  Surgeon: Cristian Carlisle MD;  Location: [facility] CATH/EP LAB;  Service: Cardiology;  Laterality: N/A;    KNEE SURGERY      knee arthroplasty    LEFT HEART CATHETERIZATION  2021    NECK SURGERY      PARTIAL HYSTERECTOMY      STENT, DRUG ELUTING, MULTI VESSEL, CORONARY  02/12/2024    Procedure: Stent,  Drug Eluting, Multi Vessel, Coronary;  Surgeon: Cristian Carlisle MD;  Location: [facility] CATH/EP LAB;  Service: Cardiology;;    TONSILLECTOMY         Past Social History:  Social History     Socioeconomic History    Marital status: Single   Tobacco Use    Smoking status: Never    Smokeless tobacco: Never   Substance and Sexual Activity    Alcohol use: Never    Drug use: Never       Medications:  Scheduled Meds:  Current Facility-Administered Medications   Medication Dose Route Frequency    albuterol-ipratropium  3 mL Nebulization Q6H WAKE    arformoteroL  15 mcg Nebulization BID    atorvastatin  40 mg Oral Daily    budesonide  0.5 mg Nebulization BID    [START ON 5/2/2024] levoFLOXacin  500 mg Intravenous Q48H    levoFLOXacin  750 mg Intravenous Once    levothyroxine  150 mcg Oral Before breakfast    mupirocin   Nasal BID    rOPINIRole  2 mg Oral TID    torsemide  100 mg Oral BID    venlafaxine  150 mg Oral Daily     Continuous Infusions:  Current Facility-Administered Medications   Medication Dose Route Frequency Last Rate Last Admin     PRN Meds:.  Current Facility-Administered Medications:     acetaminophen, 650 mg, Oral, Q4H PRN    aluminum-magnesium hydroxide-simethicone, 30 mL, Oral, QID PRN    dextrose 10%, 12.5 g, Intravenous, PRN    dextrose 10%, 25 g, Intravenous, PRN    glucagon (human recombinant), 1 mg, Intramuscular, PRN    glucose, 16 g, Oral, PRN    glucose, 24 g, Oral, PRN    melatonin, 9 mg, Oral, Nightly PRN    morphine, 2 mg, Intravenous, Q6H PRN    naloxone, 0.02 mg, Intravenous, PRN    sodium chloride 0.9%, 10 mL, Intravenous, Q12H PRN  No current facility-administered medications on file prior to encounter.     Current Outpatient Medications on File Prior to Encounter   Medication Sig Dispense Refill    albuterol (PROVENTIL/VENTOLIN HFA) 90 mcg/actuation inhaler Inhale 2 puffs into the lungs every 6 (six) hours as needed. Rescue 18 g 6    atorvastatin (LIPITOR) 40 MG tablet Take 1 tablet (40 mg  total) by mouth once daily. 90 tablet 0    azelastine (ASTELIN) 137 mcg (0.1 %) nasal spray 1 spray (137 mcg total) by Nasal route 2 (two) times daily. 30 mL 2    budesonide-glycopyr-formoterol (BREZTRI AEROSPHERE) 160-9-4.8 mcg/actuation HFAA Inhale 2 puffs into the lungs 2 (two) times daily. 10.7 g 6    cetirizine (ZYRTEC) 10 MG tablet Take 1 tablet (10 mg total) by mouth once daily. 90 tablet 3    clopidogreL (PLAVIX) 75 mg tablet Take 1 tablet (75 mg total) by mouth once daily. 90 tablet 3    ferric citrate (AURYXIA) 210 mg iron Tab Take 420 mg by mouth 3 (three) times daily. 540 tablet 3    levothyroxine (SYNTHROID) 150 MCG tablet Take 1 tablet (150 mcg total) by mouth before breakfast. 90 tablet 3    linaCLOtide (LINZESS) 72 mcg Cap capsule Take 1 capsule (72 mcg total) by mouth before breakfast. 30 capsule 2    melatonin (MELATIN) 3 mg tablet Take 2 tablets (6 mg total) by mouth nightly as needed for Insomnia.  0    melatonin 10 mg Cap Take 10 mg by mouth nightly as needed.      montelukast (SINGULAIR) 10 mg tablet Take 1 tablet (10 mg total) by mouth every evening. 30 tablet 6    pantoprazole (PROTONIX) 40 MG tablet Take 1 tablet (40 mg total) by mouth once daily. 90 tablet 1    QUEtiapine (SEROQUEL) 100 MG Tab Take 1 tablet (100 mg total) by mouth once daily. (Patient taking differently: Take 100 mg by mouth nightly.) 30 tablet 5    QUEtiapine (SEROQUEL) 25 MG Tab Take 1 tablet (25 mg total) by mouth every evening. (Patient taking differently: Take 25 mg by mouth once daily.) 90 tablet 1    rOPINIRole (REQUIP) 2 MG tablet Take 1 tablet (2 mg total) by mouth 3 (three) times daily. 90 tablet 1    senna-docusate 8.6-50 mg (PERICOLACE) 8.6-50 mg per tablet Take 1 tablet by mouth daily as needed for Constipation.      sevelamer carbonate (RENVELA) 800 mg Tab Take 5 tablets (4,000 mg total) by mouth 3 (three) times daily with meals. 1350 tablet 3    torsemide (DEMADEX) 100 MG Tab Take 1 tablet (100 mg total) by  mouth 2 (two) times a day. 90 tablet 0    venlafaxine (EFFEXOR-XR) 150 MG Cp24 Take 1 capsule (150 mg total) by mouth once daily. 90 capsule 3    aspirin 81 MG Chew Take 1 tablet (81 mg total) by mouth once daily.  0    fluticasone propionate (FLONASE) 50 mcg/actuation nasal spray 2 sprays (100 mcg total) by Each Nostril route once daily. 16 g 1    gabapentin (NEURONTIN) 300 MG capsule Take 1 capsule (300 mg total) by mouth every evening. (Patient not taking: Reported on 4/1/2024) 30 capsule 11    midodrine (PROAMATINE) 10 MG tablet Take 1 tablet (10 mg total) by mouth daily as needed (hypotension). (Patient not taking: Reported on 4/1/2024) 180 tablet 0    nitroGLYCERIN (NITROSTAT) 0.4 MG SL tablet Place 1 tablet (0.4 mg total) under the tongue every 5 (five) minutes as needed for Chest pain. 10 tablet 5    ondansetron (ZOFRAN-ODT) 4 MG TbDL Take 1 tablet (4 mg total) by mouth 1 (one) time if needed (nausea). 1 tablet 0       Allergies:  Lisinopril    Past Family History:  Reviewed; refer to Hospitalist Admission Note    Review of Systems:  Review of Systems - All 14 systems reviewed and negative, except as noted in HPI    Physical Exam:  General Appearance:    NAD, AAO x 3, cooperative, appears stated age   Head:    Normocephalic, atraumatic   Eyes:    PER, EOMI, and conjunctiva/sclera clear bilaterally       Mouth:   Moist mucus membranes, no thrush or oral lesions,       normal dentition   Back:     No CVA tenderness   Lungs:     Clear to auscultation bilaterally, no wheezes, crackles,           rales or rhonchi, symmetric air movement, respirations unlabored   Chest wall:    No tenderness or deformity   Heart:    Regular rate and rhythm, S1 and S2 normal, no murmur, rub   or gallop   Abdomen:     Soft, non-tender, non-distended, bowel sounds active all four   quadrants, no RT or guarding, no masses, no organomegaly   Extremities:   Warm and well perfused, distal pulses are intact, no             cyanosis or  peripheral edema   MSK:   No joint or muscle swelling, tenderness or deformity   Skin:   Skin color, texture, turgor normal, no rashes or lesions   Neurologic/Psychiatric:   CNII-XII intact, normal strength and sensation       throughout, no asterixis; normal affect, memory, judgement     and insight      Results:  Lab Results   Component Value Date     04/30/2024    K 5.4 (H) 04/30/2024    CL 90 (L) 04/30/2024    CO2 24 04/30/2024    BUN 60 (H) 04/30/2024    CREATININE 11.0 (H) 04/30/2024    CALCIUM 9.7 04/30/2024    ANIONGAP 26 (H) 04/30/2024       Lab Results   Component Value Date    CALCIUM 9.7 04/30/2024    PHOS 6.3 (H) 04/30/2024       Recent Labs   Lab 04/30/24  0448   WBC 11.03   RBC 3.41*   HGB 11.4*   HCT 34.0*      *   MCH 33.4*   MCHC 33.5       I have personally reviewed pertinent radiological imaging and reports.    I have spent > 70 minutes providing care for this patient for the above diagnoses. These services have included chart/data/imaging review, evaluation, exam, formulation of plan, , note preparation, and discussions with staff involved in this patient's care.    Bayron Dumont MD MPH  [facility]  [address] [place] [id number]  [phone] (p)  [phone] (f)

## 2024-04-30 NOTE — SUBJECTIVE & OBJECTIVE
Interval History: Patient seen and examined.  NAD.  Complains of LLQ/LUQ pain.  Denies flatulence.  Denies nausea or vomiting.  Patient NPO, awaiting general surgery recs.  HD scheduled for today.      Review of Systems   Constitutional:  Negative for fatigue.   Respiratory:  Positive for shortness of breath (intermittent). Negative for chest tightness.    Cardiovascular:  Negative for chest pain and palpitations.   Gastrointestinal:  Positive for abdominal pain. Negative for abdominal distention, nausea and vomiting.   Genitourinary:  Negative for difficulty urinating.   Neurological:  Negative for headaches.     Objective:     Vital Signs (Most Recent):  Temp: 97.9 °F (36.6 °C) (04/30/24 0810)  Pulse: 88 (04/30/24 0810)  Resp: 16 (04/30/24 0810)  BP: 130/63 (04/30/24 0810)  SpO2: (!) 94 % (04/30/24 0810) Vital Signs (24h Range):  Temp:  [97.7 °F (36.5 °C)-98.8 °F (37.1 °C)] 97.9 °F (36.6 °C)  Pulse:  [77-98] 88  Resp:  [16-19] 16  SpO2:  [92 %-99 %] 94 %  BP: (115-153)/(58-88) 130/63     Weight: 102.3 kg (225 lb 8.5 oz)  Body mass index is 35.32 kg/m².    Intake/Output Summary (Last 24 hours) at 4/30/2024 1128  Last data filed at 4/30/2024 0613  Gross per 24 hour   Intake 110 ml   Output --   Net 110 ml         Physical Exam  Vitals and nursing note reviewed.   Constitutional:       General: She is not in acute distress.     Appearance: Normal appearance.   HENT:      Head: Normocephalic.      Mouth/Throat:      Mouth: Mucous membranes are moist.      Pharynx: Oropharynx is clear.   Eyes:      Pupils: Pupils are equal, round, and reactive to light.   Cardiovascular:      Rate and Rhythm: Normal rate and regular rhythm.      Heart sounds: Normal heart sounds.   Pulmonary:      Effort: Pulmonary effort is normal. No respiratory distress.      Breath sounds: Normal breath sounds.   Abdominal:      Palpations: Abdomen is soft.      Tenderness: There is abdominal tenderness (LLQ/LUQ).      Comments: Hypoactive bowel  sounds   Skin:     General: Skin is warm.      Comments:  Left upper AV fistula bruit and thrill noted   Neurological:      General: No focal deficit present.      Mental Status: She is alert and oriented to person, place, and time.   Psychiatric:         Mood and Affect: Mood normal.             Significant Labs: All pertinent labs within the past 24 hours have been reviewed.  Recent Lab Results  (Last 5 results in the past 24 hours)        04/30/24  1124   04/30/24  0448   04/30/24  0113   04/29/24  2342   04/29/24  2245        Procalcitonin   1.76  Comment: The Procalcitonin test is intended to aid in the risk assessment of   critically ill patients on their first day of ICU admission for   progression   to severe sepsis and septic shock.    A result of <0.50 ng/mL is associated with a low risk of severe   sepsis   and/or septic shock.  This does not exclude localized infection.    A result between 0.50 ng/mL and 2.0 ng/mL should be interpreted with   consideration of the patient's history and is recommended to retest   within 6   to 24 hours.        A result of >2.0 ng/mL is associated with a high risk of severe   sepsis   and/or septic shock.    Procalcitonin may not be accurate among patients with   localized  infection, recent trauma or major surgery, immunosuppressed   state,  invasive fungal infection, renal dysfunction. Decisions   regarding  initiation or continuation of antibiotic therapy should not be   based  solely on procalcitonin levels.               Anion Gap   26             Appearance, UA         Clear       Bacteria, UA         Rare       Baso #   0.03             Basophil %   0.3             Bilirubin (UA)         Negative       BUN   60             Calcium   9.7             Chloride   90             CO2   24             Color, UA         Yellow       Creatinine   11.0             Differential Method   Automated             eGFR   4             Eos #   0.0             Eos %   0.2              Glucose   95             Glucose, UA         Trace       Gran # (ANC)   8.7             Gran %   78.8             Hematocrit   34.0             Hemoglobin   11.4             Hyaline Casts, UA         0       Immature Grans (Abs)   0.04  Comment: Mild elevation in immature granulocytes is non specific and   can be seen in a variety of conditions including stress response,   acute inflammation, trauma and pregnancy. Correlation with other   laboratory and clinical findings is essential.               Immature Granulocytes   0.4             Ketones, UA         Negative       Lactic Acid Level   2.5  Comment: Falsely low lactic acid results can be found in samples   containing >=13.0 mg/dL total bilirubin and/or >=3.5 mg/dL   direct bilirubin.               Leukocyte Esterase, UA         Negative       Lymph #   1.7             Lymph %   15.0             Magnesium    2.0             MCH   33.4             MCHC   33.5             MCV   100             Microscopic Comment         SEE COMMENT  Comment: Other formed elements not mentioned in the report are not   present in the microscopic examination.          Mono #   0.6             Mono %   5.3             MPV   10.8             NITRITE UA         Negative       nRBC   0             Blood, UA         Trace       pH, UA         8.0       Phosphorus Level   6.3             Platelet Count   198             POCT Glucose 84     94           Potassium   5.4     4.8         Protein, UA         3+  Comment: Recommend a 24 hour urine protein or a urine   protein/creatinine ratio if globulin induced proteinuria is  clinically suspected.         RBC   3.41             RBC, UA         1       RDW   14.6             Sodium   140             Specific [place], UA         1.010       Specimen UA         Urine, Clean Catch       Squam Epithel, UA         2       UROBILINOGEN UA         Negative       WBC, UA         1       WBC   11.03                                    Significant  Imaging: I have reviewed all pertinent imaging results/findings within the past 24 hours.

## 2024-04-30 NOTE — NURSING
Nurses Note -- 4 Eyes      4/30/2024   12:16 AM      Skin assessed during: Admit      [x] No Altered Skin Integrity Present    []Prevention Measures Documented      [] Yes- Altered Skin Integrity Present or Discovered   [] LDA Added if Not in Epic (Describe Wound)   [] New Altered Skin Integrity was Present on Admit and Documented in LDA   [] Wound Image Taken    Wound Care Consulted? No    Attending Nurse:  Radha Mcginnis RN/Staff Member:  Brigida

## 2024-04-30 NOTE — ASSESSMENT & PLAN NOTE
Patient with known CAD s/p stent placement, which is controlled Will NOT continue Plavix and monitor for S/Sx of angina/ACS. Continue to monitor on telemetry.     -Hold Plavix due to SBO and GS consult

## 2024-04-30 NOTE — SUBJECTIVE & OBJECTIVE
Current Facility-Administered Medications   Medication Dose Route Frequency Provider Last Rate Last Admin    diatrizoate meglumineand-diatrizoate sodium (GASTROVIEW) 66-10 % solution             acetaminophen tablet 650 mg  650 mg Oral Q4H PRN Meena Allen DNP        albuterol-ipratropium 2.5 mg-0.5 mg/3 mL nebulizer solution 3 mL  3 mL Nebulization Q6H WAKE Meena Allen DNP   3 mL at 04/30/24 0641    aluminum-magnesium hydroxide-simethicone 200-200-20 mg/5 mL suspension 30 mL  30 mL Oral QID PRN Meena Allen DNP        arformoteroL nebulizer solution 15 mcg  15 mcg Nebulization BID Meena Allen DNP   15 mcg at 04/30/24 0644    atorvastatin tablet 40 mg  40 mg Oral Daily Meena Allen DNP        budesonide nebulizer solution 0.5 mg  0.5 mg Nebulization BID Aranza Espinoza MD   0.5 mg at 04/30/24 0641    dextrose 10% bolus 125 mL 125 mL  12.5 g Intravenous PRN Meena Allen DNP        dextrose 10% bolus 250 mL 250 mL  25 g Intravenous PRN Meena Allen DNP        glucagon (human recombinant) injection 1 mg  1 mg Intramuscular PRN Meena Allen DNP        glucose chewable tablet 16 g  16 g Oral PRN Meena Allen DNP        glucose chewable tablet 24 g  24 g Oral PRN Meena Allen DNP        [START ON 5/2/2024] levoFLOXacin 500 mg/100 mL IVPB 500 mg  500 mg Intravenous Q48H Meena Allen DNP        levoFLOXacin 750 mg/150 mL IVPB 750 mg  750 mg Intravenous Once Meena Allen DNP        levothyroxine tablet 150 mcg  150 mcg Oral Before breakfast Meena Allen DNP   150 mcg at 04/30/24 0553    melatonin tablet 9 mg  9 mg Oral Nightly PRN Meena Allen DNP        morphine injection 2 mg  2 mg Intravenous Q6H PRN eMena Allen DNP        mupirocin 2 % ointment   Nasal BID Clinton Lee MD   1 g at 04/30/24 1009    naloxone 0.4 mg/mL injection 0.02 mg  0.02 mg Intravenous PRN Meena Allen DNP        rOPINIRole tablet 2 mg  2 mg Oral TID Meena Allen DNP         sodium chloride 0.9% flush 10 mL  10 mL Intravenous Q12H PRN Meena Allen DNP        torsemide tablet 100 mg  100 mg Oral BID Meena Allen DNP        venlafaxine 24 hr capsule 150 mg  150 mg Oral Daily Meena Allen DNP           Review of patient's allergies indicates:   Allergen Reactions    Lisinopril Anaphylaxis       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     Generalized edema     GERD (gastroesophageal reflux disease)     Heart attack 2021    History of traumatic brain injury     Hypertension     Hypothyroidism     Insomnia     Muscle weakness (generalized)     Restless leg syndrome     Senile dementia, uncomplicated     Sleep apnea, unspecified     Unsteadiness on feet      Past Surgical History:   Procedure Laterality Date    ADENOIDECTOMY      ANGIOGRAM, CORONARY, WITH LEFT HEART CATHETERIZATION N/A 02/12/2024    Procedure: Angiogram, Coronary, with Left Heart Cath;  Surgeon: Cristian Carlisle MD;  Location: [facility] CATH/EP LAB;  Service: Cardiology;  Laterality: N/A;    APPENDECTOMY      AV FISTULA PLACEMENT Left     CHOLECYSTECTOMY      COLONOSCOPY      10 years ago in [place]    CORONARY STENT PLACEMENT  2021    IVUS (INTRAVASCULAR ULTRASOUND) N/A 02/12/2024    Procedure: ULTRASOUND, INTRAVASCULAR;  Surgeon: Cristian Carlisle MD;  Location: [facility] CATH/EP LAB;  Service: Cardiology;  Laterality: N/A;    KNEE SURGERY      knee arthroplasty    LEFT HEART CATHETERIZATION  2021    NECK  SURGERY      PARTIAL HYSTERECTOMY      STENT, DRUG ELUTING, MULTI VESSEL, CORONARY  02/12/2024    Procedure: Stent, Drug Eluting, Multi Vessel, Coronary;  Surgeon: Cristian Carlisle MD;  Location: [facility] CATH/EP LAB;  Service: Cardiology;;    TONSILLECTOMY       Family History    None       Tobacco Use    Smoking status: Never    Smokeless tobacco: Never   Substance and Sexual Activity    Alcohol use: Never    Drug use: Never    Sexual activity: Not on file     Review of Systems   Constitutional:  Negative for fatigue.   Respiratory:  Positive for shortness of breath (intermittent). Negative for chest tightness.    Cardiovascular:  Negative for chest pain and palpitations.   Gastrointestinal:  Positive for abdominal pain. Negative for abdominal distention, nausea and vomiting.   Genitourinary:  Negative for difficulty urinating.   Neurological:  Negative for headaches.   All other systems reviewed and are negative.    Objective:     Vital Signs (Most Recent):  Temp: 98.3 °F (36.8 °C) (04/30/24 1201)  Pulse: 75 (04/30/24 1201)  Resp: 17 (04/30/24 1201)  BP: 125/66 (04/30/24 1201)  SpO2: 95 % (04/30/24 1201) Vital Signs (24h Range):  Temp:  [97.7 °F (36.5 °C)-98.8 °F (37.1 °C)] 98.3 °F (36.8 °C)  Pulse:  [75-98] 75  Resp:  [16-19] 17  SpO2:  [92 %-99 %] 95 %  BP: (115-153)/(58-88) 125/66     Weight: 102.3 kg (225 lb 8.5 oz)  Body mass index is 35.32 kg/m².     Physical Exam  Vitals and nursing note reviewed.   Constitutional:       General: She is not in acute distress.     Appearance: Normal appearance.   HENT:      Head: Normocephalic.      Mouth/Throat:      Mouth: Mucous membranes are moist.      Pharynx: Oropharynx is clear.   Eyes:      Pupils: Pupils are equal, round, and reactive to light.   Cardiovascular:      Rate and Rhythm: Normal rate and regular rhythm.      Heart sounds: Normal heart sounds.   Pulmonary:      Effort: Pulmonary effort is normal. No respiratory distress.      Breath sounds: Normal breath  sounds.   Abdominal:      General: There is no distension.      Palpations: Abdomen is soft. There is no mass.      Tenderness: There is no abdominal tenderness. There is no guarding.      Hernia: No hernia is present.      Comments: Well healed lower midline scar   Skin:     General: Skin is warm.      Comments:  Left upper AV fistula bruit and thrill noted   Neurological:      General: No focal deficit present.      Mental Status: She is alert and oriented to person, place, and time.   Psychiatric:         Mood and Affect: Mood normal.            I have reviewed all pertinent lab results within the past 24 hours.  CBC:   Recent Labs   Lab 04/30/24  0448   WBC 11.03   RBC 3.41*   HGB 11.4*   HCT 34.0*      *   MCH 33.4*   MCHC 33.5     CMP:   Recent Labs   Lab 04/29/24  1841 04/29/24  2342 04/30/24  0448   *  --  95   CALCIUM 10.6*  --  9.7   ALBUMIN 4.7  --   --    PROT 9.6*  --   --      --  140   K 6.5*   < > 5.4*   CO2 26  --  24   CL 85*  --  90*   BUN 55*  --  60*   CREATININE 10.7*  --  11.0*   ALKPHOS 309*  --   --    ALT 23  --   --    AST 23  --   --    BILITOT 0.7  --   --     < > = values in this interval not displayed.       Significant Diagnostics:  I have reviewed all pertinent imaging results/findings within the past 24 hours.  CT: I have reviewed all pertinent results/findings within the past 24 hours and my personal findings are:  mildly dilated loops of small bowel with decompression distally.  Possible transition point in rlq

## 2024-04-30 NOTE — ASSESSMENT & PLAN NOTE
-procalcitonin pending  -lactic acid pending  -blood culture pending  -started on renal dosing antibiotics on dialysis days

## 2024-04-30 NOTE — ASSESSMENT & PLAN NOTE
Creatine stable for now. BMP reviewed- noted Estimated Creatinine Clearance: 7.1 mL/min (A) (based on SCr of 10.7 mg/dL (H)). according to latest data. Based on current GFR, CKD stage is end stage.  Monitor UOP and serial BMP and adjust therapy as needed. Renally dose meds. Avoid nephrotoxic medications and procedures.    -Consult Nephrology HD Tues/Thurs/Sat

## 2024-04-30 NOTE — ASSESSMENT & PLAN NOTE
Creatine stable for now. BMP reviewed- noted Estimated Creatinine Clearance: 6.9 mL/min (A) (based on SCr of 11 mg/dL (H)). according to latest data. Based on current GFR, CKD stage is end stage.  Monitor UOP and serial BMP and adjust therapy as needed. Renally dose meds. Avoid nephrotoxic medications and procedures.    -Consult Nephrology HD Tues/Thurs/Sat

## 2024-04-30 NOTE — HPI
Eugenia Manuel is a 58 year old female with a past medical history of end-stage renal disease HD on T/TH/SAT, asthma, CAD w/stent, bipolar disorder, hypothyroidism, MI, CVA, restless legs syndrome, and hypertension who presents with complaints of severe intermittent epigastric cramping pain associated with decreased appetite, nausea, and vomiting for a few hours. Denies chest pain, shortness on breath, fever, chills, dizziness, headache, hemoptysis, or hematochezia.  She reports last bowel movement was 3 days ago and last dialysis on Saturday.  ED workup reveals CT abdomen/pelvis small-bowel obstruction and concerning for atypical pneumonia. CBC with leukocytosis 14.5.  BMP reflective of ESRD with hyperkalemia 6.5.  Her potassium was shifted in ED repeated at 4.8.  Elevated Lipase 165, and AST/ALT within normal limits.  Nephrology and general surgery consult placed. Admitted to hospital medicine for further management and treatment.

## 2024-04-30 NOTE — ASSESSMENT & PLAN NOTE
This patient has hyperkalemia which is uncontrolled. We will monitor for arrhythmias with EKG or continuous telemetry. We will treat the hyperkalemia with Calcium gluconate and IV insulin and dextrose. The likely etiology of the hyperkalemia is ESRD.  The patients latest potassium has been reviewed and the results are listed below  Recent Labs   Lab 04/29/24  2342   K 4.8       -potassium 6.5 shift; repeat 4.8

## 2024-04-30 NOTE — PLAN OF CARE
[facility] - Med/Surg  Initial Discharge Assessment       Primary Care Provider: John Castro MD    Admission Diagnosis: Nausea and vomiting, unspecified vomiting type [R11.2]  SBO (small bowel obstruction) [K56.609]    Admission Date: 4/29/2024  Expected Discharge Date: 5/2/2024    Transition of Care Barriers: None    Payor: TC Website Promotions [facility] [facility] / Plan: [employer] / Product Type: Medicare Advantage /     Extended Emergency Contact Information  Primary Emergency Contact: Kinjal [place] [address] [place]  Mobile Phone: [phone]  Relation: Other  Preferred language: English   needed? No    Discharge Plan A: Home with family  Discharge Plan B: Home      [id number] - [place] - [address] AT [facility] [address]  [address]  [place] [address]  Phone: [phone] Fax: [phone]    [facility] [place] - [place], [place] - [address] [place] [address]  [address] [place] [id number]  Phone: [phone] Fax: [phone]    DC assessment completed with patient at bedside. Verified information on facesheet as correct. Pt lives at listed address with friend Kinjal. Reports she has help if needed from friends. Reports POA as Kinjal ([phone])  PCP is John Castro MD office- reports last apt was 2 months ago. Pharmacy is [facility]. Denies hh/coumadine. Has Dialysis with [facility], T/T/S at 0700.  States has PT at [facility]. Oxygen- [facility] has concentrator and portable, 1L as needed. DME- cane but doesn't currently need. Reports being independent with activities. States Kinjal or takes a cab to apts. Reports Kinjal will provide transportation home upon DC. Reports taking home medications as prescribed and can currently afford them. Verified insurance on file. Denies recent inpt stay in last 30 days.  DC plan is home.      Initial Assessment (most recent)       Adult Discharge  Assessment - 04/30/24 1210          Discharge Assessment    Assessment Type Discharge Planning Assessment     Confirmed/corrected address, phone number and insurance Yes     Confirmed Demographics Correct on Facesheet     Source of Information patient     Communicated VERO with patient/caregiver Date not available/Unable to determine     Reason For Admission Small bowel obstruction     People in Home friend(s)     Do you expect to return to your current living situation? Yes     Do you have help at home or someone to help you manage your care at home? Yes     Who are your caregiver(s) and their phone number(s)? Kinjal Cruz [phone]     Prior to hospitilization cognitive status: Alert/Oriented     Current cognitive status: Alert/Oriented     Walking or Climbing Stairs Difficulty no     Dressing/Bathing Difficulty no     Equipment Currently Used at Home none     Readmission within 30 days? No     Patient currently being followed by outpatient case management? No     Do you currently have service(s) that help you manage your care at home? No     Do you take prescription medications? Yes     Do you have prescription coverage? Yes     Coverage [facility]     Do you have any problems affording any of your prescribed medications? No     Is the patient taking medications as prescribed? yes     Who is going to help you get home at discharge? Kinjal     How do you get to doctors appointments? family or friend will provide;other (see comments)   cab    Are you on dialysis? Yes     [facility]     Do you take coumadin? No     Discharge Plan A Home with family     Discharge Plan B Home     DME Needed Upon Discharge  none     Discharge Plan discussed with: Patient     Transition of Care Barriers None

## 2024-04-30 NOTE — PROGRESS NOTES
Pharmacist Renal Dose Adjustment Note    Eugenia Manuel is a 58 y.o. female being treated with the medication Levofloxacin    Patient Data:    Vital Signs (Most Recent):  Temp: 98.8 °F (37.1 °C) (04/30/24 0012)  Pulse: 90 (04/30/24 0012)  Resp: 18 (04/30/24 0012)  BP: (!) 115/58 (04/30/24 0012)  SpO2: (!) 93 % (04/30/24 0030) Vital Signs (72h Range):  Temp:  [97.7 °F (36.5 °C)-98.8 °F (37.1 °C)]   Pulse:  [86-98]   Resp:  [18-19]   BP: (115-153)/(58-88)   SpO2:  [92 %-99 %]      Recent Labs   Lab 04/29/24  1841   CREATININE 10.7*     Serum creatinine: 10.7 mg/dL (H) 04/29/24 1841  Estimated creatinine clearance: 7.1 mL/min (A)    Levofloxacin dose will be changed to Levofloxacin 750 mg once then Levofloxacin 500 mg q48h  Due to Pt on dialysis    Pharmacist's Name: Chey Greer  Pharmacist's Extension: [phone]

## 2024-04-30 NOTE — ASSESSMENT & PLAN NOTE
Chronic, controlled. Latest blood pressure and vitals reviewed-     Temp:  [97.7 °F (36.5 °C)-98.8 °F (37.1 °C)]   Pulse:  [86-98]   Resp:  [18-19]   BP: (115-153)/(58-88)   SpO2:  [92 %-99 %] .   Home meds for hypertension were reviewed and noted below.   Hypertension Medications               nitroGLYCERIN (NITROSTAT) 0.4 MG SL tablet Place 1 tablet (0.4 mg total) under the tongue every 5 (five) minutes as needed for Chest pain.    torsemide (DEMADEX) 100 MG Tab Take 1 tablet (100 mg total) by mouth 2 (two) times a day.            While in the hospital, will manage blood pressure as follows; Continue home antihypertensive regimen    Will utilize p.r.n. blood pressure medication only if patient's blood pressure greater than 180/110 and she develops symptoms such as worsening chest pain or shortness of breath.

## 2024-04-30 NOTE — ASSESSMENT & PLAN NOTE
This patient has hyperkalemia which is uncontrolled. We will monitor for arrhythmias with EKG or continuous telemetry. We will treat the hyperkalemia with dialysis. The likely etiology of the hyperkalemia is ESRD.  The patients latest potassium has been reviewed and the results are listed below  Recent Labs   Lab 04/30/24  0448   K 5.4*

## 2024-04-30 NOTE — HPI
57 y/o female presents with on going vomiting for the last 1-2 days.  Mild associated pain.  No diarrhea.  Has constipation normally.  No recent bm, no flatus. No vomiting currently feels a little better. Multiple abdominal surgeries in the past.

## 2024-04-30 NOTE — ASSESSMENT & PLAN NOTE
Chronic, controlled. Latest blood pressure and vitals reviewed-     Temp:  [97.7 °F (36.5 °C)-98.8 °F (37.1 °C)]   Pulse:  [77-98]   Resp:  [16-19]   BP: (115-153)/(58-88)   SpO2:  [92 %-99 %] .   Home meds for hypertension were reviewed and noted below.   Hypertension Medications               nitroGLYCERIN (NITROSTAT) 0.4 MG SL tablet Place 1 tablet (0.4 mg total) under the tongue every 5 (five) minutes as needed for Chest pain.    torsemide (DEMADEX) 100 MG Tab Take 1 tablet (100 mg total) by mouth 2 (two) times a day.            While in the hospital, will manage blood pressure as follows; Continue home antihypertensive regimen    Will utilize p.r.n. blood pressure medication only if patient's blood pressure greater than 180/110 and she develops symptoms such as worsening chest pain or shortness of breath.

## 2024-04-30 NOTE — ED PROVIDER NOTES
Encounter Date: 4/29/2024       History     Chief Complaint   Patient presents with    Vomiting     Started 0600  / EGD scheduled , off plavix since Friday      58F with PMH HTN, ESRD on HD, asthma, CAD w/ stent, bipolar disorder, hypothyroid, RLS, hx CVA, and dementia presents today with nausea vomiting abdominal pain that began approximately 4:00 a.m..  She describes nonbloody nonbilious emesis.  She reports decreased oral intake all day today has not been able to keep anything down.  Last bowel movement was on Saturday proximally 3 days ago which is normal for the patient.  Patient reports abdominal pain is crampy bloating diffuse abdominal pain.  Denies any dysuria hematuria.  Patient is on dialysis but still makes urine twice.  Reports Tuesday Thursday Saturday dialysis with last dialysis on Saturday.  Patient reports she thought it was food poisoning.  Denies any cough chest pain shortness of breath fevers chills.    The history is provided by the patient. No  was used.     Review of patient's allergies indicates:   Allergen Reactions    Lisinopril Anaphylaxis     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     Generalized edema     GERD (gastroesophageal reflux disease)     Heart attack 2021    History of traumatic brain injury     Hypertension     Hypothyroidism     Insomnia     Muscle weakness (generalized)     Restless leg syndrome     Senile dementia, uncomplicated      Sleep apnea, unspecified     Unsteadiness on feet      Past Surgical History:   Procedure Laterality Date    ADENOIDECTOMY      ANGIOGRAM, CORONARY, WITH LEFT HEART CATHETERIZATION N/A 02/12/2024    Procedure: Angiogram, Coronary, with Left Heart Cath;  Surgeon: Cristian Carlisle MD;  Location: [facility] CATH/EP LAB;  Service: Cardiology;  Laterality: N/A;    APPENDECTOMY      AV FISTULA PLACEMENT Left     CHOLECYSTECTOMY      COLONOSCOPY      10 years ago in [place]    CORONARY STENT PLACEMENT  2021    IVUS (INTRAVASCULAR ULTRASOUND) N/A 02/12/2024    Procedure: ULTRASOUND, INTRAVASCULAR;  Surgeon: Cristian Carlisle MD;  Location: [facility] CATH/EP LAB;  Service: Cardiology;  Laterality: N/A;    KNEE SURGERY      knee arthroplasty    LEFT HEART CATHETERIZATION  2021    NECK SURGERY      PARTIAL HYSTERECTOMY      STENT, DRUG ELUTING, MULTI VESSEL, CORONARY  02/12/2024    Procedure: Stent, Drug Eluting, Multi Vessel, Coronary;  Surgeon: Cristian Carlisle MD;  Location: [facility] CATH/EP LAB;  Service: Cardiology;;    TONSILLECTOMY       No family history on file.  Social History     Tobacco Use    Smoking status: Never    Smokeless tobacco: Never   Substance Use Topics    Alcohol use: Never    Drug use: Never     Review of Systems    Physical Exam     Initial Vitals [04/29/24 1731]   BP Pulse Resp Temp SpO2   (!) 153/69 87 18 97.7 °F (36.5 °C) 96 %      MAP       --         Physical Exam    Nursing note and vitals reviewed.  Constitutional: She appears well-developed. She is not diaphoretic. No distress.   HENT:   Head: Normocephalic and atraumatic.   Nose: Nose normal.   Eyes: EOM are normal. No scleral icterus.   Neck: Neck supple.   Normal range of motion.  Cardiovascular:  Normal rate, regular rhythm, normal heart sounds and intact distal pulses.     Exam reveals no gallop and no friction rub.       No murmur heard.  Left upper extremity dialysis fistula with palpable thrill.   Pulmonary/Chest: Breath sounds normal. No stridor. No  respiratory distress. She has no wheezes. She has no rhonchi. She has no rales.   Abdominal: Abdomen is soft. Bowel sounds are normal. She exhibits no distension. There is no abdominal tenderness. There is no rebound and no guarding.   Musculoskeletal:         General: Normal range of motion.      Cervical back: Normal range of motion and neck supple.     Neurological: She is alert and oriented to person, place, and time. She has normal strength. No cranial nerve deficit or sensory deficit.   Skin: Skin is warm and dry. Capillary refill takes less than 2 seconds. No rash noted.   Psychiatric: She has a normal mood and affect.         ED Course   Procedures  Labs Reviewed   CBC W/ AUTO DIFFERENTIAL - Abnormal; Notable for the following components:       Result Value    WBC 14.57 (*)     RBC 3.97 (*)     MCH 33.2 (*)     Gran # (ANC) 13.2 (*)     Immature Grans (Abs) 0.06 (*)     Lymph # 0.7 (*)     Gran % 90.7 (*)     Lymph % 4.9 (*)     Mono % 3.5 (*)     All other components within normal limits   COMPREHENSIVE METABOLIC PANEL - Abnormal; Notable for the following components:    Potassium 6.5 (*)     Chloride 85 (*)     Glucose 146 (*)     BUN 55 (*)     Creatinine 10.7 (*)     Calcium 10.6 (*)     Total Protein 9.6 (*)     Alkaline Phosphatase 309 (*)     eGFR 4 (*)     Anion Gap 27 (*)     All other components within normal limits    Narrative:     Potassium  critical result(s) called and verbal readback obtained   from Camden Loyola RN.  by JENY 04/29/2024 19:42   LIPASE - Abnormal; Notable for the following components:    Lipase 165 (*)     All other components within normal limits   POCT GLUCOSE - Abnormal; Notable for the following components:    POCT Glucose 196 (*)     All other components within normal limits   POCT GLUCOSE - Abnormal; Notable for the following components:    POCT Glucose 171 (*)     All other components within normal limits   MAGNESIUM   POCT URINE PREGNANCY   POCT GLUCOSE MONITORING  CONTINUOUS          Imaging Results              CT Abdomen Pelvis  Without Contrast (In process)                      Medications   sodium chloride 0.9% bolus 250 mL 250 mL (has no administration in time range)   mupirocin 2 % ointment (1 g Nasal Given 4/29/24 2345)   atorvastatin tablet 40 mg (has no administration in time range)   levothyroxine tablet 150 mcg (has no administration in time range)   rOPINIRole tablet 2 mg (has no administration in time range)   venlafaxine 24 hr capsule 150 mg (has no administration in time range)   sodium chloride 0.9% flush 10 mL (has no administration in time range)   melatonin tablet 9 mg (has no administration in time range)   naloxone 0.4 mg/mL injection 0.02 mg (has no administration in time range)   glucose chewable tablet 16 g (has no administration in time range)   glucose chewable tablet 24 g (has no administration in time range)   glucagon (human recombinant) injection 1 mg (has no administration in time range)   acetaminophen tablet 650 mg (has no administration in time range)   albuterol-ipratropium 2.5 mg-0.5 mg/3 mL nebulizer solution 3 mL (has no administration in time range)   aluminum-magnesium hydroxide-simethicone 200-200-20 mg/5 mL suspension 30 mL (has no administration in time range)   dextrose 10% bolus 125 mL 125 mL (has no administration in time range)   dextrose 10% bolus 250 mL 250 mL (has no administration in time range)   torsemide tablet 100 mg (0 mg Oral Hold 4/29/24 2245)   morphine injection 2 mg (has no administration in time range)   ondansetron injection 4 mg (4 mg Intravenous Given 4/29/24 1902)   albuterol sulfate nebulizer solution 10 mg (10 mg Nebulization Given 4/29/24 2007)   calcium gluconate 1 g in NS IVPB (premixed) (0 g Intravenous Stopped 4/29/24 2120)   insulin regular injection 5 Units 0.05 mL (5 Units Intravenous Given 4/29/24 2026)   dextrose 50% injection 25 g (25 g Intravenous Given 4/29/24 2024)   sodium bicarbonate solution  50 mEq (50 mEq Intravenous Given 4/29/24 2019)     Medical Decision Making  Labs show hyperkalemia. EKG without signs of hyperkalemia but given number treated empirically and discussed with nephro who ordered stat dialysis. Imaging showed SBO which was discussed with gen bansal, Dr. Lezama, who agrees with plan to admit for further  management. Accepted by hospital medicine for further workup and management.     Amount and/or Complexity of Data Reviewed  Labs: ordered. Decision-making details documented in ED Course.  Radiology: ordered.    Risk  OTC drugs.  Prescription drug management.  Decision regarding hospitalization.               ED Course as of 04/30/24 0030   Mon Apr 29, 2024   1824 58F with PMH HTN, ESRD on HD, asthma, CAD w/ stent, bipolar disorder, hypothyroid, RLS, hx CVA, and dementia pw NV and abdominal pain. Diffused tender abdomen but no peritonitis. [BD]   1935 BP(!): 153/69 [BD]   1935 Temp: 97.7 °F (36.5 °C) [BD]   1935 Pulse: 87 [BD]   1935 Resp: 18 [BD]   1935 SpO2: 96 % [BD]   1935 WBC(!): 14.57 [BD]   1948 Lipase(!): 165 [BD]   1948 Potassium(!!): 6.5 [BD]   2002 EKG individually interpreted by me shows normal sinus rhythm with a rate of 88.  Normal intervals.  Normal axis.  Nonspecific ST changes.  No signs of peaked T-waves or other evidence of hyperkalemia changes. [BD]   2038 IMPRESSION: 1. Evidence of small bowel obstruction with transition point in the right lower quadrant. No perforation. 2. Patchy alveolar densities and intermixed reticulonodular densities in both lung bases, right greater than left, concerning for age indeterminate atypical infection. 3. Severe bilateral renal atrophy. 4. Additional nonemergent findings detailed above. 5. These findings initiated a critical results reporting process. An addendum will be issued at the time of clinician notification. Dictated and Authenticated by: Camden Corado MD 04/29/2024 8:34 PM Central Time ([place] & [place])   [BD]   2059 Discussed case  with Nephrology on-call, Dr. Lee, who reviewed case and will initiate stat dialysis. [BD]      ED Course User Index  [BD] Reymundo Flores MD                           Clinical Impression:  Final diagnoses:  [N18.6, Z99.2] ESRD (end stage renal disease) on dialysis  [R11.2] Nausea and vomiting, unspecified vomiting type (Primary)  [E87.5] Hyperkalemia  [K85.90] Acute pancreatitis without infection or necrosis, unspecified pancreatitis type  [K56.609] SBO (small bowel obstruction)          ED Disposition Condition    Admit                 Reymundo Flores MD  04/30/24 0033

## 2024-04-30 NOTE — ASSESSMENT & PLAN NOTE
-concern for atypical pneumonia on CT  -procalcitonin elevated  -lactic acid 2.5, repeat pending  -blood culture pending  -started on renal dosing levaquin on dialysis days

## 2024-04-30 NOTE — H&P
[facility] Medicine  History & Physical    Patient Name: Eugenia Manuel  MRN: [id number]  Patient Class: IP- Inpatient  Admission Date: 4/29/2024  Attending Physician: Aranza Espinoza MD   Primary Care Provider: John Castro MD         Patient information was obtained from patient, past medical records, and ER records.     Subjective:     Principal Problem:Small bowel obstruction    Chief Complaint:   Chief Complaint   Patient presents with    Vomiting     Started 0600  / EGD scheduled , off plavix since Friday         HPI: Eugenia Manuel is a 58 year old female with a past medical history of end-stage renal disease HD on T/TH/SAT, asthma, CAD w/stent, bipolar disorder, hypothyroidism, MI, CVA, restless legs syndrome, and hypertension who presents with complaints of severe intermittent epigastric cramping pain associated with decreased appetite, nausea, and vomiting for a few hours. Denies chest pain, shortness on breath, fever, chills, dizziness, headache, hemoptysis, or hematochezia.  She reports last bowel movement was 3 days ago and last dialysis on Saturday.  ED workup reveals CT abdomen/pelvis small-bowel obstruction and concerning for atypical pneumonia. CBC with leukocytosis 14.5.  BMP reflective of ESRD with hyperkalemia 6.5.  Her potassium was shifted in ED repeated at 4.8.  Elevated Lipase 165, and AST/ALT within normal limits.  Nephrology and general surgery consult placed. Admitted to hospital medicine for further management and treatment.              Past Medical History:   Diagnosis Date    Acute respiratory failure with hypercapnia 2021    Anxiety     Asthma     Atherosclerosis of native coronary artery with angina pectoris, unspecified whether native or transplanted heart     Bipolar disorder     Cerebral ischemia     Chest pain, unspecified 2024    with activity    Cognitive communication deficit     Colon polyp     Constipation     Depression     Dialysis  patient 2007    MWF    Disorder of kidney and ureter     Fibromyalgia     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     Generalized edema     GERD (gastroesophageal reflux disease)     Heart attack 2021    History of traumatic brain injury     Hypertension     Hypothyroidism     Insomnia     Muscle weakness (generalized)     Restless leg syndrome     Senile dementia, uncomplicated     Sleep apnea, unspecified     Unsteadiness on feet        Past Surgical History:   Procedure Laterality Date    ADENOIDECTOMY      ANGIOGRAM, CORONARY, WITH LEFT HEART CATHETERIZATION N/A 02/12/2024    Procedure: Angiogram, Coronary, with Left Heart Cath;  Surgeon: Cristian Carlisle MD;  Location: [facility] CATH/EP LAB;  Service: Cardiology;  Laterality: N/A;    APPENDECTOMY      AV FISTULA PLACEMENT Left     CHOLECYSTECTOMY      COLONOSCOPY      10 years ago in [place]    CORONARY STENT PLACEMENT  2021    IVUS (INTRAVASCULAR ULTRASOUND) N/A 02/12/2024    Procedure: ULTRASOUND, INTRAVASCULAR;  Surgeon: Cristian Carlisle MD;  Location: [facility] CATH/EP LAB;  Service: Cardiology;  Laterality: N/A;    KNEE SURGERY      knee arthroplasty    LEFT HEART CATHETERIZATION  2021    NECK SURGERY      PARTIAL HYSTERECTOMY      STENT, DRUG ELUTING, MULTI VESSEL, CORONARY  02/12/2024    Procedure: Stent, Drug Eluting, Multi Vessel, Coronary;  Surgeon: Cristian Carlisle MD;  Location: [facility] CATH/EP LAB;  Service: Cardiology;;    TONSILLECTOMY         Review of patient's allergies indicates:   Allergen Reactions    Lisinopril Anaphylaxis       Current Facility-Administered Medications   Medication Dose Route Frequency Provider Last Rate Last Admin    acetaminophen tablet 650 mg  650 mg Oral Q4H PRN Meena Allen DNP        albuterol-ipratropium 2.5 mg-0.5 mg/3 mL nebulizer solution 3 mL  3 mL Nebulization Q6H PRN Meena Allen DNP        aluminum-magnesium hydroxide-simethicone 200-200-20 mg/5 mL suspension 30 mL  30 mL Oral QID  PRN Meena Allen DNP        atorvastatin tablet 40 mg  40 mg Oral Daily Meena Allen DNP        dextrose 10% bolus 125 mL 125 mL  12.5 g Intravenous PRN Meena Allen DNP        dextrose 10% bolus 250 mL 250 mL  25 g Intravenous PRN Meena Allen DNP        glucagon (human recombinant) injection 1 mg  1 mg Intramuscular PRN Meena Allen DNP        glucose chewable tablet 16 g  16 g Oral PRN Meena Allen DNP        glucose chewable tablet 24 g  24 g Oral PRN Meena Allen DNP        levothyroxine tablet 150 mcg  150 mcg Oral Before breakfast Meena Allen DNP        melatonin tablet 9 mg  9 mg Oral Nightly PRN Meena Allen DNP        morphine injection 2 mg  2 mg Intravenous Q6H PRN Meena Allen DNP        mupirocin 2 % ointment   Nasal BID Clinton Lee MD   1 g at 04/29/24 2345    naloxone 0.4 mg/mL injection 0.02 mg  0.02 mg Intravenous PRN Meena Allen DNP        rOPINIRole tablet 2 mg  2 mg Oral TID Meena Allen DNP        sodium chloride 0.9% bolus 250 mL 250 mL  250 mL Intravenous ED 1 Time Reymundo Flores MD   Stopped at 04/30/24 0155    sodium chloride 0.9% flush 10 mL  10 mL Intravenous Q12H PRN Meena Allen DNP        torsemide tablet 100 mg  100 mg Oral BID Meena Allen DNP        venlafaxine 24 hr capsule 150 mg  150 mg Oral Daily Meena Allen DNP         Family History    None       Tobacco Use    Smoking status: Never    Smokeless tobacco: Never   Substance and Sexual Activity    Alcohol use: Never    Drug use: Never    Sexual activity: Not on file     Review of Systems   Constitutional:  Positive for appetite change. Negative for activity change, chills, diaphoresis, fatigue and fever.   Respiratory:  Negative for cough, shortness of breath and wheezing.    Cardiovascular:  Negative for chest pain and leg swelling.   Gastrointestinal:  Positive for abdominal pain, nausea and vomiting. Negative for abdominal distention, blood in stool,  constipation and diarrhea.   Genitourinary:  Negative for difficulty urinating, flank pain and hematuria.   Musculoskeletal:  Negative for back pain.   Neurological:  Negative for dizziness, light-headedness and headaches.     Objective:     Vital Signs (Most Recent):  Temp: 98.8 °F (37.1 °C) (04/30/24 0012)  Pulse: 90 (04/30/24 0012)  Resp: 18 (04/30/24 0012)  BP: (!) 115/58 (04/30/24 0012)  SpO2: (!) 93 % (04/30/24 0030) Vital Signs (24h Range):  Temp:  [97.7 °F (36.5 °C)-98.8 °F (37.1 °C)] 98.8 °F (37.1 °C)  Pulse:  [86-98] 90  Resp:  [18-19] 18  SpO2:  [92 %-99 %] 93 %  BP: (115-153)/(58-88) 115/58     Weight: 102.3 kg (225 lb 8.5 oz)  Body mass index is 35.32 kg/m².     Physical Exam  Vitals and nursing note reviewed.   Constitutional:       Appearance: She is obese. She is not ill-appearing.   Cardiovascular:      Rate and Rhythm: Normal rate and regular rhythm.      Pulses: Normal pulses.      Heart sounds: Normal heart sounds.   Pulmonary:      Effort: Pulmonary effort is normal. No respiratory distress.      Breath sounds: Normal breath sounds. No wheezing.   Abdominal:      General: There is no distension.      Palpations: Abdomen is soft.      Tenderness: There is abdominal tenderness (epigastric).   Musculoskeletal:         General: No swelling.      Right lower leg: No edema.      Left lower leg: No edema.   Skin:     General: Skin is warm and dry.      Capillary Refill: Capillary refill takes less than 2 seconds.      Comments: Left upper AV fistula bruit and thrill noted   Neurological:      Mental Status: She is alert and oriented to person, place, and time. Mental status is at baseline.                Significant Labs: All pertinent labs within the past 24 hours have been reviewed.  A1C:   Recent Labs   Lab 03/11/24  0912   HGBA1C 5.5     BMP:   Recent Labs   Lab 04/29/24  1841 04/29/24  2342   *  --      --    K 6.5* 4.8   CL 85*  --    CO2 26  --    BUN 55*  --    CREATININE 10.7*  --     CALCIUM 10.6*  --    MG 2.1  --      CBC:   Recent Labs   Lab 04/29/24  1841   WBC 14.57*   HGB 13.2   HCT 39.0        CMP:   Recent Labs   Lab 04/29/24  1841 04/29/24  2342     --    K 6.5* 4.8   CL 85*  --    CO2 26  --    *  --    BUN 55*  --    CREATININE 10.7*  --    CALCIUM 10.6*  --    PROT 9.6*  --    ALBUMIN 4.7  --    BILITOT 0.7  --    ALKPHOS 309*  --    AST 23  --    ALT 23  --    ANIONGAP 27*  --      Lipase:   Recent Labs   Lab 04/29/24  1841   LIPASE 165*       Magnesium:   Recent Labs   Lab 04/29/24  1841   MG 2.1     TSH:   Recent Labs   Lab 02/27/24  1441   TSH 0.293*       Urine Studies:   Recent Labs   Lab 04/29/24  2245   COLORU Yellow   APPEARANCEUA Clear   PHUR 8.0   SPECGRAV 1.010   PROTEINUA 3+*   GLUCUA Trace*   KETONESU Negative   BILIRUBINUA Negative   OCCULTUA Trace*   NITRITE Negative   UROBILINOGEN Negative   LEUKOCYTESUR Negative   RBCUA 1   WBCUA 1   BACTERIA Rare   SQUAMEPITHEL 2   HYALINECASTS 0       Significant Imaging: I have reviewed all pertinent imaging results/findings within the past 24 hours.    Addendum created by Camden Corado MD on 4/29/2024 8:40 PM Central Time ([place] & [place]): Addendum: THIS REPORT CONTAINS FINDINGS THAT MAY BE CRITICAL TO PATIENT CARE. The findings were verbally communicated via telephone conference with SEGUNDO YOO at 8:40 PM CDT on 4/29/2024. The findings were acknowledged and understood. Initial Report created on 4/29/2024 8:34 PM Central Time ([place] & [place]):   PROCEDURE INFORMATION:   Exam: CT Abdomen And Pelvis Without Contrast   Exam date and time: 4/29/2024 7:43 PM Age: 58 years old Clinical indication: Other: Abd pain and vomiting. R/O sbo; Prior surgery; Surgery date: 6+ months; Surgery type: Gallbladder, partial hysterectomy, appendectomy; Patient HX: Esrd, dialysis TECHNIQUE: Imaging protocol: Computed tomography of the abdomen and pelvis without contrast. COMPARISON: DX XR HIPS BILATERAL 2 VIEW INCL AP PELVIS  11/25/2023 2:21 AM . A report from a prior CT abdomen and pelvis dated 08/19/2020 was made available, however no images were available. FINDINGS: Lungs: Patchy alveolar opacities in the right middle lobe, lingula, and both lower lobe basilar distributions, with mild intermixed reticulonodular elements in the right middle lobe and right base. The appearance is concerning for atypical pneumonia. Heart: Heart size normal. Moderate-severe coronary artery calcification, possibly prior stents, correlate with procedural history. Esophagus: The visualized distal esophagus is largely contracted without gross abnormality. Liver: Granulomatous calcifications in the liver. Normal contour. No mass lesions. No intrahepatic biliary ductal dilatation. Gallbladder and bile ducts: Prior cholecystectomy with no significant dilatation of the common bile duct. Pancreas: Normal. No inflammatory changes or ductal dilation. Spleen: Normal. No splenomegaly. Adrenal glands: Normal. No adrenal mass. MARITZA REAGAN Accession: [id number] MRN: [id number]  Preliminary Radiology Report  (QA) DISCREPANCY? If there is a discrepancy between the preliminary and final interpretation, please notify [employer] via [email or web address]. If you do not have access to our QA portal, call our QA team at [phone] CONFIDENTIALITY STATEMENT This report is intended only for the use of the referring physician, and only in accordance with law, If you received this in error, call [phone] Page 2 of 2 Kidneys and ureters: Severe bilateral renal atrophy. No hydronephrosis or hydroureter. 5 mm nonobstructive left renal stone versus vascular calcification. There are small bilateral renal cortical cysts present which do not require further assessment. Stomach and bowel: Stomach contains moderate fluid content but is otherwise unremarkable, without dilatation. Fluid-filled mildly dilated small bowel reaching 3.5 cm diameter, with transition point in  the right lower quadrant and nondilated terminal ileum, concerning for small bowel obstruction. Mild local peritoneal stranding in the right lower quadrant. No evidence of perforation or abscess. The colon is largely contracted without gross abnormality. Appendix: The appendix is not identified. No secondary signs of appendicitis. Intraperitoneal space: No peritoneal free fluid or air. Vasculature: No acute process. No abdominal aortic aneurysm. Moderate-severe calcific atherosclerosis. Lymph nodes: No adenopathy. Urinary bladder: The urinary bladder is partially contracted without gross abnormality. Reproductive: Unremarkable as visualized. Bones/joints: Osteopenia. Left inferior pubic ramus fracture with incomplete bony union and marginal callus development, favor either late subacute fracture or possibly chronic fracture with nonunion. Hypertrophy in the right inferior pubic ramus suggesting chronic nondisplaced fracture. Moderate thoracolumbar spondylosis with multilevel advanced disc degenerative changes. Moderatesevere canal stenosis L3-L4 and L4-L5. Soft tissues: Small fatty umbilical hernia . No evidence of associated bowel herniation or strangulation. IMPRESSION: 1. Evidence of small bowel obstruction with transition point in the right lower quadrant. No perforation. 2. Patchy alveolar densities and intermixed reticulonodular densities in both lung bases, right greater than left, concerning for age indeterminate atypical infection. 3. Severe bilateral renal atrophy. 4. Additional nonemergent findings detailed above. 5. These findings initiated a critical results reporting process. An addendum will be issued at the time of clinician notification. Dictated and Authenticated by: Camden Corado MD 04/29/2024 8:34 PM Central Time ([place] & [place])  Assessment/Plan:     * Small bowel obstruction  -Consult General Surgery  -NPO   -analgesics and antiemetics      Atypical pneumonia  -procalcitonin pending  -lactic acid  pending  -blood culture pending  -started on renal dosing antibiotics on dialysis days        Hyperkalemia  This patient has hyperkalemia which is uncontrolled. We will monitor for arrhythmias with EKG or continuous telemetry. We will treat the hyperkalemia with Calcium gluconate and IV insulin and dextrose. The likely etiology of the hyperkalemia is ESRD.  The patients latest potassium has been reviewed and the results are listed below  Recent Labs   Lab 04/29/24  2342   K 4.8       -potassium 6.5 shift; repeat 4.8          ESRD (end stage renal disease)  Creatine stable for now. BMP reviewed- noted Estimated Creatinine Clearance: 7.1 mL/min (A) (based on SCr of 10.7 mg/dL (H)). according to latest data. Based on current GFR, CKD stage is end stage.  Monitor UOP and serial BMP and adjust therapy as needed. Renally dose meds. Avoid nephrotoxic medications and procedures.    -Consult Nephrology HD Tues/Thurs/Sat    Coronary artery disease involving native coronary artery of native heart with unstable angina pectoris  Patient with known CAD s/p stent placement, which is controlled Will NOT continue Plavix and monitor for S/Sx of angina/ACS. Continue to monitor on telemetry.     -Hold Plavix due to SBO and GS consult    Hypertension  Chronic, controlled. Latest blood pressure and vitals reviewed-     Temp:  [97.7 °F (36.5 °C)-98.8 °F (37.1 °C)]   Pulse:  [86-98]   Resp:  [18-19]   BP: (115-153)/(58-88)   SpO2:  [92 %-99 %] .   Home meds for hypertension were reviewed and noted below.   Hypertension Medications               nitroGLYCERIN (NITROSTAT) 0.4 MG SL tablet Place 1 tablet (0.4 mg total) under the tongue every 5 (five) minutes as needed for Chest pain.    torsemide (DEMADEX) 100 MG Tab Take 1 tablet (100 mg total) by mouth 2 (two) times a day.            While in the hospital, will manage blood pressure as follows; Continue home antihypertensive regimen    Will utilize p.r.n. blood pressure medication only if  patient's blood pressure greater than 180/110 and she develops symptoms such as worsening chest pain or shortness of breath.      VTE Risk Mitigation (From admission, onward)           Ordered     IP VTE HIGH RISK PATIENT  Once         04/29/24 2237     Place sequential compression device  Until discontinued         04/29/24 2237                                    Meena Allen DNP  Department of Hospital Medicine  [facility]/Surg

## 2024-04-30 NOTE — SUBJECTIVE & OBJECTIVE
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     Generalized edema     GERD (gastroesophageal reflux disease)     Heart attack 2021    History of traumatic brain injury     Hypertension     Hypothyroidism     Insomnia     Muscle weakness (generalized)     Restless leg syndrome     Senile dementia, uncomplicated     Sleep apnea, unspecified     Unsteadiness on feet        Past Surgical History:   Procedure Laterality Date    ADENOIDECTOMY      ANGIOGRAM, CORONARY, WITH LEFT HEART CATHETERIZATION N/A 02/12/2024    Procedure: Angiogram, Coronary, with Left Heart Cath;  Surgeon: Cristian Carlisle MD;  Location: [facility] CATH/EP LAB;  Service: Cardiology;  Laterality: N/A;    APPENDECTOMY      AV FISTULA PLACEMENT Left     CHOLECYSTECTOMY      COLONOSCOPY      10 years ago in [place]    CORONARY STENT PLACEMENT  2021    IVUS (INTRAVASCULAR ULTRASOUND) N/A 02/12/2024    Procedure: ULTRASOUND, INTRAVASCULAR;  Surgeon: Cristian Carlisle MD;  Location: [facility] CATH/EP LAB;  Service: Cardiology;  Laterality: N/A;    KNEE SURGERY      knee arthroplasty    LEFT HEART CATHETERIZATION  2021    NECK SURGERY      PARTIAL HYSTERECTOMY      STENT, DRUG ELUTING, MULTI VESSEL, CORONARY  02/12/2024    Procedure: Stent, Drug Eluting, Multi Vessel, Coronary;  Surgeon: Cristian Carlisle MD;  Location: [facility] CATH/EP LAB;  Service: Cardiology;;    TONSILLECTOMY         Review of patient's allergies indicates:   Allergen Reactions    Lisinopril  Anaphylaxis       Current Facility-Administered Medications   Medication Dose Route Frequency Provider Last Rate Last Admin    acetaminophen tablet 650 mg  650 mg Oral Q4H PRN Meena Allen DNP        albuterol-ipratropium 2.5 mg-0.5 mg/3 mL nebulizer solution 3 mL  3 mL Nebulization Q6H PRN Meena Allen DNP        aluminum-magnesium hydroxide-simethicone 200-200-20 mg/5 mL suspension 30 mL  30 mL Oral QID PRN Meena Allen DNP        atorvastatin tablet 40 mg  40 mg Oral Daily Meena Allen DNP        dextrose 10% bolus 125 mL 125 mL  12.5 g Intravenous PRN Meena Allen DNP        dextrose 10% bolus 250 mL 250 mL  25 g Intravenous PRN Meena Allen DNP        glucagon (human recombinant) injection 1 mg  1 mg Intramuscular PRN Meena Allen DNP        glucose chewable tablet 16 g  16 g Oral PRN Meena Allen DNP        glucose chewable tablet 24 g  24 g Oral PRN Meena Allen DNP        levothyroxine tablet 150 mcg  150 mcg Oral Before breakfast Meena Allen DNP        melatonin tablet 9 mg  9 mg Oral Nightly PRN Meena Allen DNP        morphine injection 2 mg  2 mg Intravenous Q6H PRN Meena Allen DNP        mupirocin 2 % ointment   Nasal BID Clinton Lee MD   1 g at 04/29/24 2345    naloxone 0.4 mg/mL injection 0.02 mg  0.02 mg Intravenous PRN Meena Allen DNP        rOPINIRole tablet 2 mg  2 mg Oral TID Meena Allen DNP        sodium chloride 0.9% bolus 250 mL 250 mL  250 mL Intravenous ED 1 Time Reymundo Flores MD   Stopped at 04/30/24 0155    sodium chloride 0.9% flush 10 mL  10 mL Intravenous Q12H PRN Meena Allen DNP        torsemide tablet 100 mg  100 mg Oral BID Meena Allen DNP        venlafaxine 24 hr capsule 150 mg  150 mg Oral Daily Meena Allen DNP         Family History    None       Tobacco Use    Smoking status: Never    Smokeless tobacco: Never   Substance and Sexual Activity    Alcohol use: Never    Drug use: Never    Sexual activity:  Not on file     Review of Systems   Constitutional:  Positive for appetite change. Negative for activity change, chills, diaphoresis, fatigue and fever.   Respiratory:  Negative for cough, shortness of breath and wheezing.    Cardiovascular:  Negative for chest pain and leg swelling.   Gastrointestinal:  Positive for abdominal pain, nausea and vomiting. Negative for abdominal distention, blood in stool, constipation and diarrhea.   Genitourinary:  Negative for difficulty urinating, flank pain and hematuria.   Musculoskeletal:  Negative for back pain.   Neurological:  Negative for dizziness, light-headedness and headaches.     Objective:     Vital Signs (Most Recent):  Temp: 98.8 °F (37.1 °C) (04/30/24 0012)  Pulse: 90 (04/30/24 0012)  Resp: 18 (04/30/24 0012)  BP: (!) 115/58 (04/30/24 0012)  SpO2: (!) 93 % (04/30/24 0030) Vital Signs (24h Range):  Temp:  [97.7 °F (36.5 °C)-98.8 °F (37.1 °C)] 98.8 °F (37.1 °C)  Pulse:  [86-98] 90  Resp:  [18-19] 18  SpO2:  [92 %-99 %] 93 %  BP: (115-153)/(58-88) 115/58     Weight: 102.3 kg (225 lb 8.5 oz)  Body mass index is 35.32 kg/m².     Physical Exam  Vitals and nursing note reviewed.   Constitutional:       Appearance: She is obese. She is not ill-appearing.   Cardiovascular:      Rate and Rhythm: Normal rate and regular rhythm.      Pulses: Normal pulses.      Heart sounds: Normal heart sounds.   Pulmonary:      Effort: Pulmonary effort is normal. No respiratory distress.      Breath sounds: Normal breath sounds. No wheezing.   Abdominal:      General: There is no distension.      Palpations: Abdomen is soft.      Tenderness: There is abdominal tenderness (epigastric).   Musculoskeletal:         General: No swelling.      Right lower leg: No edema.      Left lower leg: No edema.   Skin:     General: Skin is warm and dry.      Capillary Refill: Capillary refill takes less than 2 seconds.      Comments: Left upper AV fistula bruit and thrill noted   Neurological:      Mental  Status: She is alert and oriented to person, place, and time. Mental status is at baseline.                Significant Labs: All pertinent labs within the past 24 hours have been reviewed.  A1C:   Recent Labs   Lab 03/11/24  0912   HGBA1C 5.5     BMP:   Recent Labs   Lab 04/29/24 1841 04/29/24  2342   *  --      --    K 6.5* 4.8   CL 85*  --    CO2 26  --    BUN 55*  --    CREATININE 10.7*  --    CALCIUM 10.6*  --    MG 2.1  --      CBC:   Recent Labs   Lab 04/29/24  1841   WBC 14.57*   HGB 13.2   HCT 39.0        CMP:   Recent Labs   Lab 04/29/24  1841 04/29/24  2342     --    K 6.5* 4.8   CL 85*  --    CO2 26  --    *  --    BUN 55*  --    CREATININE 10.7*  --    CALCIUM 10.6*  --    PROT 9.6*  --    ALBUMIN 4.7  --    BILITOT 0.7  --    ALKPHOS 309*  --    AST 23  --    ALT 23  --    ANIONGAP 27*  --      Lipase:   Recent Labs   Lab 04/29/24  1841   LIPASE 165*       Magnesium:   Recent Labs   Lab 04/29/24  1841   MG 2.1     TSH:   Recent Labs   Lab 02/27/24  1441   TSH 0.293*       Urine Studies:   Recent Labs   Lab 04/29/24  2245   COLORU Yellow   APPEARANCEUA Clear   PHUR 8.0   SPECGRAV 1.010   PROTEINUA 3+*   GLUCUA Trace*   KETONESU Negative   BILIRUBINUA Negative   OCCULTUA Trace*   NITRITE Negative   UROBILINOGEN Negative   LEUKOCYTESUR Negative   RBCUA 1   WBCUA 1   BACTERIA Rare   SQUAMEPITHEL 2   HYALINECASTS 0       Significant Imaging: I have reviewed all pertinent imaging results/findings within the past 24 hours.    Addendum created by Camden Corado MD on 4/29/2024 8:40 PM Central Time ([place] & [place]): Addendum: THIS REPORT CONTAINS FINDINGS THAT MAY BE CRITICAL TO PATIENT CARE. The findings were verbally communicated via telephone conference with SEGUNDO YOO at 8:40 PM CDT on 4/29/2024. The findings were acknowledged and understood. Initial Report created on 4/29/2024 8:34 PM Central Time ([place] & [place]):   PROCEDURE INFORMATION:   Exam: CT Abdomen And Pelvis Without  Contrast   Exam date and time: 4/29/2024 7:43 PM Age: 58 years old Clinical indication: Other: Abd pain and vomiting. R/O sbo; Prior surgery; Surgery date: 6+ months; Surgery type: Gallbladder, partial hysterectomy, appendectomy; Patient HX: Esrd, dialysis TECHNIQUE: Imaging protocol: Computed tomography of the abdomen and pelvis without contrast. COMPARISON: DX XR HIPS BILATERAL 2 VIEW INCL AP PELVIS 11/25/2023 2:21 AM . A report from a prior CT abdomen and pelvis dated 08/19/2020 was made available, however no images were available. FINDINGS: Lungs: Patchy alveolar opacities in the right middle lobe, lingula, and both lower lobe basilar distributions, with mild intermixed reticulonodular elements in the right middle lobe and right base. The appearance is concerning for atypical pneumonia. Heart: Heart size normal. Moderate-severe coronary artery calcification, possibly prior stents, correlate with procedural history. Esophagus: The visualized distal esophagus is largely contracted without gross abnormality. Liver: Granulomatous calcifications in the liver. Normal contour. No mass lesions. No intrahepatic biliary ductal dilatation. Gallbladder and bile ducts: Prior cholecystectomy with no significant dilatation of the common bile duct. Pancreas: Normal. No inflammatory changes or ductal dilation. Spleen: Normal. No splenomegaly. Adrenal glands: Normal. No adrenal mass. MARITZA REAGAN Accession: [id number] MRN: [id number]  Preliminary Radiology Report  (QA) DISCREPANCY? If there is a discrepancy between the preliminary and final interpretation, please notify [facility] via [email or web address]. If you do not have access to our QA portal, call our QA team at [phone] CONFIDENTIALITY STATEMENT This report is intended only for the use of the referring physician, and only in accordance with law, If you received this in error, call [phone] Page 2 of 2 Kidneys and ureters: Severe bilateral renal  atrophy. No hydronephrosis or hydroureter. 5 mm nonobstructive left renal stone versus vascular calcification. There are small bilateral renal cortical cysts present which do not require further assessment. Stomach and bowel: Stomach contains moderate fluid content but is otherwise unremarkable, without dilatation. Fluid-filled mildly dilated small bowel reaching 3.5 cm diameter, with transition point in the right lower quadrant and nondilated terminal ileum, concerning for small bowel obstruction. Mild local peritoneal stranding in the right lower quadrant. No evidence of perforation or abscess. The colon is largely contracted without gross abnormality. Appendix: The appendix is not identified. No secondary signs of appendicitis. Intraperitoneal space: No peritoneal free fluid or air. Vasculature: No acute process. No abdominal aortic aneurysm. Moderate-severe calcific atherosclerosis. Lymph nodes: No adenopathy. Urinary bladder: The urinary bladder is partially contracted without gross abnormality. Reproductive: Unremarkable as visualized. Bones/joints: Osteopenia. Left inferior pubic ramus fracture with incomplete bony union and marginal callus development, favor either late subacute fracture or possibly chronic fracture with nonunion. Hypertrophy in the right inferior pubic ramus suggesting chronic nondisplaced fracture. Moderate thoracolumbar spondylosis with multilevel advanced disc degenerative changes. Moderatesevere canal stenosis L3-L4 and L4-L5. Soft tissues: Small fatty umbilical hernia . No evidence of associated bowel herniation or strangulation. IMPRESSION: 1. Evidence of small bowel obstruction with transition point in the right lower quadrant. No perforation. 2. Patchy alveolar densities and intermixed reticulonodular densities in both lung bases, right greater than left, concerning for age indeterminate atypical infection. 3. Severe bilateral renal atrophy. 4. Additional nonemergent findings  detailed above. 5. These findings initiated a critical results reporting process. An addendum will be issued at the time of clinician notification. Dictated and Authenticated by: Camden Corado MD 04/29/2024 8:34 PM Central Time ([place] & [place])

## 2024-04-30 NOTE — HOSPITAL COURSE
58 year old female admitted with abdominal pain.  CT abdomen/pelvis showed small bowel obstruction and concern for atypical pneumonia.  Patient was made NPO and general surgery was consulted.  CBC with leukocytosis on admission, lactic acid 2.5, repeat pending.  Patient was started on levaquin due to abnormal CXR which suggested pneumonia.  Patient is ESRD on dialysis Tuesday, Thursday, Saturday.  Nephrology was consulted for dialysis management. Seen by General Surgery - gastrografin study was recommended which was negative for obstruction. Patient was eventually able to tolerated meals without issue. She improved throughout clinical course, leukocytosis resolved. Seen by General surgery who cleared for discharge. She will continue antibiotic therapy for 4 more doses with Levaquin which is adjusted for creatine clearance - Estimated Creatinine Clearance: 10 mL/min (A) (based on SCr of 7.5 mg/dL (H)). Discussed dose with pharmacy, levaquin converted to po and adjusted for CKD status to 500 mg po every other day. Patient is awake alert and oriented, denies pain, BS positive, non-ill appearing. Discharge to home in stable condition. FU with dialysis as usual. Patient reported not taking midodrine, was dc'ed on discharge.

## 2024-05-01 VITALS
HEIGHT: 67 IN | WEIGHT: 217.38 LBS | RESPIRATION RATE: 17 BRPM | BODY MASS INDEX: 34.12 KG/M2 | DIASTOLIC BLOOD PRESSURE: 77 MMHG | TEMPERATURE: 98 F | HEART RATE: 76 BPM | OXYGEN SATURATION: 96 % | SYSTOLIC BLOOD PRESSURE: 167 MMHG

## 2024-05-01 LAB
ALBUMIN SERPL BCP-MCNC: 4 G/DL (ref 3.5–5.2)
ALP SERPL-CCNC: 257 U/L (ref 55–135)
ALT SERPL W/O P-5'-P-CCNC: 21 U/L (ref 10–44)
ANION GAP SERPL CALC-SCNC: 15 MMOL/L (ref 8–16)
AST SERPL-CCNC: 21 U/L (ref 10–40)
BASOPHILS # BLD AUTO: 0.05 K/UL (ref 0–0.2)
BASOPHILS NFR BLD: 0.7 % (ref 0–1.9)
BILIRUB SERPL-MCNC: 0.7 MG/DL (ref 0.1–1)
BUN SERPL-MCNC: 32 MG/DL (ref 6–20)
CALCIUM SERPL-MCNC: 10.8 MG/DL (ref 8.7–10.5)
CHLORIDE SERPL-SCNC: 101 MMOL/L (ref 95–110)
CO2 SERPL-SCNC: 24 MMOL/L (ref 23–29)
CREAT SERPL-MCNC: 7.5 MG/DL (ref 0.5–1.4)
DIFFERENTIAL METHOD BLD: ABNORMAL
EOSINOPHIL # BLD AUTO: 0.2 K/UL (ref 0–0.5)
EOSINOPHIL NFR BLD: 2.1 % (ref 0–8)
ERYTHROCYTE [DISTWIDTH] IN BLOOD BY AUTOMATED COUNT: 14.7 % (ref 11.5–14.5)
EST. GFR  (NO RACE VARIABLE): 6 ML/MIN/1.73 M^2
GLUCOSE SERPL-MCNC: 76 MG/DL (ref 70–110)
HCT VFR BLD AUTO: 35.5 % (ref 37–48.5)
HGB BLD-MCNC: 11.4 G/DL (ref 12–16)
IMM GRANULOCYTES # BLD AUTO: 0.02 K/UL (ref 0–0.04)
IMM GRANULOCYTES NFR BLD AUTO: 0.3 % (ref 0–0.5)
LYMPHOCYTES # BLD AUTO: 1.8 K/UL (ref 1–4.8)
LYMPHOCYTES NFR BLD: 24.4 % (ref 18–48)
MAGNESIUM SERPL-MCNC: 2.2 MG/DL (ref 1.6–2.6)
MCH RBC QN AUTO: 34.1 PG (ref 27–31)
MCHC RBC AUTO-ENTMCNC: 32.1 G/DL (ref 32–36)
MCV RBC AUTO: 106 FL (ref 82–98)
MONOCYTES # BLD AUTO: 0.5 K/UL (ref 0.3–1)
MONOCYTES NFR BLD: 6.9 % (ref 4–15)
NEUTROPHILS # BLD AUTO: 4.9 K/UL (ref 1.8–7.7)
NEUTROPHILS NFR BLD: 65.6 % (ref 38–73)
NRBC BLD-RTO: 0 /100 WBC
PHOSPHATE SERPL-MCNC: 5.6 MG/DL (ref 2.7–4.5)
PLATELET # BLD AUTO: 213 K/UL (ref 150–450)
PMV BLD AUTO: 11 FL (ref 9.2–12.9)
POCT GLUCOSE: 133 MG/DL (ref 70–110)
POCT GLUCOSE: 57 MG/DL (ref 70–110)
POTASSIUM SERPL-SCNC: 5.3 MMOL/L (ref 3.5–5.1)
PROT SERPL-MCNC: 8.3 G/DL (ref 6–8.4)
RBC # BLD AUTO: 3.34 M/UL (ref 4–5.4)
SODIUM SERPL-SCNC: 140 MMOL/L (ref 136–145)
WBC # BLD AUTO: 7.5 K/UL (ref 3.9–12.7)

## 2024-05-01 PROCEDURE — 94640 AIRWAY INHALATION TREATMENT: CPT

## 2024-05-01 PROCEDURE — 25000242 PHARM REV CODE 250 ALT 637 W/ HCPCS

## 2024-05-01 PROCEDURE — 85025 COMPLETE CBC W/AUTO DIFF WBC: CPT

## 2024-05-01 PROCEDURE — 25000242 PHARM REV CODE 250 ALT 637 W/ HCPCS: Performed by: HOSPITALIST

## 2024-05-01 PROCEDURE — 84100 ASSAY OF PHOSPHORUS: CPT

## 2024-05-01 PROCEDURE — 83735 ASSAY OF MAGNESIUM: CPT

## 2024-05-01 PROCEDURE — 94760 N-INVAS EAR/PLS OXIMETRY 1: CPT

## 2024-05-01 PROCEDURE — 36415 COLL VENOUS BLD VENIPUNCTURE: CPT

## 2024-05-01 PROCEDURE — 94761 N-INVAS EAR/PLS OXIMETRY MLT: CPT

## 2024-05-01 PROCEDURE — 25000003 PHARM REV CODE 250: Performed by: INTERNAL MEDICINE

## 2024-05-01 PROCEDURE — 80053 COMPREHEN METABOLIC PANEL: CPT

## 2024-05-01 PROCEDURE — 25000003 PHARM REV CODE 250

## 2024-05-01 RX ORDER — LEVOFLOXACIN 500 MG/1
500 TABLET, FILM COATED ORAL EVERY OTHER DAY
Qty: 4 TABLET | Refills: 0 | Status: SHIPPED | OUTPATIENT
Start: 2024-05-01 | End: 2024-05-08

## 2024-05-01 RX ORDER — LEVOFLOXACIN 500 MG/1
250 TABLET, FILM COATED ORAL DAILY
Qty: 2 TABLET | Refills: 0 | Status: SHIPPED | OUTPATIENT
Start: 2024-05-01 | End: 2024-05-01

## 2024-05-01 RX ORDER — SIMETHICONE 80 MG
1 TABLET,CHEWABLE ORAL 3 TIMES DAILY PRN
Status: DISCONTINUED | OUTPATIENT
Start: 2024-05-01 | End: 2024-05-01 | Stop reason: HOSPADM

## 2024-05-01 RX ADMIN — VENLAFAXINE HYDROCHLORIDE 150 MG: 37.5 CAPSULE, EXTENDED RELEASE ORAL at 08:05

## 2024-05-01 RX ADMIN — TORSEMIDE 100 MG: 20 TABLET ORAL at 08:05

## 2024-05-01 RX ADMIN — IPRATROPIUM BROMIDE AND ALBUTEROL SULFATE 3 ML: 2.5; .5 SOLUTION RESPIRATORY (INHALATION) at 07:05

## 2024-05-01 RX ADMIN — Medication 16 G: at 05:05

## 2024-05-01 RX ADMIN — MUPIROCIN 1 G: 20 OINTMENT TOPICAL at 08:05

## 2024-05-01 RX ADMIN — ATORVASTATIN CALCIUM 40 MG: 40 TABLET, FILM COATED ORAL at 08:05

## 2024-05-01 RX ADMIN — BUDESONIDE INHALATION 0.5 MG: 0.5 SUSPENSION RESPIRATORY (INHALATION) at 07:05

## 2024-05-01 RX ADMIN — LEVOTHYROXINE SODIUM 150 MCG: 150 TABLET ORAL at 05:05

## 2024-05-01 RX ADMIN — ROPINIROLE HYDROCHLORIDE 2 MG: 1 TABLET, FILM COATED ORAL at 08:05

## 2024-05-01 RX ADMIN — ARFORMOTEROL TARTRATE 15 MCG: 15 SOLUTION RESPIRATORY (INHALATION) at 07:05

## 2024-05-01 NOTE — PLAN OF CARE
Problem: Adult Inpatient Plan of Care  Goal: Plan of Care Review  Outcome: Progressing  Goal: Patient-Specific Goal (Individualized)  Outcome: Progressing  Goal: Absence of Hospital-Acquired Illness or Injury  Outcome: Progressing  Goal: Optimal Comfort and Wellbeing  Outcome: Progressing  Goal: Readiness for Transition of Care  Outcome: Progressing     Problem: Hemodialysis  Goal: Safe, Effective Therapy Delivery  Outcome: Progressing  Goal: Effective Tissue Perfusion  Outcome: Progressing  Goal: Absence of Infection Signs and Symptoms  Outcome: Progressing     Problem: Pneumonia  Goal: Fluid Balance  Outcome: Progressing  Goal: Resolution of Infection Signs and Symptoms  Outcome: Progressing  Goal: Effective Oxygenation and Ventilation  Outcome: Progressing     Problem: Nausea and Vomiting  Goal: Nausea and Vomiting Relief  Outcome: Progressing     Problem: Adult Inpatient Plan of Care  Goal: Plan of Care Review  Outcome: Progressing  Goal: Patient-Specific Goal (Individualized)  Outcome: Progressing  Goal: Absence of Hospital-Acquired Illness or Injury  Outcome: Progressing  Goal: Optimal Comfort and Wellbeing  Outcome: Progressing  Goal: Readiness for Transition of Care  Outcome: Progressing     Problem: Hemodialysis  Goal: Safe, Effective Therapy Delivery  Outcome: Progressing  Goal: Effective Tissue Perfusion  Outcome: Progressing  Goal: Absence of Infection Signs and Symptoms  Outcome: Progressing     Problem: Pneumonia  Goal: Fluid Balance  Outcome: Progressing  Goal: Resolution of Infection Signs and Symptoms  Outcome: Progressing  Goal: Effective Oxygenation and Ventilation  Outcome: Progressing     Problem: Nausea and Vomiting  Goal: Nausea and Vomiting Relief  Outcome: Progressing     Problem: Intestinal Obstruction  Goal: Optimal Bowel Function  Outcome: Progressing  Goal: Fluid and Electrolyte Balance  Outcome: Progressing  Goal: Absence of Infection Signs and Symptoms  Outcome: Progressing  Goal:  Optimize Nutrition Status  Outcome: Progressing  Goal: Optimal Pain Control and Function  Outcome: Progressing

## 2024-05-01 NOTE — NURSING
IV and tele removed. Orders reviewed. Pt ride here. Pt escorted to car via w/c. Pt d/c home   Sylvain has questions re: Novolog rx. Thank you.

## 2024-05-01 NOTE — PROGRESS NOTES
[facility]/Surg  General Surgery  Progress Note    Subjective:     History of Present Illness:  59 y/o female presents with on going vomiting for the last 1-2 days.  Mild associated pain.  No diarrhea.  Has constipation normally.  No recent bm, no flatus. No vomiting currently feels a little better. Multiple abdominal surgeries in the past.    Post-Op Info:  * No surgery found *         Interval History: having gi function    Medications:  Continuous Infusions:  Current Facility-Administered Medications   Medication Dose Route Frequency Last Rate Last Admin     Scheduled Meds:  Current Facility-Administered Medications   Medication Dose Route Frequency    albuterol-ipratropium  3 mL Nebulization Q6H WAKE    arformoteroL  15 mcg Nebulization BID    atorvastatin  40 mg Oral Daily    budesonide  0.5 mg Nebulization BID    [START ON 5/2/2024] levoFLOXacin  500 mg Intravenous Q48H    levothyroxine  150 mcg Oral Before breakfast    mupirocin   Nasal BID    rOPINIRole  2 mg Oral TID    torsemide  100 mg Oral BID    venlafaxine  150 mg Oral Daily     PRN Meds:  Current Facility-Administered Medications:     acetaminophen, 650 mg, Oral, Q4H PRN    aluminum-magnesium hydroxide-simethicone, 30 mL, Oral, QID PRN    dextrose 10%, 12.5 g, Intravenous, PRN    dextrose 10%, 25 g, Intravenous, PRN    glucagon (human recombinant), 1 mg, Intramuscular, PRN    glucose, 16 g, Oral, PRN    glucose, 24 g, Oral, PRN    melatonin, 9 mg, Oral, Nightly PRN    morphine, 2 mg, Intravenous, Q6H PRN    naloxone, 0.02 mg, Intravenous, PRN    sodium chloride 0.9%, 10 mL, Intravenous, Q12H PRN     Review of patient's allergies indicates:   Allergen Reactions    Lisinopril Anaphylaxis     Objective:     Vital Signs (Most Recent):  Temp: 98.5 °F (36.9 °C) (05/01/24 0340)  Pulse: 74 (05/01/24 0340)  Resp: 18 (05/01/24 0340)  BP: (!) 158/72 (05/01/24 0340)  SpO2: 95 % (05/01/24 0340) Vital Signs (24h Range):  Temp:  [97.5 °F (36.4  °C)-98.5 °F (36.9 °C)] 98.5 °F (36.9 °C)  Pulse:  [72-88] 74  Resp:  [16-19] 18  SpO2:  [91 %-98 %] 95 %  BP: ()/(44-91) 158/72     Weight: 98.6 kg (217 lb 6 oz)  Body mass index is 34.05 kg/m².    Intake/Output - Last 3 Shifts         04/29 0700 04/30 0659 04/30 0700 05/01 0659 05/01 0700  05/02 0659    P.O. 60 860     Other  600     IV Piggyback 50      Total Intake(mL/kg) 110 (1.1) 1460 (14.8)     Urine (mL/kg/hr)  0 (0)     Other  1600     Stool  0     Total Output  1600     Net +110 -140            Urine Occurrence 1 x 4 x     Stool Occurrence  5 x              Physical Exam  Abdominal:      General: Abdomen is flat. There is no distension.      Palpations: Abdomen is soft.      Tenderness: There is no abdominal tenderness.          Significant Labs:  I have reviewed all pertinent lab results within the past 24 hours.  CBC:   Recent Labs   Lab 05/01/24  0337   WBC 7.50   RBC 3.34*   HGB 11.4*   HCT 35.5*      *   MCH 34.1*   MCHC 32.1     CMP:   Recent Labs   Lab 05/01/24 0337   GLU 76   CALCIUM 10.8*   ALBUMIN 4.0   PROT 8.3      K 5.3*   CO2 24      BUN 32*   CREATININE 7.5*   ALKPHOS 257*   ALT 21   AST 21   BILITOT 0.7       Significant Diagnostics:  I have reviewed all pertinent imaging results/findings within the past 24 hours.  Xray with contrast in colon  Assessment/Plan:     * Small bowel obstruction  No obstruction  Diet as tolerated  No need for surgical intervention  Will sign off          Johnny Lezama MD  General Surgery  [facility]/Surg

## 2024-05-01 NOTE — PROGRESS NOTES
04/30/24 2000   Handoff Report   Received From Ignacio GORDON RN   Given To Selena POLANCO RN   Treatment Type   Treatment Type Maintenance   Vital Signs   Temp 97.6 °F (36.4 °C)   Temp Source Oral   Pulse 82   Heart Rate Source Monitor   Resp 18   SpO2 98 %   Device (Oxygen Therapy) room air   BP (!) 154/61   BP Method Automatic   Patient Position Lying   Assessments (Pre/Post)   Blood Liters Processed (BLP) 62.9   Transport Modality not applicable   Level of Consciousness (AVPU) alert   Dialyzer Clearance moderately streaked   Pain   Pain Rating (0-10): Rest 0   Post-Hemodialysis Assessment   Rinseback Volume (mL) 250 mL   Blood Volume Processed (Liters) 62.9 L   Dialyzer Clearance Moderately streaked   Duration of Treatment 180 minutes   Additional Fluid Intake (mL) 600 mL   Total UF (mL) 1600 mL   Net Fluid Removal 1000   Patient Response to Treatment pt jacobo tx   Post-Treatment Weight 105.2 kg (231 lb 14.8 oz)   Treatment Weight Change -1.1   Arterial bleeding stop time (min) 6 min   Venous bleeding stop time (min) 6 min   Post-Hemodialysis Comments pt stable     Pt cramped and her b/p dropped midway through the tx, uf goal lowered to 1 liter and NS given, cramps eased off after that. 1 L net uf removed

## 2024-05-01 NOTE — NURSING
Patient had a hypoglycemic episode this morning, blood sugar found to be 57 at 0529, patient asymptomatic. 16gm glucose tablets PO administered at 0536 per PRN orders. Blood sugar re checked at 0601 and is now 133. Patient remains asymptomatic.
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
## 2024-05-01 NOTE — NURSING
Notified Christie Dover NP of patient and situation, ISBAR provided, explained to provider that patient had a hypoglycemic episode this morning-blood sugar found to be 57 at 0529. Informed provider that patient's serum glucose at 0337 with am labs was 76. Informed provider that 16gm of glucose tablets administered to patient at 0536 per prn prders, and patient's blood sugar came up to 133 per glucometer at 0601. Asked provider if patient needs another serum glucose to verify. Provider voiced understanding and stated that this is fine, and that patient does not need a repeat serum glucose.

## 2024-05-01 NOTE — PLAN OF CARE
Per smart scheduling contacted [facility] for follow up within 7 days. Awaiting appointment.       05/01/24 0968   Post-Acute Status   Hospital Resources/Appts/Education Provided Post-Acute resouces added to AVS

## 2024-05-01 NOTE — PLAN OF CARE
Pt clear for DC from case management standpoint. Discharging to home.       05/01/24 1010   Final Note   Assessment Type Final Discharge Note   Anticipated Discharge Disposition Home   Hospital Resources/Appts/Education Provided Appointments scheduled and added to AVS

## 2024-05-01 NOTE — PROGRESS NOTES
INPATIENT NEPHROLOGY Progress Note  [facility]    Patient Name: Eugenia Manuel  Date: 05/01/2024    Reason for consultation: ESRD    Chief Complaint:   Chief Complaint   Patient presents with    Vomiting     Started 0600  / EGD scheduled , off plavix since Friday        History of Present Illness:  Eugenia Manuel is a 58 year old female with a past medical history of end-stage renal disease HD on T/TH/SAT, asthma, CAD w/stent, bipolar disorder, hypothyroidism, MI, CVA, restless legs syndrome, and hypertension who presents with complaints of severe intermittent epigastric cramping pain associated with decreased appetite, nausea, and vomiting for a few hours. Denies chest pain, shortness on breath, fever, chills, dizziness, headache, hemoptysis, or hematochezia. She reports last bowel movement was 3 days ago and last dialysis on Saturday. ED workup reveals CT abdomen/pelvis small-bowel obstruction and concerning for atypical pneumonia. We are consulted for dialysis.    Interval History:  4/30- VSS, on 2L NC, HD today, hasn't had much food/water, may be near/below dry weight- usual UF 3-3.5L- will check today what she can tolerate today, intermittent nausea, mild episodic abd pain, no BMs/flatus  5/1- gastrografin study neg for obstruction, UF 1L yest due to low BP/cramping, c/o some gas, had 4 BMs today    Plan of Care:    Assessment:  SBO/Atypical PNA  ESRD on HD TTS  HTN  Hyperkalemia  SHPT  Anemia of CKD    Plan:    - dose meds for CrCl < 10/HD  - HD TTS  - UF 1-3L  - renal diet, 1.5L fluid restriction when able  - adjust dialysate  - binders with meals when able  - no acute SANJANA needs    Updated outpatient HD unit about her hospitalization today.    Thank you for allowing us to participate in this patient's care. We will continue to follow.    Vital Signs:  Temp Readings from Last 3 Encounters:   05/01/24 97.9 °F (36.6 °C)   03/08/24 97.6 °F (36.4 °C) (Oral)   02/28/24 97.8 °F (36.6 °C) (Oral)        Pulse Readings from Last 3 Encounters:   05/01/24 76   04/01/24 81   03/08/24 95       BP Readings from Last 3 Encounters:   05/01/24 (!) 167/77   04/01/24 (!) 175/82   03/08/24 128/80       Weight:  Wt Readings from Last 3 Encounters:   05/01/24 98.6 kg (217 lb 6 oz)   04/01/24 102.1 kg (225 lb 1.4 oz)   03/11/24 101.2 kg (223 lb)       Medications:  Scheduled Meds:  Current Facility-Administered Medications   Medication Dose Route Frequency    albuterol-ipratropium  3 mL Nebulization Q6H WAKE    arformoteroL  15 mcg Nebulization BID    atorvastatin  40 mg Oral Daily    budesonide  0.5 mg Nebulization BID    [START ON 5/2/2024] levoFLOXacin  500 mg Intravenous Q48H    levothyroxine  150 mcg Oral Before breakfast    mupirocin   Nasal BID    rOPINIRole  2 mg Oral TID    torsemide  100 mg Oral BID    venlafaxine  150 mg Oral Daily     Continuous Infusions:  Current Facility-Administered Medications   Medication Dose Route Frequency Last Rate Last Admin     PRN Meds:.  Current Facility-Administered Medications:     acetaminophen, 650 mg, Oral, Q4H PRN    aluminum-magnesium hydroxide-simethicone, 30 mL, Oral, QID PRN    dextrose 10%, 12.5 g, Intravenous, PRN    dextrose 10%, 25 g, Intravenous, PRN    glucagon (human recombinant), 1 mg, Intramuscular, PRN    glucose, 16 g, Oral, PRN    glucose, 24 g, Oral, PRN    melatonin, 9 mg, Oral, Nightly PRN    morphine, 2 mg, Intravenous, Q6H PRN    naloxone, 0.02 mg, Intravenous, PRN    sodium chloride 0.9%, 10 mL, Intravenous, Q12H PRN  No current facility-administered medications on file prior to encounter.     Current Outpatient Medications on File Prior to Encounter   Medication Sig Dispense Refill    albuterol (PROVENTIL/VENTOLIN HFA) 90 mcg/actuation inhaler Inhale 2 puffs into the lungs every 6 (six) hours as needed. Rescue 18 g 6    atorvastatin (LIPITOR) 40 MG tablet Take 1 tablet (40 mg total) by mouth once daily. 90 tablet 0    azelastine (ASTELIN) 137 mcg (0.1  %) nasal spray 1 spray (137 mcg total) by Nasal route 2 (two) times daily. 30 mL 2    budesonide-glycopyr-formoterol (BREZTRI AEROSPHERE) 160-9-4.8 mcg/actuation HFAA Inhale 2 puffs into the lungs 2 (two) times daily. 10.7 g 6    cetirizine (ZYRTEC) 10 MG tablet Take 1 tablet (10 mg total) by mouth once daily. 90 tablet 3    clopidogreL (PLAVIX) 75 mg tablet Take 1 tablet (75 mg total) by mouth once daily. 90 tablet 3    ferric citrate (AURYXIA) 210 mg iron Tab Take 420 mg by mouth 3 (three) times daily. 540 tablet 3    levothyroxine (SYNTHROID) 150 MCG tablet Take 1 tablet (150 mcg total) by mouth before breakfast. 90 tablet 3    linaCLOtide (LINZESS) 72 mcg Cap capsule Take 1 capsule (72 mcg total) by mouth before breakfast. 30 capsule 2    melatonin (MELATIN) 3 mg tablet Take 2 tablets (6 mg total) by mouth nightly as needed for Insomnia.  0    melatonin 10 mg Cap Take 10 mg by mouth nightly as needed.      montelukast (SINGULAIR) 10 mg tablet Take 1 tablet (10 mg total) by mouth every evening. 30 tablet 6    pantoprazole (PROTONIX) 40 MG tablet Take 1 tablet (40 mg total) by mouth once daily. 90 tablet 1    QUEtiapine (SEROQUEL) 100 MG Tab Take 1 tablet (100 mg total) by mouth once daily. (Patient taking differently: Take 100 mg by mouth nightly.) 30 tablet 5    QUEtiapine (SEROQUEL) 25 MG Tab Take 1 tablet (25 mg total) by mouth every evening. (Patient taking differently: Take 25 mg by mouth once daily.) 90 tablet 1    rOPINIRole (REQUIP) 2 MG tablet Take 1 tablet (2 mg total) by mouth 3 (three) times daily. 90 tablet 1    senna-docusate 8.6-50 mg (PERICOLACE) 8.6-50 mg per tablet Take 1 tablet by mouth daily as needed for Constipation.      sevelamer carbonate (RENVELA) 800 mg Tab Take 5 tablets (4,000 mg total) by mouth 3 (three) times daily with meals. 1350 tablet 3    torsemide (DEMADEX) 100 MG Tab Take 1 tablet (100 mg total) by mouth 2 (two) times a day. 90 tablet 0    venlafaxine (EFFEXOR-XR) 150 MG Cp24  Take 1 capsule (150 mg total) by mouth once daily. 90 capsule 3    aspirin 81 MG Chew Take 1 tablet (81 mg total) by mouth once daily.  0    fluticasone propionate (FLONASE) 50 mcg/actuation nasal spray 2 sprays (100 mcg total) by Each Nostril route once daily. 16 g 1    gabapentin (NEURONTIN) 300 MG capsule Take 1 capsule (300 mg total) by mouth every evening. (Patient not taking: Reported on 4/1/2024) 30 capsule 11    midodrine (PROAMATINE) 10 MG tablet Take 1 tablet (10 mg total) by mouth daily as needed (hypotension). (Patient not taking: Reported on 4/1/2024) 180 tablet 0    nitroGLYCERIN (NITROSTAT) 0.4 MG SL tablet Place 1 tablet (0.4 mg total) under the tongue every 5 (five) minutes as needed for Chest pain. 10 tablet 5    ondansetron (ZOFRAN-ODT) 4 MG TbDL Take 1 tablet (4 mg total) by mouth 1 (one) time if needed (nausea). 1 tablet 0     Review of Systems:  Neg    Physical Exam:  General Appearance:    NAD, AAO x 3, cooperative, appears stated age   Head:    Normocephalic, atraumatic   Eyes:    PER, EOMI, and conjunctiva/sclera clear bilaterally       Mouth:   Moist mucus membranes, no thrush or oral lesions,       normal dentition   Back:     No CVA tenderness   Lungs:     Clear to auscultation bilaterally, no wheezes, crackles,           rales or rhonchi, symmetric air movement, respirations unlabored   Chest wall:    No tenderness or deformity   Heart:    Regular rate and rhythm, S1 and S2 normal, no murmur, rub   or gallop   Abdomen:     Soft, non-tender, non-distended, bowel sounds active all four   quadrants, no RT or guarding, no masses, no organomegaly   Extremities:   Warm and well perfused, distal pulses are intact, no             cyanosis or peripheral edema   MSK:   No joint or muscle swelling, tenderness or deformity   Skin:   Skin color, texture, turgor normal, no rashes or lesions   Neurologic/Psychiatric:   CNII-XII intact, normal strength and sensation       throughout, no asterixis;  normal affect, memory, judgement     and insight      Results:  Lab Results   Component Value Date     05/01/2024    K 5.3 (H) 05/01/2024     05/01/2024    CO2 24 05/01/2024    BUN 32 (H) 05/01/2024    CREATININE 7.5 (H) 05/01/2024    CALCIUM 10.8 (H) 05/01/2024    ANIONGAP 15 05/01/2024       Lab Results   Component Value Date    CALCIUM 10.8 (H) 05/01/2024    PHOS 5.6 (H) 05/01/2024       Recent Labs   Lab 05/01/24  0337   WBC 7.50   RBC 3.34*   HGB 11.4*   HCT 35.5*      *   MCH 34.1*   MCHC 32.1       I have personally reviewed pertinent radiological imaging and reports.    I have spent > 35 minutes providing care for this patient for the above diagnoses. These services have included chart/data/imaging review, evaluation, exam, formulation of plan, , note preparation, and discussions with staff involved in this patient's care.    Bayron Dumont MD MPH  [facility] [place]  [address] [place] [address]  [place] [address]  [phone] (p)  [phone] (f)

## 2024-05-01 NOTE — CARE UPDATE
04/30/24 1911   Patient Assessment/Suction   Level of Consciousness (AVPU) alert   Respiratory Effort Normal;Unlabored   Expansion/Accessory Muscles/Retractions no retractions;expansion symmetric;no use of accessory muscles   All Lung Fields Breath Sounds equal bilaterally;diminished   MIKE Breath Sounds diminished   LLL Breath Sounds diminished   RUL Breath Sounds diminished   RML Breath Sounds diminished   RLL Breath Sounds diminished   Rhythm/Pattern, Respiratory depth regular;pattern regular;unlabored   Cough Frequency no cough   PRE-TX-O2   Device (Oxygen Therapy) room air   SpO2 98 %   Pulse Oximetry Type Intermittent   $ Pulse Oximetry - Multiple Charge Pulse Oximetry - Multiple   Pulse 78   Resp 18   Aerosol Therapy   $ Aerosol Therapy Charges Aerosol Treatment   Daily Review of Necessity (SVN) completed   Respiratory Treatment Status (SVN) given   Treatment Route (SVN) mask   Patient Position HOB elevated   Post Treatment Assessment (SVN) increased aeration;breath sounds unchanged   Signs of Intolerance (SVN) none   Breath Sounds Post-Respiratory Treatment   Throughout All Fields Post-Treatment All Fields   Throughout All Fields Post-Treatment no change;aeration increased   Post-treatment Heart Rate (beats/min) 80   Post-treatment Resp Rate (breaths/min) 18   Education   $ Education 15 min;Bronchodilator

## 2024-05-01 NOTE — PLAN OF CARE
Called and notified Kailey pt would be returning for dialysis starting tomorrow.       05/01/24 1126   Post-Acute Status   Post-Acute Authorization Dialysis   Diaylsis Status Set-up Complete/Auth obtained

## 2024-05-01 NOTE — SUBJECTIVE & OBJECTIVE
Interval History: having gi function    Medications:  Continuous Infusions:  Current Facility-Administered Medications   Medication Dose Route Frequency Last Rate Last Admin     Scheduled Meds:  Current Facility-Administered Medications   Medication Dose Route Frequency    albuterol-ipratropium  3 mL Nebulization Q6H WAKE    arformoteroL  15 mcg Nebulization BID    atorvastatin  40 mg Oral Daily    budesonide  0.5 mg Nebulization BID    [START ON 5/2/2024] levoFLOXacin  500 mg Intravenous Q48H    levothyroxine  150 mcg Oral Before breakfast    mupirocin   Nasal BID    rOPINIRole  2 mg Oral TID    torsemide  100 mg Oral BID    venlafaxine  150 mg Oral Daily     PRN Meds:  Current Facility-Administered Medications:     acetaminophen, 650 mg, Oral, Q4H PRN    aluminum-magnesium hydroxide-simethicone, 30 mL, Oral, QID PRN    dextrose 10%, 12.5 g, Intravenous, PRN    dextrose 10%, 25 g, Intravenous, PRN    glucagon (human recombinant), 1 mg, Intramuscular, PRN    glucose, 16 g, Oral, PRN    glucose, 24 g, Oral, PRN    melatonin, 9 mg, Oral, Nightly PRN    morphine, 2 mg, Intravenous, Q6H PRN    naloxone, 0.02 mg, Intravenous, PRN    sodium chloride 0.9%, 10 mL, Intravenous, Q12H PRN     Review of patient's allergies indicates:   Allergen Reactions    Lisinopril Anaphylaxis     Objective:     Vital Signs (Most Recent):  Temp: 98.5 °F (36.9 °C) (05/01/24 0340)  Pulse: 74 (05/01/24 0340)  Resp: 18 (05/01/24 0340)  BP: (!) 158/72 (05/01/24 0340)  SpO2: 95 % (05/01/24 0340) Vital Signs (24h Range):  Temp:  [97.5 °F (36.4 °C)-98.5 °F (36.9 °C)] 98.5 °F (36.9 °C)  Pulse:  [72-88] 74  Resp:  [16-19] 18  SpO2:  [91 %-98 %] 95 %  BP: ()/(44-91) 158/72     Weight: 98.6 kg (217 lb 6 oz)  Body mass index is 34.05 kg/m².    Intake/Output - Last 3 Shifts         04/29 0700 04/30 0659 04/30 0700 05/01 0659 05/01 0700 05/02 0659    P.O. 60 860     Other  600     IV Piggyback 50      Total Intake(mL/kg) 110 (1.1) 1460 (14.8)      Urine (mL/kg/hr)  0 (0)     Other  1600     Stool  0     Total Output  1600     Net +110 -140            Urine Occurrence 1 x 4 x     Stool Occurrence  5 x              Physical Exam  Abdominal:      General: Abdomen is flat. There is no distension.      Palpations: Abdomen is soft.      Tenderness: There is no abdominal tenderness.          Significant Labs:  I have reviewed all pertinent lab results within the past 24 hours.  CBC:   Recent Labs   Lab 05/01/24  0337   WBC 7.50   RBC 3.34*   HGB 11.4*   HCT 35.5*      *   MCH 34.1*   MCHC 32.1     CMP:   Recent Labs   Lab 05/01/24  0337   GLU 76   CALCIUM 10.8*   ALBUMIN 4.0   PROT 8.3      K 5.3*   CO2 24      BUN 32*   CREATININE 7.5*   ALKPHOS 257*   ALT 21   AST 21   BILITOT 0.7       Significant Diagnostics:  I have reviewed all pertinent imaging results/findings within the past 24 hours.  Xray with contrast in colon

## 2024-05-01 NOTE — CARE UPDATE
05/01/24 0720   Patient Assessment/Suction   Level of Consciousness (AVPU) alert   Respiratory Effort Normal   Expansion/Accessory Muscles/Retractions expansion symmetric   MIKE Breath Sounds clear   LLL Breath Sounds clear   RUL Breath Sounds clear   RML Breath Sounds clear   RLL Breath Sounds clear   Rhythm/Pattern, Respiratory pattern regular   Cough Frequency no cough   PRE-TX-O2   Device (Oxygen Therapy) room air   SpO2 96 %   Pulse Oximetry Type Intermittent   $ Pulse Oximetry - Multiple Charge Pulse Oximetry - Multiple   Pulse 70   Resp 18   Aerosol Therapy   $ Aerosol Therapy Charges Aerosol Treatment  (Duoneb)   Daily Review of Necessity (SVN) completed   Respiratory Treatment Status (SVN) given   Treatment Route (SVN) mask   Patient Position semi-Mathias's   Signs of Intolerance (SVN) none   Breath Sounds Post-Respiratory Treatment   Throughout All Fields Post-Treatment no change   Post-treatment Heart Rate (beats/min) 76   Post-treatment Resp Rate (breaths/min) 18

## 2024-05-02 ENCOUNTER — PATIENT OUTREACH (OUTPATIENT)
Dept: ADMINISTRATIVE | Facility: CLINIC | Age: 59
End: 2024-05-02
Payer: MEDICARE

## 2024-05-02 ENCOUNTER — TELEPHONE (OUTPATIENT)
Dept: PRIMARY CARE CLINIC | Facility: CLINIC | Age: 59
End: 2024-05-02
Payer: MEDICARE

## 2024-05-02 NOTE — PROGRESS NOTES
3rd Attempt made to reach patient for TCC call in response to message left in TCM box. No answer. Left voicemail please call [phone] leave first name, last name, and  for Mazin.  I will return your call.

## 2024-05-02 NOTE — PROGRESS NOTES
C3 nurse attempted to contact Eugenia Manuel for a TCC post hospital discharge follow up call. No answer. Left voicemail with callback information. The patient has a scheduled HOSFU appointment with [facility] on 05/09/24 @ 0900.

## 2024-05-02 NOTE — PROGRESS NOTES
2nd attempt made to reach patient for TCC call in response to a message that was left. No answer. Left voicemail please call [phone] and leave first name, last name and  for Mazin. I will return your call.

## 2024-05-05 LAB — BACTERIA BLD CULT: NORMAL

## 2024-05-06 DIAGNOSIS — G25.81 RESTLESS LEG: ICD-10-CM

## 2024-05-06 RX ORDER — ROPINIROLE 2 MG/1
2 TABLET, FILM COATED ORAL 3 TIMES DAILY
Qty: 90 TABLET | Refills: 1 | Status: SHIPPED | OUTPATIENT
Start: 2024-05-06

## 2024-05-08 ENCOUNTER — PATIENT OUTREACH (OUTPATIENT)
Dept: ADMINISTRATIVE | Facility: OTHER | Age: 59
End: 2024-05-08
Payer: MEDICARE

## 2024-05-08 ENCOUNTER — TELEPHONE (OUTPATIENT)
Dept: ADMINISTRATIVE | Facility: CLINIC | Age: 59
End: 2024-05-08
Payer: MEDICARE

## 2024-05-08 NOTE — TELEPHONE ENCOUNTER
05/01  Patient states needing transportation assistance. @ 0915 chat request for transportation assistance. Advised patient someone will return call. Provided pt with HF appt details. Pt vu, denies further needs at this time.  @ 8943 PD care team advised for further assist

## 2024-05-08 NOTE — PROGRESS NOTES
CHW - Initial Contact    This Community Health Worker completed OR updated the Social Determinant of Health questionnaire with patient via telephone today.    Pt identified barriers of most importance are: Pt needs transportation from dialysis. Called [facility] and talked to Katie who stated she would contact transportation to set up pt for standing treatments post dialysis.    Referrals to community agencies completed with patient/caregiver consent outside of [facility] include: yes  Referrals were put through [facility] - no:   Support and Services: Transportation Services  Other information discussed the patient needs / wants help with: transportation   Follow up required: yes  Follow-up Outreach - Due: 5/8/2024

## 2024-05-09 DIAGNOSIS — K21.9 GASTROESOPHAGEAL REFLUX DISEASE, UNSPECIFIED WHETHER ESOPHAGITIS PRESENT: ICD-10-CM

## 2024-05-09 RX ORDER — PANTOPRAZOLE SODIUM 40 MG/1
40 TABLET, DELAYED RELEASE ORAL DAILY
Qty: 90 TABLET | Refills: 1 | Status: CANCELLED | OUTPATIENT
Start: 2024-05-09 | End: 2024-11-05

## 2024-05-10 ENCOUNTER — TELEPHONE (OUTPATIENT)
Dept: GASTROENTEROLOGY | Facility: CLINIC | Age: 59
End: 2024-05-10
Payer: MEDICARE

## 2024-05-10 ENCOUNTER — TELEPHONE (OUTPATIENT)
Dept: CARDIOLOGY | Facility: CLINIC | Age: 59
End: 2024-05-10
Payer: MEDICARE

## 2024-05-10 ENCOUNTER — PATIENT OUTREACH (OUTPATIENT)
Dept: ADMINISTRATIVE | Facility: OTHER | Age: 59
End: 2024-05-10
Payer: MEDICARE

## 2024-05-10 DIAGNOSIS — R11.0 NAUSEA: ICD-10-CM

## 2024-05-10 DIAGNOSIS — K21.9 GASTROESOPHAGEAL REFLUX DISEASE, UNSPECIFIED WHETHER ESOPHAGITIS PRESENT: Primary | ICD-10-CM

## 2024-05-10 NOTE — TELEPHONE ENCOUNTER
----- Message from Ricardo Tellez LPN sent at 5/10/2024  1:35 PM CDT -----  Regarding: blood thinner  This patient is scheduled for a colonoscopy on 5/23/2024. Please send clearance to to hold Plavix for 5 days before her procedure.

## 2024-05-10 NOTE — PROGRESS NOTES
CHW - Follow Up    This Community Health Worker completed a follow up visit with patient via telephone today.  Pt/Caregiver reported: Pt called inquiring about transportation to dialysis and another appointment.   Community Health Worker provided: Call dialysis center to speak with SW. Left a message on the dialysis 's voicemail about pt's concerns.   Follow up required: yes  Follow-up Outreach - Due: 5/13/2024

## 2024-05-10 NOTE — TELEPHONE ENCOUNTER
----- Message from Chloe Cervantes sent at 5/10/2024 10:00 AM CDT -----  Regarding: EGD  Type:  Needs Medical Advice    Who Called: Pt    Would the patient rather a call back or a response via [facility]? Call back    Best Call Back Number: [phone]    Additional Information: Pt is requesting a call back for EGD. Thank you

## 2024-05-14 NOTE — PROGRESS NOTES
CHW - Follow Up    This Community Health Worker completed a follow up visit with patient via telephone today.  Pt/Caregiver reported: Pt called to discuss her transportation issue.   Community Health Worker provided: Reminded pt that she has to make appointment with service and request ride on date of service for  in order to file a complaint of them being incompetent. She stated she understands and I will follow up.  Follow up required: yes  Follow-up Outreach - Due: 6/4/2024

## 2024-05-20 ENCOUNTER — ANESTHESIA (OUTPATIENT)
Dept: ENDOSCOPY | Facility: HOSPITAL | Age: 59
End: 2024-05-20
Payer: MEDICARE

## 2024-05-20 ENCOUNTER — ANESTHESIA EVENT (OUTPATIENT)
Dept: ENDOSCOPY | Facility: HOSPITAL | Age: 59
End: 2024-05-20
Payer: MEDICARE

## 2024-05-20 ENCOUNTER — HOSPITAL ENCOUNTER (OUTPATIENT)
Facility: HOSPITAL | Age: 59
Discharge: HOME | End: 2024-05-20
Attending: INTERNAL MEDICINE | Admitting: INTERNAL MEDICINE
Payer: MEDICARE

## 2024-05-20 VITALS
RESPIRATION RATE: 18 BRPM | OXYGEN SATURATION: 96 % | HEART RATE: 76 BPM | WEIGHT: 217 LBS | HEIGHT: 67 IN | BODY MASS INDEX: 34.06 KG/M2 | TEMPERATURE: 99 F | DIASTOLIC BLOOD PRESSURE: 103 MMHG | SYSTOLIC BLOOD PRESSURE: 157 MMHG

## 2024-05-20 DIAGNOSIS — K44.9 HIATAL HERNIA: ICD-10-CM

## 2024-05-20 DIAGNOSIS — K21.9 GASTROESOPHAGEAL REFLUX DISEASE, UNSPECIFIED WHETHER ESOPHAGITIS PRESENT: ICD-10-CM

## 2024-05-20 DIAGNOSIS — K62.5 RECTAL BLEEDING: ICD-10-CM

## 2024-05-20 DIAGNOSIS — K29.70 GASTRITIS, PRESENCE OF BLEEDING UNSPECIFIED, UNSPECIFIED CHRONICITY, UNSPECIFIED GASTRITIS TYPE: Primary | ICD-10-CM

## 2024-05-20 PROCEDURE — 37000008 HC ANESTHESIA 1ST 15 MINUTES: Performed by: INTERNAL MEDICINE

## 2024-05-20 PROCEDURE — 43239 EGD BIOPSY SINGLE/MULTIPLE: CPT | Mod: ,,, | Performed by: INTERNAL MEDICINE

## 2024-05-20 PROCEDURE — 43239 EGD BIOPSY SINGLE/MULTIPLE: CPT | Performed by: INTERNAL MEDICINE

## 2024-05-20 PROCEDURE — 25000003 PHARM REV CODE 250: Performed by: INTERNAL MEDICINE

## 2024-05-20 PROCEDURE — D9220A PRA ANESTHESIA: Mod: CRNA,,, | Performed by: NURSE ANESTHETIST, CERTIFIED REGISTERED

## 2024-05-20 PROCEDURE — 88312 SPECIAL STAINS GROUP 1: CPT | Mod: TC,59 | Performed by: PATHOLOGY

## 2024-05-20 PROCEDURE — 88305 TISSUE EXAM BY PATHOLOGIST: CPT | Mod: TC,59 | Performed by: PATHOLOGY

## 2024-05-20 PROCEDURE — 27201012 HC FORCEPS, HOT/COLD, DISP: Performed by: INTERNAL MEDICINE

## 2024-05-20 PROCEDURE — D9220A PRA ANESTHESIA: Mod: ANES,,, | Performed by: ANESTHESIOLOGY

## 2024-05-20 RX ORDER — PANTOPRAZOLE SODIUM 40 MG/1
40 TABLET, DELAYED RELEASE ORAL 2 TIMES DAILY
Start: 2024-05-20 | End: 2024-06-12 | Stop reason: SDUPTHER

## 2024-05-20 RX ORDER — SODIUM CHLORIDE 9 MG/ML
INJECTION, SOLUTION INTRAVENOUS CONTINUOUS
Status: DISCONTINUED | OUTPATIENT
Start: 2024-05-20 | End: 2024-05-20 | Stop reason: HOSPADM

## 2024-05-20 RX ADMIN — SODIUM CHLORIDE: 9 INJECTION, SOLUTION INTRAVENOUS at 01:05

## 2024-05-20 NOTE — DISCHARGE INSTRUCTIONS
Procedure Date: 5/23/2024  Arrival time:930    Report to [facility] Registration ([address] [place] [address] )    No Blood Thinner or Aspirin or antiinflammatory meds (Motrin, Aleve, Ibuprofen, Naproxen, etc) for 7 days before procedure. Tylenol is OK.    Avoid eating: Beans, Peas, Corn, Nuts, Popcorn, Okra and Tomatoes for 5 days before your procedure.       Purchase - one 64 oz bottle of Gatorade, one 238 gram (8.3 oz) bottle of Miralax (generic is ok), 4 dulcolax laxative tablets.    Pour entire bottle of Miralax into gatorade and refrigerate.      The entire day before your procedure, clear liquids only. NO FOOD.  Clear liquids include - Coffee (no cream), tea, soft drinks, jello, gatorade, popsicles, juice (apple, white grape), lemonade, clear broth. Nothing red.  No alcohol.    On the day before your procedure -      1. Take 2 dulcolax at 10 am.    2. Follow with 3 - 8 ounce glasses of clear liquids.    3. Take 2 dulcolax at 1 pm.     4. Follow with 3 - 8 ounce glasses of clear liquids.     5. At 5 pm, Drink 1/2 of the gatorade/ miralax mixture - 1 glass every 15 minutes.     6. Follow with 3 - 8 ounce glasses of clear liquids.     7. At 9 pm drink remaining 1/2 of gatorade/ miralax mixture - 1 glass every 15 minutes.     8. Follow with 3 - 8 oz glasses water.    Nothing to drink after 730 am. Not even water.     Ok to take morning meds, except diabetes meds, blood thinners, or fluid pills.    Since you will be sedated, you will need someone to drive you home, no taxi or uber.    Any questions, call [phone] 7am-3pm

## 2024-05-20 NOTE — ANESTHESIA POSTPROCEDURE EVALUATION
Anesthesia Post Evaluation    Patient: Eugenia Manuel    Procedure(s) Performed: Procedure(s) (LRB):  EGD (ESOPHAGOGASTRODUODENOSCOPY) (N/A)    Final Anesthesia Type: general      Patient location during evaluation: PACU  Patient participation: Yes- Able to Participate  Level of consciousness: awake and alert  Post-procedure vital signs: reviewed and stable  Pain management: adequate  Airway patency: patent    PONV status at discharge: No PONV  Anesthetic complications: no      Cardiovascular status: hemodynamically stable  Respiratory status: unassisted and room air  Hydration status: euvolemic  Follow-up not needed.              Vitals Value Taken Time   /103 05/20/24 1548   Temp 37.2 °C (99 °F) 05/20/24 1545   Pulse 80 05/20/24 1549   Resp 18 05/20/24 1545   SpO2 96 % 05/20/24 1549   Vitals shown include unfiled device data.      Event Time   Out of Recovery 15:55:50         Pain/Farrah Score: Farrah Score: 10 (5/20/2024  3:45 PM)

## 2024-05-20 NOTE — PROVATION PATIENT INSTRUCTIONS
Discharge Summary/Instructions after an Endoscopic Procedure  Patient Name: Eugenia Manuel  Patient MRN: [id number]  Patient YOB: 1965  Monday, May 20, 2024  Carl Lynch MD  Dear patient,  As a result of recent federal legislation (The Federal Cures Act), you may   receive lab or pathology results from your procedure in your [facility]   account before your physician is able to contact you. Your physician or   their representative will relay the results to you with their   recommendations at their soonest availability.  Thank you,  RESTRICTIONS:  During your procedure today, you received medications for sedation.  These   medications may affect your judgment, balance and coordination.  Therefore,   for 24 hours, you have the following restrictions:   - DO NOT drive a car, operate machinery, make legal/financial decisions,   sign important papers or drink alcohol.    ACTIVITY:  Today: no heavy lifting, straining or running due to procedural   sedation/anesthesia.  The following day: return to full activity including work.  DIET:  Eat and drink normally unless instructed otherwise.     TREATMENT FOR COMMON SIDE EFFECTS:  - Mild abdominal pain, nausea, belching, bloating or excessive gas:  rest,   eat lightly and use a heating pad.  - Sore Throat: treat with throat lozenges and/or gargle with warm salt   water.  - Because air was used during the procedure, expelling large amounts of air   from your rectum or belching is normal.  - If a bowel prep was taken, you may not have a bowel movement for 1-3 days.    This is normal.  SYMPTOMS TO WATCH FOR AND REPORT TO YOUR PHYSICIAN:  1. Abdominal pain or bloating, other than gas cramps.  2. Chest pain.  3. Back pain.  4. Signs of infection such as: chills or fever occurring within 24 hours   after the procedure.  5. Rectal bleeding, which would show as bright red, maroon, or black stools.   (A tablespoon of blood from the rectum is not serious, especially if    hemorrhoids are present.)  6. Vomiting.  7. Weakness or dizziness.  GO DIRECTLY TO THE NEAREST EMERGENCY ROOM IF YOU HAVE ANY OF THE FOLLOWING:      Difficulty breathing              Chills and/or fever over 101 F   Persistent vomiting and/or vomiting blood   Severe abdominal pain   Severe chest pain   Black, tarry stools   Bleeding- more than one tablespoon   Any other symptom or condition that you feel may need urgent attention  Your doctor recommends these additional instructions:  If any biopsies were taken, your doctors clinic will contact you in 1 to 2   weeks with any results.  - Patient has a contact number available for emergencies.  The signs and   symptoms of potential delayed complications were discussed with the   patient.  Return to normal activities tomorrow.  Written discharge   instructions were provided to the patient.   - Resume previous diet.   - Continue present medications.   - No aspirin, ibuprofen, naproxen, or other non-steroidal anti-inflammatory   drugs.   - Resume Plavix (clopidogrel) at prior dose today.   - Await pathology results.   - Discharge patient to home (ambulatory).   - Follow an antireflux regimen.   - Use Protonix (pantoprazole) 40 mg PO BID.   - Return to GI office after studies are complete.  For questions, problems or results please call your physician - Carl Lynch MD at Work:  [phone].  [facility] PHONE NUMBER: [phone]  IF A COMPLICATION OR EMERGENCY SITUATION ARISES AND YOU ARE UNABLE TO REACH   YOUR PHYSICIAN - GO DIRECTLY TO THE EMERGENCY ROOM.  Carl Lynch MD  5/20/2024 3:26:40 PM  This report has been verified and signed electronically.  Dear patient,  As a result of recent federal legislation (The Federal Cures Act), you may   receive lab or pathology results from your procedure in your [facility]   account before your physician is able to contact you. Your physician or   their representative will relay the results to  you with their   recommendations at their soonest availability.  Thank you,  PROVATION

## 2024-05-20 NOTE — PLAN OF CARE
Vss, jacobo po fluids, denies pain, ambulates easily. IV removed, catheter intact. Discharge instructions provided and states understanding. States ready to go home. Awaiting ride to go home

## 2024-05-20 NOTE — ANESTHESIA PREPROCEDURE EVALUATION
05/20/2024  Eugenia Manuel is a 58 y.o., female.      Pre-op Assessment    I have reviewed the Patient Summary Reports.     I have reviewed the Nursing Notes. I have reviewed the NPO Status.   I have reviewed the Medications.     Review of Systems  Anesthesia Hx:  No problems with previous Anesthesia                Social:  Non-Smoker       Cardiovascular:     Hypertension  Past MI CAD   CABG/stent   Angina           Cardiovascular Symptoms: Angina       Coronary Artery Disease:          Hx of Myocardial Infarction                  Hypertension         Pulmonary:    Asthma    Sleep Apnea   Asthma:    Obstructive Sleep Apnea (CARLOS).           Renal/:  Chronic Renal Disease, ESRD        Kidney Function/Disease             Hepatic/GI:     GERD      Gerd          Neurological:  TIA,  Neuromuscular Disease,                       TIA - Transient Ischemic Attack             Neuromuscular Disease   Endocrine:   Hypothyroidism       Hypothyroidism        Obesity / BMI > 30  Psych:  Psychiatric History                  Physical Exam  General: Well nourished, Cooperative, Alert and Oriented    Airway:  Mallampati: I   Mouth Opening: Normal  TM Distance: Normal  Tongue: Normal    Dental:  Intact    Chest/Lungs:  Normal Respiratory Rate    Heart:  Rate: Normal        Anesthesia Plan  Type of Anesthesia, risks & benefits discussed:    Anesthesia Type: Gen Natural Airway  Intra-op Monitoring Plan: Standard ASA Monitors  Induction:  IV  Informed Consent: Informed consent signed with the Patient and all parties understand the risks and agree with anesthesia plan.  All questions answered.   ASA Score: 3    Ready For Surgery From Anesthesia Perspective.     .

## 2024-05-20 NOTE — H&P
CC: Epigastric pain, GERD    58 year old female with above. States that symptoms are stable, no alleviating/exacerbating factors. . No bleeding or weight loss.     ROS:  No headache, no fever/chills, no chest pain/SOB, no nausea/vomiting/diarrhea/constipation/GI bleeding/abdominal pain, no dysuria/hematuria.    VSSAF   Exam:   Alert and oriented x 3; no apparent distress   PERRLA, sclera anicteric  CV: Regular rate/rhythm, normal PMI   Lungs: Clear bilaterally with no wheeze/rales   Abdomen: Soft, NT/ND, normal bowel sounds   Ext: No cyanosis, clubbing     Impression:   As above    Plan:   Proceed with endoscopy. Further recs to follow.

## 2024-05-30 DIAGNOSIS — F31.9 BIPOLAR AFFECTIVE DISORDER, REMISSION STATUS UNSPECIFIED: ICD-10-CM

## 2024-05-30 RX ORDER — QUETIAPINE FUMARATE 25 MG/1
25 TABLET, FILM COATED ORAL NIGHTLY
Qty: 90 TABLET | Refills: 1 | Status: SHIPPED | OUTPATIENT
Start: 2024-05-30 | End: 2024-11-26

## 2024-05-30 NOTE — TELEPHONE ENCOUNTER
APPT. 9/13/24, LOV. 3/8/24.    Patient's medication was placed as NO PRINT, pended again as normal.

## 2024-06-04 ENCOUNTER — PATIENT OUTREACH (OUTPATIENT)
Dept: ADMINISTRATIVE | Facility: OTHER | Age: 59
End: 2024-06-04
Payer: MEDICARE

## 2024-06-06 NOTE — PROGRESS NOTES
Will you give Ms. Manuel a call to reschedule her f/u to review her MRI and decide on how to move forward with her care?   Thank you,   Dr. Heard

## 2024-06-07 ENCOUNTER — HOSPITAL ENCOUNTER (EMERGENCY)
Facility: HOSPITAL | Age: 59
Discharge: HOME | End: 2024-06-07
Attending: EMERGENCY MEDICINE
Payer: MEDICARE

## 2024-06-07 VITALS
SYSTOLIC BLOOD PRESSURE: 128 MMHG | HEART RATE: 78 BPM | DIASTOLIC BLOOD PRESSURE: 62 MMHG | RESPIRATION RATE: 18 BRPM | TEMPERATURE: 98 F | WEIGHT: 208 LBS | OXYGEN SATURATION: 96 % | BODY MASS INDEX: 32.58 KG/M2

## 2024-06-07 DIAGNOSIS — S30.0XXA CONTUSION OF LEFT BUTTOCK: ICD-10-CM
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
## 2024-06-07 DIAGNOSIS — S40.012A CONTUSION OF LEFT SHOULDER, INITIAL ENCOUNTER: ICD-10-CM

## 2024-06-07 PROCEDURE — 99285 EMERGENCY DEPT VISIT HI MDM: CPT | Mod: 25

## 2024-06-07 PROCEDURE — 25000003 PHARM REV CODE 250: Performed by: EMERGENCY MEDICINE

## 2024-06-07 RX ORDER — HYDROCODONE BITARTRATE AND ACETAMINOPHEN 5; 325 MG/1; MG/1
1 TABLET ORAL
Status: COMPLETED | OUTPATIENT
Start: 2024-06-07 | End: 2024-06-07

## 2024-06-07 RX ADMIN — HYDROCODONE BITARTRATE AND ACETAMINOPHEN 1 TABLET: 5; 325 TABLET ORAL at 08:06

## 2024-06-07 NOTE — DISCHARGE INSTRUCTIONS
RETURN TO EMERGENCY DEPARTMENT WITHOUT FAIL, IF YOUR SYMPTOMS WORSEN, IF YOU GET NEW OR DIFFERENT SYMPTOMS, IF YOU ARE UNABLE TO FOLLOW UP AS DIRECTED, OR IF YOU HAVE ANY CONCERNS OR WORRIES.    Take Tylenol as needed for pain.  Follow up with primary care provider.

## 2024-06-07 NOTE — ED PROVIDER NOTES
Encounter Date: 6/7/2024       History     Chief Complaint   Patient presents with    Fall     Slipped in shower and fell. Complains of left hip and left shoulder pain     This is a 58-year-old female with end-stage renal disease on dialysis, bipolar, stroke, anxiety, asthma, coronary artery disease, who presents emergency department with a slip and fall.  She was in the shower this morning and she did not have the rubber mat in and she slipped and fell and thinks that she hit her head on the way down and injured her left hip in her head and her left shoulder.  She is on Plavix.  She did not lose consciousness.  Mostly the pain is in the left hip she states in the left shoulder.  She is due to go to dialysis today.  Denies any shortness of breath or any chest pain.      Review of patient's allergies indicates:   Allergen Reactions    Lisinopril Anaphylaxis     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     Generalized edema     GERD (gastroesophageal reflux disease)     Heart attack 2021    History of traumatic brain injury     Hypertension     Hypothyroidism     Insomnia     Muscle weakness (generalized)     Restless leg syndrome     Senile dementia, uncomplicated     Sleep apnea, unspecified     TIA (transient ischemic attack)     Unsteadiness on feet      Past Surgical History:   Procedure Laterality Date    ADENOIDECTOMY      ANGIOGRAM, CORONARY, WITH LEFT HEART  CATHETERIZATION N/A 02/12/2024    Procedure: Angiogram, Coronary, with Left Heart Cath;  Surgeon: Cristian Carlisle MD;  Location: [facility] CATH/EP LAB;  Service: Cardiology;  Laterality: N/A;    APPENDECTOMY      AV FISTULA PLACEMENT Left     CHOLECYSTECTOMY      COLONOSCOPY      10 years ago in [place]    CORONARY STENT PLACEMENT  2021    ESOPHAGOGASTRODUODENOSCOPY N/A 5/20/2024    Procedure: EGD (ESOPHAGOGASTRODUODENOSCOPY);  Surgeon: Carl Conti MD;  Location: [facility] ENDO;  Service: Endoscopy;  Laterality: N/A;    IVUS (INTRAVASCULAR ULTRASOUND) N/A 02/12/2024    Procedure: ULTRASOUND, INTRAVASCULAR;  Surgeon: Cristian Carlisle MD;  Location: [facility] CATH/EP LAB;  Service: Cardiology;  Laterality: N/A;    KNEE SURGERY      knee arthroplasty    LEFT HEART CATHETERIZATION  2021    NECK SURGERY      PARTIAL HYSTERECTOMY      STENT, DRUG ELUTING, MULTI VESSEL, CORONARY  02/12/2024    Procedure: Stent, Drug Eluting, Multi Vessel, Coronary;  Surgeon: Cristian Carlisle MD;  Location: [facility] CATH/EP LAB;  Service: Cardiology;;    TONSILLECTOMY       No family history on file.  Social History     Tobacco Use    Smoking status: Never    Smokeless tobacco: Never   Substance Use Topics    Alcohol use: Never    Drug use: Never     Review of Systems   Constitutional:  Negative for fever.   HENT:  Negative for sore throat.    Respiratory:  Negative for shortness of breath.    Cardiovascular:  Negative for chest pain.   Gastrointestinal:  Negative for nausea.   Genitourinary:  Negative for dysuria.   Musculoskeletal:  Positive for arthralgias and myalgias. Negative for back pain.   Skin:  Negative for rash.   Neurological:  Negative for weakness.   Hematological:  Does not bruise/bleed easily.   All other systems reviewed and are negative.      Physical Exam     Initial Vitals [06/07/24 0612]   BP Pulse Resp Temp SpO2   (!) 156/76 80 16 98.2 °F (36.8 °C) (!) 93 %      MAP       --         Physical Exam    Nursing note and vitals  reviewed.  Constitutional: She appears well-developed and well-nourished. No distress.   HENT:   Head: Normocephalic and atraumatic.   Mouth/Throat: No oropharyngeal exudate.   Eyes: Conjunctivae and EOM are normal. Pupils are equal, round, and reactive to light.   Neck: Neck supple. No tracheal deviation present.   Cardiovascular:  Normal rate, regular rhythm, normal heart sounds and intact distal pulses.           No murmur heard.  Pulmonary/Chest: Breath sounds normal. No stridor. No respiratory distress. She has no wheezes. She has no rhonchi. She has no rales. She exhibits no tenderness.   Clear and equal breath sounds bilaterally.  No chest wall tenderness to palpation   Abdominal: Abdomen is soft. She exhibits no distension. There is no abdominal tenderness. There is no rebound and no guarding.   Musculoskeletal:         General: No tenderness or edema. Normal range of motion.      Cervical back: Neck supple.      Comments: Left shoulder, there is pain with palpation to the posterior aspect of the left glenohumeral joint region.  Full range of motion passively without pain.  Neurovascular intact distally.  Patient has a palpable thrill and bruit in the left arm region from an AV fistula.    Left hip: There is tenderness to the greater trochanteric region.  Patient is able to flex upward to about 60° with mild pain.  Left leg is not shortened or externally rotated, neurovascularly intact distally.         Neurological: She is alert and oriented to person, place, and time. She has normal strength. No cranial nerve deficit or sensory deficit.   Skin: Skin is warm and dry. Capillary refill takes less than 2 seconds. No rash noted. No erythema. No pallor.   Psychiatric: She has a normal mood and affect. Her behavior is normal. Judgment and thought content normal.         ED Course   Procedures  Labs Reviewed - No data to display       Imaging Results              CT Pelvis Without Contrast (Final result)  Result  time 06/07/24 09:48:32      Final result by Romain Copeland Jr., MD (06/07/24 09:48:32)                   Impression:      Large left buttock contusion and small hematomas.  An acute fracture of the left pelvis or elsewhere is not seen.  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  Degenerative disc disease noted at L3-4, L4-5 and L5-S1.      Electronically signed by: Romain Copeland MD  Date:    06/07/2024  Time:    09:48               Narrative:    EXAMINATION:  CT PELVIS WITHOUT CONTRAST    CLINICAL HISTORY:  Pelvic trauma;    TECHNIQUE:  Axial images are obtained through the pelvis and displayed at bone and soft tissue windows.  Sagittal and coronal reconstructions are provided.    COMPARISON:  CT abdomen and pelvis of April 29, 2024.    FINDINGS:  Degenerative disc disease is noted at L3-4, L4-5 and L5-S1. and whole partially fused fracture of the left ischiopubic synchondrosis is noted.  This is unchanged from the prior study of April 2024.  A fracture of either hip is not seen.  The rest of the bones of the pelvis are intact.  There is a large contusion in the left buttock.  Small quantities of hematoma are also seen in the subcutaneous fat.  The underlying ileum is intact.    Within the pelvis the bladder is not full but is without a focal mass.  Free hemorrhage in the pelvis is not identified.                                       CT Cervical Spine Without Contrast (Final result)  Result time 06/07/24 08:53:28      Final result by Rey Cuevas MD (06/07/24 08:53:28)                   Impression:      1. Cervical spondylosis and postoperative changes redemonstrated without evidence of an acute process.      Electronically signed by: Rey Cuevas  Date:    06/07/2024  Time:    08:53               Narrative:    EXAMINATION:  CT CERVICAL SPINE WITHOUT CONTRAST    CLINICAL HISTORY:  Neck trauma, midline tenderness (Age  16-64y);    TECHNIQUE:  Low dose axial images, sagittal and coronal reformations were performed though the cervical spine.  Contrast was not administered.    COMPARISON:  CT 01/31/2024.    FINDINGS:  Bones: Redemonstrated ACDF changes spanning C3-C4.  Hardware demonstrates stable alignment.  Solid-appearing osseous fusion at C4-C5 and C5-C6.  Advanced degenerative disc height loss with subcortical cystic formation and sclerosis with endplate centered osteophytes C6-C7 and C7-T1 similar to the prior exam.  No acute fractures identified.    Alignment: Similar trace retrolisthesis of C3 on C4.    Craniocervical junction: Regional osseous anatomy is within normal limits.  No malalignment.    Disc levels:    Multilevel spondylosis.  Broad-based disc osteophyte complex at C3-C4 producing mild to moderate narrowing of the osseous spinal canal.  There is also severe bilateral foraminal narrowing at this level.  No high-grade osseous spinal canal narrowing identified.  Additional uncovertebral spurring and facet arthrosis produces multilevel foraminal narrowing.  No gross interval change identified.    Paraspinal soft tissues: Right apical pulmonary scarring.  Visualized paraspinal soft tissues are within normal limits.                                       CT Head Without Contrast (Final result)  Result time 06/07/24 08:45:21      Final result by Rey Cuevas MD (06/07/24 08:45:21)                   Impression:      1. No hemorrhage or other acute intracranial CT findings.      Electronically signed by: Rey Cuevas  Date:    06/07/2024  Time:    08:45               Narrative:    EXAMINATION:  CT HEAD WITHOUT CONTRAST    CLINICAL HISTORY:  Head trauma, moderate-severe;    TECHNIQUE:  Low dose axial CT images obtained throughout the head without intravenous contrast. Sagittal and coronal reconstructions were performed.    COMPARISON:  Head CT 02/27/2024.    FINDINGS:  Brain: There is no evidence of a mass, edema, midline  shift, or intracranial hemorrhage. No extra-axial fluid collection.  Redemonstrated chronic lacunar type infarct within the left caudate on a background of patchy low density throughout the cerebral hemispheric white matter consistent with moderate sequela of chronic small vessel ischemic change.  No CT evidence of an acute major vascular territorial infarct.    Ventricles: The ventricles, sulci, and cisterns are within normal limits.    Skull: The osseous structures are unremarkable in appearance.    Extracranial soft tissues: Limited imaging is within normal limits.    Other: Mild patchy mucosal thickening within the maxillary sinuses.  Mastoid air cells are clear.  No focal orbital abnormality identified.                                       X-Ray Shoulder Trauma Left (Final result)  Result time 06/07/24 07:46:17      Final result by Romain Copeland Jr., MD (06/07/24 07:46:17)                   Impression:      Negative shoulder radiographs.      Electronically signed by: Romain Copeland MD  Date:    06/07/2024  Time:    07:46               Narrative:    EXAMINATION:  XR SHOULDER TRAUMA 3 VIEW LEFT    CLINICAL HISTORY:  Unspecified fall, initial encounter    TECHNIQUE:  2 or more views of the shoulder are obtained.    COMPARISON:  None.    FINDINGS:  No dislocation is seen.  A fracture of the scapula, humerus or clavicle is not seen.  The acromioclavicular and glenohumeral joints are within normal limits.                                       X-Ray Hip 2 or 3 views Left with Pelvis when performed (Final result)  Result time 06/07/24 07:46:02      Final result by Romain Copeland Jr., MD (06/07/24 07:46:02)                   Impression:      Negative left hip x-rays.  Probable old healed fracture of the left ischiopubic synchondrosis.      Electronically signed by: Romain Copeland MD  Date:    06/07/2024  Time:    07:46               Narrative:    EXAMINATION:  XR HIP WITH PELVIS WHEN PERFORMED 2 OR 3  VIEWS LEFT    CLINICAL HISTORY:  Unspecified fall, initial encounter    TECHNIQUE:  AP view of the pelvis and frog leg lateral view of the left hip were performed.    COMPARISON:  Hip and pelvic x-ray of November 25, 2023    FINDINGS:  A fracture of the left hip is not seen.  The femur and acetabulum are intact.  There appears to be an old healed fracture of the left ischiopubic synchondrosis.  The bones of the pelvis and right hip are intact.  The sacroiliac joints are sharp and symmetric.                                       Medications   HYDROcodone-acetaminophen 5-325 mg per tablet 1 tablet (1 tablet Oral Given 6/7/24 0843)     Medical Decision Making  Differential includes but not limited to fall, contusion, abrasion, fracture, dislocation,     Emergent evaluation of a 58-year-old female presents emergency department after a slip and fall shower.  She was evaluated in the ER and does not have any evidence of any obvious fracture/dislocation.  She has a contusion to her posterior hip region buttock region.  She was able to bear weight in the emergency department.  CT scan of the pelvis did not show any new fracture she had old healing fracture but nothing new.  Patient also had x-ray of the shoulder which was negative.  CT scan of the head and C-spine were ordered as she is on blood thinners and possibly hit her head.  No evidence of any fracture dislocation.  Recommended supportive care, Tylenol.  I recommend she go to her dialysis session today.  She will call scheduled session in the afternoon.    A dictation software program was used for this note.  Please expect some simple typographical  errors in this note.    I had a detailed discussion with the patient and/or guardian regarding: The historical points, exam findings, and diagnostic results supporting the discharge diagnosis, lab results, pertinent radiology results, and the need for outpatient follow-up, for definitive care with a family practitioner  and to return to the emergency department if symptoms worsen or persist or if there are any questions or concerns that arise at home. All questions have been answered in detail. Strict return to Emergency Department precautions have been provided.       Amount and/or Complexity of Data Reviewed  External Data Reviewed: labs and notes.  Labs: ordered. Decision-making details documented in ED Course.  Radiology: ordered and independent interpretation performed. Decision-making details documented in ED Course.    Risk  OTC drugs.  Prescription drug management.                                      Clinical Impression:  Final diagnoses:  [W19.XXXA] Fall (Primary)  [S30.0XXA] Contusion of left buttock  [S40.012A] Contusion of left shoulder, initial encounter          ED Disposition Condition    Discharge Stable          ED Prescriptions    None       Follow-up Information       Follow up With Specialties Details Why Contact Info Additional Information    John Castro MD Family Medicine In 3 days  [address] [facility]  [address]  [facility] [address]  [phone]       Critical access hospital -  Emergency Medicine  If symptoms worsen [address] [place] [address] [address] [place] [address] 1st floor             Benoit Spicer DO  06/07/24 [id number]

## 2024-06-10 ENCOUNTER — PATIENT OUTREACH (OUTPATIENT)
Dept: EMERGENCY MEDICINE | Facility: HOSPITAL | Age: 59
End: 2024-06-10

## 2024-06-12 DIAGNOSIS — K21.9 GASTROESOPHAGEAL REFLUX DISEASE, UNSPECIFIED WHETHER ESOPHAGITIS PRESENT: ICD-10-CM

## 2024-06-12 RX ORDER — PANTOPRAZOLE SODIUM 40 MG/1
40 TABLET, DELAYED RELEASE ORAL 2 TIMES DAILY
Qty: 180 TABLET | Refills: 1 | Status: SHIPPED | OUTPATIENT
Start: 2024-06-12 | End: 2025-06-12

## 2024-06-20 ENCOUNTER — TELEPHONE (OUTPATIENT)
Dept: CARDIOLOGY | Facility: CLINIC | Age: 59
End: 2024-06-20
Payer: MEDICARE

## 2024-06-20 NOTE — TELEPHONE ENCOUNTER
----- Message from Zehra Slater sent at 6/20/2024  1:11 PM CDT -----  Contact: Self  Type:  Sooner Appointment Request    Caller is requesting a sooner appointment.  Caller declined first available appointment listed below.  Caller will not accept being placed on the waitlist and is requesting a message be sent to doctor.    Name of Caller:  Patient  When is the first available appointment?  N/A  Symptoms:  Pt has been feeling really bad, has no energy, feels like she will pass out  Would the patient rather a call back or a response via [facility]? Call  Best Call Back Number:  [phone]  Additional Information:  Thank You

## 2024-06-21 ENCOUNTER — NURSE TRIAGE (OUTPATIENT)
Dept: ADMINISTRATIVE | Facility: CLINIC | Age: 59
End: 2024-06-21
Payer: MEDICARE

## 2024-06-21 NOTE — TELEPHONE ENCOUNTER
Pt states that she had a fall and was seen in ED on 6/7/24. Pt injured left shoulder and hip. Now c/o left shoulder pain 10/10. Pt states that she can not lift shoulder when putting arm straight out. Advised to be seen in office today per protocol. VU. Unable to schedule appt per protocol. Encounter routed to provider for f/u call.       Reason for Disposition   SEVERE pain (e.g., excruciating)    Additional Information   Negative: Major bleeding (actively dripping or spurting) that can't be stopped   Negative: Amputation or bone sticking through the skin   Negative: Bullet, stabbed by knife or other serious penetrating wound   Negative: Serious injury with multiple fractures (broken bones)   Negative: Sounds like a life-threatening emergency to the triager   Negative: Looks like a broken bone or dislocated joint (crooked or deformed)   Negative: Can't move injured shoulder at all   Negative: Collar bone is painful or tender to touch   Negative: Skin is split open or gaping (length > 1/2 inch or 12 mm)   Negative: Bleeding won't stop after 10 minutes of direct pressure (using correct technique)   Negative: Dirt in the wound and not removed after 15 minutes of scrubbing   Negative: Sounds like a serious injury to the triager    Protocols used: Shoulder Injury-A-OH

## 2024-06-26 ENCOUNTER — TELEPHONE (OUTPATIENT)
Dept: CARDIOLOGY | Facility: CLINIC | Age: 59
End: 2024-06-26
Payer: MEDICARE

## 2024-06-26 NOTE — TELEPHONE ENCOUNTER
----- Message from Emerson Shaw sent at 6/26/2024  1:09 PM CDT -----  Regarding: appointment  Contact: patient  Type:  Sooner Apoointment Request    Caller is requesting a sooner appointment.  Caller declined first available appointment listed below.  Caller will not accept being placed on the waitlist and is requesting a message be sent to doctor.  Name of Caller:patient  When is the first available appointment?unable to schedule  Symptoms:yearly check up  Would the patient rather a call back or a response via [facility]? Please call to schedule  Best Call Back Number:[phone]  Additional Information:

## 2024-07-08 ENCOUNTER — HOSPITAL ENCOUNTER (INPATIENT)
Facility: HOSPITAL | Age: 59
LOS: 6 days | Discharge: HOME | DRG: 335 | End: 2024-07-14
Attending: EMERGENCY MEDICINE | Admitting: HOSPITALIST
Payer: MEDICARE

## 2024-07-08 DIAGNOSIS — R94.31 ELECTROCARDIOGRAM SHOWING T WAVE ABNORMALITIES: ICD-10-CM

## 2024-07-08 DIAGNOSIS — N18.6 ESRD (END STAGE RENAL DISEASE): ICD-10-CM

## 2024-07-08 DIAGNOSIS — K56.609 SMALL BOWEL OBSTRUCTION: ICD-10-CM

## 2024-07-08 DIAGNOSIS — R07.9 CHEST PAIN: ICD-10-CM

## 2024-07-08 DIAGNOSIS — E87.5 HYPERKALEMIA: ICD-10-CM

## 2024-07-08 LAB
ALBUMIN SERPL BCP-MCNC: 3.7 G/DL (ref 3.5–5.2)
ALP SERPL-CCNC: 250 U/L (ref 55–135)
ALT SERPL W/O P-5'-P-CCNC: 12 U/L (ref 10–44)
ANION GAP SERPL CALC-SCNC: 25 MMOL/L (ref 8–16)
AST SERPL-CCNC: 10 U/L (ref 10–40)
BACTERIA #/AREA URNS HPF: NORMAL /HPF
BASOPHILS # BLD AUTO: 0.03 K/UL (ref 0–0.2)
BASOPHILS NFR BLD: 0.4 % (ref 0–1.9)
BILIRUB SERPL-MCNC: 0.7 MG/DL (ref 0.1–1)
BILIRUB UR QL STRIP: NEGATIVE
BUN SERPL-MCNC: 64 MG/DL (ref 6–20)
CALCIUM SERPL-MCNC: 8.7 MG/DL (ref 8.7–10.5)
CHLORIDE SERPL-SCNC: 93 MMOL/L (ref 95–110)
CLARITY UR: CLEAR
CO2 SERPL-SCNC: 19 MMOL/L (ref 23–29)
COLOR UR: YELLOW
CREAT SERPL-MCNC: 11.9 MG/DL (ref 0.5–1.4)
DIFFERENTIAL METHOD BLD: ABNORMAL
EOSINOPHIL # BLD AUTO: 0.1 K/UL (ref 0–0.5)
EOSINOPHIL NFR BLD: 0.9 % (ref 0–8)
ERYTHROCYTE [DISTWIDTH] IN BLOOD BY AUTOMATED COUNT: 18.8 % (ref 11.5–14.5)
EST. GFR  (NO RACE VARIABLE): 3 ML/MIN/1.73 M^2
GLUCOSE SERPL-MCNC: 176 MG/DL (ref 70–110)
GLUCOSE UR QL STRIP: NEGATIVE
HCT VFR BLD AUTO: 34.1 % (ref 37–48.5)
HGB BLD-MCNC: 11.1 G/DL (ref 12–16)
HGB UR QL STRIP: ABNORMAL
HYALINE CASTS #/AREA URNS LPF: 0 /LPF
IMM GRANULOCYTES # BLD AUTO: 0.02 K/UL (ref 0–0.04)
IMM GRANULOCYTES NFR BLD AUTO: 0.3 % (ref 0–0.5)
KETONES UR QL STRIP: NEGATIVE
LEUKOCYTE ESTERASE UR QL STRIP: ABNORMAL
LIPASE SERPL-CCNC: 44 U/L (ref 4–60)
LYMPHOCYTES # BLD AUTO: 1.1 K/UL (ref 1–4.8)
LYMPHOCYTES NFR BLD: 14.2 % (ref 18–48)
MCH RBC QN AUTO: 36 PG (ref 27–31)
MCHC RBC AUTO-ENTMCNC: 32.6 G/DL (ref 32–36)
MCV RBC AUTO: 111 FL (ref 82–98)
MICROSCOPIC COMMENT: NORMAL
MONOCYTES # BLD AUTO: 0.7 K/UL (ref 0.3–1)
MONOCYTES NFR BLD: 8.3 % (ref 4–15)
NEUTROPHILS # BLD AUTO: 6 K/UL (ref 1.8–7.7)
NEUTROPHILS NFR BLD: 75.9 % (ref 38–73)
NITRITE UR QL STRIP: NEGATIVE
NRBC BLD-RTO: 0 /100 WBC
PH UR STRIP: 8 [PH] (ref 5–8)
PLATELET # BLD AUTO: 166 K/UL (ref 150–450)
PMV BLD AUTO: 10.9 FL (ref 9.2–12.9)
POCT GLUCOSE: 79 MG/DL (ref 70–110)
POTASSIUM SERPL-SCNC: 4 MMOL/L (ref 3.5–5.1)
POTASSIUM SERPL-SCNC: 6 MMOL/L (ref 3.5–5.1)
POTASSIUM SERPL-SCNC: 6 MMOL/L (ref 3.5–5.1)
POTASSIUM SERPL-SCNC: 6.1 MMOL/L (ref 3.5–5.1)
PROT SERPL-MCNC: 7.8 G/DL (ref 6–8.4)
PROT UR QL STRIP: ABNORMAL
RBC # BLD AUTO: 3.08 M/UL (ref 4–5.4)
RBC #/AREA URNS HPF: 3 /HPF (ref 0–4)
SODIUM SERPL-SCNC: 137 MMOL/L (ref 136–145)
SP GR UR STRIP: 1.01 (ref 1–1.03)
SQUAMOUS #/AREA URNS HPF: 3 /HPF
URN SPEC COLLECT METH UR: ABNORMAL
UROBILINOGEN UR STRIP-ACNC: NEGATIVE EU/DL
WBC # BLD AUTO: 7.86 K/UL (ref 3.9–12.7)
WBC #/AREA URNS HPF: 1 /HPF (ref 0–5)

## 2024-07-08 PROCEDURE — 82962 GLUCOSE BLOOD TEST: CPT

## 2024-07-08 PROCEDURE — 99291 CRITICAL CARE FIRST HOUR: CPT

## 2024-07-08 PROCEDURE — 84132 ASSAY OF SERUM POTASSIUM: CPT | Mod: 91

## 2024-07-08 PROCEDURE — 11000001 HC ACUTE MED/SURG PRIVATE ROOM

## 2024-07-08 PROCEDURE — 63600175 PHARM REV CODE 636 W HCPCS

## 2024-07-08 PROCEDURE — 80100016 HC MAINTENANCE HEMODIALYSIS

## 2024-07-08 PROCEDURE — 81000 URINALYSIS NONAUTO W/SCOPE: CPT | Performed by: EMERGENCY MEDICINE

## 2024-07-08 PROCEDURE — 94760 N-INVAS EAR/PLS OXIMETRY 1: CPT

## 2024-07-08 PROCEDURE — 25000242 PHARM REV CODE 250 ALT 637 W/ HCPCS: Performed by: EMERGENCY MEDICINE

## 2024-07-08 PROCEDURE — 99900035 HC TECH TIME PER 15 MIN (STAT)

## 2024-07-08 PROCEDURE — 96366 THER/PROPH/DIAG IV INF ADDON: CPT

## 2024-07-08 PROCEDURE — 36415 COLL VENOUS BLD VENIPUNCTURE: CPT | Performed by: EMERGENCY MEDICINE

## 2024-07-08 PROCEDURE — 63600175 PHARM REV CODE 636 W HCPCS: Performed by: NURSE PRACTITIONER

## 2024-07-08 PROCEDURE — 93005 ELECTROCARDIOGRAM TRACING: CPT

## 2024-07-08 PROCEDURE — 36415 COLL VENOUS BLD VENIPUNCTURE: CPT

## 2024-07-08 PROCEDURE — 63600175 PHARM REV CODE 636 W HCPCS: Performed by: EMERGENCY MEDICINE

## 2024-07-08 PROCEDURE — 80053 COMPREHEN METABOLIC PANEL: CPT | Performed by: EMERGENCY MEDICINE

## 2024-07-08 PROCEDURE — 85025 COMPLETE CBC W/AUTO DIFF WBC: CPT | Performed by: EMERGENCY MEDICINE

## 2024-07-08 PROCEDURE — 99223 1ST HOSP IP/OBS HIGH 75: CPT | Mod: ,,, | Performed by: SURGERY

## 2024-07-08 PROCEDURE — 36415 COLL VENOUS BLD VENIPUNCTURE: CPT | Performed by: HOSPITALIST

## 2024-07-08 PROCEDURE — 96361 HYDRATE IV INFUSION ADD-ON: CPT

## 2024-07-08 PROCEDURE — 25000003 PHARM REV CODE 250: Performed by: EMERGENCY MEDICINE

## 2024-07-08 PROCEDURE — 96375 TX/PRO/DX INJ NEW DRUG ADDON: CPT

## 2024-07-08 PROCEDURE — 96367 TX/PROPH/DG ADDL SEQ IV INF: CPT

## 2024-07-08 PROCEDURE — 94640 AIRWAY INHALATION TREATMENT: CPT

## 2024-07-08 PROCEDURE — 83690 ASSAY OF LIPASE: CPT | Performed by: EMERGENCY MEDICINE

## 2024-07-08 PROCEDURE — 93010 ELECTROCARDIOGRAM REPORT: CPT | Mod: ,,, | Performed by: GENERAL PRACTICE

## 2024-07-08 PROCEDURE — 5A1D70Z PERFORMANCE OF URINARY FILTRATION, INTERMITTENT, LESS THAN 6 HOURS PER DAY: ICD-10-PCS | Performed by: INTERNAL MEDICINE

## 2024-07-08 PROCEDURE — 94761 N-INVAS EAR/PLS OXIMETRY MLT: CPT

## 2024-07-08 PROCEDURE — 90935 HEMODIALYSIS ONE EVALUATION: CPT

## 2024-07-08 PROCEDURE — 84132 ASSAY OF SERUM POTASSIUM: CPT | Mod: 91 | Performed by: HOSPITALIST

## 2024-07-08 PROCEDURE — 94799 UNLISTED PULMONARY SVC/PX: CPT

## 2024-07-08 PROCEDURE — 99900031 HC PATIENT EDUCATION (STAT)

## 2024-07-08 PROCEDURE — 25000003 PHARM REV CODE 250

## 2024-07-08 PROCEDURE — 25500020 PHARM REV CODE 255

## 2024-07-08 PROCEDURE — 96365 THER/PROPH/DIAG IV INF INIT: CPT

## 2024-07-08 RX ORDER — PREGABALIN 50 MG/1
50 CAPSULE ORAL DAILY
COMMUNITY

## 2024-07-08 RX ORDER — NALOXONE HCL 0.4 MG/ML
0.02 VIAL (ML) INJECTION
Status: DISCONTINUED | OUTPATIENT
Start: 2024-07-08 | End: 2024-07-14 | Stop reason: HOSPADM

## 2024-07-08 RX ORDER — HEPARIN SODIUM 5000 [USP'U]/ML
5000 INJECTION, SOLUTION INTRAVENOUS; SUBCUTANEOUS
Status: DISCONTINUED | OUTPATIENT
Start: 2024-07-08 | End: 2024-07-14 | Stop reason: HOSPADM

## 2024-07-08 RX ORDER — ONDANSETRON HYDROCHLORIDE 2 MG/ML
4 INJECTION, SOLUTION INTRAVENOUS EVERY 6 HOURS PRN
Status: DISCONTINUED | OUTPATIENT
Start: 2024-07-08 | End: 2024-07-14 | Stop reason: HOSPADM

## 2024-07-08 RX ORDER — IBUPROFEN 200 MG
24 TABLET ORAL
Status: DISCONTINUED | OUTPATIENT
Start: 2024-07-08 | End: 2024-07-14 | Stop reason: HOSPADM

## 2024-07-08 RX ORDER — SODIUM CHLORIDE 0.9 % (FLUSH) 0.9 %
10 SYRINGE (ML) INJECTION EVERY 12 HOURS PRN
Status: DISCONTINUED | OUTPATIENT
Start: 2024-07-08 | End: 2024-07-14 | Stop reason: HOSPADM

## 2024-07-08 RX ORDER — ALBUTEROL SULFATE 90 UG/1
2 AEROSOL, METERED RESPIRATORY (INHALATION) EVERY 6 HOURS PRN
Status: DISCONTINUED | OUTPATIENT
Start: 2024-07-08 | End: 2024-07-08 | Stop reason: CLARIF

## 2024-07-08 RX ORDER — IBUPROFEN 200 MG
16 TABLET ORAL
Status: DISCONTINUED | OUTPATIENT
Start: 2024-07-08 | End: 2024-07-14 | Stop reason: HOSPADM

## 2024-07-08 RX ORDER — ALBUTEROL SULFATE 2.5 MG/.5ML
10 SOLUTION RESPIRATORY (INHALATION)
Status: COMPLETED | OUTPATIENT
Start: 2024-07-08 | End: 2024-07-08

## 2024-07-08 RX ORDER — GLUCAGON 1 MG
1 KIT INJECTION
Status: DISCONTINUED | OUTPATIENT
Start: 2024-07-08 | End: 2024-07-14 | Stop reason: HOSPADM

## 2024-07-08 RX ORDER — CALCIUM GLUCONATE 20 MG/ML
1 INJECTION, SOLUTION INTRAVENOUS
Status: COMPLETED | OUTPATIENT
Start: 2024-07-08 | End: 2024-07-08

## 2024-07-08 RX ORDER — SODIUM CHLORIDE 9 MG/ML
INJECTION, SOLUTION INTRAVENOUS
Status: DISCONTINUED | OUTPATIENT
Start: 2024-07-08 | End: 2024-07-14 | Stop reason: HOSPADM

## 2024-07-08 RX ORDER — MORPHINE SULFATE 2 MG/ML
2 INJECTION, SOLUTION INTRAMUSCULAR; INTRAVENOUS EVERY 4 HOURS PRN
Status: DISCONTINUED | OUTPATIENT
Start: 2024-07-08 | End: 2024-07-12

## 2024-07-08 RX ORDER — MUPIROCIN 20 MG/G
OINTMENT TOPICAL 2 TIMES DAILY
Status: DISCONTINUED | OUTPATIENT
Start: 2024-07-08 | End: 2024-07-11

## 2024-07-08 RX ORDER — HYDRALAZINE HYDROCHLORIDE 20 MG/ML
10 INJECTION INTRAMUSCULAR; INTRAVENOUS EVERY 6 HOURS PRN
Status: DISCONTINUED | OUTPATIENT
Start: 2024-07-08 | End: 2024-07-14 | Stop reason: HOSPADM

## 2024-07-08 RX ORDER — SODIUM CHLORIDE 9 MG/ML
INJECTION, SOLUTION INTRAVENOUS ONCE
Status: DISCONTINUED | OUTPATIENT
Start: 2024-07-08 | End: 2024-07-09

## 2024-07-08 RX ORDER — ALBUTEROL SULFATE 2.5 MG/.5ML
2.5 SOLUTION RESPIRATORY (INHALATION) EVERY 6 HOURS PRN
Status: DISCONTINUED | OUTPATIENT
Start: 2024-07-08 | End: 2024-07-14 | Stop reason: HOSPADM

## 2024-07-08 RX ORDER — LIDOCAINE HYDROCHLORIDE 20 MG/ML
5 JELLY TOPICAL ONCE
Status: DISCONTINUED | OUTPATIENT
Start: 2024-07-08 | End: 2024-07-08

## 2024-07-08 RX ORDER — LIDOCAINE HYDROCHLORIDE 20 MG/ML
JELLY TOPICAL ONCE
Status: COMPLETED | OUTPATIENT
Start: 2024-07-08 | End: 2024-07-08

## 2024-07-08 RX ADMIN — HUMAN INSULIN 5 UNITS: 100 INJECTION, SOLUTION SUBCUTANEOUS at 07:07

## 2024-07-08 RX ADMIN — CALCIUM GLUCONATE 1 G: 20 INJECTION, SOLUTION INTRAVENOUS at 08:07

## 2024-07-08 RX ADMIN — LIDOCAINE HYDROCHLORIDE: 20 JELLY TOPICAL at 04:07

## 2024-07-08 RX ADMIN — ONDANSETRON 4 MG: 2 INJECTION INTRAMUSCULAR; INTRAVENOUS at 08:07

## 2024-07-08 RX ADMIN — DIATRIZOATE MEGLUMINE AND DIATRIZOATE SODIUM 100 ML: 660; 100 LIQUID ORAL; RECTAL at 05:07

## 2024-07-08 RX ADMIN — PROMETHAZINE HYDROCHLORIDE 25 MG: 25 INJECTION INTRAMUSCULAR; INTRAVENOUS at 07:07

## 2024-07-08 RX ADMIN — MORPHINE SULFATE 2 MG: 2 INJECTION, SOLUTION INTRAMUSCULAR; INTRAVENOUS at 03:07

## 2024-07-08 RX ADMIN — ALBUTEROL SULFATE 10 MG: 2.5 SOLUTION RESPIRATORY (INHALATION) at 07:07

## 2024-07-08 RX ADMIN — DEXTROSE MONOHYDRATE 500 ML: 100 INJECTION, SOLUTION INTRAVENOUS at 09:07

## 2024-07-08 RX ADMIN — IOHEXOL 100 ML: 350 INJECTION, SOLUTION INTRAVENOUS at 08:07

## 2024-07-08 RX ADMIN — MORPHINE SULFATE 2 MG: 2 INJECTION, SOLUTION INTRAMUSCULAR; INTRAVENOUS at 08:07

## 2024-07-08 NOTE — SUBJECTIVE & OBJECTIVE
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     Generalized edema     GERD (gastroesophageal reflux disease)     Heart attack 2021    History of traumatic brain injury     Hypertension     Hypothyroidism     Insomnia     Muscle weakness (generalized)     Restless leg syndrome     Senile dementia, uncomplicated     Sleep apnea, unspecified     TIA (transient ischemic attack)     Unsteadiness on feet        Past Surgical History:   Procedure Laterality Date    ADENOIDECTOMY      ANGIOGRAM, CORONARY, WITH LEFT HEART CATHETERIZATION N/A 02/12/2024    Procedure: Angiogram, Coronary, with Left Heart Cath;  Surgeon: Cristian Carlisle MD;  Location: [facility] CATH/EP LAB;  Service: Cardiology;  Laterality: N/A;    APPENDECTOMY      AV FISTULA PLACEMENT Left     CHOLECYSTECTOMY      COLONOSCOPY      10 years ago in [place]    CORONARY STENT PLACEMENT  2021    ESOPHAGOGASTRODUODENOSCOPY N/A 5/20/2024    Procedure: EGD (ESOPHAGOGASTRODUODENOSCOPY);  Surgeon: Carl Conti MD;  Location: [facility] ENDO;  Service: Endoscopy;  Laterality: N/A;    IVUS (INTRAVASCULAR ULTRASOUND) N/A 02/12/2024    Procedure: ULTRASOUND, INTRAVASCULAR;  Surgeon: Cristian Carlisle MD;  Location: [facility] CATH/EP LAB;  Service: Cardiology;  Laterality: N/A;    KNEE SURGERY      knee arthroplasty    LEFT HEART CATHETERIZATION  2021    NECK SURGERY      PARTIAL HYSTERECTOMY      STENT, DRUG ELUTING, MULTI VESSEL,  CORONARY  02/12/2024    Procedure: Stent, Drug Eluting, Multi Vessel, Coronary;  Surgeon: Cristian Carlisle MD;  Location: [facility] CATH/EP LAB;  Service: Cardiology;;    TONSILLECTOMY         Review of patient's allergies indicates:   Allergen Reactions    Lisinopril Anaphylaxis       No current facility-administered medications on file prior to encounter.     Current Outpatient Medications on File Prior to Encounter   Medication Sig    albuterol (PROVENTIL/VENTOLIN HFA) 90 mcg/actuation inhaler Inhale 2 puffs into the lungs every 6 (six) hours as needed. Rescue    aspirin 81 MG Chew Take 1 tablet (81 mg total) by mouth once daily.    atorvastatin (LIPITOR) 40 MG tablet Take 1 tablet (40 mg total) by mouth once daily.    azelastine (ASTELIN) 137 mcg (0.1 %) nasal spray 1 spray (137 mcg total) by Nasal route 2 (two) times daily.    budesonide-glycopyr-formoterol (BREZTRI AEROSPHERE) 160-9-4.8 mcg/actuation HFAA Inhale 2 puffs into the lungs 2 (two) times daily.    cetirizine (ZYRTEC) 10 MG tablet Take 1 tablet (10 mg total) by mouth once daily.    clopidogreL (PLAVIX) 75 mg tablet Take 1 tablet (75 mg total) by mouth once daily.    ferric citrate (AURYXIA) 210 mg iron Tab Take 420 mg by mouth 3 (three) times daily.    fluticasone propionate (FLONASE) 50 mcg/actuation nasal spray 2 sprays (100 mcg total) by Each Nostril route once daily.    levothyroxine (SYNTHROID) 150 MCG tablet Take 1 tablet (150 mcg total) by mouth before breakfast.    melatonin 10 mg Cap Take 10 mg by mouth nightly as needed.    midodrine (PROAMATINE) 10 MG tablet Take 1 tablet (10 mg total) by mouth daily as needed (hypotension).    montelukast (SINGULAIR) 10 mg tablet Take 1 tablet (10 mg total) by mouth every evening.    nitroGLYCERIN (NITROSTAT) 0.4 MG SL tablet Place 1 tablet (0.4 mg total) under the tongue every 5 (five) minutes as needed for Chest pain.    ondansetron (ZOFRAN-ODT) 4 MG TbDL Take 1 tablet (4 mg total) by mouth 1 (one) time if  needed (nausea).    pantoprazole (PROTONIX) 40 MG tablet Take 1 tablet (40 mg total) by mouth 2 (two) times daily.    pregabalin (LYRICA) 50 MG capsule Take 50 mg by mouth once daily.    QUEtiapine (SEROQUEL) 100 MG Tab Take 1 tablet (100 mg total) by mouth once daily. (Patient taking differently: Take 100 mg by mouth nightly.)    QUEtiapine (SEROQUEL) 25 MG Tab Take 1 tablet (25 mg total) by mouth every evening.    rOPINIRole (REQUIP) 2 MG tablet Take 1 tablet (2 mg total) by mouth 3 (three) times daily.    senna-docusate 8.6-50 mg (PERICOLACE) 8.6-50 mg per tablet Take 1 tablet by mouth daily as needed for Constipation.    sevelamer carbonate (RENVELA) 800 mg Tab Take 5 tablets (4,000 mg total) by mouth 3 (three) times daily with meals.    torsemide (DEMADEX) 100 MG Tab Take 1 tablet (100 mg total) by mouth 2 (two) times a day.    venlafaxine (EFFEXOR-XR) 150 MG Cp24 Take 1 capsule (150 mg total) by mouth once daily.    [DISCONTINUED] gabapentin (NEURONTIN) 300 MG capsule Take 1 capsule (300 mg total) by mouth every evening.     Family History    None       Tobacco Use    Smoking status: Never    Smokeless tobacco: Never   Substance and Sexual Activity    Alcohol use: Never    Drug use: Never    Sexual activity: Not on file     Review of Systems   Constitutional:  Negative for fatigue and fever.   Respiratory:  Negative for chest tightness and shortness of breath.    Cardiovascular:  Negative for chest pain and palpitations.   Gastrointestinal:  Positive for abdominal distention, abdominal pain, nausea and vomiting (improved). Negative for diarrhea.   Genitourinary:  Negative for difficulty urinating.   Neurological:  Negative for headaches.     Objective:     Vital Signs (Most Recent):  Temp: 98.2 °F (36.8 °C) (07/08/24 0600)  Pulse: 88 (07/08/24 0756)  Resp: 19 (07/08/24 0756)  BP: (!) 166/76 (07/08/24 0756)  SpO2: 96 % (07/08/24 0756) Vital Signs (24h Range):  Temp:  [98.2 °F (36.8 °C)] 98.2 °F (36.8  °C)  Pulse:  [88-95] 88  Resp:  [19-22] 19  SpO2:  [95 %-99 %] 96 %  BP: (149-166)/(76-87) 166/76     Weight: 96.2 kg (212 lb)  Body mass index is 32.23 kg/m².     Physical Exam  Vitals and nursing note reviewed.   Constitutional:       General: She is not in acute distress.     Appearance: Normal appearance.   HENT:      Head: Normocephalic.      Mouth/Throat:      Mouth: Mucous membranes are dry.   Eyes:      Pupils: Pupils are equal, round, and reactive to light.   Cardiovascular:      Rate and Rhythm: Normal rate and regular rhythm.   Pulmonary:      Effort: Pulmonary effort is normal. No respiratory distress.      Breath sounds: Normal breath sounds.   Abdominal:      General: There is distension.      Tenderness: There is abdominal tenderness (LUQ).   Musculoskeletal:         General: Normal range of motion.   Skin:     General: Skin is warm and dry.      Comments: AV fistula to LUE +thrill/bruit   Neurological:      General: No focal deficit present.      Mental Status: She is alert and oriented to person, place, and time.   Psychiatric:         Mood and Affect: Mood normal.              CRANIAL NERVES     CN III, IV, VI   Pupils are equal, round, and reactive to light.       Significant Labs: All pertinent labs within the past 24 hours have been reviewed.  Recent Lab Results         07/08/24  0922   07/08/24  0646        Albumin   3.7       ALP   250       ALT   12       Anion Gap   25       AST   10       Baso #   0.03       Basophil %   0.4       BILIRUBIN TOTAL   0.7  Comment: For infants and newborns, interpretation of results should be based  on gestational age, weight and in agreement with clinical  observations.    Premature Infant recommended reference ranges:  Up to 24 hours.............<8.0 mg/dL  Up to 48 hours............<12.0 mg/dL  3-5 days..................<15.0 mg/dL  6-29 days.................<15.0 mg/dL         BUN   64       Calcium   8.7       Chloride   93       CO2   19       Creatinine    11.9       Differential Method   Automated       eGFR   3       Eos #   0.1       Eos %   0.9       Glucose   176       Gran # (ANC)   6.0       Gran %   75.9       Hematocrit   34.1       Hemoglobin   11.1       Immature Grans (Abs)   0.02  Comment: Mild elevation in immature granulocytes is non specific and   can be seen in a variety of conditions including stress response,   acute inflammation, trauma and pregnancy. Correlation with other   laboratory and clinical findings is essential.         Immature Granulocytes   0.3       Lipase   44       Lymph #   1.1       Lymph %   14.2       MCH   36.0       MCHC   32.6       MCV   111       Mono #   0.7       Mono %   8.3       MPV   10.9       nRBC   0       Platelet Count   166       POCT Glucose 79         Potassium   6.1  Comment: No visible hemolysis  K   critical result(s) called and verbal readback obtained from   Sudarshan Joiner.  No visible hemolysis by CLG 07/08/2024 07:28         PROTEIN TOTAL   7.8       RBC   3.08       RDW   18.8       Sodium   137       WBC   7.86               Significant Imaging: I have reviewed all pertinent imaging results/findings within the past 24 hours.  Imaging Results              CT Abdomen Pelvis With IV Contrast NO Oral Contrast (Final result)  Result time 07/08/24 08:50:30      Final result by Marie Salgado MD (07/08/24 08:50:30)                   Impression:      Small-bowel obstruction.  Dilatation of the small bowel is greater today than on the prior study 04/29/2024.    Mild bibasilar opacification decreased compared to the prior exam    Results were called to Dr. Jerez 08:50 07/08/2024      Electronically signed by: Marie Salgado MD  Date:    07/08/2024  Time:    08:50               Narrative:    EXAMINATION:  CT ABDOMEN PELVIS WITH IV CONTRAST    CLINICAL HISTORY:  Bowel obstruction suspected;Nausea/vomiting;    TECHNIQUE:  Low dose axial images, sagittal and coronal reformations were obtained from the lung  bases to the pubic symphysis following the IV administration of 100 mL of Omnipaque 350 and no p.o. contrast    COMPARISON:  CT abdomen and pelvis 04/29/2024.    FINDINGS:  Visualized lung bases: Mild dependent hypoventilatory changes.  Small nodular foci right posterior lung base not significantly changed compared to the prior exam.  For example axial series 2 image 6 2-3 mm nodular focus.  Improved aeration of the lung bases particularly on the right compared to the prior exam    Liver, spleen, pancreas, adrenal glands unremarkable appearance.  Prior cholecystectomy.  Severe atrophy of the kidneys.  Small renal cysts.  No hydronephrosis.  Abdominal aorta tapers without aneurysmal dilatation    Appendix not identified but no abnormal appendix inflammatory changes appendiceal region is seen.  Small fat containing umbilical hernia.  Osseous structures show degenerative changes    Colon is not abnormally dilated.  There is diffuse dilatation of small bowel mildly to mildly dilated and dilated more so today than on the prior exam.  Transition zone is thought to be seen right lower quadrant of the abdomen.    No free intraperitoneal air or fluid.  No abscess    Decrease of the buttock contusion hematoma compared to the recent pelvic CT of 06/07/2024    Prior hysterectomy.  Adnexal region unremarkable appearance    Osseous degenerative changes multilevel spinal canal narrowing

## 2024-07-08 NOTE — CONSULTS
INPATIENT NEPHROLOGY CONSULT   [facility]    Patient Name: Eugenia Manuel  Date: 07/08/2024    Reason for consultation: ESRD    Chief Complaint:   Chief Complaint   Patient presents with    Abdominal Pain       History of Present Illness:  58 year old female with past medical history of CAD w/ 2 stents, HTN, ESRD on HD MWF, bipolar disorder, hypothyroidism, asthma, MI, SBO, restless leg syndrome who presented to the ER with complaints nausea, vomiting, and abdominal distention x 2 days. She reports last bowel movement was on Saturday and is not passing gas. ED work up reveals CT of abdomen/pelvis with concern for SBO. General surgery notified and ordered NG Tube to be placed. Consulted for dialysis.    Interval History:  7/8- K 6.1- got medical management- HD ordered- SBP 160s, on RA, NPO, couldn't pass first NGT in ER- floor nurse will try after HD- c/w abd tenderness- no flatus, BMs= seen on HD    Plan of Care:    Assessment:  SBO  ESRD on HD MWF  HTN  Hyperkalemia/Acidosis  SHPT  Anemia of CKD    Plan:    - f/u GI recs- NPO with plan for NGT  - ordered HD today  - adjust dialysate  - UF with HD as able  - all meds on hold while NPO (BP meds, diuretic, binders)  - no acute SANJANA needs    Thank you for allowing us to participate in this patient's care. We will continue to follow.    Vital Signs:  Temp Readings from Last 3 Encounters:   07/08/24 98.6 °F (37 °C)   06/07/24 98.2 °F (36.8 °C) (Oral)   05/20/24 99 °F (37.2 °C)       Pulse Readings from Last 3 Encounters:   07/08/24 88   06/07/24 78   05/20/24 76       BP Readings from Last 3 Encounters:   07/08/24 (!) 166/76   06/07/24 128/62   05/20/24 (!) 157/103       Weight:  Wt Readings from Last 3 Encounters:   07/08/24 97.4 kg (214 lb 11.7 oz)   06/07/24 94.3 kg (208 lb)   05/20/24 98.4 kg (217 lb)       Past Medical & Surgical History:  Past Medical History:   Diagnosis Date    Acute respiratory failure with hypercapnia 2021    Anxiety      Asthma     Atherosclerosis of native coronary artery with angina pectoris, unspecified whether native or transplanted heart     Bipolar disorder     Cerebral ischemia     Chest pain, unspecified 2024    with activity    Cognitive communication deficit     Colon polyp     Constipation     Depression     Dialysis patient 2007    MWF    Disorder of kidney and ureter     Encounter for blood transfusion     Fibromyalgia     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     Generalized edema     GERD (gastroesophageal reflux disease)     Heart attack 2021    History of traumatic brain injury     Hypertension     Hypothyroidism     Insomnia     Muscle weakness (generalized)     Restless leg syndrome     Senile dementia, uncomplicated     Sleep apnea, unspecified     TIA (transient ischemic attack)     Unsteadiness on feet        Past Surgical History:   Procedure Laterality Date    ADENOIDECTOMY      ANGIOGRAM, CORONARY, WITH LEFT HEART CATHETERIZATION N/A 02/12/2024    Procedure: Angiogram, Coronary, with Left Heart Cath;  Surgeon: Cristian Carlisle MD;  Location: [facility] CATH/EP LAB;  Service: Cardiology;  Laterality: N/A;    APPENDECTOMY      AV FISTULA PLACEMENT Left     CHOLECYSTECTOMY      COLONOSCOPY      10 years ago in [place]    CORONARY STENT PLACEMENT  2021    ESOPHAGOGASTRODUODENOSCOPY N/A 5/20/2024    Procedure: EGD (ESOPHAGOGASTRODUODENOSCOPY);  Surgeon: Carl Conti MD;  Location: [facility] ENDO;  Service: Endoscopy;  Laterality: N/A;    IVUS (INTRAVASCULAR ULTRASOUND) N/A 02/12/2024    Procedure: ULTRASOUND, INTRAVASCULAR;  Surgeon: Cristian Carlisle MD;  Location: [facility] CATH/EP LAB;  Service: Cardiology;  Laterality: N/A;    KNEE SURGERY      knee arthroplasty    LEFT HEART CATHETERIZATION  2021    NECK SURGERY      PARTIAL HYSTERECTOMY      STENT, DRUG ELUTING, MULTI VESSEL, CORONARY  02/12/2024    Procedure: Stent, Drug Eluting, Multi Vessel, Coronary;  Surgeon: Cristian Carlisle MD;   Location: [facility] CATH/EP LAB;  Service: Cardiology;;    TONSILLECTOMY         Past Social History:  Social History     Socioeconomic History    Marital status: Single   Tobacco Use    Smoking status: Never    Smokeless tobacco: Never   Substance and Sexual Activity    Alcohol use: Never    Drug use: Never     Social Determinants of Health     Financial Resource Strain: Low Risk  (5/8/2024)    Overall Financial Resource Strain (CARDIA)     Difficulty of Paying Living Expenses: Not very hard   Food Insecurity: No Food Insecurity (5/8/2024)    Hunger Vital Sign     Worried About Running Out of Food in the Last Year: Never true     Ran Out of Food in the Last Year: Never true   Transportation Needs: Unmet Transportation Needs (5/8/2024)    TRANSPORTATION NEEDS     Transportation : Yes, it has kept me from medical appointments or from getting my medications.   Physical Activity: Inactive (5/10/2024)    Exercise Vital Sign     Days of Exercise per Week: 0 days     Minutes of Exercise per Session: 0 min   Stress: No Stress Concern Present (5/10/2024)    [employer] [place] [employer] - Occupational Stress Questionnaire     Feeling of Stress : Not at all   Housing Stability: Low Risk  (5/10/2024)    Housing Stability Vital Sign     Unable to Pay for Housing in the Last Year: No     Homeless in the Last Year: No       Medications:  Scheduled Meds:   0.9% NaCl   Intravenous Once    mupirocin   Nasal BID     Continuous Infusions:  PRN Meds:.  Current Facility-Administered Medications:     0.9% NaCl, , Intravenous, PRN    albuterol, 2 puff, Inhalation, Q6H PRN    dextrose 10%, 12.5 g, Intravenous, PRN    [COMPLETED] dextrose 10%, 50 g, Intravenous, Once **AND** dextrose 10%, 25 g, Intravenous, PRN    dextrose 10%, 25 g, Intravenous, PRN    glucagon (human recombinant), 1 mg, Intramuscular, PRN    glucose, 16 g, Oral, PRN    glucose, 24 g, Oral, PRN    heparin (porcine), 5,000 Units, Intravenous, PRN    hydrALAZINE, 10  mg, Intravenous, Q6H PRN    naloxone, 0.02 mg, Intravenous, PRN    sodium chloride 0.9%, 250 mL, Intravenous, PRN    sodium chloride 0.9%, 10 mL, Intravenous, Q12H PRN  No current facility-administered medications on file prior to encounter.     Current Outpatient Medications on File Prior to Encounter   Medication Sig Dispense Refill    albuterol (PROVENTIL/VENTOLIN HFA) 90 mcg/actuation inhaler Inhale 2 puffs into the lungs every 6 (six) hours as needed. Rescue 18 g 6    aspirin 81 MG Chew Take 1 tablet (81 mg total) by mouth once daily.  0    atorvastatin (LIPITOR) 40 MG tablet Take 1 tablet (40 mg total) by mouth once daily. 90 tablet 0    azelastine (ASTELIN) 137 mcg (0.1 %) nasal spray 1 spray (137 mcg total) by Nasal route 2 (two) times daily. 30 mL 2    budesonide-glycopyr-formoterol (BREZTRI AEROSPHERE) 160-9-4.8 mcg/actuation HFAA Inhale 2 puffs into the lungs 2 (two) times daily. 10.7 g 6    cetirizine (ZYRTEC) 10 MG tablet Take 1 tablet (10 mg total) by mouth once daily. 90 tablet 3    clopidogreL (PLAVIX) 75 mg tablet Take 1 tablet (75 mg total) by mouth once daily. 90 tablet 3    ferric citrate (AURYXIA) 210 mg iron Tab Take 420 mg by mouth 3 (three) times daily. 540 tablet 3    fluticasone propionate (FLONASE) 50 mcg/actuation nasal spray 2 sprays (100 mcg total) by Each Nostril route once daily. 16 g 1    levothyroxine (SYNTHROID) 150 MCG tablet Take 1 tablet (150 mcg total) by mouth before breakfast. 90 tablet 3    melatonin 10 mg Cap Take 10 mg by mouth nightly as needed.      midodrine (PROAMATINE) 10 MG tablet Take 1 tablet (10 mg total) by mouth daily as needed (hypotension). 180 tablet 0    montelukast (SINGULAIR) 10 mg tablet Take 1 tablet (10 mg total) by mouth every evening. 30 tablet 6    nitroGLYCERIN (NITROSTAT) 0.4 MG SL tablet Place 1 tablet (0.4 mg total) under the tongue every 5 (five) minutes as needed for Chest pain. 10 tablet 5    ondansetron (ZOFRAN-ODT) 4 MG TbDL Take 1 tablet (4  mg total) by mouth 1 (one) time if needed (nausea). 1 tablet 0    pantoprazole (PROTONIX) 40 MG tablet Take 1 tablet (40 mg total) by mouth 2 (two) times daily. 180 tablet 1    pregabalin (LYRICA) 50 MG capsule Take 50 mg by mouth once daily.      QUEtiapine (SEROQUEL) 100 MG Tab Take 1 tablet (100 mg total) by mouth once daily. (Patient taking differently: Take 100 mg by mouth nightly.) 30 tablet 5    QUEtiapine (SEROQUEL) 25 MG Tab Take 1 tablet (25 mg total) by mouth every evening. 90 tablet 1    rOPINIRole (REQUIP) 2 MG tablet Take 1 tablet (2 mg total) by mouth 3 (three) times daily. 90 tablet 1    senna-docusate 8.6-50 mg (PERICOLACE) 8.6-50 mg per tablet Take 1 tablet by mouth daily as needed for Constipation.      sevelamer carbonate (RENVELA) 800 mg Tab Take 5 tablets (4,000 mg total) by mouth 3 (three) times daily with meals. 1350 tablet 3    torsemide (DEMADEX) 100 MG Tab Take 1 tablet (100 mg total) by mouth 2 (two) times a day. 90 tablet 0    venlafaxine (EFFEXOR-XR) 150 MG Cp24 Take 1 capsule (150 mg total) by mouth once daily. 90 capsule 3    [DISCONTINUED] gabapentin (NEURONTIN) 300 MG capsule Take 1 capsule (300 mg total) by mouth every evening. 30 capsule 11       Allergies:  Lisinopril    Past Family History:  Reviewed; refer to Hospitalist Admission Note    Review of Systems:  Review of Systems - All 14 systems reviewed and negative, except as noted in HPI    Physical Exam:  General Appearance:    NAD, AAO x 3, cooperative, appears stated age   Head:    Normocephalic, atraumatic   Eyes:    PER, EOMI, and conjunctiva/sclera clear bilaterally       Mouth:   Moist mucus membranes, no thrush or oral lesions,       normal dentition   Back:     No CVA tenderness   Lungs:     Clear to auscultation bilaterally, no wheezes, crackles,           rales or rhonchi, symmetric air movement, respirations unlabored   Chest wall:    No tenderness or deformity   Heart:    Regular rate and rhythm, S1 and S2 normal,  no murmur, rub   or gallop   Abdomen:     Soft, tender, distended   Extremities:   Warm and well perfused, distal pulses are intact, no             cyanosis or peripheral edema   MSK:   No joint or muscle swelling, tenderness or deformity   Skin:   Skin color, texture, turgor normal, no rashes or lesions   Neurologic/Psychiatric:   CNII-XII intact, normal strength and sensation       throughout, no asterixis; normal affect, memory, judgement     and insight      Results:  Lab Results   Component Value Date     07/08/2024    K 6.0 (H) 07/08/2024    CL 93 (L) 07/08/2024    CO2 19 (L) 07/08/2024    BUN 64 (H) 07/08/2024    CREATININE 11.9 (H) 07/08/2024    CALCIUM 8.7 07/08/2024    ANIONGAP 25 (H) 07/08/2024       Lab Results   Component Value Date    CALCIUM 8.7 07/08/2024    PHOS 5.6 (H) 05/01/2024       Recent Labs   Lab 07/08/24  0646   WBC 7.86   RBC 3.08*   HGB 11.1*   HCT 34.1*      *   MCH 36.0*   MCHC 32.6       I have personally reviewed pertinent radiological imaging and reports from this admission.    I have spent > 70 minutes providing care for this patient for the above diagnoses. These services have included chart/data/imaging review, evaluation, exam, formulation of plan, , note preparation, and discussions with staff involved in this patient's care.    Bayron Dumont MD MPH  [facility]  [address] [place] [id number]  [phone] (p)  [phone] (f)

## 2024-07-08 NOTE — NURSING
Pt AAO x 3, no respiratory distress noted, pt denies chest pain but c/o abdominal pain. Per pt ED nurse tried NGT insertion twice but failed. Iv intact and dry. Informed Dialysis nurse.

## 2024-07-08 NOTE — PROGRESS NOTES
07/08/24 1730   Required for all Hemodialysis Patients   Hepatitis Status negative   Handoff Report   Received From Ignacio GORDON RN   Given To Dawson CHAMPAGNE RN   Treatment Type   Treatment Type Maintenance   Vital Signs   Temp 98.1 °F (36.7 °C)   Temp Source Oral   Pulse 102   Heart Rate Source Monitor   Resp 20   SpO2 98 %   Device (Oxygen Therapy) room air   /63   BP Method Automatic   Patient Position Lying   Assessments (Pre/Post)   Blood Liters Processed (BLP) 66.9   Transport Modality not applicable   Level of Consciousness (AVPU) alert   Dialyzer Clearance moderately streaked   Pain   Pain Body Location abdomen   Pain Rating (0-10): Rest 6   Post-Hemodialysis Assessment   Rinseback Volume (mL) 250 mL   Blood Volume Processed (Liters) 66.9 L   Dialyzer Clearance Moderately streaked   Duration of Treatment 180 minutes   Additional Fluid Intake (mL) 500 mL   Total UF (mL) 3500 mL   Net Fluid Removal 3000   Patient Response to Treatment pt jacobo tx   Post-Treatment Weight   (SANTI)   Arterial bleeding stop time (min) 7 min   Venous bleeding stop time (min) 7 min   Post-Hemodialysis Comments pt had abd pain throughout tx     Pt c/o severe abd pain throughout tx, 3000 ml net u/f removed

## 2024-07-08 NOTE — ED TRIAGE NOTES
Eugenia Manuel is here with abdominal pain for few days along with diarrhea and vomiting.  
rate control and anticoagulation.

## 2024-07-08 NOTE — PLAN OF CARE
[facility] - ED  Initial Discharge Assessment       Primary Care Provider: John Castro MD    Admission Diagnosis: Small bowel obstruction [K56.609]    Admission Date: 7/8/2024  Expected Discharge Date:     Transition of Care Barriers: None    Payor: [facility] MCARE / Plan: [facility] DUAL COMPLETE HMO SNP / Product Type: Medicare Advantage /     Extended Emergency Contact Information  Primary Emergency Contact: Kinjal Cruz           [place] [address] [place]  Mobile Phone: [phone]  Relation: Sister  Preferred language: English   needed? No    Discharge Plan A: Home with family  Discharge Plan B: Home      [id number] - [place] - [address] AT [facility] [address]  [address]  [place] [address]  Phone: [phone] Fax: [phone]    [facility] [place] [facility] [address] [place] [address]  [address] [place] [id number]  Phone: [phone] Fax: [phone]      DC assessment completed with patient at bedside. Verified information on facesheet as correct. Reports sister as POA, requested copy. PCP is Dr. Laboy, last apt was last week. Denies hh/outpt services. Does HD MWF 6:45 AM at [facility]. Uses insurance transport for HD and MD apts. Sister will provide transportation home upon DC. DME- RW, bp monitor. Independent at baseline, uses AD PRN. Verified insurance on file. Denies recent inpt stay in last 30 days. Reports taking home medications as prescribed and can currently afford them. Sister helps manage using pill box. Dc plan is home    Initial Assessment (most recent)       Adult Discharge Assessment - 07/08/24 1233          Discharge Assessment    Assessment Type Discharge Planning Assessment     Confirmed/corrected address, phone number and insurance Yes     Confirmed Demographics Correct on Facesheet     Source of Information patient     Communicated VERO with patient/caregiver  Yes     Reason For Admission SBO     People in Home sibling(s)     Facility Arrived From: home     Do you expect to return to your current living situation? Yes     Do you have help at home or someone to help you manage your care at home? Yes     Who are your caregiver(s) and their phone number(s)? sister     Prior to hospitilization cognitive status: Alert/Oriented     Current cognitive status: Alert/Oriented     Walking or Climbing Stairs Difficulty no     Dressing/Bathing Difficulty no     Equipment Currently Used at Home walker, rolling;blood pressure machine     Readmission within 30 days? No     Patient currently being followed by outpatient case management? No     Do you currently have service(s) that help you manage your care at home? No     Do you take prescription medications? Yes     Do you have prescription coverage? Yes     Do you have any problems affording any of your prescribed medications? No     Is the patient taking medications as prescribed? yes     Who is going to help you get home at discharge? sister     How do you get to doctors appointments? health plan transportation;family or friend will provide     Are you on dialysis? Yes     Dialysis Name and Scheduled days MWF [facility]     Do you take coumadin? No     Discharge Plan A Home with family     Discharge Plan B Home     DME Needed Upon Discharge  none     Discharge Plan discussed with: Patient     Transition of Care Barriers None

## 2024-07-08 NOTE — CONSULTS
[facility]/Surg  General Surgery  Consult Note    Inpatient consult to General surgery  Consult performed by: Stephen Benites III, MD  Consult ordered by: Mike Jerez MD  Reason for consult: SBO        Subjective:     Chief Complaint/Reason for Admission: SBO    History of Present Illness:  58-year-old female admitted today with a small-bowel obstruction.  She presented with several hour history of acute onset diffuse abdominal pain with waves of increasing crampy pain.  CT scan shows evidence of a small-bowel obstruction with transition point in the right lower quadrant.  NG tube attempted several times without success.  She is awake and alert.  She is currently on hemodialysis.  She states that pain is a little improved from admission but still having pretty severe waves of crampy pain lasting about 30-60 seconds.  Passing very little flatus.  No significant nausea or vomiting.  She has history of hysterectomy via lower midline incision, laparoscopic appendectomy, laparoscopic cholecystectomy.  She has had several admissions for bowel obstructions in the past.  Most recently in late April.  Obstruction resolved after Gastrografin challenge.  She has an extensive past medical history including coronary artery disease.  She had drug-eluting stent placed in February and is on Plavix.  Plavix recommendation for 1 year without break.      No current facility-administered medications on file prior to encounter.     Current Outpatient Medications on File Prior to Encounter   Medication Sig    albuterol (PROVENTIL/VENTOLIN HFA) 90 mcg/actuation inhaler Inhale 2 puffs into the lungs every 6 (six) hours as needed. Rescue    aspirin 81 MG Chew Take 1 tablet (81 mg total) by mouth once daily.    atorvastatin (LIPITOR) 40 MG tablet Take 1 tablet (40 mg total) by mouth once daily.    azelastine (ASTELIN) 137 mcg (0.1 %) nasal spray 1 spray (137 mcg total) by Nasal route 2 (two) times daily.     budesonide-glycopyr-formoterol (BREZTRI AEROSPHERE) 160-9-4.8 mcg/actuation HFAA Inhale 2 puffs into the lungs 2 (two) times daily.    cetirizine (ZYRTEC) 10 MG tablet Take 1 tablet (10 mg total) by mouth once daily.    clopidogreL (PLAVIX) 75 mg tablet Take 1 tablet (75 mg total) by mouth once daily.    ferric citrate (AURYXIA) 210 mg iron Tab Take 420 mg by mouth 3 (three) times daily.    fluticasone propionate (FLONASE) 50 mcg/actuation nasal spray 2 sprays (100 mcg total) by Each Nostril route once daily.    levothyroxine (SYNTHROID) 150 MCG tablet Take 1 tablet (150 mcg total) by mouth before breakfast.    melatonin 10 mg Cap Take 10 mg by mouth nightly as needed.    midodrine (PROAMATINE) 10 MG tablet Take 1 tablet (10 mg total) by mouth daily as needed (hypotension).    montelukast (SINGULAIR) 10 mg tablet Take 1 tablet (10 mg total) by mouth every evening.    nitroGLYCERIN (NITROSTAT) 0.4 MG SL tablet Place 1 tablet (0.4 mg total) under the tongue every 5 (five) minutes as needed for Chest pain.    ondansetron (ZOFRAN-ODT) 4 MG TbDL Take 1 tablet (4 mg total) by mouth 1 (one) time if needed (nausea).    pantoprazole (PROTONIX) 40 MG tablet Take 1 tablet (40 mg total) by mouth 2 (two) times daily.    pregabalin (LYRICA) 50 MG capsule Take 50 mg by mouth once daily.    QUEtiapine (SEROQUEL) 100 MG Tab Take 1 tablet (100 mg total) by mouth once daily. (Patient taking differently: Take 100 mg by mouth nightly.)    QUEtiapine (SEROQUEL) 25 MG Tab Take 1 tablet (25 mg total) by mouth every evening.    rOPINIRole (REQUIP) 2 MG tablet Take 1 tablet (2 mg total) by mouth 3 (three) times daily.    senna-docusate 8.6-50 mg (PERICOLACE) 8.6-50 mg per tablet Take 1 tablet by mouth daily as needed for Constipation.    sevelamer carbonate (RENVELA) 800 mg Tab Take 5 tablets (4,000 mg total) by mouth 3 (three) times daily with meals.    torsemide (DEMADEX) 100 MG Tab Take 1 tablet (100 mg total) by mouth 2 (two) times a  day.    venlafaxine (EFFEXOR-XR) 150 MG Cp24 Take 1 capsule (150 mg total) by mouth once daily.    [DISCONTINUED] gabapentin (NEURONTIN) 300 MG capsule Take 1 capsule (300 mg total) by mouth every evening.       Review of patient's allergies indicates:   Allergen Reactions    Lisinopril Anaphylaxis       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     Generalized edema     GERD (gastroesophageal reflux disease)     Heart attack 2021    History of traumatic brain injury     Hypertension     Hypothyroidism     Insomnia     Muscle weakness (generalized)     Restless leg syndrome     Senile dementia, uncomplicated     Sleep apnea, unspecified     TIA (transient ischemic attack)     Unsteadiness on feet      Past Surgical History:   Procedure Laterality Date    ADENOIDECTOMY      ANGIOGRAM, CORONARY, WITH LEFT HEART CATHETERIZATION N/A 02/12/2024    Procedure: Angiogram, Coronary, with Left Heart Cath;  Surgeon: Cristian Carlisle MD;  Location: [facility] CATH/EP LAB;  Service: Cardiology;  Laterality: N/A;    APPENDECTOMY      AV FISTULA PLACEMENT Left     CHOLECYSTECTOMY      COLONOSCOPY      10 years ago in [place]    CORONARY STENT PLACEMENT  2021    ESOPHAGOGASTRODUODENOSCOPY N/A 5/20/2024    Procedure: EGD (ESOPHAGOGASTRODUODENOSCOPY);  Surgeon: Carl Conti MD;  Location[facility];  Service: Endoscopy;  Laterality: N/A;    IVUS (INTRAVASCULAR ULTRASOUND) N/A  02/12/2024    Procedure: ULTRASOUND, INTRAVASCULAR;  Surgeon: Cristian Carlisle MD;  Location: [facility] CATH/EP LAB;  Service: Cardiology;  Laterality: N/A;    KNEE SURGERY      knee arthroplasty    LEFT HEART CATHETERIZATION  2021    NECK SURGERY      PARTIAL HYSTERECTOMY      STENT, DRUG ELUTING, MULTI VESSEL, CORONARY  02/12/2024    Procedure: Stent, Drug Eluting, Multi Vessel, Coronary;  Surgeon: Cristian Carlisle MD;  Location: [facility] CATH/EP LAB;  Service: Cardiology;;    TONSILLECTOMY       Family History    None       Tobacco Use    Smoking status: Never    Smokeless tobacco: Never   Substance and Sexual Activity    Alcohol use: Never    Drug use: Never    Sexual activity: Not on file     Review of Systems   Constitutional:  Negative for appetite change, chills, fever and unexpected weight change.   HENT:  Negative for hearing loss, rhinorrhea, sore throat and voice change.    Eyes:  Negative for photophobia and visual disturbance.   Respiratory:  Negative for cough, choking and shortness of breath.    Cardiovascular:  Negative for chest pain, palpitations and leg swelling.   Gastrointestinal:  Positive for abdominal distention and abdominal pain. Negative for blood in stool, constipation, diarrhea, nausea and vomiting.   Endocrine: Negative for cold intolerance, heat intolerance, polydipsia and polyuria.   Musculoskeletal:  Negative for arthralgias, back pain, joint swelling and neck stiffness.   Skin:  Negative for color change, pallor and rash.   Neurological:  Negative for dizziness, seizures, syncope and headaches.   Hematological:  Negative for adenopathy. Does not bruise/bleed easily.   Psychiatric/Behavioral:  Negative for agitation, behavioral problems and confusion.      Objective:     Vital Signs (Most Recent):  Temp: 98.3 °F (36.8 °C) (07/08/24 1410)  Pulse: (P) 107 (07/08/24 1700)  Resp: (!) (P) 22 (07/08/24 1700)  BP: (P) 101/66 (07/08/24 1700)  SpO2: 97 % (07/08/24 1410) Vital Signs (24h Range):  Temp:   [98.2 °F (36.8 °C)-98.6 °F (37 °C)] 98.3 °F (36.8 °C)  Pulse:  [] (P) 107  Resp:  [19-22] (P) 22  SpO2:  [95 %-99 %] 97 %  BP: (101-166)/(54-87) (P) 101/66     Weight: 97.4 kg (214 lb 11.7 oz)  Body mass index is 32.65 kg/m².      Intake/Output Summary (Last 24 hours) at 7/8/2024 1737  Last data filed at 7/8/2024 0950  Gross per 24 hour   Intake 550 ml   Output --   Net 550 ml       Physical Exam  Constitutional:       General: She is awake. She is not in acute distress.     Appearance: Normal appearance. She is obese. She is not toxic-appearing.      Comments: Patient uncomfortable in waves   Pulmonary:      Effort: No tachypnea, bradypnea or respiratory distress.   Abdominal:      General: A surgical scar is present. There is distension.      Palpations: Abdomen is soft.      Tenderness: There is generalized abdominal tenderness. There is no guarding.          Comments: Exam this afternoon she is mildly distended.  She is diffusely tender without guarding.     Neurological:      Mental Status: She is alert and oriented to person, place, and time.   Psychiatric:         Behavior: Behavior is cooperative.         Significant Labs:  CBC:   Recent Labs   Lab 07/08/24  0646   WBC 7.86   RBC 3.08*   HGB 11.1*   HCT 34.1*      *   MCH 36.0*   MCHC 32.6     CMP:   Recent Labs   Lab 07/08/24  0646 07/08/24  1146 07/08/24  1556   *  --   --    CALCIUM 8.7  --   --    ALBUMIN 3.7  --   --    PROT 7.8  --   --      --   --    K 6.1*   < > 4.0   CO2 19*  --   --    CL 93*  --   --    BUN 64*  --   --    CREATININE 11.9*  --   --    ALKPHOS 250*  --   --    ALT 12  --   --    AST 10  --   --    BILITOT 0.7  --   --     < > = values in this interval not displayed.       Significant Diagnostics:  I have reviewed all pertinent imaging results/findings within the past 24 hours.    Assessment/Plan:     Active Diagnoses:    Diagnosis Date Noted POA    PRINCIPAL PROBLEM:  Small bowel obstruction  [K56.609] 04/30/2024 Yes    Hyperkalemia [E87.5] 04/30/2024 Yes    ESRD (end stage renal disease) [N18.6] 02/27/2024 Yes    Hypertension [I10] 11/30/2023 Yes    Coronary artery disease involving native coronary artery of native heart with unstable angina pectoris [I25.110] 11/30/2023 Yes    Hypothyroidism [E03.9] 11/30/2023 Yes      Problems Resolved During this Admission:   Patient with recurrent small bowel obstruction.  CT scan has seem transition point from her previous scans.  Will attempt Gastrografin challenge again today.  Will have very low threshold for surgery.  Possibly as soon as tomorrow if Gastrografin challenge fails and symptoms are worse.    Thank you for your consult.     Stephen Benites III, MD  General Surgery  [facility]/Surg

## 2024-07-08 NOTE — ASSESSMENT & PLAN NOTE
Creatine stable for now. BMP reviewed- noted Estimated Creatinine Clearance: 6.3 mL/min (A) (based on SCr of 11.9 mg/dL (H)). according to latest data. Based on current GFR, CKD stage is end stage.  Monitor UOP and serial BMP and adjust therapy as needed. Renally dose meds. Avoid nephrotoxic medications and procedures.  Nephrology consulted  HD MWF, to have HD today

## 2024-07-08 NOTE — H&P
[facility]  History & Physical    Patient Name: Eugenia Manuel  MRN: [id number]  Patient Class: IP- Inpatient  Admission Date: 7/8/2024  Attending Physician: Aranza Espinoza MD   Primary Care Provider: John Castro MD         Patient information was obtained from patient, past medical records, and ER records.     Subjective:     Principal Problem:Small bowel obstruction    Chief Complaint:   Chief Complaint   Patient presents with    Abdominal Pain        HPI: 58 year old female with past medical history of CAD w/ 2 stents, HTN, ESRD on HD MWF, bipolar disorder, hypothyroidism, asthma, MI, SBO, restless leg syndrome who presented to the ER with complaints nausea, vomiting, and abdominal distention x 2 days.  She reports last bowel movement was on Saturday and is not passing gas.  ED work up reveals CT of abdomen/pelvis with concern for SBO.  CBC with anemia which appears around baseline.  CMP consistent with ESRD with potassium 6.1.  She was given calcium gluconate, albuterol, insulin, and dextrose.  Repeat potassium pending.  Nephrology was notified and HD to be done today.  General surgery also notified and ordered NG Tube to be placed.  On chart review, patient was admitted in April 2024 for SBO.  She was given a gastrograffin challenge and GI function returned.  Patient will be admitted to hospital medicine for further evaluation and treatment.    Past Medical History:   Diagnosis Date    Acute respiratory failure with hypercapnia 2021    Anxiety     Asthma     Atherosclerosis of native coronary artery with angina pectoris, unspecified whether native or transplanted heart     Bipolar disorder     Cerebral ischemia     Chest pain, unspecified 2024    with activity    Cognitive communication deficit     Colon polyp     Constipation     Depression     Dialysis patient 2007    MWF    Disorder of kidney and ureter     Encounter for blood transfusion     Fibromyalgia      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     Generalized edema     GERD (gastroesophageal reflux disease)     Heart attack 2021    History of traumatic brain injury     Hypertension     Hypothyroidism     Insomnia     Muscle weakness (generalized)     Restless leg syndrome     Senile dementia, uncomplicated     Sleep apnea, unspecified     TIA (transient ischemic attack)     Unsteadiness on feet        Past Surgical History:   Procedure Laterality Date    ADENOIDECTOMY      ANGIOGRAM, CORONARY, WITH LEFT HEART CATHETERIZATION N/A 02/12/2024    Procedure: Angiogram, Coronary, with Left Heart Cath;  Surgeon: Cristian Carlisle MD;  Location: [facility] CATH/EP LAB;  Service: Cardiology;  Laterality: N/A;    APPENDECTOMY      AV FISTULA PLACEMENT Left     CHOLECYSTECTOMY      COLONOSCOPY      10 years ago in [place]    CORONARY STENT PLACEMENT  2021    ESOPHAGOGASTRODUODENOSCOPY N/A 5/20/2024    Procedure: EGD (ESOPHAGOGASTRODUODENOSCOPY);  Surgeon: Carl Conti MD;  Location: [facility] ENDO;  Service: Endoscopy;  Laterality: N/A;    IVUS (INTRAVASCULAR ULTRASOUND) N/A 02/12/2024    Procedure: ULTRASOUND, INTRAVASCULAR;  Surgeon: Cristian Carlisle MD;  Location: [facility] CATH/EP LAB;  Service: Cardiology;  Laterality: N/A;    KNEE SURGERY      knee arthroplasty    LEFT HEART CATHETERIZATION  2021    NECK SURGERY      PARTIAL HYSTERECTOMY      STENT, DRUG ELUTING, MULTI VESSEL, CORONARY  02/12/2024    Procedure: Stent, Drug Eluting, Multi Vessel, Coronary;  Surgeon: Cristian Carlisle MD;  Location: [facility] CATH/EP LAB;  Service: Cardiology;;    TONSILLECTOMY         Review of patient's allergies indicates:   Allergen Reactions    Lisinopril Anaphylaxis       No current facility-administered medications on file prior to encounter.     Current Outpatient Medications on File Prior to Encounter   Medication Sig    albuterol (PROVENTIL/VENTOLIN HFA) 90 mcg/actuation inhaler Inhale 2 puffs into the lungs every  6 (six) hours as needed. Rescue    aspirin 81 MG Chew Take 1 tablet (81 mg total) by mouth once daily.    atorvastatin (LIPITOR) 40 MG tablet Take 1 tablet (40 mg total) by mouth once daily.    azelastine (ASTELIN) 137 mcg (0.1 %) nasal spray 1 spray (137 mcg total) by Nasal route 2 (two) times daily.    budesonide-glycopyr-formoterol (BREZTRI AEROSPHERE) 160-9-4.8 mcg/actuation HFAA Inhale 2 puffs into the lungs 2 (two) times daily.    cetirizine (ZYRTEC) 10 MG tablet Take 1 tablet (10 mg total) by mouth once daily.    clopidogreL (PLAVIX) 75 mg tablet Take 1 tablet (75 mg total) by mouth once daily.    ferric citrate (AURYXIA) 210 mg iron Tab Take 420 mg by mouth 3 (three) times daily.    fluticasone propionate (FLONASE) 50 mcg/actuation nasal spray 2 sprays (100 mcg total) by Each Nostril route once daily.    levothyroxine (SYNTHROID) 150 MCG tablet Take 1 tablet (150 mcg total) by mouth before breakfast.    melatonin 10 mg Cap Take 10 mg by mouth nightly as needed.    midodrine (PROAMATINE) 10 MG tablet Take 1 tablet (10 mg total) by mouth daily as needed (hypotension).    montelukast (SINGULAIR) 10 mg tablet Take 1 tablet (10 mg total) by mouth every evening.    nitroGLYCERIN (NITROSTAT) 0.4 MG SL tablet Place 1 tablet (0.4 mg total) under the tongue every 5 (five) minutes as needed for Chest pain.    ondansetron (ZOFRAN-ODT) 4 MG TbDL Take 1 tablet (4 mg total) by mouth 1 (one) time if needed (nausea).    pantoprazole (PROTONIX) 40 MG tablet Take 1 tablet (40 mg total) by mouth 2 (two) times daily.    pregabalin (LYRICA) 50 MG capsule Take 50 mg by mouth once daily.    QUEtiapine (SEROQUEL) 100 MG Tab Take 1 tablet (100 mg total) by mouth once daily. (Patient taking differently: Take 100 mg by mouth nightly.)    QUEtiapine (SEROQUEL) 25 MG Tab Take 1 tablet (25 mg total) by mouth every evening.    rOPINIRole (REQUIP) 2 MG tablet Take 1 tablet (2 mg total) by mouth 3 (three) times daily.    senna-docusate  8.6-50 mg (PERICOLACE) 8.6-50 mg per tablet Take 1 tablet by mouth daily as needed for Constipation.    sevelamer carbonate (RENVELA) 800 mg Tab Take 5 tablets (4,000 mg total) by mouth 3 (three) times daily with meals.    torsemide (DEMADEX) 100 MG Tab Take 1 tablet (100 mg total) by mouth 2 (two) times a day.    venlafaxine (EFFEXOR-XR) 150 MG Cp24 Take 1 capsule (150 mg total) by mouth once daily.    [DISCONTINUED] gabapentin (NEURONTIN) 300 MG capsule Take 1 capsule (300 mg total) by mouth every evening.     Family History    None       Tobacco Use    Smoking status: Never    Smokeless tobacco: Never   Substance and Sexual Activity    Alcohol use: Never    Drug use: Never    Sexual activity: Not on file     Review of Systems   Constitutional:  Negative for fatigue and fever.   Respiratory:  Negative for chest tightness and shortness of breath.    Cardiovascular:  Negative for chest pain and palpitations.   Gastrointestinal:  Positive for abdominal distention, abdominal pain, nausea and vomiting (improved). Negative for diarrhea.   Genitourinary:  Negative for difficulty urinating.   Neurological:  Negative for headaches.     Objective:     Vital Signs (Most Recent):  Temp: 98.2 °F (36.8 °C) (07/08/24 0600)  Pulse: 88 (07/08/24 0756)  Resp: 19 (07/08/24 0756)  BP: (!) 166/76 (07/08/24 0756)  SpO2: 96 % (07/08/24 0756) Vital Signs (24h Range):  Temp:  [98.2 °F (36.8 °C)] 98.2 °F (36.8 °C)  Pulse:  [88-95] 88  Resp:  [19-22] 19  SpO2:  [95 %-99 %] 96 %  BP: (149-166)/(76-87) 166/76     Weight: 96.2 kg (212 lb)  Body mass index is 32.23 kg/m².     Physical Exam  Vitals and nursing note reviewed.   Constitutional:       General: She is not in acute distress.     Appearance: Normal appearance.   HENT:      Head: Normocephalic.      Mouth/Throat:      Mouth: Mucous membranes are dry.   Eyes:      Pupils: Pupils are equal, round, and reactive to light.   Cardiovascular:      Rate and Rhythm: Normal rate and regular  rhythm.   Pulmonary:      Effort: Pulmonary effort is normal. No respiratory distress.      Breath sounds: Normal breath sounds.   Abdominal:      General: There is distension.      Tenderness: There is abdominal tenderness (LUQ).   Musculoskeletal:         General: Normal range of motion.   Skin:     General: Skin is warm and dry.      Comments: AV fistula to LUE +thrill/bruit   Neurological:      General: No focal deficit present.      Mental Status: She is alert and oriented to person, place, and time.   Psychiatric:         Mood and Affect: Mood normal.              CRANIAL NERVES     CN III, IV, VI   Pupils are equal, round, and reactive to light.       Significant Labs: All pertinent labs within the past 24 hours have been reviewed.  Recent Lab Results         07/08/24  0922   07/08/24  0646        Albumin   3.7       ALP   250       ALT   12       Anion Gap   25       AST   10       Baso #   0.03       Basophil %   0.4       BILIRUBIN TOTAL   0.7  Comment: For infants and newborns, interpretation of results should be based  on gestational age, weight and in agreement with clinical  observations.    Premature Infant recommended reference ranges:  Up to 24 hours.............<8.0 mg/dL  Up to 48 hours............<12.0 mg/dL  3-5 days..................<15.0 mg/dL  6-29 days.................<15.0 mg/dL         BUN   64       Calcium   8.7       Chloride   93       CO2   19       Creatinine   11.9       Differential Method   Automated       eGFR   3       Eos #   0.1       Eos %   0.9       Glucose   176       Gran # (ANC)   6.0       Gran %   75.9       Hematocrit   34.1       Hemoglobin   11.1       Immature Grans (Abs)   0.02  Comment: Mild elevation in immature granulocytes is non specific and   can be seen in a variety of conditions including stress response,   acute inflammation, trauma and pregnancy. Correlation with other   laboratory and clinical findings is essential.         Immature Granulocytes   0.3        Lipase   44       Lymph #   1.1       Lymph %   14.2       MCH   36.0       MCHC   32.6       MCV   111       Mono #   0.7       Mono %   8.3       MPV   10.9       nRBC   0       Platelet Count   166       POCT Glucose 79         Potassium   6.1  Comment: No visible hemolysis  K   critical result(s) called and verbal readback obtained from   Sudarshan Joiner.  No visible hemolysis by CLG 07/08/2024 07:28         PROTEIN TOTAL   7.8       RBC   3.08       RDW   18.8       Sodium   137       WBC   7.86               Significant Imaging: I have reviewed all pertinent imaging results/findings within the past 24 hours.  Imaging Results              CT Abdomen Pelvis With IV Contrast NO Oral Contrast (Final result)  Result time 07/08/24 08:50:30      Final result by Marie Salgado MD (07/08/24 08:50:30)                   Impression:      Small-bowel obstruction.  Dilatation of the small bowel is greater today than on the prior study 04/29/2024.    Mild bibasilar opacification decreased compared to the prior exam    Results were called to Dr. Jerez 08:50 07/08/2024      Electronically signed by: Marie Salgado MD  Date:    07/08/2024  Time:    08:50               Narrative:    EXAMINATION:  CT ABDOMEN PELVIS WITH IV CONTRAST    CLINICAL HISTORY:  Bowel obstruction suspected;Nausea/vomiting;    TECHNIQUE:  Low dose axial images, sagittal and coronal reformations were obtained from the lung bases to the pubic symphysis following the IV administration of 100 mL of Omnipaque 350 and no p.o. contrast    COMPARISON:  CT abdomen and pelvis 04/29/2024.    FINDINGS:  Visualized lung bases: Mild dependent hypoventilatory changes.  Small nodular foci right posterior lung base not significantly changed compared to the prior exam.  For example axial series 2 image 6 2-3 mm nodular focus.  Improved aeration of the lung bases particularly on the right compared to the prior exam    Liver, spleen, pancreas, adrenal glands  unremarkable appearance.  Prior cholecystectomy.  Severe atrophy of the kidneys.  Small renal cysts.  No hydronephrosis.  Abdominal aorta tapers without aneurysmal dilatation    Appendix not identified but no abnormal appendix inflammatory changes appendiceal region is seen.  Small fat containing umbilical hernia.  Osseous structures show degenerative changes    Colon is not abnormally dilated.  There is diffuse dilatation of small bowel mildly to mildly dilated and dilated more so today than on the prior exam.  Transition zone is thought to be seen right lower quadrant of the abdomen.    No free intraperitoneal air or fluid.  No abscess    Decrease of the buttock contusion hematoma compared to the recent pelvic CT of 06/07/2024    Prior hysterectomy.  Adnexal region unremarkable appearance    Osseous degenerative changes multilevel spinal canal narrowing                                     Assessment/Plan:     * Small bowel obstruction  CT of abdomen with concern for SBO  General surgery consulted  NPO      Hyperkalemia  This patient has hyperkalemia which is uncontrolled. We will monitor for arrhythmias with EKG or continuous telemetry. We will treat the hyperkalemia with Potassium Binders, Calcium gluconate, IV insulin and dextrose, and Nebulized albuterol sulfate. The likely etiology of the hyperkalemia is ESRD.  HD to be done today  The patients latest potassium has been reviewed and the results are listed below  Recent Labs   Lab 07/08/24  0646   K 6.1*             ESRD (end stage renal disease)  Creatine stable for now. BMP reviewed- noted Estimated Creatinine Clearance: 6.3 mL/min (A) (based on SCr of 11.9 mg/dL (H)). according to latest data. Based on current GFR, CKD stage is end stage.  Monitor UOP and serial BMP and adjust therapy as needed. Renally dose meds. Avoid nephrotoxic medications and procedures.  Nephrology consulted  HD MWF, to have HD today    Hypothyroidism  Synthroid on hold due to NPO,  resume when appropriate      Coronary artery disease involving native coronary artery of native heart with unstable angina pectoris  Patient with known CAD s/p stent placement, which is controlled. Monitor for S/Sx of angina/ACS. Continue to monitor on telemetry.   Plavix, statin, and aspirin on hold due to SBO and general surgery consult. Will resume when appropriate    Hypertension  Chronic, uncontrolled. Latest blood pressure and vitals reviewed-     Temp:  [98.2 °F (36.8 °C)]   Pulse:  [88-95]   Resp:  [19-22]   BP: (149-166)/(76-87)   SpO2:  [95 %-99 %] .   Home meds for hypertension were reviewed and noted below.   Hypertension Medications               nitroGLYCERIN (NITROSTAT) 0.4 MG SL tablet Place 1 tablet (0.4 mg total) under the tongue every 5 (five) minutes as needed for Chest pain.    torsemide (DEMADEX) 100 MG Tab Take 1 tablet (100 mg total) by mouth 2 (two) times a day.            While in the hospital, will manage blood pressure as follows; hold PO meds due to SBO and general surgery consulted    Will utilize p.r.n. blood pressure medication only if patient's SBP >180 and she develops symptoms such as worsening chest pain or shortness of breath.      VTE Risk Mitigation (From admission, onward)           Ordered     IP VTE HIGH RISK PATIENT  Once         07/08/24 0927     Place sequential compression device  Until discontinued         07/08/24 0927     Reason for No Pharmacological VTE Prophylaxis  Once        Comments: Awaiting surgery consult   Question:  Reasons:  Answer:  Physician Provided (leave comment)    07/08/24 0927                                    MOHAN Wesley  Department of Hospital Medicine  [facility]

## 2024-07-08 NOTE — ASSESSMENT & PLAN NOTE
This patient has hyperkalemia which is uncontrolled. We will monitor for arrhythmias with EKG or continuous telemetry. We will treat the hyperkalemia with Potassium Binders, Calcium gluconate, IV insulin and dextrose, and Nebulized albuterol sulfate. The likely etiology of the hyperkalemia is ESRD.  HD to be done today  The patients latest potassium has been reviewed and the results are listed below  Recent Labs   Lab 07/08/24  0646   K 6.1*

## 2024-07-08 NOTE — HPI
58 year old female with past medical history of CAD w/ 2 stents, HTN, ESRD on HD MWF, bipolar disorder, hypothyroidism, asthma, MI, SBO, restless leg syndrome who presented to the ER with complaints nausea, vomiting, and abdominal distention x 2 days.  She reports last bowel movement was on Saturday and is not passing gas.  ED work up reveals CT of abdomen/pelvis with concern for SBO.  CBC with anemia which appears around baseline.  CMP consistent with ESRD with potassium 6.1.  She was given calcium gluconate, albuterol, insulin, and dextrose.  Repeat potassium pending.  Nephrology was notified and HD to be done today.  General surgery also notified and ordered NG Tube to be placed.  On chart review, patient was admitted in April 2024 for SBO.  She was given a gastrograffin challenge and GI function returned.  Patient will be admitted to hospital medicine for further evaluation and treatment.

## 2024-07-08 NOTE — ASSESSMENT & PLAN NOTE
Patient with known CAD s/p stent placement, which is controlled. Monitor for S/Sx of angina/ACS. Continue to monitor on telemetry.   Plavix, statin, and aspirin on hold due to SBO and general surgery consult. Will resume when appropriate

## 2024-07-08 NOTE — ED PROVIDER NOTES
Encounter Date: 7/8/2024       History     Chief Complaint   Patient presents with    Abdominal Pain     HPI patient is a 58-year-old woman who presents emergency department for evaluation of nausea vomiting abdominal distention and severe abdominal cramps for 2 days.  She denies any associated diarrhea.  States she has a history of multiple abdominal surgeries in his sciatic bowel obstruction in the past.  She also does he hemodialysis Monday Wednesday Friday, last dialysis on Friday.  States she is vomiting brown emesis, no hematemesis.  No fever.  Review of patient's allergies indicates:   Allergen Reactions    Lisinopril Anaphylaxis     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     Generalized edema     GERD (gastroesophageal reflux disease)     Heart attack 2021    History of traumatic brain injury     Hypertension     Hypothyroidism     Insomnia     Muscle weakness (generalized)     Restless leg syndrome     Senile dementia, uncomplicated     Sleep apnea, unspecified     TIA (transient ischemic attack)     Unsteadiness on feet      Past Surgical History:   Procedure Laterality Date    ADENOIDECTOMY      ANGIOGRAM, CORONARY, WITH LEFT HEART CATHETERIZATION N/A 02/12/2024    Procedure: Angiogram, Coronary, with Left Heart Cath;  Surgeon: Cristian Carlisle MD;  Location: [facility] CATH/EP LAB;  Service: Cardiology;  Laterality: N/A;    APPENDECTOMY      AV FISTULA  PLACEMENT Left     CHOLECYSTECTOMY      COLONOSCOPY      10 years ago in [place]    CORONARY STENT PLACEMENT  2021    ESOPHAGOGASTRODUODENOSCOPY N/A 5/20/2024    Procedure: EGD (ESOPHAGOGASTRODUODENOSCOPY);  Surgeon: Carl Conti MD;  Location: [facility];  Service: Endoscopy;  Laterality: N/A;    IVUS (INTRAVASCULAR ULTRASOUND) N/A 02/12/2024    Procedure: ULTRASOUND, INTRAVASCULAR;  Surgeon: Cristian Carlisle MD;  Location: [facility] CATH/EP LAB;  Service: Cardiology;  Laterality: N/A;    KNEE SURGERY      knee arthroplasty    LEFT HEART CATHETERIZATION  2021    NECK SURGERY      PARTIAL HYSTERECTOMY      STENT, DRUG ELUTING, MULTI VESSEL, CORONARY  02/12/2024    Procedure: Stent, Drug Eluting, Multi Vessel, Coronary;  Surgeon: Cristian Carlisle MD;  Location: [facility] CATH/EP LAB;  Service: Cardiology;;    TONSILLECTOMY       No family history on file.  Social History     Tobacco Use    Smoking status: Never    Smokeless tobacco: Never   Substance Use Topics    Alcohol use: Never    Drug use: Never     Review of Systems   Constitutional:  Negative for fever.   HENT:  Negative for sore throat.    Respiratory:  Negative for shortness of breath.    Cardiovascular:  Negative for chest pain.   Gastrointestinal:  Positive for abdominal distention, abdominal pain, nausea and vomiting.   Genitourinary:  Negative for dysuria.   Musculoskeletal:  Negative for back pain.   Skin:  Negative for rash.   Neurological:  Negative for weakness.   Hematological:  Does not bruise/bleed easily.       Physical Exam     Initial Vitals [07/08/24 0600]   BP Pulse Resp Temp SpO2   (!) 149/76 95 (!) 22 98.2 °F (36.8 °C) 99 %      MAP       --         Physical Exam    Constitutional: Vital signs are normal. She appears well-developed and well-nourished.  Non-toxic appearance. No distress.   HENT:   Head: Normocephalic and atraumatic.   Eyes: EOM are normal. Pupils are equal, round, and reactive to light.   Neck: Neck supple. No JVD present.    Normal range of motion.  Cardiovascular:  Normal rate, regular rhythm, normal heart sounds and intact distal pulses.     Exam reveals no gallop and no friction rub.       No murmur heard.  AV fistula in left upper arm with palpable pulse.   Pulmonary/Chest: Breath sounds normal. She has no wheezes. She has no rhonchi. She has no rales.   Abdominal: Abdomen is soft. Bowel sounds are normal. There is abdominal tenderness in the epigastric area, periumbilical area and suprapubic area. There is guarding. There is no rebound.   Musculoskeletal:         General: Normal range of motion.      Cervical back: Normal range of motion and neck supple. No rigidity.     Neurological: She is alert and oriented to person, place, and time. She has normal strength and normal reflexes. No cranial nerve deficit or sensory deficit. She exhibits normal muscle tone. Coordination normal. GCS eye subscore is 4. GCS verbal subscore is 5. GCS motor subscore is 6.   Skin: Skin is warm and dry.   Psychiatric: She has a normal mood and affect. Her speech is normal and behavior is normal. She is not actively hallucinating.         ED Course   Procedures  Labs Reviewed   CBC W/ AUTO DIFFERENTIAL - Abnormal; Notable for the following components:       Result Value    RBC 3.08 (*)     Hemoglobin 11.1 (*)     Hematocrit 34.1 (*)      (*)     MCH 36.0 (*)     RDW 18.8 (*)     Gran % 75.9 (*)     Lymph % 14.2 (*)     All other components within normal limits   COMPREHENSIVE METABOLIC PANEL - Abnormal; Notable for the following components:    Potassium 6.1 (*)     Chloride 93 (*)     CO2 19 (*)     Glucose 176 (*)     BUN 64 (*)     Creatinine 11.9 (*)     Alkaline Phosphatase 250 (*)     eGFR 3 (*)     Anion Gap 25 (*)     All other components within normal limits    Narrative:     K   critical result(s) called and verbal readback obtained from   Sudarshan Joiner.  No visible hemolysis by CLG 07/08/2024 07:28   URINALYSIS, REFLEX TO URINE CULTURE  - Abnormal; Notable for the following components:    Protein, UA 2+ (*)     Occult Blood UA 2+ (*)     Leukocytes, UA 1+ (*)     All other components within normal limits    Narrative:     Specimen Source->Urine   POTASSIUM - Abnormal; Notable for the following components:    Potassium 6.0 (*)     All other components within normal limits   LIPASE   URINALYSIS MICROSCOPIC    Narrative:     Specimen Source->Urine   POCT GLUCOSE   POCT GLUCOSE MONITORING CONTINUOUS          Imaging Results              CT Abdomen Pelvis With IV Contrast NO Oral Contrast (Final result)  Result time 07/08/24 08:50:30      Final result by Marie Salgado MD (07/08/24 08:50:30)                   Impression:      Small-bowel obstruction.  Dilatation of the small bowel is greater today than on the prior study 04/29/2024.    Mild bibasilar opacification decreased compared to the prior exam    Results were called to Dr. Jerez 08:50 07/08/2024      Electronically signed by: Marie Salgado MD  Date:    07/08/2024  Time:    08:50               Narrative:    EXAMINATION:  CT ABDOMEN PELVIS WITH IV CONTRAST    CLINICAL HISTORY:  Bowel obstruction suspected;Nausea/vomiting;    TECHNIQUE:  Low dose axial images, sagittal and coronal reformations were obtained from the lung bases to the pubic symphysis following the IV administration of 100 mL of Omnipaque 350 and no p.o. contrast    COMPARISON:  CT abdomen and pelvis 04/29/2024.    FINDINGS:  Visualized lung bases: Mild dependent hypoventilatory changes.  Small nodular foci right posterior lung base not significantly changed compared to the prior exam.  For example axial series 2 image 6 2-3 mm nodular focus.  Improved aeration of the lung bases particularly on the right compared to the prior exam    Liver, spleen, pancreas, adrenal glands unremarkable appearance.  Prior cholecystectomy.  Severe atrophy of the kidneys.  Small renal cysts.  No hydronephrosis.  Abdominal aorta tapers without  aneurysmal dilatation    Appendix not identified but no abnormal appendix inflammatory changes appendiceal region is seen.  Small fat containing umbilical hernia.  Osseous structures show degenerative changes    Colon is not abnormally dilated.  There is diffuse dilatation of small bowel mildly to mildly dilated and dilated more so today than on the prior exam.  Transition zone is thought to be seen right lower quadrant of the abdomen.    No free intraperitoneal air or fluid.  No abscess    Decrease of the buttock contusion hematoma compared to the recent pelvic CT of 06/07/2024    Prior hysterectomy.  Adnexal region unremarkable appearance    Osseous degenerative changes multilevel spinal canal narrowing                                       Medications   dextrose 10% bolus 500 mL 500 mL (0 mLs Intravenous Stopped 7/8/24 0965)     And   dextrose 10% bolus 250 mL 250 mL (has no administration in time range)   mupirocin 2 % ointment (has no administration in time range)   0.9%  NaCl infusion (has no administration in time range)   0.9%  NaCl infusion (has no administration in time range)   sodium chloride 0.9% bolus 250 mL 250 mL (has no administration in time range)   heparin (porcine) injection 5,000 Units (has no administration in time range)   sodium chloride 0.9% flush 10 mL (has no administration in time range)   naloxone 0.4 mg/mL injection 0.02 mg (has no administration in time range)   glucose chewable tablet 16 g (has no administration in time range)   glucose chewable tablet 24 g (has no administration in time range)   glucagon (human recombinant) injection 1 mg (has no administration in time range)   dextrose 10% bolus 125 mL 125 mL (has no administration in time range)   dextrose 10% bolus 250 mL 250 mL (has no administration in time range)   hydrALAZINE injection 10 mg (has no administration in time range)   albuterol sulfate nebulizer solution 2.5 mg (has no administration in time range)   morphine  injection 2 mg (2 mg Intravenous Given 7/8/24 1552)   LIDOcaine HCl 2% urojet (has no administration in time range)   albuterol sulfate nebulizer solution 10 mg (10 mg Nebulization Given 7/8/24 0756)   calcium gluconate 1 g in NS IVPB (premixed) (0 g Intravenous Stopped 7/8/24 1030)   insulin regular injection 5 Units 0.05 mL (5 Units Intravenous Given 7/8/24 0756)   promethazine (PHENERGAN) 25 mg in 0.9% NaCl 50 mL IVPB (0 mg Intravenous Stopped 7/8/24 0902)   iohexoL (OMNIPAQUE 350) 350 mg iodine/mL injection (100 mLs Intravenous Given 7/8/24 0809)     Medical Decision Making  58-year-old woman who presents emergency department for evaluation of nausea vomiting abdominal distention and severe abdominal cramps for 2 days.  Patient has history of end-stage renal disease on hemodialysis previous bowel obstruction.  Differential diagnosis includes bowel obstruction, peritonitis, electrolyte abnormality, pancreatitis.  Patient is found to have a potassium of 6 point 1 with slightly peaked T-waves on EKG.  She is started on medications for treatment of hyperkalemia.  Patient also found to have a bowel obstruction on CT scan.  Discussed with general surgery and Hospital Medicine who agrees to admit the patient.  Discussed with Nephrology who will dialyze the patient for hyperkalemia.    Amount and/or Complexity of Data Reviewed  Radiology: ordered.    Risk  OTC drugs.  Prescription drug management.  Decision regarding hospitalization.    Critical Care  Total time providing critical care: 40 minutes               ED Course as of 07/08/24 1614   Mon Jul 08, 2024   0743 Potassium 6.1.  Obtain EKG, given hyperkalemia meds and obtaining CT abdomen pelvis to rule out obstruction. [AS]   0748 EKG interpreted by myself is normal sinus rhythm, 89 beats per minute with normal ST segments, slightly peaked T-waves, normal intervals. [AS]      ED Course User Index  [AS] Mike Jerez MD                           Clinical  Impression:  Final diagnoses:  [E87.5] Hyperkalemia (Primary)  [K56.609] Small bowel obstruction          ED Disposition Condition    Admit                 Mike Jerez MD  07/08/24 9172

## 2024-07-08 NOTE — ASSESSMENT & PLAN NOTE
Chronic, uncontrolled. Latest blood pressure and vitals reviewed-     Temp:  [98.2 °F (36.8 °C)]   Pulse:  [88-95]   Resp:  [19-22]   BP: (149-166)/(76-87)   SpO2:  [95 %-99 %] .   Home meds for hypertension were reviewed and noted below.   Hypertension Medications               nitroGLYCERIN (NITROSTAT) 0.4 MG SL tablet Place 1 tablet (0.4 mg total) under the tongue every 5 (five) minutes as needed for Chest pain.    torsemide (DEMADEX) 100 MG Tab Take 1 tablet (100 mg total) by mouth 2 (two) times a day.            While in the hospital, will manage blood pressure as follows; hold PO meds due to SBO and general surgery consulted    Will utilize p.r.n. blood pressure medication only if patient's SBP >180 and she develops symptoms such as worsening chest pain or shortness of breath.

## 2024-07-09 LAB
ALBUMIN SERPL BCP-MCNC: 3.8 G/DL (ref 3.5–5.2)
ALP SERPL-CCNC: 253 U/L (ref 55–135)
ALT SERPL W/O P-5'-P-CCNC: 14 U/L (ref 10–44)
ANION GAP SERPL CALC-SCNC: 24 MMOL/L (ref 8–16)
AST SERPL-CCNC: 21 U/L (ref 10–40)
BASOPHILS # BLD AUTO: 0.04 K/UL (ref 0–0.2)
BASOPHILS NFR BLD: 0.4 % (ref 0–1.9)
BILIRUB SERPL-MCNC: 0.8 MG/DL (ref 0.1–1)
BUN SERPL-MCNC: 42 MG/DL (ref 6–20)
CALCIUM SERPL-MCNC: 9.2 MG/DL (ref 8.7–10.5)
CHLORIDE SERPL-SCNC: 95 MMOL/L (ref 95–110)
CO2 SERPL-SCNC: 21 MMOL/L (ref 23–29)
CREAT SERPL-MCNC: 8.8 MG/DL (ref 0.5–1.4)
DIFFERENTIAL METHOD BLD: ABNORMAL
EOSINOPHIL # BLD AUTO: 0 K/UL (ref 0–0.5)
EOSINOPHIL NFR BLD: 0.3 % (ref 0–8)
ERYTHROCYTE [DISTWIDTH] IN BLOOD BY AUTOMATED COUNT: 18.9 % (ref 11.5–14.5)
EST. GFR  (NO RACE VARIABLE): 5 ML/MIN/1.73 M^2
GLUCOSE SERPL-MCNC: 122 MG/DL (ref 70–110)
HCT VFR BLD AUTO: 37.1 % (ref 37–48.5)
HGB BLD-MCNC: 11.8 G/DL (ref 12–16)
IMM GRANULOCYTES # BLD AUTO: 0.03 K/UL (ref 0–0.04)
IMM GRANULOCYTES NFR BLD AUTO: 0.3 % (ref 0–0.5)
LYMPHOCYTES # BLD AUTO: 1.5 K/UL (ref 1–4.8)
LYMPHOCYTES NFR BLD: 16.2 % (ref 18–48)
MAGNESIUM SERPL-MCNC: 2.2 MG/DL (ref 1.6–2.6)
MCH RBC QN AUTO: 35.9 PG (ref 27–31)
MCHC RBC AUTO-ENTMCNC: 31.8 G/DL (ref 32–36)
MCV RBC AUTO: 113 FL (ref 82–98)
MONOCYTES # BLD AUTO: 1 K/UL (ref 0.3–1)
MONOCYTES NFR BLD: 10.5 % (ref 4–15)
NEUTROPHILS # BLD AUTO: 6.5 K/UL (ref 1.8–7.7)
NEUTROPHILS NFR BLD: 72.3 % (ref 38–73)
NRBC BLD-RTO: 0 /100 WBC
PHOSPHATE SERPL-MCNC: 9.9 MG/DL (ref 2.7–4.5)
PLATELET # BLD AUTO: 183 K/UL (ref 150–450)
PMV BLD AUTO: 11.5 FL (ref 9.2–12.9)
POTASSIUM SERPL-SCNC: 5.6 MMOL/L (ref 3.5–5.1)
PROT SERPL-MCNC: 8.6 G/DL (ref 6–8.4)
RBC # BLD AUTO: 3.29 M/UL (ref 4–5.4)
SODIUM SERPL-SCNC: 140 MMOL/L (ref 136–145)
WBC # BLD AUTO: 9.06 K/UL (ref 3.9–12.7)

## 2024-07-09 PROCEDURE — 84100 ASSAY OF PHOSPHORUS: CPT

## 2024-07-09 PROCEDURE — 80053 COMPREHEN METABOLIC PANEL: CPT

## 2024-07-09 PROCEDURE — 99900035 HC TECH TIME PER 15 MIN (STAT)

## 2024-07-09 PROCEDURE — 99232 SBSQ HOSP IP/OBS MODERATE 35: CPT | Mod: ,,, | Performed by: SURGERY

## 2024-07-09 PROCEDURE — 94761 N-INVAS EAR/PLS OXIMETRY MLT: CPT

## 2024-07-09 PROCEDURE — 83735 ASSAY OF MAGNESIUM: CPT

## 2024-07-09 PROCEDURE — 25000003 PHARM REV CODE 250: Performed by: NURSE PRACTITIONER

## 2024-07-09 PROCEDURE — 90935 HEMODIALYSIS ONE EVALUATION: CPT

## 2024-07-09 PROCEDURE — 25000003 PHARM REV CODE 250: Performed by: INTERNAL MEDICINE

## 2024-07-09 PROCEDURE — 11000001 HC ACUTE MED/SURG PRIVATE ROOM

## 2024-07-09 PROCEDURE — 36415 COLL VENOUS BLD VENIPUNCTURE: CPT

## 2024-07-09 PROCEDURE — 85025 COMPLETE CBC W/AUTO DIFF WBC: CPT

## 2024-07-09 RX ORDER — LEVOTHYROXINE SODIUM 150 UG/1
150 TABLET ORAL
Status: DISCONTINUED | OUTPATIENT
Start: 2024-07-10 | End: 2024-07-14 | Stop reason: HOSPADM

## 2024-07-09 RX ORDER — QUETIAPINE FUMARATE 100 MG/1
100 TABLET, FILM COATED ORAL NIGHTLY
Status: DISCONTINUED | OUTPATIENT
Start: 2024-07-09 | End: 2024-07-14 | Stop reason: HOSPADM

## 2024-07-09 RX ADMIN — MUPIROCIN 1 G: 20 OINTMENT TOPICAL at 09:07

## 2024-07-09 RX ADMIN — QUETIAPINE 100 MG: 100 TABLET ORAL at 08:07

## 2024-07-09 RX ADMIN — MUPIROCIN 1 G: 20 OINTMENT TOPICAL at 08:07

## 2024-07-09 NOTE — CARE UPDATE
07/08/24 1958   Patient Assessment/Suction   Level of Consciousness (AVPU) alert   Respiratory Effort Unlabored   Expansion/Accessory Muscles/Retractions no use of accessory muscles;no retractions   All Lung Fields Breath Sounds clear   PRE-TX-O2   Device (Oxygen Therapy) room air   SpO2 97 %   Pulse Oximetry Type Intermittent   $ Pulse Oximetry - Multiple Charge Pulse Oximetry - Multiple   Pulse (!) 111   Resp 18   Aerosol Therapy   $ Aerosol Therapy Charges PRN treatment not required

## 2024-07-09 NOTE — PROGRESS NOTES
07/09/24 1641   Required for all Hemodialysis Patients   Hepatitis Status negative   Handoff Report   Received From Nicolas Hernandez   Given To MICAH Oliveira   Treatment Type   Treatment Type Maintenance   Vital Signs   Temp 98.5 °F (36.9 °C)   Pulse 85   Resp 18   /76   Assessments (Pre/Post)   Consent Obtained yes   Safety vein preservation armband present   Date Hepatitis Profile Obtained 02/27/24   Blood Liters Processed (BLP) 46.6   Transport Modality not applicable   Level of Consciousness (AVPU) alert   Dialyzer Clearance moderately streaked        Hemodialysis AV Fistula Left upper arm   No placement date or time found.   Present Prior to Hospital Arrival?: Yes  Location: Left upper arm   Needle Size 15ga   Site Assessment Dry;Clean;Intact;No redness;No swelling   Patency Present;Thrill;Bruit   Status Deaccessed   Flows Good   Dressing Intervention First dressing   Dressing Status Clean;Dry;Intact   Site Condition No complications   Dressing Gauze   Post-Hemodialysis Assessment   Rinseback Volume (mL) 250 mL   Blood Volume Processed (Liters) 46.6 L   Dialyzer Clearance Moderately streaked   Duration of Treatment 120 minutes   Additional Fluid Intake (mL) 500 mL   Total UF (mL) 500 mL   Net Fluid Removal 0   Patient Response to Treatment pt jacobo tx.   Post-Treatment Weight 97.5 kg (214 lb 15.2 oz)   Treatment Weight Change 0.1   Arterial bleeding stop time (min) 7 min   Venous bleeding stop time (min) 7 min   Post-Hemodialysis Comments Pt jacobo tx with no uf.     Pt jacobo tx with no net uf. Report given to primary nurse. Pt educated on access care.

## 2024-07-09 NOTE — ASSESSMENT & PLAN NOTE
Chronic, uncontrolled. Latest blood pressure and vitals reviewed-     Temp:  [97.4 °F (36.3 °C)-98.6 °F (37 °C)]   Pulse:  []   Resp:  [18-22]   BP: (101-158)/(54-87)   SpO2:  [93 %-98 %] .   Home meds for hypertension were reviewed and noted below.   Hypertension Medications               nitroGLYCERIN (NITROSTAT) 0.4 MG SL tablet Place 1 tablet (0.4 mg total) under the tongue every 5 (five) minutes as needed for Chest pain.    torsemide (DEMADEX) 100 MG Tab Take 1 tablet (100 mg total) by mouth 2 (two) times a day.            While in the hospital, will manage blood pressure as follows; hold PO meds due to SBO and general surgery consulted    Will utilize p.r.n. blood pressure medication only if patient's SBP >180 and she develops symptoms such as worsening chest pain or shortness of breath.

## 2024-07-09 NOTE — PROGRESS NOTES
INPATIENT NEPHROLOGY Progress Note   [facility]    Patient Name: Eugenia Manuel  Date: 07/09/2024    Reason for consultation: ESRD    Chief Complaint:   Chief Complaint   Patient presents with    Abdominal Pain       History of Present Illness:  58 year old female with past medical history of CAD w/ 2 stents, HTN, ESRD on HD MWF, bipolar disorder, hypothyroidism, asthma, MI, SBO, restless leg syndrome who presented to the ER with complaints nausea, vomiting, and abdominal distention x 2 days. She reports last bowel movement was on Saturday and is not passing gas. ED work up reveals CT of abdomen/pelvis with concern for SBO. General surgery notified and ordered NG Tube to be placed. Consulted for dialysis.    Interval History:  7/8- K 6.1- got medical management- HD ordered- SBP 160s, on RA, NPO, couldn't pass first NGT in ER- floor nurse will try after HD- c/w abd tenderness- no flatus, BMs= seen on HD  7/9- HD yest with 3L UF- BP better- on RA- Contrast has traversed to the distal colon indicating absence of a complete bowel obstruction but appears will still need surg- doesn't have NGT- had 2 BMs today- labs shows hyperK today- will do another HD session (2 hours) for clearance, no UF    Plan of Care:    Assessment:  SBO  ESRD on HD MWF  HTN  Hyperkalemia/Acidosis  SHPT  Anemia of CKD    Plan:    - awaiting GS plan- having BMs- thirsty- still NPO  - ordered HD today and then MWF  - adjust dialysate- high flux dialyzer  - UF with HD as able  - all meds on hold while NPO (BP meds, diuretic, binders)- nothing we can do about high phos while NPO  - no acute SANJANA needs    Thank you for allowing us to participate in this patient's care. We will continue to follow.    Vital Signs:  Temp Readings from Last 3 Encounters:   07/09/24 98.6 °F (37 °C)   06/07/24 98.2 °F (36.8 °C) (Oral)   05/20/24 99 °F (37.2 °C)       Pulse Readings from Last 3 Encounters:   07/09/24 81   06/07/24 78   05/20/24 76       BP  Readings from Last 3 Encounters:   07/09/24 126/60   06/07/24 128/62   05/20/24 (!) 157/103       Weight:  Wt Readings from Last 3 Encounters:   07/08/24 97.4 kg (214 lb 11.7 oz)   06/07/24 94.3 kg (208 lb)   05/20/24 98.4 kg (217 lb)       Medications:  Scheduled Meds:   0.9% NaCl   Intravenous Once    mupirocin   Nasal BID     Continuous Infusions:  PRN Meds:.  Current Facility-Administered Medications:     0.9% NaCl, , Intravenous, PRN    albuterol sulfate, 2.5 mg, Nebulization, Q6H PRN    dextrose 10%, 12.5 g, Intravenous, PRN    dextrose 10%, 25 g, Intravenous, PRN    glucagon (human recombinant), 1 mg, Intramuscular, PRN    glucose, 16 g, Oral, PRN    glucose, 24 g, Oral, PRN    heparin (porcine), 5,000 Units, Intravenous, PRN    hydrALAZINE, 10 mg, Intravenous, Q6H PRN    morphine, 2 mg, Intravenous, Q4H PRN    naloxone, 0.02 mg, Intravenous, PRN    ondansetron, 4 mg, Intravenous, Q6H PRN    sodium chloride 0.9%, 250 mL, Intravenous, PRN    sodium chloride 0.9%, 10 mL, Intravenous, Q12H PRN  No current facility-administered medications on file prior to encounter.     Current Outpatient Medications on File Prior to Encounter   Medication Sig Dispense Refill    albuterol (PROVENTIL/VENTOLIN HFA) 90 mcg/actuation inhaler Inhale 2 puffs into the lungs every 6 (six) hours as needed. Rescue 18 g 6    aspirin 81 MG Chew Take 1 tablet (81 mg total) by mouth once daily.  0    atorvastatin (LIPITOR) 40 MG tablet Take 1 tablet (40 mg total) by mouth once daily. 90 tablet 0    azelastine (ASTELIN) 137 mcg (0.1 %) nasal spray 1 spray (137 mcg total) by Nasal route 2 (two) times daily. 30 mL 2    budesonide-glycopyr-formoterol (BREZTRI AEROSPHERE) 160-9-4.8 mcg/actuation HFAA Inhale 2 puffs into the lungs 2 (two) times daily. 10.7 g 6    cetirizine (ZYRTEC) 10 MG tablet Take 1 tablet (10 mg total) by mouth once daily. 90 tablet 3    clopidogreL (PLAVIX) 75 mg tablet Take 1 tablet (75 mg total) by mouth once daily. 90  tablet 3    ferric citrate (AURYXIA) 210 mg iron Tab Take 420 mg by mouth 3 (three) times daily. 540 tablet 3    fluticasone propionate (FLONASE) 50 mcg/actuation nasal spray 2 sprays (100 mcg total) by Each Nostril route once daily. 16 g 1    levothyroxine (SYNTHROID) 150 MCG tablet Take 1 tablet (150 mcg total) by mouth before breakfast. 90 tablet 3    melatonin 10 mg Cap Take 10 mg by mouth nightly as needed.      midodrine (PROAMATINE) 10 MG tablet Take 1 tablet (10 mg total) by mouth daily as needed (hypotension). 180 tablet 0    montelukast (SINGULAIR) 10 mg tablet Take 1 tablet (10 mg total) by mouth every evening. 30 tablet 6    nitroGLYCERIN (NITROSTAT) 0.4 MG SL tablet Place 1 tablet (0.4 mg total) under the tongue every 5 (five) minutes as needed for Chest pain. 10 tablet 5    ondansetron (ZOFRAN-ODT) 4 MG TbDL Take 1 tablet (4 mg total) by mouth 1 (one) time if needed (nausea). 1 tablet 0    pantoprazole (PROTONIX) 40 MG tablet Take 1 tablet (40 mg total) by mouth 2 (two) times daily. 180 tablet 1    pregabalin (LYRICA) 50 MG capsule Take 50 mg by mouth once daily.      QUEtiapine (SEROQUEL) 100 MG Tab Take 1 tablet (100 mg total) by mouth once daily. (Patient taking differently: Take 100 mg by mouth nightly.) 30 tablet 5    QUEtiapine (SEROQUEL) 25 MG Tab Take 1 tablet (25 mg total) by mouth every evening. 90 tablet 1    rOPINIRole (REQUIP) 2 MG tablet Take 1 tablet (2 mg total) by mouth 3 (three) times daily. 90 tablet 1    senna-docusate 8.6-50 mg (PERICOLACE) 8.6-50 mg per tablet Take 1 tablet by mouth daily as needed for Constipation.      sevelamer carbonate (RENVELA) 800 mg Tab Take 5 tablets (4,000 mg total) by mouth 3 (three) times daily with meals. 1350 tablet 3    torsemide (DEMADEX) 100 MG Tab Take 1 tablet (100 mg total) by mouth 2 (two) times a day. 90 tablet 0    venlafaxine (EFFEXOR-XR) 150 MG Cp24 Take 1 capsule (150 mg total) by mouth once daily. 90 capsule 3     Review of  Systems:  Neg    Physical Exam:  General Appearance:    NAD, AAO x 3, cooperative, appears stated age   Head:    Normocephalic, atraumatic   Eyes:    PER, EOMI, and conjunctiva/sclera clear bilaterally       Mouth:   Moist mucus membranes, no thrush or oral lesions,       normal dentition   Back:     No CVA tenderness   Lungs:     Clear to auscultation bilaterally, no wheezes, crackles,           rales or rhonchi, symmetric air movement, respirations unlabored   Chest wall:    No tenderness or deformity   Heart:    Regular rate and rhythm, S1 and S2 normal, no murmur, rub   or gallop   Abdomen:     Soft, tender, distended   Extremities:   Warm and well perfused, distal pulses are intact, no             cyanosis or peripheral edema   MSK:   No joint or muscle swelling, tenderness or deformity   Skin:   Skin color, texture, turgor normal, no rashes or lesions   Neurologic/Psychiatric:   CNII-XII intact, normal strength and sensation       throughout, no asterixis; normal affect, memory, judgement     and insight      Results:  Lab Results   Component Value Date     07/09/2024    K 5.6 (H) 07/09/2024    CL 95 07/09/2024    CO2 21 (L) 07/09/2024    BUN 42 (H) 07/09/2024    CREATININE 8.8 (H) 07/09/2024    CALCIUM 9.2 07/09/2024    ANIONGAP 24 (H) 07/09/2024       Lab Results   Component Value Date    CALCIUM 9.2 07/09/2024    PHOS 9.9 (HH) 07/09/2024       Recent Labs   Lab 07/09/24  0353   WBC 9.06   RBC 3.29*   HGB 11.8*   HCT 37.1      *   MCH 35.9*   MCHC 31.8*       I have personally reviewed pertinent radiological imaging and reports from this admission.    I have spent > 35 minutes providing care for this patient for the above diagnoses. These services have included chart/data/imaging review, evaluation, exam, formulation of plan, , note preparation, and discussions with staff involved in this patient's care.    Bayron Dumont MD MPH  [facility]  [address] [place] [address]  [address]  [place] [address]  [phone] (p)  [phone] (f)

## 2024-07-09 NOTE — SUBJECTIVE & OBJECTIVE
Interval History: Patient seen and examined.  Continue with LUQ pain.  Reports large liquid bowel movement this morning.  Keep NPO.  Awaiting further general surgery recs.  Hyperphosphatemia, will need phosphate binders restarted when appropriate.    Review of Systems   Respiratory:  Negative for shortness of breath and wheezing.    Cardiovascular:  Negative for chest pain and palpitations.   Gastrointestinal:  Positive for abdominal distention and abdominal pain. Negative for nausea and vomiting.     Objective:     Vital Signs (Most Recent):  Temp: 98.6 °F (37 °C) (07/09/24 1135)  Pulse: 81 (07/09/24 1135)  Resp: 18 (07/09/24 1135)  BP: 126/60 (07/09/24 1135)  SpO2: 97 % (07/09/24 1135) Vital Signs (24h Range):  Temp:  [97.4 °F (36.3 °C)-98.6 °F (37 °C)] 98.6 °F (37 °C)  Pulse:  [] 81  Resp:  [18-22] 18  SpO2:  [93 %-98 %] 97 %  BP: (101-158)/(54-87) 126/60     Weight: 97.4 kg (214 lb 11.7 oz)  Body mass index is 32.65 kg/m².    Intake/Output Summary (Last 24 hours) at 7/9/2024 1407  Last data filed at 7/8/2024 1730  Gross per 24 hour   Intake 500 ml   Output 3500 ml   Net -3000 ml         Physical Exam  Vitals and nursing note reviewed.   Constitutional:       General: She is not in acute distress.     Appearance: Normal appearance.   HENT:      Head: Normocephalic.      Mouth/Throat:      Mouth: Mucous membranes are moist.      Pharynx: Oropharynx is clear.   Eyes:      Pupils: Pupils are equal, round, and reactive to light.   Cardiovascular:      Rate and Rhythm: Normal rate and regular rhythm.   Pulmonary:      Effort: Pulmonary effort is normal. No respiratory distress.      Breath sounds: Normal breath sounds.   Abdominal:      General: There is distension.      Palpations: Abdomen is soft.      Tenderness: There is abdominal tenderness.   Musculoskeletal:         General: Normal range of motion.   Skin:     General: Skin is warm and dry.   Neurological:      General: No focal deficit present.       Mental Status: She is alert and oriented to person, place, and time.   Psychiatric:         Mood and Affect: Mood normal.             Significant Labs: All pertinent labs within the past 24 hours have been reviewed.  Recent Lab Results         07/09/24  0353   07/08/24  1556   07/08/24  1429        Albumin 3.8                      ALT 14           Anion Gap 24           AST 21           Baso # 0.04           Basophil % 0.4           BILIRUBIN TOTAL 0.8  Comment: For infants and newborns, interpretation of results should be based  on gestational age, weight and in agreement with clinical  observations.    Premature Infant recommended reference ranges:  Up to 24 hours.............<8.0 mg/dL  Up to 48 hours............<12.0 mg/dL  3-5 days..................<15.0 mg/dL  6-29 days.................<15.0 mg/dL             BUN 42           Calcium 9.2           Chloride 95           CO2 21           Creatinine 8.8           Differential Method Automated           eGFR 5           Eos # 0.0           Eos % 0.3           Glucose 122           Gran # (ANC) 6.5           Gran % 72.3           Hematocrit 37.1           Hemoglobin 11.8           Immature Grans (Abs) 0.03  Comment: Mild elevation in immature granulocytes is non specific and   can be seen in a variety of conditions including stress response,   acute inflammation, trauma and pregnancy. Correlation with other   laboratory and clinical findings is essential.             Immature Granulocytes 0.3           Lymph # 1.5           Lymph % 16.2           Magnesium  2.2           MCH 35.9           MCHC 31.8                      Mono # 1.0           Mono % 10.5           MPV 11.5           nRBC 0           Phosphorus Level 9.9  Comment: Phosphorus    critical result(s) called and verbal readback obtained   from Kiet Hill RN.  by TH1 07/09/2024 05:27             Platelet Count 183           Potassium 5.6   4.0   6.0       PROTEIN TOTAL 8.6           RBC  3.29           RDW 18.9           Sodium 140           WBC 9.06                   Significant Imaging: I have reviewed all pertinent imaging results/findings within the past 24 hours.

## 2024-07-09 NOTE — PLAN OF CARE
Problem: Hemodialysis  Goal: Safe, Effective Therapy Delivery  Outcome: Progressing  Goal: Effective Tissue Perfusion  Outcome: Progressing  Goal: Absence of Infection Signs and Symptoms  Outcome: Progressing     Problem: Adult Inpatient Plan of Care  Goal: Plan of Care Review  Outcome: Progressing  Goal: Patient-Specific Goal (Individualized)  Outcome: Progressing  Goal: Absence of Hospital-Acquired Illness or Injury  Outcome: Progressing  Goal: Optimal Comfort and Wellbeing  Outcome: Progressing  Goal: Readiness for Transition of Care  Outcome: Progressing     Problem: Pneumonia  Goal: Fluid Balance  Outcome: Progressing  Goal: Resolution of Infection Signs and Symptoms  Outcome: Progressing  Goal: Effective Oxygenation and Ventilation  Outcome: Progressing

## 2024-07-09 NOTE — ASSESSMENT & PLAN NOTE
Creatine stable for now. BMP reviewed- noted Estimated Creatinine Clearance: 8.6 mL/min (A) (based on SCr of 8.8 mg/dL (H)). according to latest data. Based on current GFR, CKD stage is end stage.  Monitor UOP and serial BMP and adjust therapy as needed. Renally dose meds. Avoid nephrotoxic medications and procedures.  Nephrology consulted  HD MWF, had HD yesterday, continue MWF per nephrology

## 2024-07-09 NOTE — ASSESSMENT & PLAN NOTE
This patient has hyperkalemia which is uncontrolled. We will monitor for arrhythmias with EKG or continuous telemetry. We will treat the hyperkalemia with Potassium Binders, Calcium gluconate, IV insulin and dextrose, and Nebulized albuterol sulfate. The likely etiology of the hyperkalemia is ESRD.  HD done yesterday, continue MWF  The patients latest potassium has been reviewed and the results are listed below  Recent Labs   Lab 07/09/24  0353   K 5.6*

## 2024-07-09 NOTE — PROGRESS NOTES
[facility]  [facility] Note    Patient Name: Eugenia Manuel  MRN: [id number]  Patient Class: IP- Inpatient   Admission Date: 7/8/2024  Length of Stay: 1 days  Attending Physician: Aranza Espinoza MD  Primary Care Provider: John Castro MD        Subjective:     Principal Problem:Small bowel obstruction        HPI:  58 year old female with past medical history of CAD w/ 2 stents, HTN, ESRD on HD MWF, bipolar disorder, hypothyroidism, asthma, MI, SBO, restless leg syndrome who presented to the ER with complaints nausea, vomiting, and abdominal distention x 2 days.  She reports last bowel movement was on Saturday and is not passing gas.  ED work up reveals CT of abdomen/pelvis with concern for SBO.  CBC with anemia which appears around baseline.  CMP consistent with ESRD with potassium 6.1.  She was given calcium gluconate, albuterol, insulin, and dextrose.  Repeat potassium pending.  Nephrology was notified and HD to be done today.  General surgery also notified and ordered NG Tube to be placed.  On chart review, patient was admitted in April 2024 for SBO.  She was given a gastrograffin challenge and GI function returned.  Patient will be admitted to hospital medicine for further evaluation and treatment.    Overview/Hospital Course:  58 year old female admitted with complaints of nausea, vomiting, and abdominal distention.  CT of abdomen was done with concern for SBO.  CMP consistent with ESRD with hyperkalemia.  Nephrology and general surgery were consulted.  She is on HD MWF, she had HD yesterday.  She was given a gastrograffin challenge yesterday.  She had large liquid bowel movement this morning.  Waiting on further recs from general surgery.    Interval History: Patient seen and examined.  Continue with LUQ pain.  Reports large liquid bowel movement this morning.  Keep NPO.  Awaiting further general surgery recs.  Hyperphosphatemia, will need phosphate binders  restarted when appropriate.    Review of Systems   Respiratory:  Negative for shortness of breath and wheezing.    Cardiovascular:  Negative for chest pain and palpitations.   Gastrointestinal:  Positive for abdominal distention and abdominal pain. Negative for nausea and vomiting.     Objective:     Vital Signs (Most Recent):  Temp: 98.6 °F (37 °C) (07/09/24 1135)  Pulse: 81 (07/09/24 1135)  Resp: 18 (07/09/24 1135)  BP: 126/60 (07/09/24 1135)  SpO2: 97 % (07/09/24 1135) Vital Signs (24h Range):  Temp:  [97.4 °F (36.3 °C)-98.6 °F (37 °C)] 98.6 °F (37 °C)  Pulse:  [] 81  Resp:  [18-22] 18  SpO2:  [93 %-98 %] 97 %  BP: (101-158)/(54-87) 126/60     Weight: 97.4 kg (214 lb 11.7 oz)  Body mass index is 32.65 kg/m².    Intake/Output Summary (Last 24 hours) at 7/9/2024 1407  Last data filed at 7/8/2024 1730  Gross per 24 hour   Intake 500 ml   Output 3500 ml   Net -3000 ml         Physical Exam  Vitals and nursing note reviewed.   Constitutional:       General: She is not in acute distress.     Appearance: Normal appearance.   HENT:      Head: Normocephalic.      Mouth/Throat:      Mouth: Mucous membranes are moist.      Pharynx: Oropharynx is clear.   Eyes:      Pupils: Pupils are equal, round, and reactive to light.   Cardiovascular:      Rate and Rhythm: Normal rate and regular rhythm.   Pulmonary:      Effort: Pulmonary effort is normal. No respiratory distress.      Breath sounds: Normal breath sounds.   Abdominal:      General: There is distension.      Palpations: Abdomen is soft.      Tenderness: There is abdominal tenderness.   Musculoskeletal:         General: Normal range of motion.   Skin:     General: Skin is warm and dry.   Neurological:      General: No focal deficit present.      Mental Status: She is alert and oriented to person, place, and time.   Psychiatric:         Mood and Affect: Mood normal.             Significant Labs: All pertinent labs within the past 24 hours have been reviewed.  Recent  Lab Results         07/09/24  0353   07/08/24  1556   07/08/24  1429        Albumin 3.8                      ALT 14           Anion Gap 24           AST 21           Baso # 0.04           Basophil % 0.4           BILIRUBIN TOTAL 0.8  Comment: For infants and newborns, interpretation of results should be based  on gestational age, weight and in agreement with clinical  observations.    Premature Infant recommended reference ranges:  Up to 24 hours.............<8.0 mg/dL  Up to 48 hours............<12.0 mg/dL  3-5 days..................<15.0 mg/dL  6-29 days.................<15.0 mg/dL             BUN 42           Calcium 9.2           Chloride 95           CO2 21           Creatinine 8.8           Differential Method Automated           eGFR 5           Eos # 0.0           Eos % 0.3           Glucose 122           Gran # (ANC) 6.5           Gran % 72.3           Hematocrit 37.1           Hemoglobin 11.8           Immature Grans (Abs) 0.03  Comment: Mild elevation in immature granulocytes is non specific and   can be seen in a variety of conditions including stress response,   acute inflammation, trauma and pregnancy. Correlation with other   laboratory and clinical findings is essential.             Immature Granulocytes 0.3           Lymph # 1.5           Lymph % 16.2           Magnesium  2.2           MCH 35.9           MCHC 31.8                      Mono # 1.0           Mono % 10.5           MPV 11.5           nRBC 0           Phosphorus Level 9.9  Comment: Phosphorus    critical result(s) called and verbal readback obtained   from Kiet Hill RN.  by TH1 07/09/2024 05:27             Platelet Count 183           Potassium 5.6   4.0   6.0       PROTEIN TOTAL 8.6           RBC 3.29           RDW 18.9           Sodium 140           WBC 9.06                   Significant Imaging: I have reviewed all pertinent imaging results/findings within the past 24 hours.    Assessment/Plan:      * Small bowel  obstruction  CT of abdomen with concern for SBO  General surgery consulted  NPO      Hyperkalemia  This patient has hyperkalemia which is uncontrolled. We will monitor for arrhythmias with EKG or continuous telemetry. We will treat the hyperkalemia with Potassium Binders, Calcium gluconate, IV insulin and dextrose, and Nebulized albuterol sulfate. The likely etiology of the hyperkalemia is ESRD.  HD done yesterday, continue MWF  The patients latest potassium has been reviewed and the results are listed below  Recent Labs   Lab 07/09/24  0353   K 5.6*               ESRD (end stage renal disease)  Creatine stable for now. BMP reviewed- noted Estimated Creatinine Clearance: 8.6 mL/min (A) (based on SCr of 8.8 mg/dL (H)). according to latest data. Based on current GFR, CKD stage is end stage.  Monitor UOP and serial BMP and adjust therapy as needed. Renally dose meds. Avoid nephrotoxic medications and procedures.  Nephrology consulted  HD MWF, had HD yesterday, continue MWF per nephrology    Hypothyroidism  Synthroid on hold due to NPO, resume when appropriate      Coronary artery disease involving native coronary artery of native heart with unstable angina pectoris  Patient with known CAD s/p stent placement, which is controlled. Monitor for S/Sx of angina/ACS. Continue to monitor on telemetry.   Plavix, statin, and aspirin on hold due to SBO and general surgery consult. Will resume when appropriate    Hypertension  Chronic, uncontrolled. Latest blood pressure and vitals reviewed-     Temp:  [97.4 °F (36.3 °C)-98.6 °F (37 °C)]   Pulse:  []   Resp:  [18-22]   BP: (101-158)/(54-87)   SpO2:  [93 %-98 %] .   Home meds for hypertension were reviewed and noted below.   Hypertension Medications               nitroGLYCERIN (NITROSTAT) 0.4 MG SL tablet Place 1 tablet (0.4 mg total) under the tongue every 5 (five) minutes as needed for Chest pain.    torsemide (DEMADEX) 100 MG Tab Take 1 tablet (100 mg total) by mouth 2  (two) times a day.            While in the hospital, will manage blood pressure as follows; hold PO meds due to SBO and general surgery consulted    Will utilize p.r.n. blood pressure medication only if patient's SBP >180 and she develops symptoms such as worsening chest pain or shortness of breath.      VTE Risk Mitigation (From admission, onward)           Ordered     heparin (porcine) injection 5,000 Units  As needed (PRN)         07/08/24 1328     IP VTE HIGH RISK PATIENT  Once         07/08/24 0927     Place sequential compression device  Until discontinued         07/08/24 0927     Reason for No Pharmacological VTE Prophylaxis  Once        Comments: Awaiting surgery consult   Question:  Reasons:  Answer:  Physician Provided (leave comment)    07/08/24 0927                    Discharge Planning   VERO: 7/11/2024     Code Status: Full Code   Is the patient medically ready for discharge?:     Reason for patient still in hospital (select all that apply): Patient trending condition and Consult recommendations  Discharge Plan A: Home with family                  MOHAN Wesley  Department of Hospital Medicine   [facility]/Surg

## 2024-07-09 NOTE — PROGRESS NOTES
[facility]/Surg  General Surgery  Progress Note    Subjective:     Interval History:  Patient had 2 loose bowel movements today.  She had Gastrografin challenge last night.  X-ray this morning showed Gastrografin within the colon but still with dilated loops of small bowel with largest diameter at 4.6 cm which is a little increased from previous study.  Patient states she is feeling better.  Abdominal pain is much improved.  No nausea.  Currently on dialysis.    Post-Op Info:  * No surgery found *          Medications:  Continuous Infusions:  Scheduled Meds:   mupirocin   Nasal BID     PRN Meds:  Current Facility-Administered Medications:     0.9% NaCl, , Intravenous, PRN    albuterol sulfate, 2.5 mg, Nebulization, Q6H PRN    dextrose 10%, 12.5 g, Intravenous, PRN    dextrose 10%, 25 g, Intravenous, PRN    glucagon (human recombinant), 1 mg, Intramuscular, PRN    glucose, 16 g, Oral, PRN    glucose, 24 g, Oral, PRN    heparin (porcine), 5,000 Units, Intravenous, PRN    hydrALAZINE, 10 mg, Intravenous, Q6H PRN    morphine, 2 mg, Intravenous, Q4H PRN    naloxone, 0.02 mg, Intravenous, PRN    ondansetron, 4 mg, Intravenous, Q6H PRN    sodium chloride 0.9%, 250 mL, Intravenous, PRN    sodium chloride 0.9%, 10 mL, Intravenous, Q12H PRN     Objective:     Vital Signs (Most Recent):  Temp: 98.5 °F (36.9 °C) (07/09/24 1641)  Pulse: 85 (07/09/24 1641)  Resp: 18 (07/09/24 1641)  BP: 131/76 (07/09/24 1641)  SpO2: 97 % (07/09/24 1135) Vital Signs (24h Range):  Temp:  [97.4 °F (36.3 °C)-98.6 °F (37 °C)] 98.5 °F (36.9 °C)  Pulse:  [] 85  Resp:  [18-22] 18  SpO2:  [93 %-98 %] 97 %  BP: (101-133)/(48-76) 131/76       Intake/Output Summary (Last 24 hours) at 7/9/2024 1651  Last data filed at 7/8/2024 1730  Gross per 24 hour   Intake 500 ml   Output 3500 ml   Net -3000 ml       Physical Exam  Constitutional:       General: She is awake. She is not in acute distress.     Appearance: Normal appearance. She is  obese. She is not toxic-appearing.      Comments: Patient uncomfortable in waves   Pulmonary:      Effort: No tachypnea, bradypnea or respiratory distress.   Abdominal:      General: A surgical scar is present. There is no distension.      Palpations: Abdomen is soft.      Tenderness: There is no abdominal tenderness. There is no guarding.          Comments: Exam this afternoon - distention seems better. Tenderness is much better - mild mid abdominal tenderness. No guarding.     Neurological:      Mental Status: She is alert and oriented to person, place, and time.   Psychiatric:         Behavior: Behavior is cooperative.         Significant Labs:  CBC:   Recent Labs   Lab 07/09/24  0353   WBC 9.06   RBC 3.29*   HGB 11.8*   HCT 37.1      *   MCH 35.9*   MCHC 31.8*     CMP:   Recent Labs   Lab 07/09/24  0353   *   CALCIUM 9.2   ALBUMIN 3.8   PROT 8.6*      K 5.6*   CO2 21*   CL 95   BUN 42*   CREATININE 8.8*   ALKPHOS 253*   ALT 14   AST 21   BILITOT 0.8       Significant Diagnostics:  I have reviewed all pertinent imaging results/findings within the past 24 hours.    Assessment/Plan:     Active Diagnoses:    Diagnosis Date Noted POA    PRINCIPAL PROBLEM:  Small bowel obstruction [K56.609] 04/30/2024 Yes    Hyperkalemia [E87.5] 04/30/2024 Yes    ESRD (end stage renal disease) [N18.6] 02/27/2024 Yes    Hypertension [I10] 11/30/2023 Yes    Coronary artery disease involving native coronary artery of native heart with unstable angina pectoris [I25.110] 11/30/2023 Yes    Hypothyroidism [E03.9] 11/30/2023 Yes      Problems Resolved During this Admission:     -clinically patient is improved but still little distended. Xray still distended. WE had long discussion about her case today. I think she would eventually benefit from an operation - exploratory with lysis of adhesions, possible bowel resection- dud to the similar appearence of multiple CT's obstructive site has been the same for several  admissions and CT's. She has been off plavix for a couple of days. I had brief discussion with her cardiologist. Advice is to proceed as needed. She is scheduled for HD again tomorrow. Will plan for exploratory surgery this Thursday.     Stephen Benites III, MD  General Surgery  [facility]/Surg

## 2024-07-09 NOTE — HOSPITAL COURSE
58 year old female admitted with complaints of nausea, vomiting, and abdominal distention.  CT of abdomen was done with concern for SBO.  CMP consistent with ESRD with hyperkalemia.  NGT tube placed. Nephrology and general surgery were consulted.  She is on HD MWF, she had HD yesterday.  She was given a gastrograffin challenge on 7/9 with persistent dilation. She was taken for lysis of adhesions on 7/11 with Dr. Benites. SBO resolved. Diet advanced as tolerated. She tolerated diet without N/V/abdominal pain. Pain is controlled. She is medically stable for DC from surgical standpoint.

## 2024-07-10 LAB
ALBUMIN SERPL BCP-MCNC: 3.6 G/DL (ref 3.5–5.2)
ALP SERPL-CCNC: 226 U/L (ref 55–135)
ALT SERPL W/O P-5'-P-CCNC: 13 U/L (ref 10–44)
ANION GAP SERPL CALC-SCNC: 18 MMOL/L (ref 8–16)
AST SERPL-CCNC: 13 U/L (ref 10–40)
BASOPHILS # BLD AUTO: 0.05 K/UL (ref 0–0.2)
BASOPHILS NFR BLD: 0.7 % (ref 0–1.9)
BILIRUB SERPL-MCNC: 0.9 MG/DL (ref 0.1–1)
BUN SERPL-MCNC: 36 MG/DL (ref 6–20)
CALCIUM SERPL-MCNC: 9.7 MG/DL (ref 8.7–10.5)
CHLORIDE SERPL-SCNC: 94 MMOL/L (ref 95–110)
CO2 SERPL-SCNC: 24 MMOL/L (ref 23–29)
CREAT SERPL-MCNC: 7.6 MG/DL (ref 0.5–1.4)
DIFFERENTIAL METHOD BLD: ABNORMAL
EOSINOPHIL # BLD AUTO: 0.1 K/UL (ref 0–0.5)
EOSINOPHIL NFR BLD: 1.2 % (ref 0–8)
ERYTHROCYTE [DISTWIDTH] IN BLOOD BY AUTOMATED COUNT: 18.1 % (ref 11.5–14.5)
EST. GFR  (NO RACE VARIABLE): 6 ML/MIN/1.73 M^2
GLUCOSE SERPL-MCNC: 101 MG/DL (ref 70–110)
HCT VFR BLD AUTO: 34.3 % (ref 37–48.5)
HGB BLD-MCNC: 11 G/DL (ref 12–16)
IMM GRANULOCYTES # BLD AUTO: 0.02 K/UL (ref 0–0.04)
IMM GRANULOCYTES NFR BLD AUTO: 0.3 % (ref 0–0.5)
LYMPHOCYTES # BLD AUTO: 1.7 K/UL (ref 1–4.8)
LYMPHOCYTES NFR BLD: 22.7 % (ref 18–48)
MAGNESIUM SERPL-MCNC: 2.1 MG/DL (ref 1.6–2.6)
MCH RBC QN AUTO: 36.3 PG (ref 27–31)
MCHC RBC AUTO-ENTMCNC: 32.1 G/DL (ref 32–36)
MCV RBC AUTO: 113 FL (ref 82–98)
MONOCYTES # BLD AUTO: 0.8 K/UL (ref 0.3–1)
MONOCYTES NFR BLD: 10.9 % (ref 4–15)
NEUTROPHILS # BLD AUTO: 4.7 K/UL (ref 1.8–7.7)
NEUTROPHILS NFR BLD: 64.2 % (ref 38–73)
NRBC BLD-RTO: 0 /100 WBC
PHOSPHATE SERPL-MCNC: 7 MG/DL (ref 2.7–4.5)
PLATELET # BLD AUTO: 172 K/UL (ref 150–450)
PMV BLD AUTO: 10.5 FL (ref 9.2–12.9)
POCT GLUCOSE: 108 MG/DL (ref 70–110)
POTASSIUM SERPL-SCNC: 5.3 MMOL/L (ref 3.5–5.1)
PROT SERPL-MCNC: 7.9 G/DL (ref 6–8.4)
RBC # BLD AUTO: 3.03 M/UL (ref 4–5.4)
SODIUM SERPL-SCNC: 136 MMOL/L (ref 136–145)
WBC # BLD AUTO: 7.27 K/UL (ref 3.9–12.7)

## 2024-07-10 PROCEDURE — 84100 ASSAY OF PHOSPHORUS: CPT

## 2024-07-10 PROCEDURE — 83735 ASSAY OF MAGNESIUM: CPT

## 2024-07-10 PROCEDURE — 63600175 PHARM REV CODE 636 W HCPCS

## 2024-07-10 PROCEDURE — 90935 HEMODIALYSIS ONE EVALUATION: CPT

## 2024-07-10 PROCEDURE — 80053 COMPREHEN METABOLIC PANEL: CPT

## 2024-07-10 PROCEDURE — 99900035 HC TECH TIME PER 15 MIN (STAT)

## 2024-07-10 PROCEDURE — 63600175 PHARM REV CODE 636 W HCPCS: Performed by: NURSE PRACTITIONER

## 2024-07-10 PROCEDURE — 25000003 PHARM REV CODE 250: Performed by: NURSE PRACTITIONER

## 2024-07-10 PROCEDURE — 36415 COLL VENOUS BLD VENIPUNCTURE: CPT

## 2024-07-10 PROCEDURE — 25000003 PHARM REV CODE 250: Performed by: INTERNAL MEDICINE

## 2024-07-10 PROCEDURE — 11000001 HC ACUTE MED/SURG PRIVATE ROOM

## 2024-07-10 PROCEDURE — 94761 N-INVAS EAR/PLS OXIMETRY MLT: CPT

## 2024-07-10 PROCEDURE — 85025 COMPLETE CBC W/AUTO DIFF WBC: CPT

## 2024-07-10 RX ORDER — QUETIAPINE FUMARATE 25 MG/1
25 TABLET, FILM COATED ORAL DAILY
Status: DISCONTINUED | OUTPATIENT
Start: 2024-07-10 | End: 2024-07-14 | Stop reason: HOSPADM

## 2024-07-10 RX ADMIN — QUETIAPINE 25 MG: 25 TABLET ORAL at 12:07

## 2024-07-10 RX ADMIN — ONDANSETRON 4 MG: 2 INJECTION INTRAMUSCULAR; INTRAVENOUS at 01:07

## 2024-07-10 RX ADMIN — MORPHINE SULFATE 2 MG: 2 INJECTION, SOLUTION INTRAMUSCULAR; INTRAVENOUS at 02:07

## 2024-07-10 RX ADMIN — MUPIROCIN 1 G: 20 OINTMENT TOPICAL at 09:07

## 2024-07-10 RX ADMIN — QUETIAPINE 100 MG: 100 TABLET ORAL at 08:07

## 2024-07-10 RX ADMIN — MUPIROCIN: 20 OINTMENT TOPICAL at 09:07

## 2024-07-10 NOTE — ASSESSMENT & PLAN NOTE
Creatine stable for now. BMP reviewed- noted Estimated Creatinine Clearance: 10 mL/min (A) (based on SCr of 7.6 mg/dL (H)). according to latest data. Based on current GFR, CKD stage is end stage.  Monitor UOP and serial BMP and adjust therapy as needed. Renally dose meds. Avoid nephrotoxic medications and procedures.  Nephrology consulted  HD MWF: symptomatic during today (7/10) session- nephro aware and HD plans adjusted.

## 2024-07-10 NOTE — ASSESSMENT & PLAN NOTE
CT of abdomen with concern for SBO  General surgery consulted  NPO  Pain, nausea control.    Reviewed general surgery plan-had multiple bowel movements with Gastrografin challenge, but continues with distention, pain, and imaging concern for structural cause of SBO.  Consideration for surgery on 07/11 pending more conversations with Pt.  Blood thinners remained on hold.

## 2024-07-10 NOTE — PLAN OF CARE
Plan of care reviewed with patient. Safety maintained with bed in lowest position, wheels locked, side rails up x2 and call button in reach. Patient instructed to call for any assistance.     Problem: Hemodialysis  Goal: Safe, Effective Therapy Delivery  Outcome: Progressing  Goal: Effective Tissue Perfusion  Outcome: Progressing  Goal: Absence of Infection Signs and Symptoms  Outcome: Progressing     Problem: Adult Inpatient Plan of Care  Goal: Plan of Care Review  Outcome: Progressing  Goal: Patient-Specific Goal (Individualized)  Outcome: Progressing  Goal: Absence of Hospital-Acquired Illness or Injury  Outcome: Progressing  Goal: Optimal Comfort and Wellbeing  Outcome: Progressing  Goal: Readiness for Transition of Care  Outcome: Progressing

## 2024-07-10 NOTE — PROGRESS NOTES
[facility] Medicine  Progress Note    Patient Name: Eugenia Manuel  MRN: [id number]  Patient Class: IP- Inpatient   Admission Date: 7/8/2024  Length of Stay: 2 days  Attending Physician: Aranza Espinoza MD  Primary Care Provider: John Castro MD        Subjective:     Principal Problem:Small bowel obstruction        HPI:  58 year old female with past medical history of CAD w/ 2 stents, HTN, ESRD on HD MWF, bipolar disorder, hypothyroidism, asthma, MI, SBO, restless leg syndrome who presented to the ER with complaints nausea, vomiting, and abdominal distention x 2 days.  She reports last bowel movement was on Saturday and is not passing gas.  ED work up reveals CT of abdomen/pelvis with concern for SBO.  CBC with anemia which appears around baseline.  CMP consistent with ESRD with potassium 6.1.  She was given calcium gluconate, albuterol, insulin, and dextrose.  Repeat potassium pending.  Nephrology was notified and HD to be done today.  General surgery also notified and ordered NG Tube to be placed.  On chart review, patient was admitted in April 2024 for SBO.  She was given a gastrograffin challenge and GI function returned.  Patient will be admitted to hospital medicine for further evaluation and treatment.    Overview/Hospital Course:  58 year old female admitted with complaints of nausea, vomiting, and abdominal distention.  CT of abdomen was done with concern for SBO.  CMP consistent with ESRD with hyperkalemia.  Nephrology and general surgery were consulted.  She is on HD MWF, she had HD yesterday.  She was given a gastrograffin challenge yesterday.  She had large liquid bowel movement this morning.  Waiting on further recs from general surgery.    Interval History:  Pt seen and examined.  No nausea or vomiting.  She reports abdominal pain is about the same.  No bowel movements today.  Reviewed general surgery notes with tentative plan for surgery-Pt states she is going to  "talk to Dr. Benites about plan: will follow closely. Remains NPO.  Had some "clamminess" with HD associated with SBP 90s- Nephro adjusted HD plan today, will follow for symptom improvement.    Review of Systems   Constitutional:  Positive for diaphoresis. Negative for fatigue and fever.   Cardiovascular:  Negative for chest pain.   Gastrointestinal:  Positive for abdominal pain. Negative for nausea and vomiting.   Neurological:  Positive for light-headedness.     Objective:     Vital Signs (Most Recent):  Temp: 97.8 °F (36.6 °C) (07/10/24 0800)  Pulse: 93 (07/10/24 1030)  Resp: 18 (07/10/24 0800)  BP: 107/69 (07/10/24 1030)  SpO2: 96 % (07/10/24 0741) Vital Signs (24h Range):  Temp:  [97.4 °F (36.3 °C)-98.6 °F (37 °C)] 97.8 °F (36.6 °C)  Pulse:  [] 93  Resp:  [15-20] 18  SpO2:  [94 %-97 %] 96 %  BP: (105-137)/(48-85) 107/69     Weight: 97.4 kg (214 lb 11.7 oz)  Body mass index is 32.65 kg/m².    Intake/Output Summary (Last 24 hours) at 7/10/2024 1046  Last data filed at 7/9/2024 1641  Gross per 24 hour   Intake 980 ml   Output 500 ml   Net 480 ml         Physical Exam  Vitals and nursing note reviewed.   Constitutional:       Appearance: Normal appearance.      Comments: Rag on forehead   HENT:      Head: Normocephalic and atraumatic.   Eyes:      Extraocular Movements: Extraocular movements intact.      Conjunctiva/sclera: Conjunctivae normal.      Pupils: Pupils are equal, round, and reactive to light.   Cardiovascular:      Rate and Rhythm: Normal rate and regular rhythm.   Pulmonary:      Effort: Pulmonary effort is normal.      Breath sounds: Normal breath sounds.   Abdominal:      General: Abdomen is flat. There is distension.      Palpations: Abdomen is soft.      Comments: Hypoactive, scant bowel sounds  No guarding, some tenderness   Musculoskeletal:      Cervical back: Normal range of motion and neck supple.   Skin:     General: Skin is warm and dry.      Capillary Refill: Capillary refill takes less " than 2 seconds.   Neurological:      General: No focal deficit present.      Mental Status: She is alert and oriented to person, place, and time. Mental status is at baseline.   Psychiatric:         Mood and Affect: Mood normal.         Behavior: Behavior normal.             Significant Labs: All pertinent labs within the past 24 hours have been reviewed.  CBC:   Recent Labs   Lab 07/09/24  0353 07/10/24  0411   WBC 9.06 7.27   HGB 11.8* 11.0*   HCT 37.1 34.3*    172     CMP:   Recent Labs   Lab 07/08/24  1556 07/09/24  0353 07/10/24  0411   NA  --  140 136   K 4.0 5.6* 5.3*   CL  --  95 94*   CO2  --  21* 24   GLU  --  122* 101   BUN  --  42* 36*   CREATININE  --  8.8* 7.6*   CALCIUM  --  9.2 9.7   PROT  --  8.6* 7.9   ALBUMIN  --  3.8 3.6   BILITOT  --  0.8 0.9   ALKPHOS  --  253* 226*   AST  --  21 13   ALT  --  14 13   ANIONGAP  --  24* 18*       Significant Imaging: I have reviewed all pertinent imaging results/findings within the past 24 hours.    Assessment/Plan:      * Small bowel obstruction  CT of abdomen with concern for SBO  General surgery consulted  NPO  Pain, nausea control.    Reviewed general surgery plan-had multiple bowel movements with Gastrografin challenge, but continues with distention, pain, and imaging concern for structural cause of SBO.  Consideration for surgery on 07/11 pending more conversations with Pt.  Blood thinners remained on hold.    Hyperkalemia  This patient has hyperkalemia which is uncontrolled. We will monitor for arrhythmias with EKG or continuous telemetry. We will treat the hyperkalemia with Potassium Binders, Calcium gluconate, IV insulin and dextrose, and Nebulized albuterol sulfate. The likely etiology of the hyperkalemia is ESRD.  HD done yesterday, continue MWF  The patients latest potassium has been reviewed and the results are listed below  Recent Labs   Lab 07/10/24  0411   K 5.3*       -Managed with HD        ESRD (end stage renal disease)  Creatine stable  for now. BMP reviewed- noted Estimated Creatinine Clearance: 10 mL/min (A) (based on SCr of 7.6 mg/dL (H)). according to latest data. Based on current GFR, CKD stage is end stage.  Monitor UOP and serial BMP and adjust therapy as needed. Renally dose meds. Avoid nephrotoxic medications and procedures.  Nephrology consulted  HD MWF: symptomatic during today (7/10) session- nephro aware and HD plans adjusted.    Hypothyroidism  -maintained on synthroid       Coronary artery disease involving native coronary artery of native heart with unstable angina pectoris  Patient with known CAD s/p stent placement, which is controlled. Monitor for S/Sx of angina/ACS. Continue to monitor on telemetry.   Plavix, statin, and aspirin on hold due to SBO and general surgery consult. Will resume when appropriate    Hypertension  Chronic, uncontrolled. Latest blood pressure and vitals reviewed-     Temp:  [97.4 °F (36.3 °C)-98.6 °F (37 °C)]   Pulse:  []   Resp:  [15-20]   BP: (105-137)/(48-85)   SpO2:  [94 %-97 %] .   Home meds for hypertension were reviewed and noted below.   Hypertension Medications               nitroGLYCERIN (NITROSTAT) 0.4 MG SL tablet Place 1 tablet (0.4 mg total) under the tongue every 5 (five) minutes as needed for Chest pain.    torsemide (DEMADEX) 100 MG Tab Take 1 tablet (100 mg total) by mouth 2 (two) times a day.            While in the hospital, will manage blood pressure as follows; hold PO meds due to SBO and general surgery consulted    Will utilize p.r.n. blood pressure medication only if patient's SBP >180 and she develops symptoms such as worsening chest pain or shortness of breath.      VTE Risk Mitigation (From admission, onward)           Ordered     heparin (porcine) injection 5,000 Units  As needed (PRN)         07/08/24 1328     IP VTE HIGH RISK PATIENT  Once         07/08/24 0927     Place sequential compression device  Until discontinued         07/08/24 0927     Reason for No  Pharmacological VTE Prophylaxis  Once        Comments: Awaiting surgery consult   Question:  Reasons:  Answer:  Physician Provided (leave comment)    07/08/24 0997                    Discharge Planning   VERO: 7/13/2024     Code Status: Full Code   Is the patient medically ready for discharge?:     Reason for patient still in hospital (select all that apply): Patient trending condition, Treatment, and Consult recommendations  Discharge Plan A: Home with family                  Eunice Quinones NP  Department of Hospital Medicine   [place]/Surg

## 2024-07-10 NOTE — PROGRESS NOTES
442 ml net uf removed, pt. C/o feeling weak , uf off per Dr Dumont   07/10/24 1130   Required for all Hemodialysis Patients   Hepatitis Status negative   Handoff Report   Received From Katie Dawson   Treatment Type   Treatment Type Maintenance   Vital Signs   Temp 98.3 °F (36.8 °C)   Temp Source Oral   Pulse 88   Resp 18   /66   BP Location Right arm   BP Method Automatic   Patient Position Lying   Assessments (Pre/Post)   Consent Obtained yes   Safety vein preservation armband present   Date Hepatitis Profile Obtained 02/24/24   Blood Liters Processed (BLP) 64   Transport Modality bed   Level of Consciousness (AVPU) alert   Dialyzer Clearance mildly streaked   Pain   Preferred Pain Scale number (Numeric Rating Pain Scale)   Pain Rating (0-10): Rest 5   Pre-Hemodialysis Assessment   Patient Status Departed        Hemodialysis AV Fistula Left upper arm   No placement date or time found.   Present Prior to Hospital Arrival?: Yes  Location: Left upper arm   Site Assessment Clean;Dry;Intact   Patency Present;Thrill;Bruit   Status Deaccessed   Dressing Status Clean;Dry;Intact   Site Condition No complications   Dressing Gauze   Post-Hemodialysis Assessment   Rinseback Volume (mL) 250 mL   Blood Volume Processed (Liters) 64 L   Dialyzer Clearance Lightly streaked   Duration of Treatment 180 minutes   Additional Fluid Intake (mL) 750 mL   Total UF (mL) 1192 mL   Net Fluid Removal 442   Patient Response to Treatment pt. became anxious   Post-Treatment Weight 97 kg (213 lb 13.5 oz)   Treatment Weight Change -0.4   Arterial bleeding stop time (min) 5 min   Venous bleeding stop time (min) 5 min   Post-Hemodialysis Comments tx completed     Educated on renal diet

## 2024-07-10 NOTE — CARE UPDATE
07/09/24 1928   Patient Assessment/Suction   Level of Consciousness (AVPU) alert   Respiratory Effort Normal;Unlabored   Expansion/Accessory Muscles/Retractions expansion symmetric;no retractions;no use of accessory muscles   All Lung Fields Breath Sounds clear   PRE-TX-O2   Device (Oxygen Therapy) room air   SpO2 (!) 94 %   Pulse Oximetry Type Intermittent   Pulse 95   Resp 18   Aerosol Therapy   $ Aerosol Therapy Charges PRN treatment not required   Respiratory Treatment Status (SVN) PRN treatment not required

## 2024-07-10 NOTE — ASSESSMENT & PLAN NOTE
This patient has hyperkalemia which is uncontrolled. We will monitor for arrhythmias with EKG or continuous telemetry. We will treat the hyperkalemia with Potassium Binders, Calcium gluconate, IV insulin and dextrose, and Nebulized albuterol sulfate. The likely etiology of the hyperkalemia is ESRD.  HD done yesterday, continue MWF  The patients latest potassium has been reviewed and the results are listed below  Recent Labs   Lab 07/10/24  0411   K 5.3*       -Managed with HD

## 2024-07-10 NOTE — PROGRESS NOTES
INPATIENT NEPHROLOGY Progress Note   [facility]    Patient Name: Eugenia Manuel  Date: 07/10/2024    Reason for consultation: ESRD    Chief Complaint:   Chief Complaint   Patient presents with    Abdominal Pain       History of Present Illness:  58 year old female with past medical history of CAD w/ 2 stents, HTN, ESRD on HD MWF, bipolar disorder, hypothyroidism, asthma, MI, SBO, restless leg syndrome who presented to the ER with complaints nausea, vomiting, and abdominal distention x 2 days. She reports last bowel movement was on Saturday and is not passing gas. ED work up reveals CT of abdomen/pelvis with concern for SBO. General surgery notified and ordered NG Tube to be placed. Consulted for dialysis.    Interval History:  7/8- K 6.1- got medical management- HD ordered- SBP 160s, on RA, NPO, couldn't pass first NGT in ER- floor nurse will try after HD- c/w abd tenderness- no flatus, BMs= seen on HD  7/9- HD yest with 3L UF- BP better- on RA- Contrast has traversed to the distal colon indicating absence of a complete bowel obstruction but appears will still need surg- doesn't have NGT- had 2 BMs today- labs shows hyperK today- will do another HD session (2 hours) for clearance, no UF  7/10- 2hr HD yest for clearance/no UF- routine HD today- ex lap planned for tomorrow; VSS, on RA  --seen on HD, feels clammy- SBP 110s, BG low 100s- turn off UF- give 250cc NS bolus     Plan of Care:    Assessment:  SBO  ESRD on HD MWF  HTN  Hyperkalemia/Acidosis  SHPT  Anemia of CKD    Plan:    - to OR tomorrow  - ordered HD MWF  - adjust dialysate- high flux dialyzer  - UF with HD as able  - all meds on hold while NPO (BP meds, diuretic, binders)- nothing we can do about high phos while NPO beyond dialysis   - H/H stable- no acute SANJANA needs    Thank you for allowing us to participate in this patient's care. We will continue to follow.    Vital Signs:  Temp Readings from Last 3 Encounters:   07/10/24 97.8 °F  (36.6 °C) (Oral)   06/07/24 98.2 °F (36.8 °C) (Oral)   05/20/24 99 °F (37.2 °C)       Pulse Readings from Last 3 Encounters:   07/10/24 89   06/07/24 78   05/20/24 76       BP Readings from Last 3 Encounters:   07/10/24 123/77   06/07/24 128/62   05/20/24 (!) 157/103       Weight:  Wt Readings from Last 3 Encounters:   07/08/24 97.4 kg (214 lb 11.7 oz)   06/07/24 94.3 kg (208 lb)   05/20/24 98.4 kg (217 lb)       Medications:  Scheduled Meds:   levothyroxine  150 mcg Oral Before breakfast    mupirocin   Nasal BID    QUEtiapine  100 mg Oral Nightly     Continuous Infusions:  PRN Meds:.  Current Facility-Administered Medications:     0.9% NaCl, , Intravenous, PRN    albuterol sulfate, 2.5 mg, Nebulization, Q6H PRN    dextrose 10%, 12.5 g, Intravenous, PRN    dextrose 10%, 25 g, Intravenous, PRN    glucagon (human recombinant), 1 mg, Intramuscular, PRN    glucose, 16 g, Oral, PRN    glucose, 24 g, Oral, PRN    heparin (porcine), 5,000 Units, Intravenous, PRN    hydrALAZINE, 10 mg, Intravenous, Q6H PRN    morphine, 2 mg, Intravenous, Q4H PRN    naloxone, 0.02 mg, Intravenous, PRN    ondansetron, 4 mg, Intravenous, Q6H PRN    sodium chloride 0.9%, 250 mL, Intravenous, PRN    sodium chloride 0.9%, 10 mL, Intravenous, Q12H PRN  No current facility-administered medications on file prior to encounter.     Current Outpatient Medications on File Prior to Encounter   Medication Sig Dispense Refill    albuterol (PROVENTIL/VENTOLIN HFA) 90 mcg/actuation inhaler Inhale 2 puffs into the lungs every 6 (six) hours as needed. Rescue 18 g 6    aspirin 81 MG Chew Take 1 tablet (81 mg total) by mouth once daily.  0    atorvastatin (LIPITOR) 40 MG tablet Take 1 tablet (40 mg total) by mouth once daily. 90 tablet 0    azelastine (ASTELIN) 137 mcg (0.1 %) nasal spray 1 spray (137 mcg total) by Nasal route 2 (two) times daily. 30 mL 2    budesonide-glycopyr-formoterol (BREZTRI AEROSPHERE) 160-9-4.8 mcg/actuation HFAA Inhale 2 puffs into the  lungs 2 (two) times daily. 10.7 g 6    cetirizine (ZYRTEC) 10 MG tablet Take 1 tablet (10 mg total) by mouth once daily. 90 tablet 3    clopidogreL (PLAVIX) 75 mg tablet Take 1 tablet (75 mg total) by mouth once daily. 90 tablet 3    ferric citrate (AURYXIA) 210 mg iron Tab Take 420 mg by mouth 3 (three) times daily. 540 tablet 3    fluticasone propionate (FLONASE) 50 mcg/actuation nasal spray 2 sprays (100 mcg total) by Each Nostril route once daily. 16 g 1    levothyroxine (SYNTHROID) 150 MCG tablet Take 1 tablet (150 mcg total) by mouth before breakfast. 90 tablet 3    melatonin 10 mg Cap Take 10 mg by mouth nightly as needed.      midodrine (PROAMATINE) 10 MG tablet Take 1 tablet (10 mg total) by mouth daily as needed (hypotension). 180 tablet 0    montelukast (SINGULAIR) 10 mg tablet Take 1 tablet (10 mg total) by mouth every evening. 30 tablet 6    nitroGLYCERIN (NITROSTAT) 0.4 MG SL tablet Place 1 tablet (0.4 mg total) under the tongue every 5 (five) minutes as needed for Chest pain. 10 tablet 5    ondansetron (ZOFRAN-ODT) 4 MG TbDL Take 1 tablet (4 mg total) by mouth 1 (one) time if needed (nausea). 1 tablet 0    pantoprazole (PROTONIX) 40 MG tablet Take 1 tablet (40 mg total) by mouth 2 (two) times daily. 180 tablet 1    pregabalin (LYRICA) 50 MG capsule Take 50 mg by mouth once daily.      QUEtiapine (SEROQUEL) 100 MG Tab Take 1 tablet (100 mg total) by mouth once daily. (Patient taking differently: Take 100 mg by mouth nightly.) 30 tablet 5    QUEtiapine (SEROQUEL) 25 MG Tab Take 1 tablet (25 mg total) by mouth every evening. 90 tablet 1    rOPINIRole (REQUIP) 2 MG tablet Take 1 tablet (2 mg total) by mouth 3 (three) times daily. 90 tablet 1    senna-docusate 8.6-50 mg (PERICOLACE) 8.6-50 mg per tablet Take 1 tablet by mouth daily as needed for Constipation.      sevelamer carbonate (RENVELA) 800 mg Tab Take 5 tablets (4,000 mg total) by mouth 3 (three) times daily with meals. 1350 tablet 3    torsemide  (DEMADEX) 100 MG Tab Take 1 tablet (100 mg total) by mouth 2 (two) times a day. 90 tablet 0    venlafaxine (EFFEXOR-XR) 150 MG Cp24 Take 1 capsule (150 mg total) by mouth once daily. 90 capsule 3     Review of Systems:  Neg    Physical Exam:  General Appearance:    NAD, AAO x 3, cooperative, appears stated age   Head:    Normocephalic, atraumatic   Eyes:    PER, EOMI, and conjunctiva/sclera clear bilaterally       Mouth:   Moist mucus membranes, no thrush or oral lesions,       normal dentition   Back:     No CVA tenderness   Lungs:     Clear to auscultation bilaterally, no wheezes, crackles,           rales or rhonchi, symmetric air movement, respirations unlabored   Chest wall:    No tenderness or deformity   Heart:    Regular rate and rhythm, S1 and S2 normal, no murmur, rub   or gallop   Abdomen:     Soft, tender, distended   Extremities:   Warm and well perfused, distal pulses are intact, no             cyanosis or peripheral edema   MSK:   No joint or muscle swelling, tenderness or deformity   Skin:   Skin color, texture, turgor normal, no rashes or lesions   Neurologic/Psychiatric:   CNII-XII intact, normal strength and sensation       throughout, no asterixis; normal affect, memory, judgement     and insight      Results:  Lab Results   Component Value Date     07/10/2024    K 5.3 (H) 07/10/2024    CL 94 (L) 07/10/2024    CO2 24 07/10/2024    BUN 36 (H) 07/10/2024    CREATININE 7.6 (H) 07/10/2024    CALCIUM 9.7 07/10/2024    ANIONGAP 18 (H) 07/10/2024       Lab Results   Component Value Date    CALCIUM 9.7 07/10/2024    PHOS 7.0 (H) 07/10/2024       Recent Labs   Lab 07/10/24  0411   WBC 7.27   RBC 3.03*   HGB 11.0*   HCT 34.3*      *   MCH 36.3*   MCHC 32.1       I have personally reviewed pertinent radiological imaging and reports from this admission.    I have spent > 35 minutes providing care for this patient for the above diagnoses. These services have included chart/data/imaging  review, evaluation, exam, formulation of plan, , note preparation, and discussions with staff involved in this patient's care.    Bayron Dumont MD MPH  [facility] [address] [address]  [phone] (p)  [phone] (f)

## 2024-07-10 NOTE — SUBJECTIVE & OBJECTIVE
"Interval History:  Pt seen and examined.  No nausea or vomiting.  She reports abdominal pain is about the same.  No bowel movements today.  Reviewed general surgery notes with tentative plan for surgery-Pt states she is going to talk to Dr. Benites about plan: will follow closely. Remains NPO.  Had some "clamminess" with HD associated with SBP 90s- Nephro adjusted HD plan today, will follow for symptom improvement.    Review of Systems   Constitutional:  Positive for diaphoresis. Negative for fatigue and fever.   Cardiovascular:  Negative for chest pain.   Gastrointestinal:  Positive for abdominal pain. Negative for nausea and vomiting.   Neurological:  Positive for light-headedness.     Objective:     Vital Signs (Most Recent):  Temp: 97.8 °F (36.6 °C) (07/10/24 0800)  Pulse: 93 (07/10/24 1030)  Resp: 18 (07/10/24 0800)  BP: 107/69 (07/10/24 1030)  SpO2: 96 % (07/10/24 0741) Vital Signs (24h Range):  Temp:  [97.4 °F (36.3 °C)-98.6 °F (37 °C)] 97.8 °F (36.6 °C)  Pulse:  [] 93  Resp:  [15-20] 18  SpO2:  [94 %-97 %] 96 %  BP: (105-137)/(48-85) 107/69     Weight: 97.4 kg (214 lb 11.7 oz)  Body mass index is 32.65 kg/m².    Intake/Output Summary (Last 24 hours) at 7/10/2024 1046  Last data filed at 7/9/2024 1641  Gross per 24 hour   Intake 980 ml   Output 500 ml   Net 480 ml         Physical Exam  Vitals and nursing note reviewed.   Constitutional:       Appearance: Normal appearance.      Comments: Rag on forehead   HENT:      Head: Normocephalic and atraumatic.   Eyes:      Extraocular Movements: Extraocular movements intact.      Conjunctiva/sclera: Conjunctivae normal.      Pupils: Pupils are equal, round, and reactive to light.   Cardiovascular:      Rate and Rhythm: Normal rate and regular rhythm.   Pulmonary:      Effort: Pulmonary effort is normal.      Breath sounds: Normal breath sounds.   Abdominal:      General: Abdomen is flat. There is distension.      Palpations: Abdomen is soft.      Comments: " Hypoactive, scant bowel sounds  No guarding, some tenderness   Musculoskeletal:      Cervical back: Normal range of motion and neck supple.   Skin:     General: Skin is warm and dry.      Capillary Refill: Capillary refill takes less than 2 seconds.   Neurological:      General: No focal deficit present.      Mental Status: She is alert and oriented to person, place, and time. Mental status is at baseline.   Psychiatric:         Mood and Affect: Mood normal.         Behavior: Behavior normal.             Significant Labs: All pertinent labs within the past 24 hours have been reviewed.  CBC:   Recent Labs   Lab 07/09/24  0353 07/10/24  0411   WBC 9.06 7.27   HGB 11.8* 11.0*   HCT 37.1 34.3*    172     CMP:   Recent Labs   Lab 07/08/24  1556 07/09/24  0353 07/10/24  0411   NA  --  140 136   K 4.0 5.6* 5.3*   CL  --  95 94*   CO2  --  21* 24   GLU  --  122* 101   BUN  --  42* 36*   CREATININE  --  8.8* 7.6*   CALCIUM  --  9.2 9.7   PROT  --  8.6* 7.9   ALBUMIN  --  3.8 3.6   BILITOT  --  0.8 0.9   ALKPHOS  --  253* 226*   AST  --  21 13   ALT  --  14 13   ANIONGAP  --  24* 18*       Significant Imaging: I have reviewed all pertinent imaging results/findings within the past 24 hours.

## 2024-07-10 NOTE — ASSESSMENT & PLAN NOTE
Chronic, uncontrolled. Latest blood pressure and vitals reviewed-     Temp:  [97.4 °F (36.3 °C)-98.6 °F (37 °C)]   Pulse:  []   Resp:  [15-20]   BP: (105-137)/(48-85)   SpO2:  [94 %-97 %] .   Home meds for hypertension were reviewed and noted below.   Hypertension Medications               nitroGLYCERIN (NITROSTAT) 0.4 MG SL tablet Place 1 tablet (0.4 mg total) under the tongue every 5 (five) minutes as needed for Chest pain.    torsemide (DEMADEX) 100 MG Tab Take 1 tablet (100 mg total) by mouth 2 (two) times a day.            While in the hospital, will manage blood pressure as follows; hold PO meds due to SBO and general surgery consulted    Will utilize p.r.n. blood pressure medication only if patient's SBP >180 and she develops symptoms such as worsening chest pain or shortness of breath.

## 2024-07-11 ENCOUNTER — ANESTHESIA EVENT (OUTPATIENT)
Dept: SURGERY | Facility: HOSPITAL | Age: 59
End: 2024-07-11
Payer: MEDICARE

## 2024-07-11 ENCOUNTER — ANESTHESIA (OUTPATIENT)
Dept: SURGERY | Facility: HOSPITAL | Age: 59
End: 2024-07-11
Payer: MEDICARE

## 2024-07-11 DIAGNOSIS — G25.81 RESTLESS LEG: ICD-10-CM

## 2024-07-11 DIAGNOSIS — J45.909 ASTHMA: ICD-10-CM

## 2024-07-11 DIAGNOSIS — F31.9 BIPOLAR AFFECTIVE DISORDER, REMISSION STATUS UNSPECIFIED: ICD-10-CM

## 2024-07-11 LAB
ALBUMIN SERPL BCP-MCNC: 3.5 G/DL (ref 3.5–5.2)
ALP SERPL-CCNC: 217 U/L (ref 55–135)
ALT SERPL W/O P-5'-P-CCNC: 17 U/L (ref 10–44)
ANION GAP SERPL CALC-SCNC: 17 MMOL/L (ref 8–16)
ANION GAP SERPL CALC-SCNC: 22 MMOL/L (ref 8–16)
AST SERPL-CCNC: 20 U/L (ref 10–40)
BASOPHILS # BLD AUTO: 0.05 K/UL (ref 0–0.2)
BASOPHILS NFR BLD: 0.8 % (ref 0–1.9)
BILIRUB SERPL-MCNC: 0.9 MG/DL (ref 0.1–1)
BUN SERPL-MCNC: 37 MG/DL (ref 6–20)
BUN SERPL-MCNC: 47 MG/DL (ref 6–20)
CALCIUM SERPL-MCNC: 9.1 MG/DL (ref 8.7–10.5)
CALCIUM SERPL-MCNC: 9.5 MG/DL (ref 8.7–10.5)
CHLORIDE SERPL-SCNC: 97 MMOL/L (ref 95–110)
CHLORIDE SERPL-SCNC: 97 MMOL/L (ref 95–110)
CO2 SERPL-SCNC: 14 MMOL/L (ref 23–29)
CO2 SERPL-SCNC: 19 MMOL/L (ref 23–29)
CREAT SERPL-MCNC: 7 MG/DL (ref 0.5–1.4)
CREAT SERPL-MCNC: 7.9 MG/DL (ref 0.5–1.4)
DIFFERENTIAL METHOD BLD: ABNORMAL
EOSINOPHIL # BLD AUTO: 0.1 K/UL (ref 0–0.5)
EOSINOPHIL NFR BLD: 1.4 % (ref 0–8)
ERYTHROCYTE [DISTWIDTH] IN BLOOD BY AUTOMATED COUNT: 17.1 % (ref 11.5–14.5)
EST. GFR  (NO RACE VARIABLE): 6 ML/MIN/1.73 M^2
EST. GFR  (NO RACE VARIABLE): 6 ML/MIN/1.73 M^2
GLUCOSE SERPL-MCNC: 146 MG/DL (ref 70–110)
GLUCOSE SERPL-MCNC: 93 MG/DL (ref 70–110)
HCT VFR BLD AUTO: 33.2 % (ref 37–48.5)
HGB BLD-MCNC: 10.8 G/DL (ref 12–16)
IMM GRANULOCYTES # BLD AUTO: 0.02 K/UL (ref 0–0.04)
IMM GRANULOCYTES NFR BLD AUTO: 0.3 % (ref 0–0.5)
LYMPHOCYTES # BLD AUTO: 1.4 K/UL (ref 1–4.8)
LYMPHOCYTES NFR BLD: 21 % (ref 18–48)
MAGNESIUM SERPL-MCNC: 2.2 MG/DL (ref 1.6–2.6)
MCH RBC QN AUTO: 36.5 PG (ref 27–31)
MCHC RBC AUTO-ENTMCNC: 32.5 G/DL (ref 32–36)
MCV RBC AUTO: 112 FL (ref 82–98)
MONOCYTES # BLD AUTO: 0.9 K/UL (ref 0.3–1)
MONOCYTES NFR BLD: 13.3 % (ref 4–15)
NEUTROPHILS # BLD AUTO: 4.1 K/UL (ref 1.8–7.7)
NEUTROPHILS NFR BLD: 63.2 % (ref 38–73)
NRBC BLD-RTO: 0 /100 WBC
PHOSPHATE SERPL-MCNC: 6.8 MG/DL (ref 2.7–4.5)
PLATELET # BLD AUTO: 170 K/UL (ref 150–450)
PMV BLD AUTO: 11 FL (ref 9.2–12.9)
POTASSIUM SERPL-SCNC: 4.7 MMOL/L (ref 3.5–5.1)
POTASSIUM SERPL-SCNC: 6 MMOL/L (ref 3.5–5.1)
PROT SERPL-MCNC: 7.6 G/DL (ref 6–8.4)
RBC # BLD AUTO: 2.96 M/UL (ref 4–5.4)
SODIUM SERPL-SCNC: 133 MMOL/L (ref 136–145)
SODIUM SERPL-SCNC: 133 MMOL/L (ref 136–145)
WBC # BLD AUTO: 6.53 K/UL (ref 3.9–12.7)

## 2024-07-11 PROCEDURE — 36000709 HC OR TIME LEV III EA ADD 15 MIN: Performed by: SURGERY

## 2024-07-11 PROCEDURE — 83735 ASSAY OF MAGNESIUM: CPT

## 2024-07-11 PROCEDURE — 63600175 PHARM REV CODE 636 W HCPCS: Performed by: NURSE ANESTHETIST, CERTIFIED REGISTERED

## 2024-07-11 PROCEDURE — 85025 COMPLETE CBC W/AUTO DIFF WBC: CPT

## 2024-07-11 PROCEDURE — 25000003 PHARM REV CODE 250: Performed by: INTERNAL MEDICINE

## 2024-07-11 PROCEDURE — 80053 COMPREHEN METABOLIC PANEL: CPT

## 2024-07-11 PROCEDURE — 99900035 HC TECH TIME PER 15 MIN (STAT)

## 2024-07-11 PROCEDURE — 94761 N-INVAS EAR/PLS OXIMETRY MLT: CPT

## 2024-07-11 PROCEDURE — 93005 ELECTROCARDIOGRAM TRACING: CPT

## 2024-07-11 PROCEDURE — 0WUF4JZ SUPPLEMENT ABDOMINAL WALL WITH SYNTHETIC SUBSTITUTE, PERCUTANEOUS ENDOSCOPIC APPROACH: ICD-10-PCS | Performed by: SURGERY

## 2024-07-11 PROCEDURE — 36415 COLL VENOUS BLD VENIPUNCTURE: CPT

## 2024-07-11 PROCEDURE — D9220A PRA ANESTHESIA: Mod: ANES,,, | Performed by: ANESTHESIOLOGY

## 2024-07-11 PROCEDURE — 37000009 HC ANESTHESIA EA ADD 15 MINS: Performed by: SURGERY

## 2024-07-11 PROCEDURE — C1765 ADHESION BARRIER: HCPCS | Performed by: SURGERY

## 2024-07-11 PROCEDURE — 63600175 PHARM REV CODE 636 W HCPCS: Performed by: STUDENT IN AN ORGANIZED HEALTH CARE EDUCATION/TRAINING PROGRAM

## 2024-07-11 PROCEDURE — 0DNU4ZZ RELEASE OMENTUM, PERCUTANEOUS ENDOSCOPIC APPROACH: ICD-10-PCS | Performed by: SURGERY

## 2024-07-11 PROCEDURE — 25000003 PHARM REV CODE 250: Performed by: SURGERY

## 2024-07-11 PROCEDURE — 44180 LAP ENTEROLYSIS: CPT | Mod: ,,, | Performed by: SURGERY

## 2024-07-11 PROCEDURE — 25000003 PHARM REV CODE 250: Performed by: ANESTHESIOLOGY

## 2024-07-11 PROCEDURE — 84100 ASSAY OF PHOSPHORUS: CPT

## 2024-07-11 PROCEDURE — 37000008 HC ANESTHESIA 1ST 15 MINUTES: Performed by: SURGERY

## 2024-07-11 PROCEDURE — 25000003 PHARM REV CODE 250: Performed by: STUDENT IN AN ORGANIZED HEALTH CARE EDUCATION/TRAINING PROGRAM

## 2024-07-11 PROCEDURE — 99232 SBSQ HOSP IP/OBS MODERATE 35: CPT | Mod: 57,,, | Performed by: SURGERY

## 2024-07-11 PROCEDURE — 93010 ELECTROCARDIOGRAM REPORT: CPT | Mod: ,,, | Performed by: GENERAL PRACTICE

## 2024-07-11 PROCEDURE — 80048 BASIC METABOLIC PNL TOTAL CA: CPT | Performed by: NURSE PRACTITIONER

## 2024-07-11 PROCEDURE — 11000001 HC ACUTE MED/SURG PRIVATE ROOM

## 2024-07-11 PROCEDURE — 27201423 OPTIME MED/SURG SUP & DEVICES STERILE SUPPLY: Performed by: SURGERY

## 2024-07-11 PROCEDURE — D9220A PRA ANESTHESIA: Mod: CRNA,,, | Performed by: NURSE ANESTHETIST, CERTIFIED REGISTERED

## 2024-07-11 PROCEDURE — 36000708 HC OR TIME LEV III 1ST 15 MIN: Performed by: SURGERY

## 2024-07-11 PROCEDURE — 63600175 PHARM REV CODE 636 W HCPCS

## 2024-07-11 PROCEDURE — 90935 HEMODIALYSIS ONE EVALUATION: CPT

## 2024-07-11 PROCEDURE — 36415 COLL VENOUS BLD VENIPUNCTURE: CPT | Performed by: NURSE PRACTITIONER

## 2024-07-11 PROCEDURE — 27000221 HC OXYGEN, UP TO 24 HOURS

## 2024-07-11 PROCEDURE — 71000033 HC RECOVERY, INTIAL HOUR: Performed by: SURGERY

## 2024-07-11 PROCEDURE — 0DN84ZZ RELEASE SMALL INTESTINE, PERCUTANEOUS ENDOSCOPIC APPROACH: ICD-10-PCS | Performed by: SURGERY

## 2024-07-11 PROCEDURE — 25000003 PHARM REV CODE 250: Performed by: NURSE ANESTHETIST, CERTIFIED REGISTERED

## 2024-07-11 DEVICE — SEPRAFILM ADHESION BARRIER (MEMBRANE) IS A STERILE, BIORESORBABLE, TRANSLUCENT ADHESION BARRIER COMPOSED OF TWO ANIONIC POLYSACCHARIDES, SODIUM HYALURONATE (HA) AND CARBOXYMETHYLCELLULOSE (CMC).
Type: IMPLANTABLE DEVICE | Site: ABDOMEN | Status: FUNCTIONAL
Brand: SEPRAFILM

## 2024-07-11 RX ORDER — SODIUM CHLORIDE 9 MG/ML
INJECTION, SOLUTION INTRAVENOUS CONTINUOUS
Status: DISCONTINUED | OUTPATIENT
Start: 2024-07-11 | End: 2024-07-12

## 2024-07-11 RX ORDER — FENTANYL CITRATE 50 UG/ML
25 INJECTION, SOLUTION INTRAMUSCULAR; INTRAVENOUS EVERY 5 MIN PRN
Status: DISCONTINUED | OUTPATIENT
Start: 2024-07-11 | End: 2024-07-11 | Stop reason: HOSPADM

## 2024-07-11 RX ORDER — DEXAMETHASONE SODIUM PHOSPHATE 4 MG/ML
INJECTION, SOLUTION INTRA-ARTICULAR; INTRALESIONAL; INTRAMUSCULAR; INTRAVENOUS; SOFT TISSUE
Status: DISCONTINUED | OUTPATIENT
Start: 2024-07-11 | End: 2024-07-11

## 2024-07-11 RX ORDER — ROPINIROLE 2 MG/1
2 TABLET, FILM COATED ORAL 3 TIMES DAILY
Qty: 90 TABLET | Refills: 1 | Status: SHIPPED | OUTPATIENT
Start: 2024-07-11

## 2024-07-11 RX ORDER — QUETIAPINE FUMARATE 25 MG/1
25 TABLET, FILM COATED ORAL NIGHTLY
Qty: 90 TABLET | Refills: 1 | Status: SHIPPED | OUTPATIENT
Start: 2024-07-11 | End: 2025-01-07

## 2024-07-11 RX ORDER — MUPIROCIN 20 MG/G
1 OINTMENT TOPICAL 2 TIMES DAILY
Status: DISCONTINUED | OUTPATIENT
Start: 2024-07-11 | End: 2024-07-14 | Stop reason: HOSPADM

## 2024-07-11 RX ORDER — FLUTICASONE PROPIONATE 50 MCG
2 SPRAY, SUSPENSION (ML) NASAL DAILY
Start: 2024-07-11

## 2024-07-11 RX ORDER — SODIUM CHLORIDE 0.9 % (FLUSH) 0.9 %
3 SYRINGE (ML) INJECTION
Status: DISCONTINUED | OUTPATIENT
Start: 2024-07-11 | End: 2024-07-11 | Stop reason: HOSPADM

## 2024-07-11 RX ORDER — KETOROLAC TROMETHAMINE 30 MG/ML
INJECTION, SOLUTION INTRAMUSCULAR; INTRAVENOUS
Status: DISCONTINUED | OUTPATIENT
Start: 2024-07-11 | End: 2024-07-11

## 2024-07-11 RX ORDER — SUCCINYLCHOLINE CHLORIDE 20 MG/ML
INJECTION INTRAMUSCULAR; INTRAVENOUS
Status: DISCONTINUED | OUTPATIENT
Start: 2024-07-11 | End: 2024-07-11

## 2024-07-11 RX ORDER — PROPOFOL 10 MG/ML
VIAL (ML) INTRAVENOUS
Status: DISCONTINUED | OUTPATIENT
Start: 2024-07-11 | End: 2024-07-11

## 2024-07-11 RX ORDER — ONDANSETRON HYDROCHLORIDE 2 MG/ML
4 INJECTION, SOLUTION INTRAVENOUS DAILY PRN
Status: DISCONTINUED | OUTPATIENT
Start: 2024-07-11 | End: 2024-07-11 | Stop reason: HOSPADM

## 2024-07-11 RX ORDER — HYDROMORPHONE HYDROCHLORIDE 2 MG/ML
0.2 INJECTION, SOLUTION INTRAMUSCULAR; INTRAVENOUS; SUBCUTANEOUS EVERY 5 MIN PRN
Status: DISCONTINUED | OUTPATIENT
Start: 2024-07-11 | End: 2024-07-11 | Stop reason: HOSPADM

## 2024-07-11 RX ORDER — FENTANYL CITRATE 50 UG/ML
INJECTION, SOLUTION INTRAMUSCULAR; INTRAVENOUS
Status: DISCONTINUED | OUTPATIENT
Start: 2024-07-11 | End: 2024-07-11

## 2024-07-11 RX ORDER — LIDOCAINE HYDROCHLORIDE 20 MG/ML
INJECTION INTRAVENOUS
Status: DISCONTINUED | OUTPATIENT
Start: 2024-07-11 | End: 2024-07-11

## 2024-07-11 RX ORDER — HYDROCODONE BITARTRATE AND ACETAMINOPHEN 5; 325 MG/1; MG/1
1 TABLET ORAL EVERY 4 HOURS PRN
Status: DISCONTINUED | OUTPATIENT
Start: 2024-07-11 | End: 2024-07-14 | Stop reason: HOSPADM

## 2024-07-11 RX ORDER — MIDAZOLAM HYDROCHLORIDE 1 MG/ML
INJECTION INTRAMUSCULAR; INTRAVENOUS
Status: DISCONTINUED | OUTPATIENT
Start: 2024-07-11 | End: 2024-07-11

## 2024-07-11 RX ORDER — DIPHENHYDRAMINE HYDROCHLORIDE 50 MG/ML
12.5 INJECTION INTRAMUSCULAR; INTRAVENOUS ONCE
Status: COMPLETED | OUTPATIENT
Start: 2024-07-11 | End: 2024-07-11

## 2024-07-11 RX ORDER — ONDANSETRON HYDROCHLORIDE 2 MG/ML
INJECTION, SOLUTION INTRAVENOUS
Status: DISCONTINUED | OUTPATIENT
Start: 2024-07-11 | End: 2024-07-11

## 2024-07-11 RX ORDER — ROCURONIUM BROMIDE 10 MG/ML
INJECTION, SOLUTION INTRAVENOUS
Status: DISCONTINUED | OUTPATIENT
Start: 2024-07-11 | End: 2024-07-11

## 2024-07-11 RX ORDER — BUPIVACAINE HYDROCHLORIDE AND EPINEPHRINE 2.5; 5 MG/ML; UG/ML
INJECTION, SOLUTION EPIDURAL; INFILTRATION; INTRACAUDAL; PERINEURAL
Status: DISCONTINUED | OUTPATIENT
Start: 2024-07-11 | End: 2024-07-11 | Stop reason: HOSPADM

## 2024-07-11 RX ORDER — HYDROMORPHONE HYDROCHLORIDE 2 MG/ML
INJECTION, SOLUTION INTRAMUSCULAR; INTRAVENOUS; SUBCUTANEOUS
Status: DISCONTINUED | OUTPATIENT
Start: 2024-07-11 | End: 2024-07-11

## 2024-07-11 RX ORDER — OXYCODONE HYDROCHLORIDE 5 MG/1
5 TABLET ORAL
Status: DISCONTINUED | OUTPATIENT
Start: 2024-07-11 | End: 2024-07-11 | Stop reason: HOSPADM

## 2024-07-11 RX ADMIN — DEXAMETHASONE SODIUM PHOSPHATE 4 MG: 4 INJECTION, SOLUTION INTRA-ARTICULAR; INTRALESIONAL; INTRAMUSCULAR; INTRAVENOUS; SOFT TISSUE at 12:07

## 2024-07-11 RX ADMIN — ROCURONIUM BROMIDE 20 MG: 10 INJECTION, SOLUTION INTRAVENOUS at 01:07

## 2024-07-11 RX ADMIN — HYDROMORPHONE HYDROCHLORIDE 0.5 MG: 2 INJECTION INTRAMUSCULAR; INTRAVENOUS; SUBCUTANEOUS at 03:07

## 2024-07-11 RX ADMIN — MUPIROCIN 1 G: 20 OINTMENT TOPICAL at 08:07

## 2024-07-11 RX ADMIN — LIDOCAINE HYDROCHLORIDE 100 MG: 20 INJECTION, SOLUTION INTRAVENOUS at 12:07

## 2024-07-11 RX ADMIN — ROCURONIUM BROMIDE 20 MG: 10 INJECTION, SOLUTION INTRAVENOUS at 12:07

## 2024-07-11 RX ADMIN — ROCURONIUM BROMIDE 10 MG: 10 INJECTION, SOLUTION INTRAVENOUS at 12:07

## 2024-07-11 RX ADMIN — DIPHENHYDRAMINE HYDROCHLORIDE 12.5 MG: 50 INJECTION INTRAMUSCULAR; INTRAVENOUS at 08:07

## 2024-07-11 RX ADMIN — SODIUM CHLORIDE: 9 INJECTION, SOLUTION INTRAVENOUS at 07:07

## 2024-07-11 RX ADMIN — ONDANSETRON 4 MG: 2 INJECTION INTRAMUSCULAR; INTRAVENOUS at 12:07

## 2024-07-11 RX ADMIN — KETOROLAC TROMETHAMINE 30 MG: 30 INJECTION, SOLUTION INTRAMUSCULAR; INTRAVENOUS at 02:07

## 2024-07-11 RX ADMIN — SODIUM CHLORIDE, SODIUM GLUCONATE, SODIUM ACETATE, POTASSIUM CHLORIDE AND MAGNESIUM CHLORIDE: 526; 502; 368; 37; 30 INJECTION, SOLUTION INTRAVENOUS at 09:07

## 2024-07-11 RX ADMIN — SODIUM CHLORIDE, SODIUM GLUCONATE, SODIUM ACETATE, POTASSIUM CHLORIDE AND MAGNESIUM CHLORIDE: 526; 502; 368; 37; 30 INJECTION, SOLUTION INTRAVENOUS at 01:07

## 2024-07-11 RX ADMIN — QUETIAPINE 100 MG: 100 TABLET ORAL at 08:07

## 2024-07-11 RX ADMIN — MIDAZOLAM HYDROCHLORIDE 2 MG: 1 INJECTION INTRAMUSCULAR; INTRAVENOUS at 12:07

## 2024-07-11 RX ADMIN — FENTANYL CITRATE 50 MCG: 50 INJECTION, SOLUTION INTRAMUSCULAR; INTRAVENOUS at 12:07

## 2024-07-11 RX ADMIN — DEXTROSE 3.38 G: 50 INJECTION, SOLUTION INTRAVENOUS at 12:07

## 2024-07-11 RX ADMIN — PROPOFOL 150 MG: 10 INJECTION, EMULSION INTRAVENOUS at 12:07

## 2024-07-11 RX ADMIN — MUPIROCIN 1 G: 20 OINTMENT TOPICAL at 07:07

## 2024-07-11 RX ADMIN — HYDROMORPHONE HYDROCHLORIDE 0.5 MG: 2 INJECTION INTRAMUSCULAR; INTRAVENOUS; SUBCUTANEOUS at 02:07

## 2024-07-11 RX ADMIN — FENTANYL CITRATE 50 MCG: 50 INJECTION, SOLUTION INTRAMUSCULAR; INTRAVENOUS at 01:07

## 2024-07-11 RX ADMIN — SUCCINYLCHOLINE CHLORIDE 140 MG: 20 INJECTION, SOLUTION INTRAMUSCULAR; INTRAVENOUS at 12:07

## 2024-07-11 NOTE — CARE UPDATE
07/11/24 0930   Incentive Spirometer   $ Incentive Spirometer Charges proper technique demonstrated   Incentive Spirometer Predicted Level (mL) 2040   Administration (IS) proper technique demonstrated   Number of Repetitions (IS) 5   Level Incentive Spirometer (mL) 1500   Patient Tolerance (IS) good

## 2024-07-11 NOTE — BRIEF OP NOTE
[facility]/Surg  Brief Operative Note    SUMMARY     Surgery Date: 7/11/2024     Surgeons and Role:     * Stephen Benites III, MD - Primary    Assisting Surgeon: None    Pre-op Diagnosis:  Small bowel obstruction [K56.609]    Post-op Diagnosis:  Post-Op Diagnosis Codes:     * Small bowel obstruction [K56.609]    Procedure(s) (LRB):  Diagnostic laparoscopy with conversion to hand assist laparoscopy with lysis of dense small bowel to small bowel adhesions and omental to anterior abdominal wall adhesions.      LAPAROSCOPY, DIAGNOSTIC (N/A)  LYSIS, ADHESIONS, LAPAROSCOPIC (N/A)    Anesthesia: General    Implants:  Implant Name Type Inv. Item Serial No.  Lot No. LRB No. Used Action   MEMBRANE SEPRAFILM 5 X 6 - [id number]  MEMBRANE SEPRAFILM 5 X 6  [employer] [id number] N/A 2 Implanted       Operative Findings:  Patient brought to the operating room transferred the operating table supine position.  She was given general anesthesia intubated.  NG tube placed.  Abdomen prepped and draped.  We 1st started making a small left upper quadrant incision.  Abdomen insufflated with Veress needle.  Abdomen entered with a 5 mm Visiport.  We placed 2 more 5 mm ports in the left lateral abdomen.  The bowels were moderately dilated.  There was almost her entire omentum adhesed to the anterior surface of the abdominal wall.  We 1st started by taking down these adhesions from omentum to anterior abdominal wall with the scissors.  Once the entire omentum was released it was retracted superiorly.  We then located the cecum and then ran the bowel proximally from there.  The terminal ileum was immediately adhered to the lateral abdominal wall about 10 cm away from ileocecal valve.  We were able to retract the bowel medially and take down these adhesions with laparoscopic scissors.  This freed up the distal 20 cm of terminal ileum.  This led to a twisted and severely adhered intertwined loops of small bowel.   We attempted to cut through the small bowel to small bowel adhesions laparoscopically but they were very dense.  Chose to make a hand port site in the abdomen.  The camera was removed.  A limited midline incision was made around the umbilicus.  The fascia was entered through the linea alba.  Hand port, GelPort was positioned here.  We were able to eviscerate the adhesed area of bowel through the hand port.  Small bowel small bowel adhesions were carefully taken down using Metzenbaum scissors.  This took about 90 minutes to complete.  Were able to successfully straighten out the intestine without any significant serosal injury or need to resect any bowel.  The bowel was placed back in the abdomen a hand port closed.  Laparoscopically we then ran the bowel from the terminal ileum to the ligament of Treitz.  There were no other significant adhesions were obstructive areas.  We irrigated the over quadrants of the abdomen and pelvis using the suction .  All of our ports were removed.  Instruments removed.  Two sheets of Surgicel were placed in the abdomen.  Omentum placed over the intestine.  The midline incision closed with running 1. PDS.  Skin sites closed with 4-0 Monocryl.  Instrument counts correct.  Patient extubated.  NG tube left in place.  She went to recovery room stable condition.  Complications none.          Estimated Blood Loss: 10 mL    Estimated Blood Loss has been documented.         Specimens:   Specimen (24h ago, onward)      None            [id number]

## 2024-07-11 NOTE — PROGRESS NOTES
INPATIENT NEPHROLOGY Progress Note   [facility]    Patient Name: Eugenia Manuel  Date: 07/11/2024    Reason for consultation: ESRD    Chief Complaint:   Chief Complaint   Patient presents with    Abdominal Pain       History of Present Illness:  58 year old female with past medical history of CAD w/ 2 stents, HTN, ESRD on HD MWF, bipolar disorder, hypothyroidism, asthma, MI, SBO, restless leg syndrome who presented to the ER with complaints nausea, vomiting, and abdominal distention x 2 days. She reports last bowel movement was on Saturday and is not passing gas. ED work up reveals CT of abdomen/pelvis with concern for SBO. General surgery notified and ordered NG Tube to be placed. Consulted for dialysis.    Interval History:  7/8- K 6.1- got medical management- HD ordered- SBP 160s, on RA, NPO, couldn't pass first NGT in ER- floor nurse will try after HD- c/w abd tenderness- no flatus, BMs= seen on HD  7/9- HD yest with 3L UF- BP better- on RA- Contrast has traversed to the distal colon indicating absence of a complete bowel obstruction but appears will still need surg- doesn't have NGT- had 2 BMs today- labs shows hyperK today- will do another HD session (2 hours) for clearance, no UF  7/10- 2hr HD yest for clearance/no UF- routine HD today- ex lap planned for tomorrow; VSS, on RA  --seen on HD, feels clammy- SBP 110s, BG low 100s- turn off UF- give 250cc NS bolus   7/11- OR today- VSS, on RA    Plan of Care:    Assessment:  SBO  ESRD on HD MWF  HTN  Hyponatremia/Hyperkalemia/Acidosis  SHPT  Anemia of CKD    Plan:    - to OR today  - HD MWF  - adjusted dialysate- high flux dialyzer working better  - UF with HD as able  - all meds on hold while NPO (BP meds, diuretic, binders)- nothing we can do about high phos while NPO beyond dialysis - that said phos is improving  - H/H in the 10s now- will add SANJANA with HD    Thank you for allowing us to participate in this patient's care. We will continue to  follow.    Vital Signs:  Temp Readings from Last 3 Encounters:   07/11/24 98.2 °F (36.8 °C) (Skin)   06/07/24 98.2 °F (36.8 °C) (Oral)   05/20/24 99 °F (37.2 °C)       Pulse Readings from Last 3 Encounters:   07/11/24 82   06/07/24 78   05/20/24 76       BP Readings from Last 3 Encounters:   07/11/24 (!) 146/77   06/07/24 128/62   05/20/24 (!) 157/103       Weight:  Wt Readings from Last 3 Encounters:   07/08/24 97.4 kg (214 lb 11.7 oz)   06/07/24 94.3 kg (208 lb)   05/20/24 98.4 kg (217 lb)       Medications:  Scheduled Meds:   levothyroxine  150 mcg Oral Before breakfast    mupirocin   Nasal BID    QUEtiapine  100 mg Oral Nightly    quetiapine  25 mg Oral Daily     Continuous Infusions:   electrolyte-S (pH 7.4)   Intravenous Continuous 10 mL/hr at 07/11/24 0933 New Bag at 07/11/24 0933     PRN Meds:.  Current Facility-Administered Medications:     0.9% NaCl, , Intravenous, PRN    albuterol sulfate, 2.5 mg, Nebulization, Q6H PRN    dextrose 10%, 12.5 g, Intravenous, PRN    dextrose 10%, 25 g, Intravenous, PRN    glucagon (human recombinant), 1 mg, Intramuscular, PRN    glucose, 16 g, Oral, PRN    glucose, 24 g, Oral, PRN    heparin (porcine), 5,000 Units, Intravenous, PRN    hydrALAZINE, 10 mg, Intravenous, Q6H PRN    morphine, 2 mg, Intravenous, Q4H PRN    naloxone, 0.02 mg, Intravenous, PRN    ondansetron, 4 mg, Intravenous, Q6H PRN    sodium chloride 0.9%, 250 mL, Intravenous, PRN    sodium chloride 0.9%, 10 mL, Intravenous, Q12H PRN  No current facility-administered medications on file prior to encounter.     Current Outpatient Medications on File Prior to Encounter   Medication Sig Dispense Refill    albuterol (PROVENTIL/VENTOLIN HFA) 90 mcg/actuation inhaler Inhale 2 puffs into the lungs every 6 (six) hours as needed. Rescue 18 g 6    aspirin 81 MG Chew Take 1 tablet (81 mg total) by mouth once daily.  0    atorvastatin (LIPITOR) 40 MG tablet Take 1 tablet (40 mg total) by mouth once daily. 90 tablet 0     azelastine (ASTELIN) 137 mcg (0.1 %) nasal spray 1 spray (137 mcg total) by Nasal route 2 (two) times daily. 30 mL 2    budesonide-glycopyr-formoterol (BREZTRI AEROSPHERE) 160-9-4.8 mcg/actuation HFAA Inhale 2 puffs into the lungs 2 (two) times daily. 10.7 g 6    cetirizine (ZYRTEC) 10 MG tablet Take 1 tablet (10 mg total) by mouth once daily. 90 tablet 3    clopidogreL (PLAVIX) 75 mg tablet Take 1 tablet (75 mg total) by mouth once daily. 90 tablet 3    ferric citrate (AURYXIA) 210 mg iron Tab Take 420 mg by mouth 3 (three) times daily. 540 tablet 3    fluticasone propionate (FLONASE) 50 mcg/actuation nasal spray 2 sprays (100 mcg total) by Each Nostril route once daily. 16 g 1    levothyroxine (SYNTHROID) 150 MCG tablet Take 1 tablet (150 mcg total) by mouth before breakfast. 90 tablet 3    melatonin 10 mg Cap Take 10 mg by mouth nightly as needed.      midodrine (PROAMATINE) 10 MG tablet Take 1 tablet (10 mg total) by mouth daily as needed (hypotension). 180 tablet 0    montelukast (SINGULAIR) 10 mg tablet Take 1 tablet (10 mg total) by mouth every evening. 30 tablet 6    nitroGLYCERIN (NITROSTAT) 0.4 MG SL tablet Place 1 tablet (0.4 mg total) under the tongue every 5 (five) minutes as needed for Chest pain. 10 tablet 5    ondansetron (ZOFRAN-ODT) 4 MG TbDL Take 1 tablet (4 mg total) by mouth 1 (one) time if needed (nausea). 1 tablet 0    pantoprazole (PROTONIX) 40 MG tablet Take 1 tablet (40 mg total) by mouth 2 (two) times daily. 180 tablet 1    pregabalin (LYRICA) 50 MG capsule Take 50 mg by mouth once daily.      QUEtiapine (SEROQUEL) 100 MG Tab Take 1 tablet (100 mg total) by mouth once daily. (Patient taking differently: Take 100 mg by mouth nightly.) 30 tablet 5    QUEtiapine (SEROQUEL) 25 MG Tab Take 1 tablet (25 mg total) by mouth every evening. 90 tablet 1    rOPINIRole (REQUIP) 2 MG tablet Take 1 tablet (2 mg total) by mouth 3 (three) times daily. 90 tablet 1    senna-docusate 8.6-50 mg (PERICOLACE)  8.6-50 mg per tablet Take 1 tablet by mouth daily as needed for Constipation.      sevelamer carbonate (RENVELA) 800 mg Tab Take 5 tablets (4,000 mg total) by mouth 3 (three) times daily with meals. 1350 tablet 3    torsemide (DEMADEX) 100 MG Tab Take 1 tablet (100 mg total) by mouth 2 (two) times a day. 90 tablet 0    venlafaxine (EFFEXOR-XR) 150 MG Cp24 Take 1 capsule (150 mg total) by mouth once daily. 90 capsule 3     Review of Systems:  In OR    Physical Exam:  In OR    Results:  Lab Results   Component Value Date     (L) 07/11/2024    K 4.7 07/11/2024    CL 97 07/11/2024    CO2 19 (L) 07/11/2024    BUN 37 (H) 07/11/2024    CREATININE 7.0 (H) 07/11/2024    CALCIUM 9.5 07/11/2024    ANIONGAP 17 (H) 07/11/2024       Lab Results   Component Value Date    CALCIUM 9.5 07/11/2024    PHOS 6.8 (H) 07/11/2024       Recent Labs   Lab 07/11/24  0421   WBC 6.53   RBC 2.96*   HGB 10.8*   HCT 33.2*      *   MCH 36.5*   MCHC 32.5       I have personally reviewed pertinent radiological imaging and reports from this admission.    I have spent > 35 minutes providing care for this patient for the above diagnoses. These services have included chart/data/imaging review, evaluation, exam, formulation of plan, , note preparation, and discussions with staff involved in this patient's care.    Bayron Dumont MD MPH  [facility]  [address] [place] [address], [place] [address]  [phone] (p)  [phone] (f)

## 2024-07-11 NOTE — ASSESSMENT & PLAN NOTE
S/p ex lap with lysis of adhesion  -keep NG clamped: discussed with Dr. Benites  -pain and nausea control with PRN medications  -Monitor and await bowel function post-op

## 2024-07-11 NOTE — PROGRESS NOTES
[place]/Surg  General Surgery  Progress Note    Subjective:     Interval History:  Patient clinically improved but not passing much flatus.  Still feels a little bloated.    Post-Op Info:  Procedure(s) (LRB):  LAPAROSCOPY, DIAGNOSTIC (N/A)  LYSIS, ADHESIONS, LAPAROSCOPIC (N/A)   Day of Surgery      Medications:  Continuous Infusions:   electrolyte-S (pH 7.4)   Intravenous Continuous 10 mL/hr at 07/11/24 1354 New Bag at 07/11/24 1354     Scheduled Meds:   [START ON 7/12/2024] epoetin marcell-epbx  4,900 Units Subcutaneous Every Mon, Wed, Fri    levothyroxine  150 mcg Oral Before breakfast    mupirocin   Nasal BID    QUEtiapine  100 mg Oral Nightly    quetiapine  25 mg Oral Daily     PRN Meds:  Current Facility-Administered Medications:     0.9% NaCl, , Intravenous, PRN    albuterol sulfate, 2.5 mg, Nebulization, Q6H PRN    dextrose 10%, 12.5 g, Intravenous, PRN    dextrose 10%, 25 g, Intravenous, PRN    glucagon (human recombinant), 1 mg, Intramuscular, PRN    glucose, 16 g, Oral, PRN    glucose, 24 g, Oral, PRN    heparin (porcine), 5,000 Units, Intravenous, PRN    hydrALAZINE, 10 mg, Intravenous, Q6H PRN    morphine, 2 mg, Intravenous, Q4H PRN    naloxone, 0.02 mg, Intravenous, PRN    ondansetron, 4 mg, Intravenous, Q6H PRN    sodium chloride 0.9%, 250 mL, Intravenous, PRN    sodium chloride 0.9%, 10 mL, Intravenous, Q12H PRN     Objective:     Vital Signs (Most Recent):  Temp: 98.2 °F (36.8 °C) (07/11/24 1621)  Pulse: 88 (07/11/24 1621)  Resp: 14 (07/11/24 1621)  BP: 128/62 (07/11/24 1621)  SpO2: (!) 93 % (07/11/24 1621) Vital Signs (24h Range):  Temp:  [97.5 °F (36.4 °C)-98.2 °F (36.8 °C)] 98.2 °F (36.8 °C)  Pulse:  [77-94] 88  Resp:  [10-18] 14  SpO2:  [88 %-100 %] 93 %  BP: (120-173)/(55-77) 128/62       Intake/Output Summary (Last 24 hours) at 7/11/2024 1650  Last data filed at 7/11/2024 1533  Gross per 24 hour   Intake 1850 ml   Output 30 ml   Net 1820 ml       Physical Exam  Constitutional:        General: She is awake. She is not in acute distress.     Appearance: Normal appearance. She is obese. She is not toxic-appearing.      Comments: Patient uncomfortable in waves   Pulmonary:      Effort: No tachypnea, bradypnea or respiratory distress.   Abdominal:      General: A surgical scar is present. There is no distension.      Palpations: Abdomen is soft.      Tenderness: There is no abdominal tenderness. There is no guarding.          Comments: Exam this afternoon - distention seems better. Tenderness is much better - mild mid abdominal tenderness. No guarding.     Neurological:      Mental Status: She is alert and oriented to person, place, and time.   Psychiatric:         Behavior: Behavior is cooperative.         Significant Labs:  CBC:   Recent Labs   Lab 07/11/24 0421   WBC 6.53   RBC 2.96*   HGB 10.8*   HCT 33.2*      *   MCH 36.5*   MCHC 32.5     CMP:   Recent Labs   Lab 07/11/24 0421   GLU 93   CALCIUM 9.5   ALBUMIN 3.5   PROT 7.6   *   K 4.7   CO2 19*   CL 97   BUN 37*   CREATININE 7.0*   ALKPHOS 217*   ALT 17   AST 20   BILITOT 0.9       Significant Diagnostics:  I have reviewed all pertinent imaging results/findings within the past 24 hours.    Assessment/Plan:     Active Diagnoses:    Diagnosis Date Noted POA    PRINCIPAL PROBLEM:  Small bowel obstruction [K56.609] 04/30/2024 Yes    Hyperkalemia [E87.5] 04/30/2024 Yes    ESRD (end stage renal disease) [N18.6] 02/27/2024 Yes    Hypertension [I10] 11/30/2023 Yes    Coronary artery disease involving native coronary artery of native heart with unstable angina pectoris [I25.110] 11/30/2023 Yes    Hypothyroidism [E03.9] 11/30/2023 Yes      Problems Resolved During this Admission:     Patient clinically improved but not completely resolved.  Will plan on diagnostic laparoscopy, probable laparotomy today to address small-bowel obstruction.  Suspect dense adhesions.    Stephen Benites III, MD  General Surgery  [facility] - Med/Surg

## 2024-07-11 NOTE — PROGRESS NOTES
[facility]  Progress Note    Patient Name: Eugenia Manuel  MRN: [id number]  Patient Class: IP- Inpatient   Admission Date: 7/8/2024  Length of Stay: 3 days  Attending Physician: Aranza Espinoza MD  Primary Care Provider: John Castro MD        Subjective:     Principal Problem:Small bowel obstruction        HPI:  58 year old female with past medical history of CAD w/ 2 stents, HTN, ESRD on HD MWF, bipolar disorder, hypothyroidism, asthma, MI, SBO, restless leg syndrome who presented to the ER with complaints nausea, vomiting, and abdominal distention x 2 days.  She reports last bowel movement was on Saturday and is not passing gas.  ED work up reveals CT of abdomen/pelvis with concern for SBO.  CBC with anemia which appears around baseline.  CMP consistent with ESRD with potassium 6.1.  She was given calcium gluconate, albuterol, insulin, and dextrose.  Repeat potassium pending.  Nephrology was notified and HD to be done today.  General surgery also notified and ordered NG Tube to be placed.  On chart review, patient was admitted in April 2024 for SBO.  She was given a gastrograffin challenge and GI function returned.  Patient will be admitted to hospital medicine for further evaluation and treatment.    Overview/Hospital Course:  58 year old female admitted with complaints of nausea, vomiting, and abdominal distention.  CT of abdomen was done with concern for SBO.  CMP consistent with ESRD with hyperkalemia.  Nephrology and general surgery were consulted.  She is on HD MWF, she had HD yesterday.  She was given a gastrograffin challenge on 7/9 with persistent dilation.  She was taken for lysis of adhesions on 7/11 with Dr. Benites.    Interval History:  Pt seen and examined in PACU s/p ex lap with lysis of adhesion. She is still drowsy from anesthesia- is hemodynamically stable.    Review of Systems   Unable to perform ROS: Other (in PACU post anesthesia)      Objective:     Vital Signs (Most Recent):  Temp: 98.2 °F (36.8 °C) (07/11/24 0924)  Pulse: 82 (07/11/24 0924)  Resp: 16 (07/11/24 0924)  BP: (!) 146/77 (07/11/24 0924)  SpO2: 98 % (07/11/24 0924) Vital Signs (24h Range):  Temp:  [97.5 °F (36.4 °C)-98.3 °F (36.8 °C)] 98.2 °F (36.8 °C)  Pulse:  [77-93] 82  Resp:  [16-18] 16  SpO2:  [96 %-99 %] 98 %  BP: (113-160)/(65-77) 146/77     Weight: 97.4 kg (214 lb 11.7 oz)  Body mass index is 32.65 kg/m².    Intake/Output Summary (Last 24 hours) at 7/11/2024 1606  Last data filed at 7/11/2024 1533  Gross per 24 hour   Intake 1850 ml   Output 30 ml   Net 1820 ml         Physical Exam  Vitals and nursing note reviewed.   Constitutional:       Appearance: Normal appearance.      Comments: Rag on forehead   HENT:      Head: Normocephalic and atraumatic.   Eyes:      Extraocular Movements: Extraocular movements intact.      Conjunctiva/sclera: Conjunctivae normal.      Pupils: Pupils are equal, round, and reactive to light.   Cardiovascular:      Rate and Rhythm: Normal rate and regular rhythm.   Pulmonary:      Effort: Pulmonary effort is normal.      Breath sounds: Normal breath sounds.   Abdominal:      General: Abdomen is flat. There is distension.      Palpations: Abdomen is soft.      Comments: Lap sites and dressings CDI  NG clamped   Musculoskeletal:      Cervical back: Normal range of motion and neck supple.   Skin:     General: Skin is warm and dry.      Capillary Refill: Capillary refill takes less than 2 seconds.   Neurological:      General: No focal deficit present.      Mental Status: She is alert and oriented to person, place, and time. Mental status is at baseline.   Psychiatric:         Mood and Affect: Mood normal.         Behavior: Behavior normal.             Significant Labs: All pertinent labs within the past 24 hours have been reviewed.  CBC:   Recent Labs   Lab 07/10/24  0411 07/11/24  0421   WBC 7.27 6.53   HGB 11.0* 10.8*   HCT 34.3* 33.2*     170     CMP:   Recent Labs   Lab 07/10/24  0411 07/11/24  0421    133*   K 5.3* 4.7   CL 94* 97   CO2 24 19*    93   BUN 36* 37*   CREATININE 7.6* 7.0*   CALCIUM 9.7 9.5   PROT 7.9 7.6   ALBUMIN 3.6 3.5   BILITOT 0.9 0.9   ALKPHOS 226* 217*   AST 13 20   ALT 13 17   ANIONGAP 18* 17*       Significant Imaging: I have reviewed all pertinent imaging results/findings within the past 24 hours.    Assessment/Plan:      * Small bowel obstruction  S/p ex lap with lysis of adhesion  -keep NG clamped: discussed with Dr. Benites  -pain and nausea control with PRN medications  -Monitor and await bowel function post-op    Hyperkalemia  This patient has hyperkalemia which is uncontrolled. We will monitor for arrhythmias with EKG or continuous telemetry. We will treat the hyperkalemia with Potassium Binders, Calcium gluconate, IV insulin and dextrose, and Nebulized albuterol sulfate. The likely etiology of the hyperkalemia is ESRD.  HD done yesterday, continue MWF  The patients latest potassium has been reviewed and the results are listed below  Recent Labs   Lab 07/10/24  0411   K 5.3*       -Managed with HD        ESRD (end stage renal disease)  Creatine stable for now. BMP reviewed- noted Estimated Creatinine Clearance: 10.8 mL/min (A) (based on SCr of 7 mg/dL (H)). according to latest data. Based on current GFR, CKD stage is end stage.  Monitor UOP and serial BMP and adjust therapy as needed. Renally dose meds. Avoid nephrotoxic medications and procedures.  Nephrology consulted  HD MWF  Close attention to volume status post-op (was unable to tolerate full session on Wednesday)    Hypothyroidism  -maintained on synthroid       Coronary artery disease involving native coronary artery of native heart with unstable angina pectoris  Patient with known CAD s/p stent placement, which is controlled. Monitor for S/Sx of angina/ACS. Continue to monitor on telemetry.   Plavix, statin, and aspirin on hold due to SBO and  general surgery consult. Will resume when appropriate    Hypertension  Chronic, uncontrolled. Latest blood pressure and vitals reviewed-     Temp:  [97.4 °F (36.3 °C)-98.6 °F (37 °C)]   Pulse:  []   Resp:  [15-20]   BP: (105-137)/(48-85)   SpO2:  [94 %-97 %] .   Home meds for hypertension were reviewed and noted below.   Hypertension Medications               nitroGLYCERIN (NITROSTAT) 0.4 MG SL tablet Place 1 tablet (0.4 mg total) under the tongue every 5 (five) minutes as needed for Chest pain.    torsemide (DEMADEX) 100 MG Tab Take 1 tablet (100 mg total) by mouth 2 (two) times a day.            While in the hospital, will manage blood pressure as follows; hold PO meds due to SBO and general surgery consulted    Will utilize p.r.n. blood pressure medication only if patient's SBP >180 and she develops symptoms such as worsening chest pain or shortness of breath.      VTE Risk Mitigation (From admission, onward)           Ordered     heparin (porcine) injection 5,000 Units  As needed (PRN)         07/08/24 1328     IP VTE HIGH RISK PATIENT  Once         07/08/24 0927     Place sequential compression device  Until discontinued         07/08/24 0927     Reason for No Pharmacological VTE Prophylaxis  Once        Comments: Awaiting surgery consult   Question:  Reasons:  Answer:  Physician Provided (leave comment)    07/08/24 0927                    Discharge Planning   VERO: 7/13/2024     Code Status: Full Code   Is the patient medically ready for discharge?:     Reason for patient still in hospital (select all that apply): Patient trending condition, Treatment, Imaging, and Consult recommendations  Discharge Plan A: Home with family                  Eunice Quinones NP  Department of Hospital Medicine   [facility]

## 2024-07-11 NOTE — TRANSFER OF CARE
"Anesthesia Transfer of Care Note    Patient: Eugenia Manuel    Procedure(s) Performed: Procedure(s) (LRB):  LAPAROSCOPY, DIAGNOSTIC (N/A)  LYSIS, ADHESIONS, LAPAROSCOPIC (N/A)    Patient location: PACU    Anesthesia Type: general    Transport from OR: Transported from OR on 6-10 L/min O2 by face mask with adequate spontaneous ventilation    Post pain: adequate analgesia    Post assessment: no apparent anesthetic complications and tolerated procedure well    Post vital signs: stable    Level of consciousness: awake and responds to stimulation    Nausea/Vomiting: no nausea/vomiting    Complications: none    Transfer of care protocol was followed      Last vitals: Visit Vitals  BP (!) 146/77 (BP Location: Left arm, Patient Position: Lying)   Pulse 82   Temp 36.8 °C (98.2 °F) (Skin)   Resp 16   Ht 5' 8" (1.727 m)   Wt 97.4 kg (214 lb 11.7 oz)   SpO2 98%   Breastfeeding No   BMI 32.65 kg/m²     "

## 2024-07-11 NOTE — ANESTHESIA PREPROCEDURE EVALUATION
07/11/2024  Eugenia Manuel is a 58 y.o., female.      Pre-op Assessment    I have reviewed the Patient Summary Reports.     I have reviewed the Nursing Notes. I have reviewed the NPO Status.   I have reviewed the Medications.     Review of Systems  Anesthesia Hx:  No problems with previous Anesthesia                Social:  Non-Smoker       Cardiovascular:     Hypertension  Past MI CAD   CABG/stent   Angina           Cardiovascular Symptoms: Angina       Coronary Artery Disease:          Hx of Myocardial Infarction                  Hypertension         Pulmonary:    Asthma    Sleep Apnea   Asthma:    Obstructive Sleep Apnea (CARLOS).           Renal/:  Chronic Renal Disease, ESRD        Kidney Function/Disease             Hepatic/GI:     GERD   SBO   Gerd          Neurological:  TIA, CVA Neuromuscular Disease,       TBI                TIA - Transient Ischemic Attack             Neuromuscular Disease   Endocrine:   Hypothyroidism       Hypothyroidism        Obesity / BMI > 30  Psych:  Psychiatric History   Bipolar               Physical Exam  General: Well nourished, Cooperative, Alert and Oriented    Airway:  Mallampati: II   Mouth Opening: Normal  TM Distance: Normal  Tongue: Normal  Neck ROM: Normal ROM    Dental:  Intact    Chest/Lungs:  Normal Respiratory Rate    Heart:  Rate: Normal        Anesthesia Plan  Type of Anesthesia, risks & benefits discussed:    Anesthesia Type: Gen ETT  Intra-op Monitoring Plan: Standard ASA Monitors  Post Op Pain Control Plan: multimodal analgesia and IV/PO Opioids PRN  Induction:  IV  Airway Plan: Video, Post-Induction  Informed Consent: Informed consent signed with the Patient and all parties understand the risks and agree with anesthesia plan.  All questions answered.   ASA Score: 3    Ready For Surgery From Anesthesia Perspective.     .

## 2024-07-11 NOTE — ANESTHESIA PROCEDURE NOTES
Intubation    Date/Time: 7/11/2024 12:17 PM    Performed by: Zoe Combs CRNA  Authorized by: Ignacio Lema MD    Intubation:     Induction:  Rapid sequence induction    Intubated:  Postinduction    Mask Ventilation:  Not attempted    Attempts:  1    Attempted By:  CRNA    Method of Intubation:  Video laryngoscopy    Blade:  Potts 3    Laryngeal View Grade: Grade IIA - cords partially seen      Difficult Airway Encountered?: No      Complications:  None    Airway Device:  Oral endotracheal tube    Airway Device Size:  7.0    Style/Cuff Inflation:  Cuffed (inflated to minimal occlusive pressure)    Tube secured:  21    Secured at:  The lips    Placement Verified By:  Capnometry    Complicating Factors:  None    Findings Post-Intubation:  BS equal bilateral and atraumatic/condition of teeth unchanged

## 2024-07-11 NOTE — OP NOTE
Surgery Date: 7/11/2024     Surgeons and Role:     * Stephen Benites III, MD - Primary    Assisting Surgeon: None    Pre-op Diagnosis:  Small bowel obstruction [K56.609]    Post-op Diagnosis:  Post-Op Diagnosis Codes:     * Small bowel obstruction [K56.609]    Procedure(s) (LRB):  Diagnostic laparoscopy with conversion to hand assist laparoscopy with lysis of dense small bowel to small bowel adhesions and omental to anterior abdominal wall adhesions.      LAPAROSCOPY, DIAGNOSTIC (N/A)  LYSIS, ADHESIONS, LAPAROSCOPIC (N/A)    Anesthesia: General    Implants:  Implant Name Type Inv. Item Serial No.  Lot No. LRB No. Used Action   MEMBRANE SEPRAFILM 5 X 6 - [id number]  MEMBRANE SEPRAFILM 5 X 6  [employer] [id number] N/A 2 Implanted       Operative Findings:  Patient brought to the operating room transferred the operating table supine position.  She was given general anesthesia intubated.  NG tube placed.  Abdomen prepped and draped.  We 1st started making a small left upper quadrant incision.  Abdomen insufflated with Veress needle.  Abdomen entered with a 5 mm Visiport.  We placed 2 more 5 mm ports in the left lateral abdomen.  The bowels were moderately dilated.  There was almost her entire omentum adhesed to the anterior surface of the abdominal wall.  We 1st started by taking down these adhesions from omentum to anterior abdominal wall with the scissors.  Once the entire omentum was released it was retracted superiorly.  We then located the cecum and then ran the bowel proximally from there.  The terminal ileum was immediately adhered to the lateral abdominal wall about 10 cm away from ileocecal valve.  We were able to retract the bowel medially and take down these adhesions with laparoscopic scissors.  This freed up the distal 20 cm of terminal ileum.  This led to a twisted and severely adhered intertwined loops of small bowel.  We attempted to cut through the small bowel to small bowel adhesions  laparoscopically but they were very dense.  Chose to make a hand port site in the abdomen.  The camera was removed.  A limited midline incision was made around the umbilicus.  The fascia was entered through the linea alba.  Hand port, GelPort was positioned here.  We were able to eviscerate the adhesed area of bowel through the hand port.  Small bowel small bowel adhesions were carefully taken down using Metzenbaum scissors.  This took about 90 minutes to complete.  Were able to successfully straighten out the intestine without any significant serosal injury or need to resect any bowel.  The bowel was placed back in the abdomen a hand port closed.  Laparoscopically we then ran the bowel from the terminal ileum to the ligament of Treitz.  There were no other significant adhesions were obstructive areas.  We irrigated the over quadrants of the abdomen and pelvis using the suction .  All of our ports were removed.  Instruments removed.  Two sheets of Surgicel were placed in the abdomen.  Omentum placed over the intestine.  The midline incision closed with running 1. PDS.  Skin sites closed with 4-0 Monocryl.  Instrument counts correct.  Patient extubated.  NG tube left in place.  She went to recovery room stable condition.  Complications none.          Estimated Blood Loss: 10 mL    Estimated Blood Loss has been documented.         Specimens:   Specimen (24h ago, onward)      None            [id number]

## 2024-07-11 NOTE — SUBJECTIVE & OBJECTIVE
Interval History:  Pt seen and examined in PACU s/p ex lap with lysis of adhesion. She is still drowsy from anesthesia- is hemodynamically stable.    Review of Systems   Unable to perform ROS: Other (in PACU post anesthesia)     Objective:     Vital Signs (Most Recent):  Temp: 98.2 °F (36.8 °C) (07/11/24 0924)  Pulse: 82 (07/11/24 0924)  Resp: 16 (07/11/24 0924)  BP: (!) 146/77 (07/11/24 0924)  SpO2: 98 % (07/11/24 0924) Vital Signs (24h Range):  Temp:  [97.5 °F (36.4 °C)-98.3 °F (36.8 °C)] 98.2 °F (36.8 °C)  Pulse:  [77-93] 82  Resp:  [16-18] 16  SpO2:  [96 %-99 %] 98 %  BP: (113-160)/(65-77) 146/77     Weight: 97.4 kg (214 lb 11.7 oz)  Body mass index is 32.65 kg/m².    Intake/Output Summary (Last 24 hours) at 7/11/2024 1606  Last data filed at 7/11/2024 1533  Gross per 24 hour   Intake 1850 ml   Output 30 ml   Net 1820 ml         Physical Exam  Vitals and nursing note reviewed.   Constitutional:       Appearance: Normal appearance.      Comments: Rag on forehead   HENT:      Head: Normocephalic and atraumatic.   Eyes:      Extraocular Movements: Extraocular movements intact.      Conjunctiva/sclera: Conjunctivae normal.      Pupils: Pupils are equal, round, and reactive to light.   Cardiovascular:      Rate and Rhythm: Normal rate and regular rhythm.   Pulmonary:      Effort: Pulmonary effort is normal.      Breath sounds: Normal breath sounds.   Abdominal:      General: Abdomen is flat. There is distension.      Palpations: Abdomen is soft.      Comments: Lap sites and dressings CDI  NG clamped   Musculoskeletal:      Cervical back: Normal range of motion and neck supple.   Skin:     General: Skin is warm and dry.      Capillary Refill: Capillary refill takes less than 2 seconds.   Neurological:      General: No focal deficit present.      Mental Status: She is alert and oriented to person, place, and time. Mental status is at baseline.   Psychiatric:         Mood and Affect: Mood normal.         Behavior:  Behavior normal.             Significant Labs: All pertinent labs within the past 24 hours have been reviewed.  CBC:   Recent Labs   Lab 07/10/24  0411 07/11/24  0421   WBC 7.27 6.53   HGB 11.0* 10.8*   HCT 34.3* 33.2*    170     CMP:   Recent Labs   Lab 07/10/24  0411 07/11/24  0421    133*   K 5.3* 4.7   CL 94* 97   CO2 24 19*    93   BUN 36* 37*   CREATININE 7.6* 7.0*   CALCIUM 9.7 9.5   PROT 7.9 7.6   ALBUMIN 3.6 3.5   BILITOT 0.9 0.9   ALKPHOS 226* 217*   AST 13 20   ALT 13 17   ANIONGAP 18* 17*       Significant Imaging: I have reviewed all pertinent imaging results/findings within the past 24 hours.

## 2024-07-11 NOTE — CARE UPDATE
07/10/24 2110   Patient Assessment/Suction   Level of Consciousness (AVPU) alert   Respiratory Effort Normal;Unlabored   Expansion/Accessory Muscles/Retractions expansion symmetric   All Lung Fields Breath Sounds clear   Rhythm/Pattern, Respiratory pattern regular   Cough Frequency no cough   Cough Type nonproductive   PRE-TX-O2   Device (Oxygen Therapy) room air   SpO2 96 %   Pulse Oximetry Type Intermittent   $ Pulse Oximetry - Multiple Charge Pulse Oximetry - Multiple   Pulse 78   Resp 16   Aerosol Therapy   $ Aerosol Therapy Charges PRN treatment not required   Respiratory Treatment Status (SVN) PRN treatment not required

## 2024-07-11 NOTE — ASSESSMENT & PLAN NOTE
Creatine stable for now. BMP reviewed- noted Estimated Creatinine Clearance: 10.8 mL/min (A) (based on SCr of 7 mg/dL (H)). according to latest data. Based on current GFR, CKD stage is end stage.  Monitor UOP and serial BMP and adjust therapy as needed. Renally dose meds. Avoid nephrotoxic medications and procedures.  Nephrology consulted  HD MWF  Close attention to volume status post-op (was unable to tolerate full session on Wednesday)

## 2024-07-12 LAB
ALBUMIN SERPL BCP-MCNC: 3.4 G/DL (ref 3.5–5.2)
ALP SERPL-CCNC: 214 U/L (ref 55–135)
ALT SERPL W/O P-5'-P-CCNC: 41 U/L (ref 10–44)
ANION GAP SERPL CALC-SCNC: 18 MMOL/L (ref 8–16)
AST SERPL-CCNC: 40 U/L (ref 10–40)
BASOPHILS # BLD AUTO: 0.04 K/UL (ref 0–0.2)
BASOPHILS NFR BLD: 0.5 % (ref 0–1.9)
BILIRUB SERPL-MCNC: 0.9 MG/DL (ref 0.1–1)
BUN SERPL-MCNC: 24 MG/DL (ref 6–20)
CALCIUM SERPL-MCNC: 9.5 MG/DL (ref 8.7–10.5)
CHLORIDE SERPL-SCNC: 98 MMOL/L (ref 95–110)
CO2 SERPL-SCNC: 19 MMOL/L (ref 23–29)
CREAT SERPL-MCNC: 5.2 MG/DL (ref 0.5–1.4)
DIFFERENTIAL METHOD BLD: ABNORMAL
EOSINOPHIL # BLD AUTO: 0 K/UL (ref 0–0.5)
EOSINOPHIL NFR BLD: 0.2 % (ref 0–8)
ERYTHROCYTE [DISTWIDTH] IN BLOOD BY AUTOMATED COUNT: 16.8 % (ref 11.5–14.5)
EST. GFR  (NO RACE VARIABLE): 9 ML/MIN/1.73 M^2
GLUCOSE SERPL-MCNC: 96 MG/DL (ref 70–110)
HCT VFR BLD AUTO: 31.9 % (ref 37–48.5)
HGB BLD-MCNC: 10.4 G/DL (ref 12–16)
IMM GRANULOCYTES # BLD AUTO: 0.02 K/UL (ref 0–0.04)
IMM GRANULOCYTES NFR BLD AUTO: 0.2 % (ref 0–0.5)
LYMPHOCYTES # BLD AUTO: 0.9 K/UL (ref 1–4.8)
LYMPHOCYTES NFR BLD: 10.9 % (ref 18–48)
MAGNESIUM SERPL-MCNC: 2.1 MG/DL (ref 1.6–2.6)
MCH RBC QN AUTO: 36 PG (ref 27–31)
MCHC RBC AUTO-ENTMCNC: 32.6 G/DL (ref 32–36)
MCV RBC AUTO: 110 FL (ref 82–98)
MONOCYTES # BLD AUTO: 0.7 K/UL (ref 0.3–1)
MONOCYTES NFR BLD: 8.7 % (ref 4–15)
NEUTROPHILS # BLD AUTO: 6.6 K/UL (ref 1.8–7.7)
NEUTROPHILS NFR BLD: 79.5 % (ref 38–73)
NRBC BLD-RTO: 0 /100 WBC
PHOSPHATE SERPL-MCNC: 6.8 MG/DL (ref 2.7–4.5)
PLATELET # BLD AUTO: 191 K/UL (ref 150–450)
PMV BLD AUTO: 11 FL (ref 9.2–12.9)
POTASSIUM SERPL-SCNC: 4.7 MMOL/L (ref 3.5–5.1)
PROT SERPL-MCNC: 7.4 G/DL (ref 6–8.4)
RBC # BLD AUTO: 2.89 M/UL (ref 4–5.4)
SODIUM SERPL-SCNC: 135 MMOL/L (ref 136–145)
WBC # BLD AUTO: 8.25 K/UL (ref 3.9–12.7)

## 2024-07-12 PROCEDURE — 83735 ASSAY OF MAGNESIUM: CPT | Performed by: SURGERY

## 2024-07-12 PROCEDURE — 25000003 PHARM REV CODE 250: Performed by: SURGERY

## 2024-07-12 PROCEDURE — 25000003 PHARM REV CODE 250: Performed by: NURSE PRACTITIONER

## 2024-07-12 PROCEDURE — 94799 UNLISTED PULMONARY SVC/PX: CPT

## 2024-07-12 PROCEDURE — 63600175 PHARM REV CODE 636 W HCPCS: Performed by: NURSE PRACTITIONER

## 2024-07-12 PROCEDURE — 90935 HEMODIALYSIS ONE EVALUATION: CPT

## 2024-07-12 PROCEDURE — 99900035 HC TECH TIME PER 15 MIN (STAT)

## 2024-07-12 PROCEDURE — 94761 N-INVAS EAR/PLS OXIMETRY MLT: CPT

## 2024-07-12 PROCEDURE — 36415 COLL VENOUS BLD VENIPUNCTURE: CPT | Performed by: NURSE PRACTITIONER

## 2024-07-12 PROCEDURE — 63600175 PHARM REV CODE 636 W HCPCS: Mod: JZ,JG | Performed by: SURGERY

## 2024-07-12 PROCEDURE — 63600175 PHARM REV CODE 636 W HCPCS: Performed by: SURGERY

## 2024-07-12 PROCEDURE — 36415 COLL VENOUS BLD VENIPUNCTURE: CPT | Performed by: SURGERY

## 2024-07-12 PROCEDURE — 84100 ASSAY OF PHOSPHORUS: CPT | Performed by: SURGERY

## 2024-07-12 PROCEDURE — 11000001 HC ACUTE MED/SURG PRIVATE ROOM

## 2024-07-12 PROCEDURE — 85025 COMPLETE CBC W/AUTO DIFF WBC: CPT | Performed by: SURGERY

## 2024-07-12 PROCEDURE — 80053 COMPREHEN METABOLIC PANEL: CPT | Performed by: NURSE PRACTITIONER

## 2024-07-12 RX ORDER — CLOPIDOGREL BISULFATE 75 MG/1
75 TABLET ORAL DAILY
Status: DISCONTINUED | OUTPATIENT
Start: 2024-07-13 | End: 2024-07-14 | Stop reason: HOSPADM

## 2024-07-12 RX ORDER — HEPARIN SODIUM 5000 [USP'U]/ML
5000 INJECTION, SOLUTION INTRAVENOUS; SUBCUTANEOUS EVERY 8 HOURS
Status: DISCONTINUED | OUTPATIENT
Start: 2024-07-12 | End: 2024-07-14 | Stop reason: HOSPADM

## 2024-07-12 RX ORDER — HYDROMORPHONE HYDROCHLORIDE 1 MG/ML
0.5 INJECTION, SOLUTION INTRAMUSCULAR; INTRAVENOUS; SUBCUTANEOUS EVERY 6 HOURS PRN
Status: DISCONTINUED | OUTPATIENT
Start: 2024-07-12 | End: 2024-07-14 | Stop reason: HOSPADM

## 2024-07-12 RX ORDER — VENLAFAXINE HYDROCHLORIDE 37.5 MG/1
150 CAPSULE, EXTENDED RELEASE ORAL DAILY
Status: DISCONTINUED | OUTPATIENT
Start: 2024-07-12 | End: 2024-07-14 | Stop reason: HOSPADM

## 2024-07-12 RX ADMIN — HEPARIN SODIUM 5000 UNITS: 5000 INJECTION, SOLUTION INTRAVENOUS; SUBCUTANEOUS at 08:07

## 2024-07-12 RX ADMIN — QUETIAPINE 25 MG: 25 TABLET ORAL at 10:07

## 2024-07-12 RX ADMIN — VENLAFAXINE HYDROCHLORIDE 150 MG: 37.5 CAPSULE, EXTENDED RELEASE ORAL at 10:07

## 2024-07-12 RX ADMIN — MORPHINE SULFATE 2 MG: 2 INJECTION, SOLUTION INTRAMUSCULAR; INTRAVENOUS at 02:07

## 2024-07-12 RX ADMIN — HYDROCODONE BITARTRATE AND ACETAMINOPHEN 1 TABLET: 5; 325 TABLET ORAL at 08:07

## 2024-07-12 RX ADMIN — QUETIAPINE 100 MG: 100 TABLET ORAL at 08:07

## 2024-07-12 RX ADMIN — EPOETIN ALFA-EPBX 4900 UNITS: 10000 INJECTION, SOLUTION INTRAVENOUS; SUBCUTANEOUS at 10:07

## 2024-07-12 NOTE — PROGRESS NOTES
INPATIENT NEPHROLOGY Progress Note   [facility]    Patient Name: Eugenia Manuel  Date: 07/12/2024    Reason for consultation: ESRD    Chief Complaint:   Chief Complaint   Patient presents with    Abdominal Pain       History of Present Illness:  58 year old female with past medical history of CAD w/ 2 stents, HTN, ESRD on HD MWF, bipolar disorder, hypothyroidism, asthma, MI, SBO, restless leg syndrome who presented to the ER with complaints nausea, vomiting, and abdominal distention x 2 days. She reports last bowel movement was on Saturday and is not passing gas. ED work up reveals CT of abdomen/pelvis with concern for SBO. General surgery notified and ordered NG Tube to be placed. Consulted for dialysis.    Interval History:  7/8- K 6.1- got medical management- HD ordered- SBP 160s, on RA, NPO, couldn't pass first NGT in ER- floor nurse will try after HD- c/w abd tenderness- no flatus, BMs= seen on HD  7/9- HD yest with 3L UF- BP better- on RA- Contrast has traversed to the distal colon indicating absence of a complete bowel obstruction but appears will still need surg- doesn't have NGT- had 2 BMs today- labs shows hyperK today- will do another HD session (2 hours) for clearance, no UF  7/10- 2hr HD yest for clearance/no UF- routine HD today- ex lap planned for tomorrow; VSS, on RA  --seen on HD, feels clammy- SBP 110s, BG low 100s- turn off UF- give 250cc NS bolus   7/11- OR today- VSS, on RA  7/12- s/p diagnostic laparoscopy with conversion to hand assist laparoscopy with lysis of dense small bowel to small bowel adhesions and omental to anterior abdominal wall adhesions- labs post op with hyperK/acidosis and peaked TW on EKG- did HD for clearance last night- VSS, on RA- seen on HD- with NGT but clamped- do 1L UF at most- on CLD    Plan of Care:    Assessment:  SBO  ESRD on HD MWF  HTN  Hyponatremia/Hyperkalemia/Acidosis  SHPT  Anemia of CKD    Plan:    - s/p surg 7/11  - HD MWF  - adjusted  dialysate- high flux dialyzer   - BP on lower end at times due to being NPO/pain meds- dry/euvolemic- hold torsemide- judicious UF  - will hold binder until she is on a normal diet  - continue SANJANA with HD    Thank you for allowing us to participate in this patient's care. We will continue to follow.    Vital Signs:  Temp Readings from Last 3 Encounters:   07/12/24 98.3 °F (36.8 °C) (Oral)   06/07/24 98.2 °F (36.8 °C) (Oral)   05/20/24 99 °F (37.2 °C)       Pulse Readings from Last 3 Encounters:   07/12/24 84   06/07/24 78   05/20/24 76       BP Readings from Last 3 Encounters:   07/12/24 (!) 157/72   06/07/24 128/62   05/20/24 (!) 157/103       Weight:  Wt Readings from Last 3 Encounters:   07/08/24 97.4 kg (214 lb 11.7 oz)   06/07/24 94.3 kg (208 lb)   05/20/24 98.4 kg (217 lb)       Medications:  Scheduled Meds:   epoetin marcell-epbx  4,900 Units Subcutaneous Every Mon, Wed, Fri    levothyroxine  150 mcg Oral Before breakfast    mupirocin  1 g Nasal BID    QUEtiapine  100 mg Oral Nightly    quetiapine  25 mg Oral Daily    venlafaxine  150 mg Oral Daily     Continuous Infusions:      PRN Meds:.  Current Facility-Administered Medications:     0.9% NaCl, , Intravenous, PRN    albuterol sulfate, 2.5 mg, Nebulization, Q6H PRN    dextrose 10%, 12.5 g, Intravenous, PRN    dextrose 10%, 25 g, Intravenous, PRN    glucagon (human recombinant), 1 mg, Intramuscular, PRN    glucose, 16 g, Oral, PRN    glucose, 24 g, Oral, PRN    heparin (porcine), 5,000 Units, Intravenous, PRN    hydrALAZINE, 10 mg, Intravenous, Q6H PRN    HYDROcodone-acetaminophen, 1 tablet, Oral, Q4H PRN    HYDROmorphone, 0.5 mg, Intravenous, Q6H PRN    naloxone, 0.02 mg, Intravenous, PRN    ondansetron, 4 mg, Intravenous, Q6H PRN    sodium chloride 0.9%, 250 mL, Intravenous, PRN    sodium chloride 0.9%, 10 mL, Intravenous, Q12H PRN  No current facility-administered medications on file prior to encounter.     Current Outpatient Medications on File Prior to  Encounter   Medication Sig Dispense Refill    albuterol (PROVENTIL/VENTOLIN HFA) 90 mcg/actuation inhaler Inhale 2 puffs into the lungs every 6 (six) hours as needed. Rescue 18 g 6    aspirin 81 MG Chew Take 1 tablet (81 mg total) by mouth once daily.  0    atorvastatin (LIPITOR) 40 MG tablet Take 1 tablet (40 mg total) by mouth once daily. 90 tablet 0    azelastine (ASTELIN) 137 mcg (0.1 %) nasal spray 1 spray (137 mcg total) by Nasal route 2 (two) times daily. 30 mL 2    budesonide-glycopyr-formoterol (BREZTRI AEROSPHERE) 160-9-4.8 mcg/actuation HFAA Inhale 2 puffs into the lungs 2 (two) times daily. 10.7 g 6    cetirizine (ZYRTEC) 10 MG tablet Take 1 tablet (10 mg total) by mouth once daily. 90 tablet 3    clopidogreL (PLAVIX) 75 mg tablet Take 1 tablet (75 mg total) by mouth once daily. 90 tablet 3    ferric citrate (AURYXIA) 210 mg iron Tab Take 420 mg by mouth 3 (three) times daily. 540 tablet 3    levothyroxine (SYNTHROID) 150 MCG tablet Take 1 tablet (150 mcg total) by mouth before breakfast. 90 tablet 3    melatonin 10 mg Cap Take 10 mg by mouth nightly as needed.      midodrine (PROAMATINE) 10 MG tablet Take 1 tablet (10 mg total) by mouth daily as needed (hypotension). 180 tablet 0    montelukast (SINGULAIR) 10 mg tablet Take 1 tablet (10 mg total) by mouth every evening. 30 tablet 6    nitroGLYCERIN (NITROSTAT) 0.4 MG SL tablet Place 1 tablet (0.4 mg total) under the tongue every 5 (five) minutes as needed for Chest pain. 10 tablet 5    ondansetron (ZOFRAN-ODT) 4 MG TbDL Take 1 tablet (4 mg total) by mouth 1 (one) time if needed (nausea). 1 tablet 0    pantoprazole (PROTONIX) 40 MG tablet Take 1 tablet (40 mg total) by mouth 2 (two) times daily. 180 tablet 1    pregabalin (LYRICA) 50 MG capsule Take 50 mg by mouth once daily.      QUEtiapine (SEROQUEL) 100 MG Tab Take 1 tablet (100 mg total) by mouth once daily. (Patient taking differently: Take 100 mg by mouth nightly.) 30 tablet 5    senna-docusate  8.6-50 mg (PERICOLACE) 8.6-50 mg per tablet Take 1 tablet by mouth daily as needed for Constipation.      sevelamer carbonate (RENVELA) 800 mg Tab Take 5 tablets (4,000 mg total) by mouth 3 (three) times daily with meals. 1350 tablet 3    torsemide (DEMADEX) 100 MG Tab Take 1 tablet (100 mg total) by mouth 2 (two) times a day. 90 tablet 0    venlafaxine (EFFEXOR-XR) 150 MG Cp24 Take 1 capsule (150 mg total) by mouth once daily. 90 capsule 3     Review of Systems:  neg    Physical Exam:  Constitutional: nad, aao x 3  Heart: rrr, no m/r/g, wwp, no edema  Lungs: ctab, no w/r/r/c, no lb  Abdomen: s/nt/nd, +BS      Results:  Lab Results   Component Value Date     (L) 07/12/2024    K 4.7 07/12/2024    CL 98 07/12/2024    CO2 19 (L) 07/12/2024    BUN 24 (H) 07/12/2024    CREATININE 5.2 (H) 07/12/2024    CALCIUM 9.5 07/12/2024    ANIONGAP 18 (H) 07/12/2024       Lab Results   Component Value Date    CALCIUM 9.5 07/12/2024    PHOS 6.8 (H) 07/12/2024       Recent Labs   Lab 07/12/24  0422   WBC 8.25   RBC 2.89*   HGB 10.4*   HCT 31.9*      *   MCH 36.0*   MCHC 32.6       I have personally reviewed pertinent radiological imaging and reports from this admission.    I have spent > 35 minutes providing care for this patient for the above diagnoses. These services have included chart/data/imaging review, evaluation, exam, formulation of plan, , note preparation, and discussions with staff involved in this patient's care.    Bayron Dumont MD MPH  [facility]  [address] [place] [id number]  [phone] (p)  [phone] (f)

## 2024-07-12 NOTE — ASSESSMENT & PLAN NOTE
S/p ex lap with lysis of adhesion  -NG clamped  -pain and nausea control with PRN medications: switching off morphine today per patient request  -Monitor and await bowel function post-op  -Resume ASA/plavix AND start DVT ppx with SQ heparin if/when okay with general surgery.  Discussed with their team this morning, will follow up for timing.

## 2024-07-12 NOTE — PROGRESS NOTES
07/12/24 1540   Required for all Hemodialysis Patients   Hepatitis Status negative   Handoff Report   Received From AP Hernandez   Given To MICAH Raymundo   Treatment Type   Treatment Type Maintenance   Vital Signs   Temp 98.5 °F (36.9 °C)   Temp Source Oral   Pulse 91   Resp 18   BP (!) 155/82   Assessments (Pre/Post)   Consent Obtained yes   Safety vein preservation armband present   Date Hepatitis Profile Obtained 02/27/24   Blood Liters Processed (BLP) 69.5   Transport Modality not applicable   Level of Consciousness (AVPU) alert   Dialyzer Clearance moderately streaked        Hemodialysis AV Fistula Left upper arm   No placement date or time found.   Present Prior to Hospital Arrival?: Yes  Location: Left upper arm   Needle Size 15ga   Site Assessment Clean;Dry;Intact   Patency Present;Thrill;Bruit   Status Deaccessed   Flows Good   Dressing Intervention Integrity maintained   Dressing Status Clean;Dry;Intact   Site Condition No complications   Dressing Gauze   Post-Hemodialysis Assessment   Rinseback Volume (mL) 250 mL   Blood Volume Processed (Liters) 69.5 L   Dialyzer Clearance Moderately streaked   Duration of Treatment 180 minutes   Additional Fluid Intake (mL) 700 mL   Total UF (mL) 1500 mL   Net Fluid Removal 800   Patient Response to Treatment pt jacobo tx   Post-Treatment Weight 96.5 kg (212 lb 11.9 oz)   Treatment Weight Change -0.9   Arterial bleeding stop time (min) 6 min   Venous bleeding stop time (min) 6 min   Post-Hemodialysis Comments pt jacobo tx wtih net uf of 800 cc.     Pt jacobo tx with net uf of 800 cc noted. Consent and hep b status verified. Pt educated on access care. Report given to primary nurse.

## 2024-07-12 NOTE — PROGRESS NOTES
Notified by nursing of peaked T waves on EKG.  BMP ordered and revealing for significant acidosis with hyperkalemia. Discuss with Nephrology on-call, Dr. Dumont. She plans for dialysis tonight  nursing notified to call out dialysis.

## 2024-07-12 NOTE — PLAN OF CARE
POC/Meds reviewed, pt verbalized understanding. Vitals stable. Afebrile. Remains on room air. IVPB abx administered. Tele In place-NSR. Up with x1 assist to bathroom. IS at bedside, instructed on use and return demonstration performed. No complaints of pain. Repositions self. Hourly/Q2hr rounding performed, safety maintained. Bed in lowest position, wheels locked, SR up x2, call light in easy reach. No complaints at this time. Will continue to monitor.

## 2024-07-12 NOTE — ASSESSMENT & PLAN NOTE
Creatine stable for now. BMP reviewed- noted Estimated Creatinine Clearance: 14.6 mL/min (A) (based on SCr of 5.2 mg/dL (H)). according to latest data. Based on current GFR, CKD stage is end stage.  Monitor UOP and serial BMP and adjust therapy as needed. Renally dose meds. Avoid nephrotoxic medications and procedures.  Nephrology consulted  HD MWF  -required a session for clearance 7/12 PM due to hyperkalemia

## 2024-07-12 NOTE — PLAN OF CARE
Case Management continuing to follow and will assist with discharge planning as needed.      07/12/24 1048   Discharge Reassessment   Assessment Type Discharge Planning Reassessment   Did the patient's condition or plan change since previous assessment? No   Discharge Plan discussed with: Patient

## 2024-07-12 NOTE — PLAN OF CARE
Problem: Hemodialysis  Goal: Safe, Effective Therapy Delivery  Outcome: Progressing     Problem: Adult Inpatient Plan of Care  Goal: Plan of Care Review  Outcome: Progressing     Problem: Pneumonia  Goal: Fluid Balance  Outcome: Progressing     Problem: Wound  Goal: Optimal Coping  Outcome: Progressing  Goal: Optimal Functional Ability  Outcome: Progressing     Problem: Fall Injury Risk  Goal: Absence of Fall and Fall-Related Injury  Outcome: Progressing

## 2024-07-12 NOTE — RESPIRATORY THERAPY
07/12/24 0805   Patient Assessment/Suction   Level of Consciousness (AVPU) alert   Respiratory Effort Normal;Unlabored   Expansion/Accessory Muscles/Retractions expansion symmetric;no retractions;no use of accessory muscles   Rhythm/Pattern, Respiratory depth regular;pattern regular;unlabored;no shortness of breath reported   Cough Frequency infrequent   Cough Type good;productive   PRE-TX-O2   Device (Oxygen Therapy) room air   SpO2 97 %   Pulse Oximetry Type Intermittent   $ Pulse Oximetry - Multiple Charge Pulse Oximetry - Multiple   Pulse 82   Resp 18   Positioning Sitting in chair   Incentive Spirometer   $ Incentive Spirometer Charges refused;postop instruction   Administration (IS) refused  (pt stated she will do when NG tube is removed)

## 2024-07-12 NOTE — ASSESSMENT & PLAN NOTE
This patient had hyperkalemia which is improved today. The likely etiology of the hyperkalemia is ESRD.  -Continue HD per nephrology.  The patients latest potassium has been reviewed and the results are listed below  Recent Labs   Lab 07/12/24  0422   K 4.7

## 2024-07-12 NOTE — ASSESSMENT & PLAN NOTE
Patient with known CAD s/p stent placement, which is controlled. Monitor for S/Sx of angina/ACS. Continue to monitor on telemetry.   Plavix, statin, and aspirin on hold with plan to resume once okay with Dr. Benites.

## 2024-07-12 NOTE — NURSING
Consent and hep b status verified, removed 0uf net, per orders, no issues with access, post thrill and bruit noted, patient stable throughout treatment, educated patient on infection control. Report given to Taylor, floor nurse     07/12/24 0105   Handoff Report   Received From [facility]   Given To Taylor   Vital Signs   Temp 98.1 °F (36.7 °C)   Temp Source Oral   Pulse 76   Heart Rate Source Monitor   Resp 16   SpO2 99 %   Pulse Oximetry Type Intermittent   Flow (L/min) (Oxygen Therapy) 2   Device (Oxygen Therapy) nasal cannula   BP (!) 161/85   BP Location Right arm   BP Method Automatic   Patient Position Lying   Assessments (Pre/Post)   Consent Obtained yes   Safety vein preservation armband present   Date Hepatitis Profile Obtained 02/27/24   Blood Liters Processed (BLP) 72   Transport Modality bed   Level of Consciousness (AVPU) alert   Dialyzer Clearance mildly streaked   Pain   Preferred Pain Scale number (Numeric Rating Pain Scale)   Comfort/Acceptable Pain Level 3   Pain Rating (0-10): Rest 0   Pain Rating (0-10): Activity 0   Pain Management Interventions quiet environment facilitated;position adjusted;pillow support provided   Pain/Comfort Interventions   Fever Reduction/Comfort Measures lightweight clothing;lightweight bedding   Pre-Hemodialysis Assessment   Additional Dialysis Information Needed Yes   Patient Status Departed   Treatment Consent Verified Yes   Treatment Status Completed        Hemodialysis AV Fistula Left upper arm   No placement date or time found.   Present Prior to Hospital Arrival?: Yes  Location: Left upper arm   Site Assessment Clean;Dry;Intact   Patency Present;Thrill;Bruit   Status Deaccessed   Dressing Intervention First dressing   Dressing Status Clean;Dry;Intact   Site Condition No complications   Dressing Gauze   Post-Hemodialysis Assessment   Rinseback Volume (mL) 250 mL   Blood Volume Processed (Liters) 72 L   Dialyzer Clearance Lightly streaked   Duration of Treatment 180  minutes   Additional Fluid Intake (mL) 500 mL   Total UF (mL) 500 mL   Net Fluid Removal 0   Patient Response to Treatment stable   Post-Treatment Weight 97.4 kg (214 lb 11.7 oz)   Treatment Weight Change 0   Arterial bleeding stop time (min) 8 min   Venous bleeding stop time (min) 5 min   Post-Hemodialysis Comments stable

## 2024-07-12 NOTE — SUBJECTIVE & OBJECTIVE
Interval History:  Pt seen and examined.  She underwent HD for clearance last night without issue.  BMP more stable this morning.  Reports pain currently 4/10 in severity.  States morphine gives her anxiety and chest pain-has tolerated Dilaudid in the past.  No nausea or vomiting.  Requesting her Effexor be resumed.      Review of Systems   Unable to perform ROS: Other   Constitutional:  Negative for chills and fever.   Respiratory:  Negative for cough and shortness of breath.    Cardiovascular:  Negative for chest pain.   Gastrointestinal:  Positive for abdominal pain. Negative for nausea and vomiting.   Psychiatric/Behavioral:  The patient is nervous/anxious.      Objective:     Vital Signs (Most Recent):  Temp: 98.3 °F (36.8 °C) (07/12/24 1105)  Pulse: 84 (07/12/24 1105)  Resp: 17 (07/12/24 1105)  BP: (!) 157/72 (07/12/24 1105)  SpO2: 95 % (07/12/24 1105) Vital Signs (24h Range):  Temp:  [97.8 °F (36.6 °C)-98.3 °F (36.8 °C)] 98.3 °F (36.8 °C)  Pulse:  [72-94] 84  Resp:  [10-18] 17  SpO2:  [88 %-100 %] 95 %  BP: (102-176)/(52-87) 157/72     Weight: 97.4 kg (214 lb 11.7 oz)  Body mass index is 32.65 kg/m².    Intake/Output Summary (Last 24 hours) at 7/12/2024 1106  Last data filed at 7/12/2024 0916  Gross per 24 hour   Intake 2200 ml   Output 580 ml   Net 1620 ml         Physical Exam  Vitals and nursing note reviewed.   Constitutional:       Appearance: Normal appearance.   HENT:      Head: Normocephalic and atraumatic.   Eyes:      Extraocular Movements: Extraocular movements intact.      Conjunctiva/sclera: Conjunctivae normal.      Pupils: Pupils are equal, round, and reactive to light.   Cardiovascular:      Rate and Rhythm: Normal rate and regular rhythm.   Pulmonary:      Effort: Pulmonary effort is normal.      Breath sounds: Normal breath sounds.   Abdominal:      General: Abdomen is flat. There is no distension.      Palpations: Abdomen is soft.      Tenderness: There is abdominal tenderness (expected  post-op tenderness; minimal on exam).      Comments: Lap sites and dressings CDI  NG clamped   Musculoskeletal:      Cervical back: Normal range of motion and neck supple.   Skin:     General: Skin is warm and dry.      Capillary Refill: Capillary refill takes less than 2 seconds.   Neurological:      General: No focal deficit present.      Mental Status: She is alert and oriented to person, place, and time. Mental status is at baseline.   Psychiatric:         Mood and Affect: Mood normal.         Behavior: Behavior normal.             Significant Labs: All pertinent labs within the past 24 hours have been reviewed.  CBC:   Recent Labs   Lab 07/11/24 0421 07/12/24 0422   WBC 6.53 8.25   HGB 10.8* 10.4*   HCT 33.2* 31.9*    191     CMP:   Recent Labs   Lab 07/11/24  0421 07/11/24  1734 07/12/24 0422   * 133* 135*   K 4.7 6.0* 4.7   CL 97 97 98   CO2 19* 14* 19*   GLU 93 146* 96   BUN 37* 47* 24*   CREATININE 7.0* 7.9* 5.2*   CALCIUM 9.5 9.1 9.5   PROT 7.6  --  7.4   ALBUMIN 3.5  --  3.4*   BILITOT 0.9  --  0.9   ALKPHOS 217*  --  214*   AST 20  --  40   ALT 17  --  41   ANIONGAP 17* 22* 18*       Significant Imaging: I have reviewed all pertinent imaging results/findings within the past 24 hours.

## 2024-07-12 NOTE — PROGRESS NOTES
[facility] Medicine  Progress Note    Patient Name: Eugenia Manuel  MRN: [id number]  Patient Class: IP- Inpatient   Admission Date: 7/8/2024  Length of Stay: 4 days  Attending Physician: Aranza Espinoza MD  Primary Care Provider: John Castro MD        Subjective:     Principal Problem:Small bowel obstruction        HPI:  58 year old female with past medical history of CAD w/ 2 stents, HTN, ESRD on HD MWF, bipolar disorder, hypothyroidism, asthma, MI, SBO, restless leg syndrome who presented to the ER with complaints nausea, vomiting, and abdominal distention x 2 days.  She reports last bowel movement was on Saturday and is not passing gas.  ED work up reveals CT of abdomen/pelvis with concern for SBO.  CBC with anemia which appears around baseline.  CMP consistent with ESRD with potassium 6.1.  She was given calcium gluconate, albuterol, insulin, and dextrose.  Repeat potassium pending.  Nephrology was notified and HD to be done today.  General surgery also notified and ordered NG Tube to be placed.  On chart review, patient was admitted in April 2024 for SBO.  She was given a gastrograffin challenge and GI function returned.  Patient will be admitted to hospital medicine for further evaluation and treatment.    Overview/Hospital Course:  58 year old female admitted with complaints of nausea, vomiting, and abdominal distention.  CT of abdomen was done with concern for SBO.  CMP consistent with ESRD with hyperkalemia.  Nephrology and general surgery were consulted.  She is on HD MWF, she had HD yesterday.  She was given a gastrograffin challenge on 7/9 with persistent dilation.  She was taken for lysis of adhesions on 7/11 with Dr. Benites.    Interval History:  Pt seen and examined.  She underwent HD for clearance last night without issue.  BMP more stable this morning.  Reports pain currently 4/10 in severity.  States morphine gives her anxiety and chest pain-has tolerated  Dilaudid in the past.  No nausea or vomiting.  Requesting her Effexor be resumed.      Review of Systems   Unable to perform ROS: Other   Constitutional:  Negative for chills and fever.   Respiratory:  Negative for cough and shortness of breath.    Cardiovascular:  Negative for chest pain.   Gastrointestinal:  Positive for abdominal pain. Negative for nausea and vomiting.   Psychiatric/Behavioral:  The patient is nervous/anxious.      Objective:     Vital Signs (Most Recent):  Temp: 98.3 °F (36.8 °C) (07/12/24 1105)  Pulse: 84 (07/12/24 1105)  Resp: 17 (07/12/24 1105)  BP: (!) 157/72 (07/12/24 1105)  SpO2: 95 % (07/12/24 1105) Vital Signs (24h Range):  Temp:  [97.8 °F (36.6 °C)-98.3 °F (36.8 °C)] 98.3 °F (36.8 °C)  Pulse:  [72-94] 84  Resp:  [10-18] 17  SpO2:  [88 %-100 %] 95 %  BP: (102-176)/(52-87) 157/72     Weight: 97.4 kg (214 lb 11.7 oz)  Body mass index is 32.65 kg/m².    Intake/Output Summary (Last 24 hours) at 7/12/2024 1106  Last data filed at 7/12/2024 0916  Gross per 24 hour   Intake 2200 ml   Output 580 ml   Net 1620 ml         Physical Exam  Vitals and nursing note reviewed.   Constitutional:       Appearance: Normal appearance.   HENT:      Head: Normocephalic and atraumatic.   Eyes:      Extraocular Movements: Extraocular movements intact.      Conjunctiva/sclera: Conjunctivae normal.      Pupils: Pupils are equal, round, and reactive to light.   Cardiovascular:      Rate and Rhythm: Normal rate and regular rhythm.   Pulmonary:      Effort: Pulmonary effort is normal.      Breath sounds: Normal breath sounds.   Abdominal:      General: Abdomen is flat. There is no distension.      Palpations: Abdomen is soft.      Tenderness: There is abdominal tenderness (expected post-op tenderness; minimal on exam).      Comments: Lap sites and dressings CDI  NG clamped   Musculoskeletal:      Cervical back: Normal range of motion and neck supple.   Skin:     General: Skin is warm and dry.      Capillary Refill:  Capillary refill takes less than 2 seconds.   Neurological:      General: No focal deficit present.      Mental Status: She is alert and oriented to person, place, and time. Mental status is at baseline.   Psychiatric:         Mood and Affect: Mood normal.         Behavior: Behavior normal.             Significant Labs: All pertinent labs within the past 24 hours have been reviewed.  CBC:   Recent Labs   Lab 07/11/24 0421 07/12/24 0422   WBC 6.53 8.25   HGB 10.8* 10.4*   HCT 33.2* 31.9*    191     CMP:   Recent Labs   Lab 07/11/24 0421 07/11/24  1734 07/12/24 0422   * 133* 135*   K 4.7 6.0* 4.7   CL 97 97 98   CO2 19* 14* 19*   GLU 93 146* 96   BUN 37* 47* 24*   CREATININE 7.0* 7.9* 5.2*   CALCIUM 9.5 9.1 9.5   PROT 7.6  --  7.4   ALBUMIN 3.5  --  3.4*   BILITOT 0.9  --  0.9   ALKPHOS 217*  --  214*   AST 20  --  40   ALT 17  --  41   ANIONGAP 17* 22* 18*       Significant Imaging: I have reviewed all pertinent imaging results/findings within the past 24 hours.    Assessment/Plan:      * Small bowel obstruction  S/p ex lap with lysis of adhesion  -NG clamped  -pain and nausea control with PRN medications: switching off morphine today per patient request  -Monitor and await bowel function post-op  -Resume ASA/plavix AND start DVT ppx with SQ heparin if/when okay with general surgery.  Discussed with their team this morning, will follow up for timing.    Hyperkalemia  This patient had hyperkalemia which is improved today. The likely etiology of the hyperkalemia is ESRD.  -Continue HD per nephrology.  The patients latest potassium has been reviewed and the results are listed below  Recent Labs   Lab 07/12/24 0422   K 4.7               ESRD (end stage renal disease)  Creatine stable for now. BMP reviewed- noted Estimated Creatinine Clearance: 14.6 mL/min (A) (based on SCr of 5.2 mg/dL (H)). according to latest data. Based on current GFR, CKD stage is end stage.  Monitor UOP and serial BMP and adjust  therapy as needed. Renally dose meds. Avoid nephrotoxic medications and procedures.  Nephrology consulted  HD MWF  -required a session for clearance 7/12 PM due to hyperkalemia    Hypothyroidism  -maintained on synthroid       Coronary artery disease involving native coronary artery of native heart with unstable angina pectoris  Patient with known CAD s/p stent placement, which is controlled. Monitor for S/Sx of angina/ACS. Continue to monitor on telemetry.   Plavix, statin, and aspirin on hold with plan to resume once okay with Dr. Benites.    Hypertension  Chronic, controlled. Latest blood pressure and vitals reviewed-     Temp:  [97.8 °F (36.6 °C)-98.3 °F (36.8 °C)]   Pulse:  [72-94]   Resp:  [10-18]   BP: (102-176)/(52-87)   SpO2:  [88 %-100 %] .   Home meds for hypertension were reviewed and noted below.   Hypertension Medications               nitroGLYCERIN (NITROSTAT) 0.4 MG SL tablet Place 1 tablet (0.4 mg total) under the tongue every 5 (five) minutes as needed for Chest pain.    torsemide (DEMADEX) 100 MG Tab Take 1 tablet (100 mg total) by mouth 2 (two) times a day.            While in the hospital, will manage blood pressure as follows; hold PO meds due to SBO and general surgery consulted    Will utilize p.r.n. blood pressure medication only if patient's SBP >180 and she develops symptoms such as worsening chest pain or shortness of breath.      VTE Risk Mitigation (From admission, onward)           Ordered     heparin (porcine) injection 5,000 Units  As needed (PRN)         07/08/24 1328     IP VTE HIGH RISK PATIENT  Once         07/08/24 0927     Place sequential compression device  Until discontinued         07/08/24 0927     Reason for No Pharmacological VTE Prophylaxis  Once        Comments: Awaiting surgery consult   Question:  Reasons:  Answer:  Physician Provided (leave comment)    07/08/24 0927                    Discharge Planning   VERO: 7/15/2024     Code Status: Full Code   Is the patient  medically ready for discharge?:     Reason for patient still in hospital (select all that apply): Patient trending condition, Treatment, and Consult recommendations  Discharge Plan A: Home with family                  Eunice Quinones NP  Department of Hospital Medicine   [facility]

## 2024-07-12 NOTE — ASSESSMENT & PLAN NOTE
Chronic, controlled. Latest blood pressure and vitals reviewed-     Temp:  [97.8 °F (36.6 °C)-98.3 °F (36.8 °C)]   Pulse:  [72-94]   Resp:  [10-18]   BP: (102-176)/(52-87)   SpO2:  [88 %-100 %] .   Home meds for hypertension were reviewed and noted below.   Hypertension Medications               nitroGLYCERIN (NITROSTAT) 0.4 MG SL tablet Place 1 tablet (0.4 mg total) under the tongue every 5 (five) minutes as needed for Chest pain.    torsemide (DEMADEX) 100 MG Tab Take 1 tablet (100 mg total) by mouth 2 (two) times a day.            While in the hospital, will manage blood pressure as follows; hold PO meds due to SBO and general surgery consulted    Will utilize p.r.n. blood pressure medication only if patient's SBP >180 and she develops symptoms such as worsening chest pain or shortness of breath.

## 2024-07-12 NOTE — NURSING
Notified MD that pt had a small BM and has started passing gas. She is also tolerating clears. Ordered to removed NG Tube. Pt tolerated well.

## 2024-07-12 NOTE — PLAN OF CARE
Plan of care reviewed with patient. Safety maintained with bed in lowest position, wheels locked, side rails up x2 and call button in reach. Patient instructed to call for any assistance.     Problem: Hemodialysis  Goal: Safe, Effective Therapy Delivery  Outcome: Progressing  Goal: Effective Tissue Perfusion  Outcome: Progressing  Goal: Absence of Infection Signs and Symptoms  Outcome: Progressing     Problem: Adult Inpatient Plan of Care  Goal: Plan of Care Review  Outcome: Progressing  Goal: Patient-Specific Goal (Individualized)  Outcome: Progressing  Goal: Absence of Hospital-Acquired Illness or Injury  Outcome: Progressing  Goal: Optimal Comfort and Wellbeing  Outcome: Progressing  Goal: Readiness for Transition of Care  Outcome: Progressing     Problem: Pneumonia  Goal: Fluid Balance  Outcome: Progressing  Goal: Resolution of Infection Signs and Symptoms  Outcome: Progressing  Goal: Effective Oxygenation and Ventilation  Outcome: Progressing     Problem: Wound  Goal: Optimal Coping  Outcome: Progressing  Goal: Optimal Functional Ability  Outcome: Progressing  Goal: Absence of Infection Signs and Symptoms  Outcome: Progressing  Goal: Improved Oral Intake  Outcome: Progressing  Goal: Optimal Pain Control and Function  Outcome: Progressing  Goal: Skin Health and Integrity  Outcome: Progressing  Goal: Optimal Wound Healing  Outcome: Progressing

## 2024-07-13 LAB
BASOPHILS # BLD AUTO: 0.03 K/UL (ref 0–0.2)
BASOPHILS NFR BLD: 0.5 % (ref 0–1.9)
DIFFERENTIAL METHOD BLD: ABNORMAL
EOSINOPHIL # BLD AUTO: 0.2 K/UL (ref 0–0.5)
EOSINOPHIL NFR BLD: 2.3 % (ref 0–8)
ERYTHROCYTE [DISTWIDTH] IN BLOOD BY AUTOMATED COUNT: 16.9 % (ref 11.5–14.5)
HCT VFR BLD AUTO: 30.5 % (ref 37–48.5)
HGB BLD-MCNC: 9.8 G/DL (ref 12–16)
IMM GRANULOCYTES # BLD AUTO: 0.02 K/UL (ref 0–0.04)
IMM GRANULOCYTES NFR BLD AUTO: 0.3 % (ref 0–0.5)
LYMPHOCYTES # BLD AUTO: 1.2 K/UL (ref 1–4.8)
LYMPHOCYTES NFR BLD: 18.9 % (ref 18–48)
MAGNESIUM SERPL-MCNC: 2.1 MG/DL (ref 1.6–2.6)
MCH RBC QN AUTO: 35.4 PG (ref 27–31)
MCHC RBC AUTO-ENTMCNC: 32.1 G/DL (ref 32–36)
MCV RBC AUTO: 110 FL (ref 82–98)
MONOCYTES # BLD AUTO: 0.7 K/UL (ref 0.3–1)
MONOCYTES NFR BLD: 10.8 % (ref 4–15)
NEUTROPHILS # BLD AUTO: 4.4 K/UL (ref 1.8–7.7)
NEUTROPHILS NFR BLD: 67.2 % (ref 38–73)
NRBC BLD-RTO: 0 /100 WBC
PHOSPHATE SERPL-MCNC: 5 MG/DL (ref 2.7–4.5)
PLATELET # BLD AUTO: 178 K/UL (ref 150–450)
PMV BLD AUTO: 11 FL (ref 9.2–12.9)
RBC # BLD AUTO: 2.77 M/UL (ref 4–5.4)
WBC # BLD AUTO: 6.47 K/UL (ref 3.9–12.7)

## 2024-07-13 PROCEDURE — 97535 SELF CARE MNGMENT TRAINING: CPT

## 2024-07-13 PROCEDURE — 94761 N-INVAS EAR/PLS OXIMETRY MLT: CPT

## 2024-07-13 PROCEDURE — 97116 GAIT TRAINING THERAPY: CPT

## 2024-07-13 PROCEDURE — 63600175 PHARM REV CODE 636 W HCPCS: Performed by: NURSE PRACTITIONER

## 2024-07-13 PROCEDURE — 36415 COLL VENOUS BLD VENIPUNCTURE: CPT | Performed by: SURGERY

## 2024-07-13 PROCEDURE — 83735 ASSAY OF MAGNESIUM: CPT | Performed by: SURGERY

## 2024-07-13 PROCEDURE — 25000003 PHARM REV CODE 250: Performed by: SURGERY

## 2024-07-13 PROCEDURE — 11000001 HC ACUTE MED/SURG PRIVATE ROOM

## 2024-07-13 PROCEDURE — 97165 OT EVAL LOW COMPLEX 30 MIN: CPT

## 2024-07-13 PROCEDURE — 84100 ASSAY OF PHOSPHORUS: CPT | Performed by: SURGERY

## 2024-07-13 PROCEDURE — 99900035 HC TECH TIME PER 15 MIN (STAT)

## 2024-07-13 PROCEDURE — 97161 PT EVAL LOW COMPLEX 20 MIN: CPT

## 2024-07-13 PROCEDURE — 25000003 PHARM REV CODE 250: Performed by: NURSE PRACTITIONER

## 2024-07-13 PROCEDURE — 85025 COMPLETE CBC W/AUTO DIFF WBC: CPT | Performed by: SURGERY

## 2024-07-13 PROCEDURE — 94799 UNLISTED PULMONARY SVC/PX: CPT

## 2024-07-13 RX ADMIN — HEPARIN SODIUM 5000 UNITS: 5000 INJECTION, SOLUTION INTRAVENOUS; SUBCUTANEOUS at 05:07

## 2024-07-13 RX ADMIN — QUETIAPINE 25 MG: 25 TABLET ORAL at 08:07

## 2024-07-13 RX ADMIN — LEVOTHYROXINE SODIUM 150 MCG: 150 TABLET ORAL at 05:07

## 2024-07-13 RX ADMIN — HYDROCODONE BITARTRATE AND ACETAMINOPHEN 1 TABLET: 5; 325 TABLET ORAL at 10:07

## 2024-07-13 RX ADMIN — HEPARIN SODIUM 5000 UNITS: 5000 INJECTION, SOLUTION INTRAVENOUS; SUBCUTANEOUS at 10:07

## 2024-07-13 RX ADMIN — HEPARIN SODIUM 5000 UNITS: 5000 INJECTION, SOLUTION INTRAVENOUS; SUBCUTANEOUS at 01:07

## 2024-07-13 RX ADMIN — VENLAFAXINE HYDROCHLORIDE 150 MG: 37.5 CAPSULE, EXTENDED RELEASE ORAL at 08:07

## 2024-07-13 RX ADMIN — MUPIROCIN 1 G: 20 OINTMENT TOPICAL at 10:07

## 2024-07-13 RX ADMIN — HYDROCODONE BITARTRATE AND ACETAMINOPHEN 1 TABLET: 5; 325 TABLET ORAL at 05:07

## 2024-07-13 RX ADMIN — CLOPIDOGREL BISULFATE 75 MG: 75 TABLET, FILM COATED ORAL at 08:07

## 2024-07-13 RX ADMIN — QUETIAPINE 100 MG: 100 TABLET ORAL at 10:07

## 2024-07-13 RX ADMIN — MUPIROCIN 1 G: 20 OINTMENT TOPICAL at 08:07

## 2024-07-13 NOTE — PROGRESS NOTES
[facility] Medicine  Progress Note    Patient Name: Eugenia Manuel  MRN: [id number]  Patient Class: IP- Inpatient   Admission Date: 7/8/2024  Length of Stay: 5 days  Attending Physician: Rey Thorne MD  Primary Care Provider: John Castro MD        Subjective:     Principal Problem:Small bowel obstruction        HPI:  58 year old female with past medical history of CAD w/ 2 stents, HTN, ESRD on HD MWF, bipolar disorder, hypothyroidism, asthma, MI, SBO, restless leg syndrome who presented to the ER with complaints nausea, vomiting, and abdominal distention x 2 days.  She reports last bowel movement was on Saturday and is not passing gas.  ED work up reveals CT of abdomen/pelvis with concern for SBO.  CBC with anemia which appears around baseline.  CMP consistent with ESRD with potassium 6.1.  She was given calcium gluconate, albuterol, insulin, and dextrose.  Repeat potassium pending.  Nephrology was notified and HD to be done today.  General surgery also notified and ordered NG Tube to be placed.  On chart review, patient was admitted in April 2024 for SBO.  She was given a gastrograffin challenge and GI function returned.  Patient will be admitted to hospital medicine for further evaluation and treatment.    Overview/Hospital Course:  58 year old female admitted with complaints of nausea, vomiting, and abdominal distention.  CT of abdomen was done with concern for SBO.  CMP consistent with ESRD with hyperkalemia.  Nephrology and general surgery were consulted.  She is on HD MWF, she had HD yesterday.  She was given a gastrograffin challenge on 7/9 with persistent dilation.  She was taken for lysis of adhesions on 7/11 with Dr. Benites.    Interval History:  Pt seen and examined.  Sitting up in chair. Mild abdominal post op pain 5/10.   Feeling better this am and tolerating clear liquids. + BM last pm and flatulence this am.    Review of Systems   Unable to perform ROS: Other    Constitutional:  Negative for chills and fever.   Respiratory:  Negative for cough and shortness of breath.    Cardiovascular:  Negative for chest pain.   Gastrointestinal:  Positive for abdominal pain. Negative for nausea and vomiting.   Psychiatric/Behavioral:  The patient is nervous/anxious.      Objective:     Vital Signs (Most Recent):  Temp: 98.8 °F (37.1 °C) (07/13/24 0730)  Pulse: 86 (07/13/24 0730)  Resp: 17 (07/13/24 0730)  BP: 124/62 (07/13/24 0730)  SpO2: 95 % (07/13/24 0730) Vital Signs (24h Range):  Temp:  [98 °F (36.7 °C)-98.8 °F (37.1 °C)] 98.8 °F (37.1 °C)  Pulse:  [71-96] 86  Resp:  [16-19] 17  SpO2:  [94 %-96 %] 95 %  BP: (101-159)/(50-94) 124/62     Weight: 97.4 kg (214 lb 11.7 oz)  Body mass index is 32.65 kg/m².    Intake/Output Summary (Last 24 hours) at 7/13/2024 0927  Last data filed at 7/12/2024 1700  Gross per 24 hour   Intake 940 ml   Output 1701 ml   Net -761 ml         Physical Exam  Vitals and nursing note reviewed.   Constitutional:       Appearance: Normal appearance.   HENT:      Head: Normocephalic and atraumatic.   Cardiovascular:      Rate and Rhythm: Normal rate and regular rhythm.   Pulmonary:      Effort: Pulmonary effort is normal.      Breath sounds: Normal breath sounds.   Abdominal:      General: Abdomen is flat. There is no distension.      Palpations: Abdomen is soft.      Tenderness: There is abdominal tenderness (expected post-op tenderness; minimal on exam).      Comments: Lap sites and dressings CDI  NG clamped   Skin:     General: Skin is warm and dry.   Neurological:      General: No focal deficit present.      Mental Status: She is alert and oriented to person, place, and time. Mental status is at baseline.   Psychiatric:         Mood and Affect: Mood normal.         Behavior: Behavior normal.             Significant Labs: All pertinent labs within the past 24 hours have been reviewed.  CBC:   Recent Labs   Lab 07/12/24  0422 07/13/24  0421   WBC 8.25 6.47   HGB  "10.4* 9.8*   HCT 31.9* 30.5*    178     CMP:   Recent Labs   Lab 07/11/24  1734 07/12/24  0422   * 135*   K 6.0* 4.7   CL 97 98   CO2 14* 19*   * 96   BUN 47* 24*   CREATININE 7.9* 5.2*   CALCIUM 9.1 9.5   PROT  --  7.4   ALBUMIN  --  3.4*   BILITOT  --  0.9   ALKPHOS  --  214*   AST  --  40   ALT  --  41   ANIONGAP 22* 18*       Significant Imaging: I have reviewed all pertinent imaging results/findings within the past 24 hours.    Assessment/Plan:      * Small bowel obstruction  S/p ex lap with lysis of adhesion  -NG clamped  -pain and nausea control with PRN medications: switching off morphine today per patient request  -Monitor and await bowel function post-op  -Resume ASA/plavix AND start DVT ppx with SQ heparin if/when okay with general surgery.  Discussed with their team this morning, will follow up for timing.  -advance diet to a renal diet.    Hyperkalemia  This patient had hyperkalemia which is improved today. The likely etiology of the hyperkalemia is ESRD.  -Continue HD per nephrology.  The patients latest potassium has been reviewed and the results are listed below  No results for input(s): "K" in the last 24 hours.  Reosolved with HD.           ESRD (end stage renal disease)  Creatine stable for now. BMP reviewed- noted Estimated Creatinine Clearance: 14.6 mL/min (A) (based on SCr of 5.2 mg/dL (H)). according to latest data. Based on current GFR, CKD stage is end stage.  Monitor UOP and serial BMP and adjust therapy as needed. Renally dose meds. Avoid nephrotoxic medications and procedures.  Nephrology consulted  HD MWF  -required a session for clearance 7/12 PM due to hyperkalemia    Hypothyroidism  -maintained on synthroid       Coronary artery disease involving native coronary artery of native heart with unstable angina pectoris  Patient with known CAD s/p stent placement, which is controlled. Monitor for S/Sx of angina/ACS. Continue to monitor on telemetry.   Plavix, statin, and " aspirin on hold with plan to resume once okay with Dr. Benites.    Hypertension  Chronic, controlled. Latest blood pressure and vitals reviewed-     Temp:  [97.8 °F (36.6 °C)-98.3 °F (36.8 °C)]   Pulse:  [72-94]   Resp:  [10-18]   BP: (102-176)/(52-87)   SpO2:  [88 %-100 %] .   Home meds for hypertension were reviewed and noted below.   Hypertension Medications               nitroGLYCERIN (NITROSTAT) 0.4 MG SL tablet Place 1 tablet (0.4 mg total) under the tongue every 5 (five) minutes as needed for Chest pain.    torsemide (DEMADEX) 100 MG Tab Take 1 tablet (100 mg total) by mouth 2 (two) times a day.            While in the hospital, will manage blood pressure as follows; hold PO meds due to SBO and general surgery consulted    Will utilize p.r.n. blood pressure medication only if patient's SBP >180 and she develops symptoms such as worsening chest pain or shortness of breath.      VTE Risk Mitigation (From admission, onward)           Ordered     heparin (porcine) injection 5,000 Units  Every 8 hours         07/12/24 1626     heparin (porcine) injection 5,000 Units  As needed (PRN)         07/08/24 1328     IP VTE HIGH RISK PATIENT  Once         07/08/24 0927     Place sequential compression device  Until discontinued         07/08/24 0927     Reason for No Pharmacological VTE Prophylaxis  Once        Comments: Awaiting surgery consult   Question:  Reasons:  Answer:  Physician Provided (leave comment)    07/08/24 0927                    Discharge Planning   VERO: 7/15/2024     Code Status: Full Code   Is the patient medically ready for discharge?:     Reason for patient still in hospital (select all that apply): Laboratory test, Treatment, and Consult recommendations  Discharge Plan A: Home with family                  Penny Low NP  Department of Hospital Medicine   [facility]/Surg

## 2024-07-13 NOTE — PLAN OF CARE
Goals to be met by: 8/10/24     Patient will increase functional independence with ADLs by performing:    UE Dressing with Supervision.  LE Dressing with Supervision.  Grooming while standing at sink with Supervision.  Toileting from toilet with Supervision for hygiene and clothing management.   Bathing from  shower chair/bench with Supervision.  Toilet transfer to toilet with Supervision.  Increased strength and functional activity tolerance for ADL's/IADL's

## 2024-07-13 NOTE — PLAN OF CARE
Problem: Physical Therapy  Goal: Physical Therapy Goal  Description:   Patient will increase functional independence with mobility by performin. Supine to sit with Stand-by Assistance  2. Sit to stand transfer with Stand-by Assistance  3. Bed to chair transfer with Stand-by Assistance using Rolling Walker  4. Gait  x 500 feet with Stand-by Assistance using Rolling Walker.   5. Lower extremity exercise program x30 reps per handout, with independence    Outcome: Progressing   PT for functional mob training and/ or ex; pt educated on safety precautions

## 2024-07-13 NOTE — PROGRESS NOTES
INPATIENT NEPHROLOGY Progress Note   [facility]    Patient Name: Eugenia Manuel  Date: 07/13/2024    Reason for consultation: ESRD    Chief Complaint:   Chief Complaint   Patient presents with    Abdominal Pain       History of Present Illness:  58 year old female with past medical history of CAD w/ 2 stents, HTN, ESRD on HD MWF, bipolar disorder, hypothyroidism, asthma, MI, SBO, restless leg syndrome who presented to the ER with complaints nausea, vomiting, and abdominal distention x 2 days. She reports last bowel movement was on Saturday and is not passing gas. ED work up reveals CT of abdomen/pelvis with concern for SBO. General surgery notified and ordered NG Tube to be placed. Consulted for dialysis.    Interval History:  7/8- K 6.1- got medical management- HD ordered- SBP 160s, on RA, NPO, couldn't pass first NGT in ER- floor nurse will try after HD- c/w abd tenderness- no flatus, BMs= seen on HD  7/9- HD yest with 3L UF- BP better- on RA- Contrast has traversed to the distal colon indicating absence of a complete bowel obstruction but appears will still need surg- doesn't have NGT- had 2 BMs today- labs shows hyperK today- will do another HD session (2 hours) for clearance, no UF  7/10- 2hr HD yest for clearance/no UF- routine HD today- ex lap planned for tomorrow; VSS, on RA  --seen on HD, feels clammy- SBP 110s, BG low 100s- turn off UF- give 250cc NS bolus   7/11- OR today- VSS, on RA  7/12- s/p diagnostic laparoscopy with conversion to hand assist laparoscopy with lysis of dense small bowel to small bowel adhesions and omental to anterior abdominal wall adhesions- labs post op with hyperK/acidosis and peaked TW on EKG- did HD for clearance last night- VSS, on RA- seen on HD- with NGT but clamped- do 1L UF at most- on CLD  7/13 VSS. No new complains. HD on Monday or PRN.    Plan of Care:    SBO, s/p lysis of adhesions 7/11/2024  ESRD on HD  MWF  HTN  Hyponatremia/Hyperkalemia/Acidosis  Secondary HPT  Anemia of CKD    Plan:    - s/p surg 7/11  - HD MWF  - adjusted dialysate- high flux dialyzer   - BP on lower end at times due to being NPO/pain meds- dry/euvolemic- hold torsemide- judicious UF  - will hold binder until she is on a normal diet  - continue SANJANA with HD    Thank you for allowing us to participate in this patient's care. We will continue to follow.    Vital Signs:  Temp Readings from Last 3 Encounters:   07/13/24 98.8 °F (37.1 °C) (Oral)   06/07/24 98.2 °F (36.8 °C) (Oral)   05/20/24 99 °F (37.2 °C)       Pulse Readings from Last 3 Encounters:   07/13/24 86   06/07/24 78   05/20/24 76       BP Readings from Last 3 Encounters:   07/13/24 124/62   06/07/24 128/62   05/20/24 (!) 157/103       Weight:  Wt Readings from Last 3 Encounters:   07/08/24 97.4 kg (214 lb 11.7 oz)   06/07/24 94.3 kg (208 lb)   05/20/24 98.4 kg (217 lb)       Medications:  Scheduled Meds:   clopidogreL  75 mg Oral Daily    epoetin marcell-epbx  4,900 Units Subcutaneous Every Mon, Wed, Fri    heparin (porcine)  5,000 Units Subcutaneous Q8H    levothyroxine  150 mcg Oral Before breakfast    mupirocin  1 g Nasal BID    QUEtiapine  100 mg Oral Nightly    quetiapine  25 mg Oral Daily    venlafaxine  150 mg Oral Daily     Continuous Infusions:      PRN Meds:.  Current Facility-Administered Medications:     0.9% NaCl, , Intravenous, PRN    albuterol sulfate, 2.5 mg, Nebulization, Q6H PRN    dextrose 10%, 12.5 g, Intravenous, PRN    dextrose 10%, 25 g, Intravenous, PRN    glucagon (human recombinant), 1 mg, Intramuscular, PRN    glucose, 16 g, Oral, PRN    glucose, 24 g, Oral, PRN    heparin (porcine), 5,000 Units, Intravenous, PRN    hydrALAZINE, 10 mg, Intravenous, Q6H PRN    HYDROcodone-acetaminophen, 1 tablet, Oral, Q4H PRN    HYDROmorphone, 0.5 mg, Intravenous, Q6H PRN    naloxone, 0.02 mg, Intravenous, PRN    ondansetron, 4 mg, Intravenous, Q6H PRN    sodium chloride 0.9%, 250  mL, Intravenous, PRN    sodium chloride 0.9%, 10 mL, Intravenous, Q12H PRN  No current facility-administered medications on file prior to encounter.     Current Outpatient Medications on File Prior to Encounter   Medication Sig Dispense Refill    albuterol (PROVENTIL/VENTOLIN HFA) 90 mcg/actuation inhaler Inhale 2 puffs into the lungs every 6 (six) hours as needed. Rescue 18 g 6    aspirin 81 MG Chew Take 1 tablet (81 mg total) by mouth once daily.  0    atorvastatin (LIPITOR) 40 MG tablet Take 1 tablet (40 mg total) by mouth once daily. 90 tablet 0    azelastine (ASTELIN) 137 mcg (0.1 %) nasal spray 1 spray (137 mcg total) by Nasal route 2 (two) times daily. 30 mL 2    budesonide-glycopyr-formoterol (BREZTRI AEROSPHERE) 160-9-4.8 mcg/actuation HFAA Inhale 2 puffs into the lungs 2 (two) times daily. 10.7 g 6    cetirizine (ZYRTEC) 10 MG tablet Take 1 tablet (10 mg total) by mouth once daily. 90 tablet 3    clopidogreL (PLAVIX) 75 mg tablet Take 1 tablet (75 mg total) by mouth once daily. 90 tablet 3    ferric citrate (AURYXIA) 210 mg iron Tab Take 420 mg by mouth 3 (three) times daily. 540 tablet 3    levothyroxine (SYNTHROID) 150 MCG tablet Take 1 tablet (150 mcg total) by mouth before breakfast. 90 tablet 3    melatonin 10 mg Cap Take 10 mg by mouth nightly as needed.      midodrine (PROAMATINE) 10 MG tablet Take 1 tablet (10 mg total) by mouth daily as needed (hypotension). 180 tablet 0    montelukast (SINGULAIR) 10 mg tablet Take 1 tablet (10 mg total) by mouth every evening. 30 tablet 6    nitroGLYCERIN (NITROSTAT) 0.4 MG SL tablet Place 1 tablet (0.4 mg total) under the tongue every 5 (five) minutes as needed for Chest pain. 10 tablet 5    ondansetron (ZOFRAN-ODT) 4 MG TbDL Take 1 tablet (4 mg total) by mouth 1 (one) time if needed (nausea). 1 tablet 0    pantoprazole (PROTONIX) 40 MG tablet Take 1 tablet (40 mg total) by mouth 2 (two) times daily. 180 tablet 1    pregabalin (LYRICA) 50 MG capsule Take 50 mg  by mouth once daily.      QUEtiapine (SEROQUEL) 100 MG Tab Take 1 tablet (100 mg total) by mouth once daily. (Patient taking differently: Take 100 mg by mouth nightly.) 30 tablet 5    senna-docusate 8.6-50 mg (PERICOLACE) 8.6-50 mg per tablet Take 1 tablet by mouth daily as needed for Constipation.      sevelamer carbonate (RENVELA) 800 mg Tab Take 5 tablets (4,000 mg total) by mouth 3 (three) times daily with meals. 1350 tablet 3    torsemide (DEMADEX) 100 MG Tab Take 1 tablet (100 mg total) by mouth 2 (two) times a day. 90 tablet 0    venlafaxine (EFFEXOR-XR) 150 MG Cp24 Take 1 capsule (150 mg total) by mouth once daily. 90 capsule 3     Review of Systems:  neg    Physical Exam:  Constitutional: nad, aao x 3  Heart: rrr, no m/r/g, wwp, no edema  Lungs: ctab, no w/r/r/c, no lb  Abdomen: s/nt/nd, +BS      Results:  Lab Results   Component Value Date     (L) 07/12/2024    K 4.7 07/12/2024    CL 98 07/12/2024    CO2 19 (L) 07/12/2024    BUN 24 (H) 07/12/2024    CREATININE 5.2 (H) 07/12/2024    CALCIUM 9.5 07/12/2024    ANIONGAP 18 (H) 07/12/2024       Lab Results   Component Value Date    CALCIUM 9.5 07/12/2024    PHOS 5.0 (H) 07/13/2024       Recent Labs   Lab 07/13/24  0421   WBC 6.47   RBC 2.77*   HGB 9.8*   HCT 30.5*      *   MCH 35.4*   MCHC 32.1       I have spent >  minutes providing care for this patient for the above diagnoses. These services have included chart/data/imaging review, evaluation, exam, formulation of plan, , note preparation, and discussions with staff involved in this patient's care.    David Capone MD  [facility] [address] [place] [address]  [phone] (p)  [phone] (f)

## 2024-07-13 NOTE — PLAN OF CARE
In basket sent to surgery clinic for scheduling  Pt was referred to [facility] NP at home per [facility] smart scheduling rec

## 2024-07-13 NOTE — PT/OT/SLP EVAL
Physical Therapy Evaluation    Patient Name:  Eugenia Manuel   MRN:  [id number]    Recommendations:     Discharge Recommendations: Low Intensity Therapy   Discharge Equipment Recommendations: none   Barriers to discharge: None    Assessment:     Eugenia Manuel is a 58 y.o. female admitted with a medical diagnosis of Small bowel obstruction.  She presents with the following impairments/functional limitations: weakness, impaired endurance, impaired self care skills, impaired functional mobility, gait instability, impaired balance, impaired cardiopulmonary response to activity, pain, decreased lower extremity function; pt is pleasant & cooperative, thinks she is going home tomorrow or Monday; agreed to be seen by PT and amb; gait: 250 ft with a RW, CGA, steady with good safety awareness.    Rehab Prognosis: Good; patient would benefit from acute skilled PT services to address these deficits and reach maximum level of function.    Recent Surgery: Procedure(s) (LRB):  LAPAROSCOPY, DIAGNOSTIC (N/A)  LYSIS, ADHESIONS, LAPAROSCOPIC (N/A) 2 Days Post-Op    Plan:     During this hospitalization, patient to be seen 5 x/week to address the identified rehab impairments via gait training, therapeutic activities, therapeutic exercises and progress toward the following goals:    Plan of Care Expires:  08/10/24    Subjective     Chief Complaint: abd pain  Patient/Family Comments/goals: pleasant & cooperative, agreed to walk with PT  Pain/Comfort:  Pain Rating 1: 3/10  Location - Side 1: Bilateral  Location - Orientation 1: generalized  Location 1: abdomen  Pain Addressed 1: Reposition, Distraction, Nurse notified  Pain Rating Post-Intervention 1: 3/10    Patients cultural, spiritual, Mandaeism conflicts given the current situation: no    Living Environment:  Lives with her sister who is her guardian, has a 9-month pet dog that she adores  Prior to admission, patients level of function was mod (I).  Equipment used at home: walker,  rolling.  DME owned (not currently used): none.  Upon discharge, patient will have assistance from family.    Objective:     Communicated with AP Lu prior to session.  Patient found HOB elevated with peripheral IV  upon PT entry to room.    General Precautions: Standard, fall  Orthopedic Precautions:N/A   Braces: N/A  Respiratory Status: Room air    Exams:  Cognitive Exam:  Patient is oriented to Person, Place, Time, and Situation  Gross Motor Coordination:  WFL  Postural Exam:  Patient presented with the following abnormalities:    -       Rounded shoulders  -       Forward head  Skin Integrity/Edema:      -       Skin integrity: Visible skin intact  RLE ROM: WFL  RLE Strength: WFL  LLE ROM: WFL  LLE Strength: WFL    Functional Mobility:  Bed Mobility:     Rolling Left:  contact guard assistance  Rolling Right: minimum assistance  Supine to Sit: minimum assistance  Sit to Supine: minimum assistance  Transfers:     Sit to Stand:  minimum assistance with rolling walker  Gait: gait: 250 ft with a RW, CGA, steady with good safety awareness  Balance: Sit: G; Stand: G-      AM-PAC 6 CLICK MOBILITY  Total Score:22     Treatment & Education:  Functional mobility training as above; pt educated on safety precautions and mobility techniques, as well as the role and benefits of PT and mobilization.    Patient left HOB elevated with all lines intact, call button in reach, bed alarm on, and RN notified.    GOALS:   Multidisciplinary Problems       Physical Therapy Goals          Problem: Physical Therapy    Goal Priority Disciplines Outcome Goal Variances Interventions   Physical Therapy Goal     PT, PT/OT Progressing     Description:   Patient will increase functional independence with mobility by performin. Supine to sit with Stand-by Assistance  2. Sit to stand transfer with Stand-by Assistance  3. Bed to chair transfer with Stand-by Assistance using Rolling Walker  4. Gait  x 500 feet with Stand-by Assistance  using Rolling Walker.   5. Lower extremity exercise program x30 reps per handout, with independence                         History:     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     Generalized edema     GERD (gastroesophageal reflux disease)     Heart attack 2021    History of traumatic brain injury     Hypertension     Hypothyroidism     Insomnia     Muscle weakness (generalized)     Restless leg syndrome     Senile dementia, uncomplicated     Sleep apnea, unspecified     TIA (transient ischemic attack)     Unsteadiness on feet        Past Surgical History:   Procedure Laterality Date    ADENOIDECTOMY      ANGIOGRAM, CORONARY, WITH LEFT HEART CATHETERIZATION N/A 02/12/2024    Procedure: Angiogram, Coronary, with Left Heart Cath;  Surgeon: Cristian Carlisle MD;  Location: [facility] CATH/EP LAB;  Service: Cardiology;  Laterality: N/A;    APPENDECTOMY      AV FISTULA PLACEMENT Left     CHOLECYSTECTOMY      COLONOSCOPY      10 years ago in [place]    CORONARY STENT PLACEMENT  2021    DIAGNOSTIC LAPAROSCOPY N/A 7/11/2024    Procedure: LAPAROSCOPY, DIAGNOSTIC;  Surgeon: Stephen Benites III, MD;  Location: [facility] OR;  Service: General;  Laterality: N/A;    ESOPHAGOGASTRODUODENOSCOPY N/A 5/20/2024    Procedure: EGD (ESOPHAGOGASTRODUODENOSCOPY);  Surgeon: Carl Conti MD;  Location: [facility] ENDO;  Service: Endoscopy;  Laterality: N/A;    IVUS (INTRAVASCULAR ULTRASOUND) N/A  02/12/2024    Procedure: ULTRASOUND, INTRAVASCULAR;  Surgeon: Cristian Carlisle MD;  Location: [facility] CATH/EP LAB;  Service: Cardiology;  Laterality: N/A;    KNEE SURGERY      knee arthroplasty    LAPAROSCOPIC LYSIS OF ADHESIONS N/A 7/11/2024    Procedure: LYSIS, ADHESIONS, LAPAROSCOPIC;  Surgeon: Stephen Benites III, MD;  Location: [facility];  Service: General;  Laterality: N/A;    LEFT HEART CATHETERIZATION  2021    NECK SURGERY      PARTIAL HYSTERECTOMY      STENT, DRUG ELUTING, MULTI VESSEL, CORONARY  02/12/2024    Procedure: Stent, Drug Eluting, Multi Vessel, Coronary;  Surgeon: Cristian Carlisle MD;  Location: [facility] CATH/EP LAB;  Service: Cardiology;;    TONSILLECTOMY         Time Tracking:     PT Received On: 07/13/24  PT Start Time: 1406     PT Stop Time: 1430  PT Total Time (min): 24 min     Billable Minutes: Evaluation 10 and Gait Training 13      07/13/2024

## 2024-07-13 NOTE — SUBJECTIVE & OBJECTIVE
Interval History:  Pt seen and examined.  Sitting up in chair. Mild abdominal post op pain 5/10.   Feeling better this am and tolerating clear liquids. + BM last pm and flatulence this am.    Review of Systems   Unable to perform ROS: Other   Constitutional:  Negative for chills and fever.   Respiratory:  Negative for cough and shortness of breath.    Cardiovascular:  Negative for chest pain.   Gastrointestinal:  Positive for abdominal pain. Negative for nausea and vomiting.   Psychiatric/Behavioral:  The patient is nervous/anxious.      Objective:     Vital Signs (Most Recent):  Temp: 98.8 °F (37.1 °C) (07/13/24 0730)  Pulse: 86 (07/13/24 0730)  Resp: 17 (07/13/24 0730)  BP: 124/62 (07/13/24 0730)  SpO2: 95 % (07/13/24 0730) Vital Signs (24h Range):  Temp:  [98 °F (36.7 °C)-98.8 °F (37.1 °C)] 98.8 °F (37.1 °C)  Pulse:  [71-96] 86  Resp:  [16-19] 17  SpO2:  [94 %-96 %] 95 %  BP: (101-159)/(50-94) 124/62     Weight: 97.4 kg (214 lb 11.7 oz)  Body mass index is 32.65 kg/m².    Intake/Output Summary (Last 24 hours) at 7/13/2024 0927  Last data filed at 7/12/2024 1700  Gross per 24 hour   Intake 940 ml   Output 1701 ml   Net -761 ml         Physical Exam  Vitals and nursing note reviewed.   Constitutional:       Appearance: Normal appearance.   HENT:      Head: Normocephalic and atraumatic.   Cardiovascular:      Rate and Rhythm: Normal rate and regular rhythm.   Pulmonary:      Effort: Pulmonary effort is normal.      Breath sounds: Normal breath sounds.   Abdominal:      General: Abdomen is flat. There is no distension.      Palpations: Abdomen is soft.      Tenderness: There is abdominal tenderness (expected post-op tenderness; minimal on exam).      Comments: Lap sites and dressings CDI  NG clamped   Skin:     General: Skin is warm and dry.   Neurological:      General: No focal deficit present.      Mental Status: She is alert and oriented to person, place, and time. Mental status is at baseline.   Psychiatric:          Mood and Affect: Mood normal.         Behavior: Behavior normal.             Significant Labs: All pertinent labs within the past 24 hours have been reviewed.  CBC:   Recent Labs   Lab 07/12/24  0422 07/13/24  0421   WBC 8.25 6.47   HGB 10.4* 9.8*   HCT 31.9* 30.5*    178     CMP:   Recent Labs   Lab 07/11/24  1734 07/12/24 0422   * 135*   K 6.0* 4.7   CL 97 98   CO2 14* 19*   * 96   BUN 47* 24*   CREATININE 7.9* 5.2*   CALCIUM 9.1 9.5   PROT  --  7.4   ALBUMIN  --  3.4*   BILITOT  --  0.9   ALKPHOS  --  214*   AST  --  40   ALT  --  41   ANIONGAP 22* 18*       Significant Imaging: I have reviewed all pertinent imaging results/findings within the past 24 hours.

## 2024-07-13 NOTE — PLAN OF CARE
Spoke with Dr. Lopez who states pt is clear for d/c from his standpoint as long as pt tolerates diet.

## 2024-07-13 NOTE — ANESTHESIA POSTPROCEDURE EVALUATION
Anesthesia Post Evaluation    Patient: Eugenia Manuel    Procedure(s) Performed: Procedure(s) (LRB):  LAPAROSCOPY, DIAGNOSTIC (N/A)  LYSIS, ADHESIONS, LAPAROSCOPIC (N/A)    Final Anesthesia Type: general      Patient location during evaluation: PACU  Patient participation: Yes- Able to Participate  Level of consciousness: awake  Post-procedure vital signs: reviewed and stable  Pain management: adequate  Airway patency: patent    PONV status at discharge: No PONV  Anesthetic complications: no      Cardiovascular status: blood pressure returned to baseline  Respiratory status: unassisted  Hydration status: euvolemic  Follow-up not needed.              Vitals Value Taken Time   /67 07/13/24 1132   Temp 36.8 °C (98.3 °F) 07/13/24 1132   Pulse 88 07/13/24 1132   Resp 17 07/13/24 1132   SpO2 98 % 07/13/24 1132         Event Time   Out of Recovery 07/11/2024 16:15:00         Pain/Farrah Score: Pain Rating Prior to Med Admin: 8 (7/13/2024  5:54 AM)  Pain Rating Post Med Admin: 5 (7/13/2024  6:30 AM)  Farrah Score: 10 (7/12/2024  7:50 AM)

## 2024-07-13 NOTE — PLAN OF CARE
Problem: Hemodialysis  Goal: Safe, Effective Therapy Delivery  Outcome: Progressing  Goal: Effective Tissue Perfusion  Outcome: Progressing     Problem: Adult Inpatient Plan of Care  Goal: Plan of Care Review  Outcome: Progressing     Problem: Pneumonia  Goal: Fluid Balance  Outcome: Progressing     Problem: Wound  Goal: Optimal Coping  Outcome: Progressing     Problem: Fall Injury Risk  Goal: Absence of Fall and Fall-Related Injury  Outcome: Progressing

## 2024-07-13 NOTE — PT/OT/SLP EVAL
Occupational Therapy   Evaluation    Name: Eugenia Manuel  MRN: [id number]  Admitting Diagnosis: Small bowel obstruction  Recent Surgery: Procedure(s) (LRB):  LAPAROSCOPY, DIAGNOSTIC (N/A)  LYSIS, ADHESIONS, LAPAROSCOPIC (N/A) 2 Days Post-Op    Recommendations:     Discharge Recommendations: Low Intensity Therapy  Discharge Equipment Recommendations:  other (see comments) (Pt was educated on use of tub transfer bench to increase safety with tub/shower transfers and bathing. Pt/caregiver verbalized understanding and state they are looking into getting one.)  Barriers to discharge:       Assessment:     Eugenia Manuel is a 58 y.o. female with a medical diagnosis of Small bowel obstruction.  She presents with the following performance deficits affecting function: weakness, impaired endurance, impaired self care skills, impaired functional mobility, gait instability, impaired balance.  Pt sitting on low sofa in room with guardian/caregiver present. Pt was agreeable to OT.  Pt alert and oriented.     Rehab Prognosis: Good; patient would benefit from acute skilled OT services to address these deficits and reach maximum level of function.       Plan:     Patient to be seen 5 x/week to address the above listed problems via self-care/home management, therapeutic activities, therapeutic exercises  Plan of Care Expires: 08/10/24  Plan of Care Reviewed with: patient, caregiver    Subjective     Chief Complaint: dizziness on standing that resolved once in supine. Nurse Tabitha and Charge Nurse Natty advised.  Patient/Family Comments/goals: To go home    Occupational Profile:  Living Environment: Pt lives with guardian in 1 story home with no WESLEY. Pt has a tub/shower and standard toilet. OT provided education in use of tub transfer bench to increase safety with tub/shower transfers. Pt/guardian report currently looking in to getting one.  Previous level of function: Supervision with ADL's/ambulation. Used RW PRN  Roles and Routines:  Friend, Sister  Equipment Used at Home: walker, rolling, other (see comments) (Pt reports RW used PRN;)  Assistance upon Discharge: Guardian/caregiver    Pain/Comfort:  Pain Rating 1: 0/10    Patients cultural, spiritual, Mosque conflicts given the current situation:      Objective:     Communicated with: Nurse Lu  prior to session.  Patient found  sitting on low sofa in room  with peripheral IV, telemetry upon OT entry to room. Guardian/caregiver present.    General Precautions: Standard, fall  Orthopedic Precautions: N/A  Braces: N/A  Respiratory Status: Room air    Occupational Performance:    Bed Mobility:    Patient completed Sit to Supine with supervision    Functional Mobility/Transfers:  Patient completed Sit <> Stand Transfer with moderate assistance  with  hand-held assist from low sofa in room. Dizziness on standing.  Patient completed Bed <> Chair Transfer using Step Transfer technique with minimum assistance with hand-held assist. Dizziness resolved once in supine and Nurse and Charge Nurse advised.      Activities of Daily Living:  Feeding:  independence    Grooming: stand by assistance set up in sitting  Bathing: moderate assistance    Upper Body Dressing: stand by assistance set up in sitting  Lower Body Dressing: minimum assistance    Toileting: minimum assistance      Cognitive/Visual Perceptual:  Pt alert and oriented    Physical Exam:  Upper Extremity Strength:    -       Right Upper Extremity: R hand dominant. RUE AROM/strength WNL's  -       Left Upper Extremity: Pt reports rotator cuff tear about a month ago L shoulder. Pt demonstrates AROM L shoulder flexion approximately 45 degrees and slow.  Pt demonstrates distal LUE AROM/strength WFL's    AMPAC 6 Click ADL:  AMPAC Total Score: 20    Treatment & Education:  OT provided education in role of OT. Patient verbalized understanding and participated in OT.  OT provided instruction in home safety  with ADL/IADL including review of home set  up and DME/AE. Patient verbalized understanding.   OT provided education in calling for assist. Patient verbalized understanding.        Patient left HOB elevated with all lines intact, call button in reach, and Nurse Tabitha and Charge Nurse Natty  notified bed alarm malfunction.    GOALS:   Multidisciplinary Problems       Occupational Therapy Goals          Problem: Occupational Therapy    Goal Priority Disciplines Outcome Interventions   Occupational Therapy Goal     OT, PT/OT     Description: Goals to be met by: 8/10/24     Patient will increase functional independence with ADLs by performing:    UE Dressing with Supervision.  LE Dressing with Supervision.  Grooming while standing at sink with Supervision.  Toileting from toilet with Supervision for hygiene and clothing management.   Bathing from  shower chair/bench with Supervision.  Toilet transfer to toilet with Supervision.  Increased strength and functional activity tolerance for ADL's/IADL's                         History:     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     Generalized edema     GERD (gastroesophageal reflux disease)     Heart attack 2021    History of traumatic brain injury     Hypertension     Hypothyroidism     Insomnia     Muscle weakness (generalized)     Restless leg syndrome     Senile dementia, uncomplicated     Sleep apnea, unspecified     TIA (transient ischemic attack)      Unsteadiness on feet          Past Surgical History:   Procedure Laterality Date    ADENOIDECTOMY      ANGIOGRAM, CORONARY, WITH LEFT HEART CATHETERIZATION N/A 02/12/2024    Procedure: Angiogram, Coronary, with Left Heart Cath;  Surgeon: Cristian Carlisle MD;  Location: [facility] CATH/EP LAB;  Service: Cardiology;  Laterality: N/A;    APPENDECTOMY      AV FISTULA PLACEMENT Left     CHOLECYSTECTOMY      COLONOSCOPY      10 years ago in [place]    CORONARY STENT PLACEMENT  2021    DIAGNOSTIC LAPAROSCOPY N/A 7/11/2024    Procedure: LAPAROSCOPY, DIAGNOSTIC;  Surgeon: Stephen Benites III, MD;  Location: [facility] OR;  Service: General;  Laterality: N/A;    ESOPHAGOGASTRODUODENOSCOPY N/A 5/20/2024    Procedure: EGD (ESOPHAGOGASTRODUODENOSCOPY);  Surgeon: Carl Conti MD;  Location: [facility] ENDO;  Service: Endoscopy;  Laterality: N/A;    IVUS (INTRAVASCULAR ULTRASOUND) N/A 02/12/2024    Procedure: ULTRASOUND, INTRAVASCULAR;  Surgeon: Cristian Carlisle MD;  Location: [facility] CATH/EP LAB;  Service: Cardiology;  Laterality: N/A;    KNEE SURGERY      knee arthroplasty    LAPAROSCOPIC LYSIS OF ADHESIONS N/A 7/11/2024    Procedure: LYSIS, ADHESIONS, LAPAROSCOPIC;  Surgeon: Stephen Benites III, MD;  Location: [facility] OR;  Service: General;  Laterality: N/A;    LEFT HEART CATHETERIZATION  2021    NECK SURGERY      PARTIAL HYSTERECTOMY      STENT, DRUG ELUTING, MULTI VESSEL, CORONARY  02/12/2024    Procedure: Stent, Drug Eluting, Multi Vessel, Coronary;  Surgeon: Cristian Carlisle MD;  Location: [facility] CATH/EP LAB;  Service: Cardiology;;    TONSILLECTOMY         Time Tracking:     OT Date of Treatment: 07/13/24  OT Start Time: 0952  OT Stop Time: 1012  OT Total Time (min): 20 min    Billable Minutes:Evaluation 8  Self Care/Home Management 12    7/13/2024

## 2024-07-13 NOTE — PROGRESS NOTES
Pt seen and examined.   Resting comfortably.   Denies n/v.  No fever/chills.  Having flatus and BM    Wt Readings from Last 3 Encounters:   07/08/24 97.4 kg (214 lb 11.7 oz)   06/07/24 94.3 kg (208 lb)   05/20/24 98.4 kg (217 lb)     Temp Readings from Last 3 Encounters:   07/13/24 98.3 °F (36.8 °C) (Oral)   06/07/24 98.2 °F (36.8 °C) (Oral)   05/20/24 99 °F (37.2 °C)     BP Readings from Last 3 Encounters:   07/13/24 130/67   06/07/24 128/62   05/20/24 (!) 157/103     Pulse Readings from Last 3 Encounters:   07/13/24 88   06/07/24 78   05/20/24 76     AAOx3  Soft/nd/nt  No resp distress    Lab Results   Component Value Date    WBC 6.47 07/13/2024    HGB 9.8 (L) 07/13/2024    HCT 30.5 (L) 07/13/2024     (H) 07/13/2024     07/13/2024       BMP  Lab Results   Component Value Date     (L) 07/12/2024    K 4.7 07/12/2024    CL 98 07/12/2024    CO2 19 (L) 07/12/2024    BUN 24 (H) 07/12/2024    CREATININE 5.2 (H) 07/12/2024    CALCIUM 9.5 07/12/2024    ANIONGAP 18 (H) 07/12/2024    EGFRNORACEVR 9 (A) 07/12/2024     A/P: s/p ex lap with AHMET  Ambulate  Aggressive IS  Adv diet as tolerated  If tolerates diet adv ok to d/c home

## 2024-07-13 NOTE — ASSESSMENT & PLAN NOTE
S/p ex lap with lysis of adhesion  -NG clamped  -pain and nausea control with PRN medications: switching off morphine today per patient request  -Monitor and await bowel function post-op  -Resume ASA/plavix AND start DVT ppx with SQ heparin if/when okay with general surgery.  Discussed with their team this morning, will follow up for timing.  -advance diet to a renal diet.

## 2024-07-13 NOTE — PROGRESS NOTES
[facility]/Surg  General Surgery  Progress Note    Subjective:     Interval History: Doing well POD 1. No acute events overnight. No nausea. Passed flatus this afternoon.  Tolerating clears. NGT clamped.     Post-Op Info:  Procedure(s) (LRB):  LAPAROSCOPY, DIAGNOSTIC (N/A)  LYSIS, ADHESIONS, LAPAROSCOPIC (N/A)   1 Day Post-Op      Medications:  Continuous Infusions:  Scheduled Meds:   epoetin marcell-epbx  4,900 Units Subcutaneous Every Mon, Wed, Fri    heparin (porcine)  5,000 Units Subcutaneous Q8H    levothyroxine  150 mcg Oral Before breakfast    mupirocin  1 g Nasal BID    QUEtiapine  100 mg Oral Nightly    quetiapine  25 mg Oral Daily    venlafaxine  150 mg Oral Daily     PRN Meds:  Current Facility-Administered Medications:     0.9% NaCl, , Intravenous, PRN    albuterol sulfate, 2.5 mg, Nebulization, Q6H PRN    dextrose 10%, 12.5 g, Intravenous, PRN    dextrose 10%, 25 g, Intravenous, PRN    glucagon (human recombinant), 1 mg, Intramuscular, PRN    glucose, 16 g, Oral, PRN    glucose, 24 g, Oral, PRN    heparin (porcine), 5,000 Units, Intravenous, PRN    hydrALAZINE, 10 mg, Intravenous, Q6H PRN    HYDROcodone-acetaminophen, 1 tablet, Oral, Q4H PRN    HYDROmorphone, 0.5 mg, Intravenous, Q6H PRN    naloxone, 0.02 mg, Intravenous, PRN    ondansetron, 4 mg, Intravenous, Q6H PRN    sodium chloride 0.9%, 250 mL, Intravenous, PRN    sodium chloride 0.9%, 10 mL, Intravenous, Q12H PRN     Objective:     Vital Signs (Most Recent):  Temp: 98.3 °F (36.8 °C) (07/12/24 1940)  Pulse: 87 (07/12/24 2003)  Resp: 18 (07/12/24 2003)  BP: (!) 150/94 (07/12/24 1940)  SpO2: (!) 94 % (07/12/24 2003) Vital Signs (24h Range):  Temp:  [97.8 °F (36.6 °C)-98.5 °F (36.9 °C)] 98.3 °F (36.8 °C)  Pulse:  [71-96] 87  Resp:  [14-19] 18  SpO2:  [94 %-100 %] 94 %  BP: (101-176)/(52-94) 150/94       Intake/Output Summary (Last 24 hours) at 7/12/2024 2019  Last data filed at 7/12/2024 1700  Gross per 24 hour   Intake 1440 ml   Output  2301 ml   Net -861 ml       Physical Exam  Constitutional:       General: She is awake. She is not in acute distress.     Appearance: Normal appearance. She is not toxic-appearing.   Pulmonary:      Effort: No tachypnea or bradypnea.   Abdominal:      General: There is no distension.      Palpations: Abdomen is soft.      Tenderness: There is abdominal tenderness.      Comments: Appropriate tenderness  Incision intact  No significant distention.    Neurological:      Mental Status: She is alert and oriented to person, place, and time.   Psychiatric:         Behavior: Behavior is cooperative.         Significant Labs:  CBC:   Recent Labs   Lab 07/12/24 0422   WBC 8.25   RBC 2.89*   HGB 10.4*   HCT 31.9*      *   MCH 36.0*   MCHC 32.6     CMP:   Recent Labs   Lab 07/12/24 0422   GLU 96   CALCIUM 9.5   ALBUMIN 3.4*   PROT 7.4   *   K 4.7   CO2 19*   CL 98   BUN 24*   CREATININE 5.2*   ALKPHOS 214*   ALT 41   AST 40   BILITOT 0.9       Significant Diagnostics:  I have reviewed all pertinent imaging results/findings within the past 24 hours.    Assessment/Plan:     Active Diagnoses:    Diagnosis Date Noted POA    PRINCIPAL PROBLEM:  Small bowel obstruction [K56.609] 04/30/2024 Yes    Hyperkalemia [E87.5] 04/30/2024 Yes    ESRD (end stage renal disease) [N18.6] 02/27/2024 Yes    Hypertension [I10] 11/30/2023 Yes    Coronary artery disease involving native coronary artery of native heart with unstable angina pectoris [I25.110] 11/30/2023 Yes    Hypothyroidism [E03.9] 11/30/2023 Yes      Problems Resolved During this Admission:   -pod 1 lysis of adhesion for SBO.   -removed NGT  -advance diet as tolerated.   -PT   -IS       Stephen Benites III, MD  General Surgery  [facility]/Surg

## 2024-07-14 VITALS
BODY MASS INDEX: 32.55 KG/M2 | OXYGEN SATURATION: 97 % | HEIGHT: 68 IN | RESPIRATION RATE: 18 BRPM | DIASTOLIC BLOOD PRESSURE: 79 MMHG | HEART RATE: 80 BPM | TEMPERATURE: 98 F | WEIGHT: 214.75 LBS | SYSTOLIC BLOOD PRESSURE: 131 MMHG

## 2024-07-14 LAB
BASOPHILS # BLD AUTO: 0.04 K/UL (ref 0–0.2)
BASOPHILS NFR BLD: 0.7 % (ref 0–1.9)
DIFFERENTIAL METHOD BLD: ABNORMAL
EOSINOPHIL # BLD AUTO: 0.2 K/UL (ref 0–0.5)
EOSINOPHIL NFR BLD: 3 % (ref 0–8)
ERYTHROCYTE [DISTWIDTH] IN BLOOD BY AUTOMATED COUNT: 16.3 % (ref 11.5–14.5)
HCT VFR BLD AUTO: 29.6 % (ref 37–48.5)
HGB BLD-MCNC: 9.7 G/DL (ref 12–16)
IMM GRANULOCYTES # BLD AUTO: 0.02 K/UL (ref 0–0.04)
IMM GRANULOCYTES NFR BLD AUTO: 0.4 % (ref 0–0.5)
LYMPHOCYTES # BLD AUTO: 1.5 K/UL (ref 1–4.8)
LYMPHOCYTES NFR BLD: 27.4 % (ref 18–48)
MAGNESIUM SERPL-MCNC: 2.2 MG/DL (ref 1.6–2.6)
MCH RBC QN AUTO: 35.7 PG (ref 27–31)
MCHC RBC AUTO-ENTMCNC: 32.8 G/DL (ref 32–36)
MCV RBC AUTO: 109 FL (ref 82–98)
MONOCYTES # BLD AUTO: 0.6 K/UL (ref 0.3–1)
MONOCYTES NFR BLD: 10.7 % (ref 4–15)
NEUTROPHILS # BLD AUTO: 3.1 K/UL (ref 1.8–7.7)
NEUTROPHILS NFR BLD: 57.8 % (ref 38–73)
NRBC BLD-RTO: 0 /100 WBC
PHOSPHATE SERPL-MCNC: 6.2 MG/DL (ref 2.7–4.5)
PLATELET # BLD AUTO: 213 K/UL (ref 150–450)
PMV BLD AUTO: 10.8 FL (ref 9.2–12.9)
RBC # BLD AUTO: 2.72 M/UL (ref 4–5.4)
WBC # BLD AUTO: 5.4 K/UL (ref 3.9–12.7)

## 2024-07-14 PROCEDURE — 25000003 PHARM REV CODE 250: Performed by: SURGERY

## 2024-07-14 PROCEDURE — 84100 ASSAY OF PHOSPHORUS: CPT | Performed by: SURGERY

## 2024-07-14 PROCEDURE — 36415 COLL VENOUS BLD VENIPUNCTURE: CPT | Performed by: SURGERY

## 2024-07-14 PROCEDURE — 63600175 PHARM REV CODE 636 W HCPCS: Performed by: NURSE PRACTITIONER

## 2024-07-14 PROCEDURE — 94760 N-INVAS EAR/PLS OXIMETRY 1: CPT

## 2024-07-14 PROCEDURE — 25000003 PHARM REV CODE 250: Performed by: NURSE PRACTITIONER

## 2024-07-14 PROCEDURE — 85025 COMPLETE CBC W/AUTO DIFF WBC: CPT | Performed by: SURGERY

## 2024-07-14 PROCEDURE — 94799 UNLISTED PULMONARY SVC/PX: CPT

## 2024-07-14 PROCEDURE — 83735 ASSAY OF MAGNESIUM: CPT | Performed by: SURGERY

## 2024-07-14 RX ORDER — ONDANSETRON 4 MG/1
4 TABLET, ORALLY DISINTEGRATING ORAL EVERY 8 HOURS PRN
Qty: 15 TABLET | Refills: 0 | Status: SHIPPED | OUTPATIENT
Start: 2024-07-14

## 2024-07-14 RX ORDER — ROPINIROLE 1 MG/1
2 TABLET, FILM COATED ORAL 3 TIMES DAILY
Status: DISCONTINUED | OUTPATIENT
Start: 2024-07-14 | End: 2024-07-14 | Stop reason: HOSPADM

## 2024-07-14 RX ORDER — HYDROCODONE BITARTRATE AND ACETAMINOPHEN 5; 325 MG/1; MG/1
1 TABLET ORAL EVERY 6 HOURS PRN
Qty: 10 TABLET | Refills: 0 | Status: SHIPPED | OUTPATIENT
Start: 2024-07-14

## 2024-07-14 RX ADMIN — HEPARIN SODIUM 5000 UNITS: 5000 INJECTION, SOLUTION INTRAVENOUS; SUBCUTANEOUS at 05:07

## 2024-07-14 RX ADMIN — LEVOTHYROXINE SODIUM 150 MCG: 150 TABLET ORAL at 05:07

## 2024-07-14 RX ADMIN — HYDROCODONE BITARTRATE AND ACETAMINOPHEN 1 TABLET: 5; 325 TABLET ORAL at 08:07

## 2024-07-14 RX ADMIN — QUETIAPINE 25 MG: 25 TABLET ORAL at 08:07

## 2024-07-14 RX ADMIN — CLOPIDOGREL BISULFATE 75 MG: 75 TABLET, FILM COATED ORAL at 08:07

## 2024-07-14 RX ADMIN — ROPINIROLE HYDROCHLORIDE 2 MG: 1 TABLET, FILM COATED ORAL at 12:07

## 2024-07-14 RX ADMIN — VENLAFAXINE HYDROCHLORIDE 150 MG: 37.5 CAPSULE, EXTENDED RELEASE ORAL at 08:07

## 2024-07-14 RX ADMIN — ROPINIROLE HYDROCHLORIDE 2 MG: 1 TABLET, FILM COATED ORAL at 08:07

## 2024-07-14 NOTE — PLAN OF CARE
Problem: Pneumonia  Goal: Fluid Balance  Outcome: Progressing  Goal: Resolution of Infection Signs and Symptoms  Outcome: Progressing     Problem: Wound  Goal: Optimal Pain Control and Function  Outcome: Progressing     Problem: Fall Injury Risk  Goal: Absence of Fall and Fall-Related Injury  Outcome: Progressing

## 2024-07-14 NOTE — PROGRESS NOTES
INPATIENT NEPHROLOGY Progress Note   [facility]    Patient Name: Eugenia Manuel  Date: 07/14/2024    Reason for consultation: ESRD    Chief Complaint:   Chief Complaint   Patient presents with    Abdominal Pain     History of Present Illness:  58 year old female with past medical history of CAD w/ 2 stents, HTN, ESRD on HD MWF, bipolar disorder, hypothyroidism, asthma, MI, SBO, restless leg syndrome who presented to the ER with complaints nausea, vomiting, and abdominal distention x 2 days. She reports last bowel movement was on Saturday and is not passing gas. ED work up reveals CT of abdomen/pelvis with concern for SBO. General surgery notified and ordered NG Tube to be placed. Consulted for dialysis.    Interval History:  7/8- K 6.1- got medical management- HD ordered- SBP 160s, on RA, NPO, couldn't pass first NGT in ER- floor nurse will try after HD- c/w abd tenderness- no flatus, BMs= seen on HD  7/9- HD yest with 3L UF- BP better- on RA- Contrast has traversed to the distal colon indicating absence of a complete bowel obstruction but appears will still need surg- doesn't have NGT- had 2 BMs today- labs shows hyperK today- will do another HD session (2 hours) for clearance, no UF  7/10- 2hr HD yest for clearance/no UF- routine HD today- ex lap planned for tomorrow; VSS, on RA  --seen on HD, feels clammy- SBP 110s, BG low 100s- turn off UF- give 250cc NS bolus   7/11- OR today- VSS, on RA  7/12- s/p diagnostic laparoscopy with conversion to hand assist laparoscopy with lysis of dense small bowel to small bowel adhesions and omental to anterior abdominal wall adhesions- labs post op with hyperK/acidosis and peaked TW on EKG- did HD for clearance last night- VSS, on RA- seen on HD- with NGT but clamped- do 1L UF at most- on CLD  7/13 VSS. No new complains. HD on Monday or PRN.  7/14 VSS. OK to dc and follow outpatient dialysis per schedule.    Plan of Care:    SBO, s/p lysis of adhesions  7/11/2024  ESRD on HD MWF  HTN  Hyponatremia/Hyperkalemia/Acidosis  Secondary HPT  Anemia of CKD    Plan:    - s/p surg 7/11  - HD MWF  - adjusted dialysate- high flux dialyzer   - BP on lower end at times due to being NPO/pain meds- dry/euvolemic- hold torsemide- judicious UF  - will hold binder until she is on a normal diet  - continue SANJANA with HD    Thank you for allowing us to participate in this patient's care. We will continue to follow.    Vital Signs:  Temp Readings from Last 3 Encounters:   07/14/24 97.8 °F (36.6 °C) (Oral)   06/07/24 98.2 °F (36.8 °C) (Oral)   05/20/24 99 °F (37.2 °C)       Pulse Readings from Last 3 Encounters:   07/14/24 81   06/07/24 78   05/20/24 76       BP Readings from Last 3 Encounters:   07/14/24 131/79   06/07/24 128/62   05/20/24 (!) 157/103       Weight:  Wt Readings from Last 3 Encounters:   07/08/24 97.4 kg (214 lb 11.7 oz)   06/07/24 94.3 kg (208 lb)   05/20/24 98.4 kg (217 lb)       Medications:  Scheduled Meds:   clopidogreL  75 mg Oral Daily    epoetin marcell-epbx  4,900 Units Subcutaneous Every Mon, Wed, Fri    heparin (porcine)  5,000 Units Subcutaneous Q8H    levothyroxine  150 mcg Oral Before breakfast    mupirocin  1 g Nasal BID    QUEtiapine  100 mg Oral Nightly    quetiapine  25 mg Oral Daily    rOPINIRole  2 mg Oral TID    venlafaxine  150 mg Oral Daily     Continuous Infusions:      PRN Meds:.  Current Facility-Administered Medications:     0.9% NaCl, , Intravenous, PRN    albuterol sulfate, 2.5 mg, Nebulization, Q6H PRN    dextrose 10%, 12.5 g, Intravenous, PRN    dextrose 10%, 25 g, Intravenous, PRN    glucagon (human recombinant), 1 mg, Intramuscular, PRN    glucose, 16 g, Oral, PRN    glucose, 24 g, Oral, PRN    heparin (porcine), 5,000 Units, Intravenous, PRN    hydrALAZINE, 10 mg, Intravenous, Q6H PRN    HYDROcodone-acetaminophen, 1 tablet, Oral, Q4H PRN    HYDROmorphone, 0.5 mg, Intravenous, Q6H PRN    naloxone, 0.02 mg, Intravenous, PRN    ondansetron, 4  mg, Intravenous, Q6H PRN    sodium chloride 0.9%, 250 mL, Intravenous, PRN    sodium chloride 0.9%, 10 mL, Intravenous, Q12H PRN  No current facility-administered medications on file prior to encounter.     Current Outpatient Medications on File Prior to Encounter   Medication Sig Dispense Refill    albuterol (PROVENTIL/VENTOLIN HFA) 90 mcg/actuation inhaler Inhale 2 puffs into the lungs every 6 (six) hours as needed. Rescue 18 g 6    aspirin 81 MG Chew Take 1 tablet (81 mg total) by mouth once daily.  0    atorvastatin (LIPITOR) 40 MG tablet Take 1 tablet (40 mg total) by mouth once daily. 90 tablet 0    azelastine (ASTELIN) 137 mcg (0.1 %) nasal spray 1 spray (137 mcg total) by Nasal route 2 (two) times daily. 30 mL 2    budesonide-glycopyr-formoterol (BREZTRI AEROSPHERE) 160-9-4.8 mcg/actuation HFAA Inhale 2 puffs into the lungs 2 (two) times daily. 10.7 g 6    cetirizine (ZYRTEC) 10 MG tablet Take 1 tablet (10 mg total) by mouth once daily. 90 tablet 3    clopidogreL (PLAVIX) 75 mg tablet Take 1 tablet (75 mg total) by mouth once daily. 90 tablet 3    ferric citrate (AURYXIA) 210 mg iron Tab Take 420 mg by mouth 3 (three) times daily. 540 tablet 3    levothyroxine (SYNTHROID) 150 MCG tablet Take 1 tablet (150 mcg total) by mouth before breakfast. 90 tablet 3    melatonin 10 mg Cap Take 10 mg by mouth nightly as needed.      midodrine (PROAMATINE) 10 MG tablet Take 1 tablet (10 mg total) by mouth daily as needed (hypotension). 180 tablet 0    montelukast (SINGULAIR) 10 mg tablet Take 1 tablet (10 mg total) by mouth every evening. 30 tablet 6    nitroGLYCERIN (NITROSTAT) 0.4 MG SL tablet Place 1 tablet (0.4 mg total) under the tongue every 5 (five) minutes as needed for Chest pain. 10 tablet 5    ondansetron (ZOFRAN-ODT) 4 MG TbDL Take 1 tablet (4 mg total) by mouth 1 (one) time if needed (nausea). 1 tablet 0    pantoprazole (PROTONIX) 40 MG tablet Take 1 tablet (40 mg total) by mouth 2 (two) times daily. 180  tablet 1    pregabalin (LYRICA) 50 MG capsule Take 50 mg by mouth once daily.      QUEtiapine (SEROQUEL) 100 MG Tab Take 1 tablet (100 mg total) by mouth once daily. (Patient taking differently: Take 100 mg by mouth nightly.) 30 tablet 5    senna-docusate 8.6-50 mg (PERICOLACE) 8.6-50 mg per tablet Take 1 tablet by mouth daily as needed for Constipation.      sevelamer carbonate (RENVELA) 800 mg Tab Take 5 tablets (4,000 mg total) by mouth 3 (three) times daily with meals. 1350 tablet 3    torsemide (DEMADEX) 100 MG Tab Take 1 tablet (100 mg total) by mouth 2 (two) times a day. 90 tablet 0    venlafaxine (EFFEXOR-XR) 150 MG Cp24 Take 1 capsule (150 mg total) by mouth once daily. 90 capsule 3     Review of Systems:  neg    Physical Exam:  Constitutional: nad, aao x 3  Heart: rrr, no m/r/g, wwp, no edema  Lungs: ctab, no w/r/r/c, no lb  Abdomen: s/nt/nd, +BS      Results:  Lab Results   Component Value Date     (L) 07/12/2024    K 4.7 07/12/2024    CL 98 07/12/2024    CO2 19 (L) 07/12/2024    BUN 24 (H) 07/12/2024    CREATININE 5.2 (H) 07/12/2024    CALCIUM 9.5 07/12/2024    ANIONGAP 18 (H) 07/12/2024       Lab Results   Component Value Date    CALCIUM 9.5 07/12/2024    PHOS 6.2 (H) 07/14/2024       Recent Labs   Lab 07/14/24  0504   WBC 5.40   RBC 2.72*   HGB 9.7*   HCT 29.6*      *   MCH 35.7*   MCHC 32.8     I have spent >  minutes providing care for this patient for the above diagnoses. These services have included chart/data/imaging review, evaluation, exam, formulation of plan, , note preparation, and discussions with staff involved in this patient's care.    David Capone MD  [facility] [address] [address] [phone] (p)  [phone] (f)

## 2024-07-14 NOTE — PLAN OF CARE
Problem: Hemodialysis  Goal: Safe, Effective Therapy Delivery  Outcome: Met  Goal: Effective Tissue Perfusion  Outcome: Met  Goal: Absence of Infection Signs and Symptoms  Outcome: Met     Problem: Adult Inpatient Plan of Care  Goal: Plan of Care Review  Outcome: Met  Goal: Patient-Specific Goal (Individualized)  Outcome: Met  Goal: Absence of Hospital-Acquired Illness or Injury  Outcome: Met  Goal: Optimal Comfort and Wellbeing  Outcome: Met  Goal: Readiness for Transition of Care  Outcome: Met     Problem: Pneumonia  Goal: Fluid Balance  Outcome: Met  Goal: Resolution of Infection Signs and Symptoms  Outcome: Met  Goal: Effective Oxygenation and Ventilation  Outcome: Met     Problem: Wound  Goal: Optimal Coping  Outcome: Met  Goal: Optimal Functional Ability  Outcome: Met  Goal: Absence of Infection Signs and Symptoms  Outcome: Met  Goal: Improved Oral Intake  Outcome: Met  Goal: Optimal Pain Control and Function  Outcome: Met  Goal: Skin Health and Integrity  Outcome: Met  Goal: Optimal Wound Healing  Outcome: Met     Problem: Fall Injury Risk  Goal: Absence of Fall and Fall-Related Injury  Outcome: Met     Problem: Occupational Therapy  Goal: Occupational Therapy Goal  Description: Goals to be met by: 8/10/24     Patient will increase functional independence with ADLs by performing:    UE Dressing with Supervision.  LE Dressing with Supervision.  Grooming while standing at sink with Supervision.  Toileting from toilet with Supervision for hygiene and clothing management.   Bathing from  shower chair/bench with Supervision.  Toilet transfer to toilet with Supervision.  Increased strength and functional activity tolerance for ADL's/IADL's    Outcome: Met     Problem: Physical Therapy  Goal: Physical Therapy Goal  Description:   Patient will increase functional independence with mobility by performin. Supine to sit with Stand-by Assistance  2. Sit to stand transfer with Stand-by Assistance  3. Bed to chair  transfer with Stand-by Assistance using Rolling Walker  4. Gait  x 500 feet with Stand-by Assistance using Rolling Walker.   5. Lower extremity exercise program x30 reps per handout, with independence    Outcome: Met

## 2024-07-14 NOTE — PLAN OF CARE
Chart and discharge orders reviewed.  Patient cleared for discharge from case management standpoint.    Family to transport pt home.      07/14/24 0819   Final Note   Assessment Type Final Discharge Note   Anticipated Discharge Disposition Home   What phone number can be called within the next 1-3 days to see how you are doing after discharge? [phone]   Post-Acute Status   Discharge Delays None known at this time

## 2024-07-14 NOTE — PROGRESS NOTES
Pt d/c education provided, pt verbalized understanding. PIV and Tele removed. Discharge instructions and personal belongings in hand. Pt transported via wheelchair to family's personal vehicle.

## 2024-07-14 NOTE — CARE UPDATE
07/13/24 1919   Patient Assessment/Suction   Level of Consciousness (AVPU) alert   Respiratory Effort Normal;Unlabored   Expansion/Accessory Muscles/Retractions expansion symmetric;no retractions;no use of accessory muscles   All Lung Fields Breath Sounds clear   PRE-TX-O2   Device (Oxygen Therapy) room air   SpO2 97 %   Pulse Oximetry Type Intermittent   Pulse 84   Resp 18   Aerosol Therapy   $ Aerosol Therapy Charges PRN treatment not required   Respiratory Treatment Status (SVN) PRN treatment not required   Incentive Spirometer   $ Incentive Spirometer Charges done with encouragement   Administration (IS) proper technique demonstrated   Number of Repetitions (IS) 6   Level Incentive Spirometer (mL) 2000   Patient Tolerance (IS) good

## 2024-07-15 ENCOUNTER — PATIENT OUTREACH (OUTPATIENT)
Dept: ADMINISTRATIVE | Facility: CLINIC | Age: 59
End: 2024-07-15
Payer: MEDICARE

## 2024-07-15 ENCOUNTER — TELEPHONE (OUTPATIENT)
Dept: SURGERY | Facility: CLINIC | Age: 59
End: 2024-07-15
Payer: MEDICARE

## 2024-07-15 DIAGNOSIS — K56.609 SMALL BOWEL OBSTRUCTION: Primary | ICD-10-CM

## 2024-07-15 NOTE — PROGRESS NOTES
C3 nurse attempted to contact Eugenia Manuel  for a TCC post hospital discharge follow up call. No answer. No voicemail available.The patient does not have a scheduled HOSFU appointment.    Please contact patient and schedule follow up appointment using HOSFU visit type on or before 07/21/2024.    Message sent to PCP staff

## 2024-07-16 ENCOUNTER — NURSE TRIAGE (OUTPATIENT)
Dept: ADMINISTRATIVE | Facility: CLINIC | Age: 59
End: 2024-07-16
Payer: MEDICARE

## 2024-07-16 NOTE — TELEPHONE ENCOUNTER
Patient states recent discharge from the hospital and is s/p Laparoscopy, Diagnostic on 7/11/24. Patient states history of chronic kidney disease and decreased urine output. Patient states she has not urinated since Sunday, 7/14/24, and c/o blood in her urine. Patient states when she wipes there is pure blood noted.     Patient advised to Go to ED Now for evaluation/treatment. Patient also advised to contact the [facility] for any worsening symptoms. Patient states understanding of care advice.     Reason for Disposition   Unable to urinate (or only a few drops) > 4 hours and bladder feels very full (e.g., palpable bladder or strong urge to urinate)    Additional Information   Negative: Shock suspected (e.g., cold/pale/clammy skin, too weak to stand, low BP, rapid pulse)   Negative: Sounds like a life-threatening emergency to the triager   Negative: Urinary catheter, questions about   Negative: Recent back or abdominal injury   Negative: Recent genital injury    Protocols used: Urine - Blood In-A-OH

## 2024-07-18 LAB
OHS QRS DURATION: 90 MS
OHS QTC CALCULATION: 450 MS

## 2024-07-19 LAB
OHS QRS DURATION: 100 MS
OHS QTC CALCULATION: 479 MS

## 2024-07-19 NOTE — DISCHARGE SUMMARY
[facility] Medicine  Discharge Summary      Patient Name: Eugenia Manuel  MRN: [id number]  DEANN: [id number]  Patient Class: IP- Inpatient  Admission Date: 7/8/2024  Hospital Length of Stay: 6 days  Discharge Date and Time: 7/14/2024 10:47 AM  Attending Physician: No att. providers found   Discharging Provider: Penny Low NP  Primary Care Provider: John Castro MD    Primary Care Team: Networked reference to record PCT     HPI:   58 year old female with past medical history of CAD w/ 2 stents, HTN, ESRD on HD MWF, bipolar disorder, hypothyroidism, asthma, MI, SBO, restless leg syndrome who presented to the ER with complaints nausea, vomiting, and abdominal distention x 2 days.  She reports last bowel movement was on Saturday and is not passing gas.  ED work up reveals CT of abdomen/pelvis with concern for SBO.  CBC with anemia which appears around baseline.  CMP consistent with ESRD with potassium 6.1.  She was given calcium gluconate, albuterol, insulin, and dextrose.  Repeat potassium pending.  Nephrology was notified and HD to be done today.  General surgery also notified and ordered NG Tube to be placed.  On chart review, patient was admitted in April 2024 for SBO.  She was given a gastrograffin challenge and GI function returned.  Patient will be admitted to hospital medicine for further evaluation and treatment.    Procedure(s) (LRB):  LAPAROSCOPY, DIAGNOSTIC (N/A)  LYSIS, ADHESIONS, LAPAROSCOPIC (N/A)      Hospital Course:   58 year old female admitted with complaints of nausea, vomiting, and abdominal distention.  CT of abdomen was done with concern for SBO.  CMP consistent with ESRD with hyperkalemia.  NGT tube placed. Nephrology and general surgery were consulted.  She is on HD MWF, she had HD yesterday.  She was given a gastrograffin challenge on 7/9 with persistent dilation. She was taken for lysis of adhesions on 7/11 with Dr. Benites. SBO resolved. Diet  advanced as tolerated. She tolerated diet without N/V/abdominal pain. Pain is controlled. She is medically stable for DC from surgical standpoint.      Goals of Care Treatment Preferences:  Code Status: Full Code      Consults:   Consults (From admission, onward)          Status Ordering Provider     Inpatient consult to Nephrology  Once        Provider:  Clinton Lee MD    Completed DARON ARCINIEGA     Inpatient consult to General surgery  Once        Provider:  Stephen Benites III, MD    Completed DARON ARCINIEGA            No new Assessment & Plan notes have been filed under this hospital service since the last note was generated.  Service: Hospital Medicine    Final Active Diagnoses:    Diagnosis Date Noted POA    PRINCIPAL PROBLEM:  Small bowel obstruction [K56.609] 04/30/2024 Yes    Hyperkalemia [E87.5] 04/30/2024 Yes    ESRD (end stage renal disease) [N18.6] 02/27/2024 Yes    Hypertension [I10] 11/30/2023 Yes    Coronary artery disease involving native coronary artery of native heart with unstable angina pectoris [I25.110] 11/30/2023 Yes    Hypothyroidism [E03.9] 11/30/2023 Yes      Problems Resolved During this Admission:       Discharged Condition: stable    Disposition: Home or Self Care    Follow Up:   Follow-up Information       Stephen Benites III, MD Follow up.    Specialties: General Surgery, Surgery  Why: msg sent to clinic for scheduling  Contact information:  [address]  [address]  [place] [address]  [phone]               John Castro MD Follow up.    Specialty: Family Medicine  Contact information:  [address]  [address]  [place] [address]  [phone]               John Castro MD Follow up in 1 week(s).    Specialty: Family Medicine  Contact information:  [address]  [address]  [place] [address]  [phone]                           Patient Instructions:      Ambulatory referral/consult to [facility]   Standing Status: Future    Referral Priority: Routine Referral Type: Consultation   Referral Reason: Specialty Services Required   Number of Visits Requested: 1     Diet renal     Notify your health care provider if you experience any of the following:  severe uncontrolled pain     Notify your health care provider if you experience any of the following:  persistent nausea and vomiting or diarrhea     Activity as tolerated       Significant Diagnostic Studies: N/A    Pending Diagnostic Studies:       Procedure Component Value Units Date/Time    EKG 12-lead [[id number]]     Order Status: Sent Lab Status: No result            Medications:  Reconciled Home Medications:      Medication List        START taking these medications      HYDROcodone-acetaminophen 5-325 mg per tablet  Commonly known as: NORCO  Take 1 tablet by mouth every 6 (six) hours as needed for Pain.            CHANGE how you take these medications      * ondansetron 4 MG Tbdl  Commonly known as: ZOFRAN-ODT  Take 1 tablet (4 mg total) by mouth 1 (one) time if needed (nausea).  What changed: Another medication with the same name was added. Make sure you understand how and when to take each.     * ondansetron 4 MG Tbdl  Commonly known as: ZOFRAN-ODT  Take 1 tablet (4 mg total) by mouth every 8 (eight) hours as needed.  What changed: You were already taking a medication with the same name, and this prescription was added. Make sure you understand how and when to take each.     * QUEtiapine 100 MG Tab  Commonly known as: SEROQUEL  Take 1 tablet (100 mg total) by mouth once daily.  What changed: when to take this     * QUEtiapine 25 MG Tab  Commonly known as: SEROQUEL  Take 1 tablet (25 mg total) by mouth every evening.  What changed: Another medication with the same name was changed. Make sure you understand how and when to take each.           * This list has 4 medication(s) that are the same as other medications prescribed for you. Read the directions carefully, and ask your doctor  or other care provider to review them with you.                CONTINUE taking these medications      albuterol 90 mcg/actuation inhaler  Commonly known as: PROVENTIL/VENTOLIN HFA  Inhale 2 puffs into the lungs every 6 (six) hours as needed. Rescue     aspirin 81 MG Chew  Take 1 tablet (81 mg total) by mouth once daily.     atorvastatin 40 MG tablet  Commonly known as: LIPITOR  Take 1 tablet (40 mg total) by mouth once daily.     AURYXIA 210 mg iron Tab  Generic drug: ferric citrate  Take 420 mg by mouth 3 (three) times daily.     azelastine 137 mcg (0.1 %) nasal spray  Commonly known as: ASTELIN  1 spray (137 mcg total) by Nasal route 2 (two) times daily.     BREZTRI AEROSPHERE 160-9-4.8 mcg/actuation Hfaa  Generic drug: budesonide-glycopyr-formoterol  Inhale 2 puffs into the lungs 2 (two) times daily.     cetirizine 10 MG tablet  Commonly known as: ZYRTEC  Take 1 tablet (10 mg total) by mouth once daily.     clopidogreL 75 mg tablet  Commonly known as: PLAVIX  Take 1 tablet (75 mg total) by mouth once daily.     fluticasone propionate 50 mcg/actuation nasal spray  Commonly known as: FLONASE  2 sprays (100 mcg total) by Each Nostril route once daily.     levothyroxine 150 MCG tablet  Commonly known as: SYNTHROID  Take 1 tablet (150 mcg total) by mouth before breakfast.     melatonin 10 mg Cap  Take 10 mg by mouth nightly as needed.     midodrine 10 MG tablet  Commonly known as: PROAMATINE  Take 1 tablet (10 mg total) by mouth daily as needed (hypotension).     montelukast 10 mg tablet  Commonly known as: SINGULAIR  Take 1 tablet (10 mg total) by mouth every evening.     nitroGLYCERIN 0.4 MG SL tablet  Commonly known as: NITROSTAT  Place 1 tablet (0.4 mg total) under the tongue every 5 (five) minutes as needed for Chest pain.     pantoprazole 40 MG tablet  Commonly known as: PROTONIX  Take 1 tablet (40 mg total) by mouth 2 (two) times daily.     pregabalin 50 MG capsule  Commonly known as: LYRICA  Take 50 mg by  mouth once daily.     rOPINIRole 2 MG tablet  Commonly known as: REQUIP  Take 1 tablet (2 mg total) by mouth 3 (three) times daily.     senna-docusate 8.6-50 mg 8.6-50 mg per tablet  Commonly known as: PERICOLACE  Take 1 tablet by mouth daily as needed for Constipation.     sevelamer carbonate 800 mg Tab  Commonly known as: RENVELA  Take 5 tablets (4,000 mg total) by mouth 3 (three) times daily with meals.     torsemide 100 MG Tab  Commonly known as: DEMADEX  Take 1 tablet (100 mg total) by mouth 2 (two) times a day.     venlafaxine 150 MG Cp24  Commonly known as: EFFEXOR-XR  Take 1 capsule (150 mg total) by mouth once daily.              Indwelling Lines/Drains at time of discharge:   Lines/Drains/Airways       Drain  Duration                  Hemodialysis AV Fistula Left upper arm -- days                    Time spent on the discharge of patient: 45 minutes         Penny Low NP  Department of Hospital Medicine  [facility]/Surg

## 2024-07-25 ENCOUNTER — OFFICE VISIT (OUTPATIENT)
Dept: SURGERY | Facility: CLINIC | Age: 59
End: 2024-07-25
Payer: MEDICARE

## 2024-07-25 VITALS
SYSTOLIC BLOOD PRESSURE: 147 MMHG | DIASTOLIC BLOOD PRESSURE: 81 MMHG | TEMPERATURE: 98 F | WEIGHT: 214 LBS | BODY MASS INDEX: 32.43 KG/M2 | HEART RATE: 106 BPM | HEIGHT: 68 IN

## 2024-07-25 DIAGNOSIS — K56.50 SMALL BOWEL OBSTRUCTION DUE TO ADHESIONS: Primary | ICD-10-CM

## 2024-07-26 ENCOUNTER — TELEPHONE (OUTPATIENT)
Dept: SURGERY | Facility: CLINIC | Age: 59
End: 2024-07-26
Payer: MEDICARE

## 2024-07-26 RX ORDER — SULFAMETHOXAZOLE AND TRIMETHOPRIM 800; 160 MG/1; MG/1
1 TABLET ORAL 2 TIMES DAILY
Qty: 20 TABLET | Refills: 0 | Status: SHIPPED | OUTPATIENT
Start: 2024-07-26 | End: 2024-08-05

## 2024-07-26 NOTE — TELEPHONE ENCOUNTER
----- Message from Yeny Melendez sent at 7/26/2024  2:26 PM CDT -----  Contact: Patient  Type:  Patient Returning Call    Who Called:  Patient  Who Left Message for Patient: Bonita  Does the patient know what this is regarding?:   Pharmacy, possibly    Would the patient rather a call back or a response via [facility]?   Call back  Best Call Back Number:  [phone]    Additional Information:   States she is returning a missed call - please call back - thank you

## 2024-07-26 NOTE — PROGRESS NOTES
Subjective:       Patient ID: Eugenia Manuel is a 58 y.o. female.    Chief Complaint: Post-op Evaluation      HPI:  Status post diagnostic laparoscopy with conversion to hybrid hand assist for lysis of adhesions due to small-bowel obstruction.  She is feeling well.  Tolerating diet.  Having good bowel function.  Tenderness much improved.  No fevers or chills.    Review of Systems    Objective:      Physical Exam  Constitutional:       General: She is not in acute distress.  Pulmonary:      Effort: Pulmonary effort is normal. No respiratory distress.   Abdominal:      General: There is no distension.      Palpations: Abdomen is soft.      Tenderness: There is no abdominal tenderness. There is no guarding or rebound.   Skin:     Comments: Incisions are clean, dry and intact  There is no evidence of infection, hematoma or seroma    Neurological:      Mental Status: She is alert and oriented to person, place, and time.   Psychiatric:         Behavior: Behavior is cooperative.         Assessment/Plan:   Eugenia was seen today for post-op evaluation.    Diagnoses and all orders for this visit:    Small bowel obstruction due to adhesions    Other orders  -     sulfamethoxazole-trimethoprim 800-160mg (BACTRIM DS) 800-160 mg Tab; Take 1 tablet by mouth 2 (two) times daily. for 10 days        Status post lysis of adhesions -small bowel to small bowel, omental to the anterior abdominal wall.  Doing well.  No evidence of complications.  Evidence of obstruction.  Return to clinic p.r.n..

## 2024-07-26 NOTE — TELEPHONE ENCOUNTER
----- Message from Jose Raul Reid sent at 7/26/2024  9:14 AM CDT -----  Contact: self  Type: RX Refill Request        Who Called: Patient   Refill or New Rx: New Rx   RX Name and Strength: antibiotics   How is the patient currently taking it? (ex. 1XDay): as directed   Is this a 30 day or 90 day RX: n/a  Preferred Pharmacy with phone number:   [id number] - [place], [place] - [address] [facility] [address]  Phone: [phone] Fax: [phone]  Local or Mail Order: Local  Ordering Provider:  Dr. Delmis Madera Call Back Number: [phone]  Additional Information: Plz fill for the pt. Thanks

## 2024-07-31 DIAGNOSIS — J45.909 ASTHMA: ICD-10-CM

## 2024-07-31 RX ORDER — FLUTICASONE PROPIONATE 50 MCG
2 SPRAY, SUSPENSION (ML) NASAL DAILY
Qty: 16 G | Refills: 11
Start: 2024-07-31

## 2024-08-15 ENCOUNTER — TELEPHONE (OUTPATIENT)
Dept: SURGERY | Facility: CLINIC | Age: 59
End: 2024-08-15
Payer: MEDICARE

## 2024-08-15 NOTE — TELEPHONE ENCOUNTER
----- Message from Lisa Gaspar sent at 8/15/2024  1:56 PM CDT -----  Type:  Sooner Appointment Request    Caller is requesting a sooner appointment.  Caller declined first available appointment listed below.  Caller will not accept being placed on the waitlist and is requesting a message be sent to doctor.    Name of Caller:  pt   When is the first available appointment?  N/A  Symptoms:  follow up   Would the patient rather a call back or a response via [facility]? Call   Best Call Back Number:  [phone] (home)     Additional Information:  pt will be getting new insurance on first of month please advise

## 2024-08-15 NOTE — TELEPHONE ENCOUNTER
----- Message from Kami Jorge sent at 8/15/2024 10:18 AM CDT -----  Regarding: Needs return call  Type: Needs Medical Advice  Who Called:  Pt    Best Call Back Number: [phone]    Additional Information: Pt is getting new insurance next month so she wanted to talk to the office about possibly getting in to see dr rodriguez, please call to hesham. Had some questions for the office

## 2024-08-16 DIAGNOSIS — G25.81 RESTLESS LEG: ICD-10-CM

## 2024-08-16 RX ORDER — ROPINIROLE 2 MG/1
TABLET, FILM COATED ORAL
Qty: 90 TABLET | Refills: 1 | Status: SHIPPED | OUTPATIENT
Start: 2024-08-16

## 2024-09-06 ENCOUNTER — TELEPHONE (OUTPATIENT)
Dept: PULMONOLOGY | Facility: CLINIC | Age: 59
End: 2024-09-06
Payer: MEDICARE

## 2024-09-06 NOTE — TELEPHONE ENCOUNTER
Received referral order from Bhavin Laboy D.O office.  Spoke to patient because we are not providers for her insurance.  She verbalized understanding

## 2024-10-01 ENCOUNTER — TELEPHONE (OUTPATIENT)
Dept: PULMONOLOGY | Facility: CLINIC | Age: 59
End: 2024-10-01
Payer: MEDICARE

## 2024-10-01 DIAGNOSIS — J45.909 ASTHMA, UNSPECIFIED ASTHMA SEVERITY, UNSPECIFIED WHETHER COMPLICATED, UNSPECIFIED WHETHER PERSISTENT: Primary | ICD-10-CM

## 2024-10-01 NOTE — TELEPHONE ENCOUNTER
Patient called said she got her Oxygen in 03/2024 and no longer uses it,  she just uses her inhalers.  She needs an order for [facility] to pick concentrator and tank up.  Please advise

## 2024-10-03 ENCOUNTER — TELEPHONE (OUTPATIENT)
Dept: FAMILY MEDICINE | Facility: CLINIC | Age: 59
End: 2024-10-03
Payer: MEDICARE

## 2024-10-03 NOTE — TELEPHONE ENCOUNTER
----- Message from Patito sent at 10/3/2024  8:35 AM CDT -----  Contact: anaya Lucio from [facility] sent a refill request and checking on the status.   [phone]

## 2024-10-04 DIAGNOSIS — G45.9 TIA (TRANSIENT ISCHEMIC ATTACK): Primary | ICD-10-CM

## 2024-10-10 DIAGNOSIS — F31.9 BIPOLAR AFFECTIVE DISORDER, REMISSION STATUS UNSPECIFIED: ICD-10-CM

## 2024-10-10 DIAGNOSIS — G25.81 RESTLESS LEG: ICD-10-CM

## 2024-10-11 RX ORDER — ROPINIROLE 2 MG/1
TABLET, FILM COATED ORAL
Qty: 90 TABLET | Refills: 1 | OUTPATIENT
Start: 2024-10-11

## 2024-10-11 RX ORDER — VENLAFAXINE HYDROCHLORIDE 150 MG/1
CAPSULE, EXTENDED RELEASE ORAL
Qty: 90 CAPSULE | Refills: 1 | OUTPATIENT
Start: 2024-10-11

## 2024-10-14 DIAGNOSIS — E03.9 ACQUIRED HYPOTHYROIDISM: ICD-10-CM

## 2024-10-15 RX ORDER — LINACLOTIDE 72 UG/1
CAPSULE, GELATIN COATED ORAL
Qty: 30 CAPSULE | Refills: 11 | Status: SHIPPED | OUTPATIENT
Start: 2024-10-15

## 2024-10-15 RX ORDER — LEVOTHYROXINE SODIUM 150 UG/1
TABLET ORAL
Qty: 240 TABLET | Refills: 0 | Status: SHIPPED | OUTPATIENT
Start: 2024-10-15

## 2024-10-15 NOTE — TELEPHONE ENCOUNTER
Called patient to advise thyroid medication was sent and she needs her thyroid functions repeated. Received voicemail, left message and call back number.

## 2024-11-17 DIAGNOSIS — K21.9 GASTROESOPHAGEAL REFLUX DISEASE, UNSPECIFIED WHETHER ESOPHAGITIS PRESENT: ICD-10-CM

## 2024-11-17 DIAGNOSIS — G25.81 RESTLESS LEG: ICD-10-CM

## 2024-11-18 ENCOUNTER — TELEPHONE (OUTPATIENT)
Dept: PULMONOLOGY | Facility: CLINIC | Age: 59
End: 2024-11-18
Payer: MEDICARE

## 2024-11-18 RX ORDER — ROPINIROLE 2 MG/1
TABLET, FILM COATED ORAL
Qty: 90 TABLET | Refills: 0 | Status: SHIPPED | OUTPATIENT
Start: 2024-11-18

## 2024-11-18 RX ORDER — PANTOPRAZOLE SODIUM 40 MG/1
40 TABLET, DELAYED RELEASE ORAL 2 TIMES DAILY
Qty: 180 TABLET | Refills: 0 | Status: SHIPPED | OUTPATIENT
Start: 2024-11-18

## 2024-11-18 RX ORDER — ALBUTEROL SULFATE 90 UG/1
2 INHALANT RESPIRATORY (INHALATION) EVERY 6 HOURS PRN
Qty: 54 G | Refills: 6 | Status: SHIPPED | OUTPATIENT
Start: 2024-11-18 | End: 2025-11-18

## 2024-11-18 NOTE — TELEPHONE ENCOUNTER
----- Message from Marcelo sent at 11/15/2024 11:58 AM CST -----  Regarding: Med refill  The patients insurance called to request a refill on her albuterol inhaler and also asked if she could receive a 90 day supply instead of the normal 30 days.

## 2024-11-19 ENCOUNTER — TELEPHONE (OUTPATIENT)
Dept: CARDIOLOGY | Facility: CLINIC | Age: 59
End: 2024-11-19

## 2024-11-19 NOTE — TELEPHONE ENCOUNTER
----- Message from Qian sent at 11/19/2024  3:05 PM CST -----  Contact: pt  Type:  Needs Medical Advice    Who Called: pt    Would the patient rather a call back or a response via [facility]?  Call back    Best Call Back Number: [phone]     Additional Information: Dr KARSTEN Tobin faxed over a release form for treatment at his location.    Did you get it?     Can you fax back please?    Please advise    Thanks

## 2024-11-19 NOTE — LETTER
2024    Eugenia Manuel  [address]  [place] [address]             [facility] [place] [facility]  [address]  [address]  [place] [address]  Phone: [phone]  Fax: [phone] Patient: Eugenia Manuel  : 1965  Referring Doctor: Dr. Mike Tobin  Telephone: [phone] , Fax : [phone]  Procedure : Minimally invasive lumbar decompression L3/4  Type of Anesthesia: General   Date of Last Stent: 2024  Date of Last Office Visit:2024    Current Outpatient Medications   Medication Sig    albuterol (PROVENTIL/VENTOLIN HFA) 90 mcg/actuation inhaler Inhale 2 puffs into the lungs every 6 (six) hours as needed. Rescue    albuterol (PROVENTIL/VENTOLIN HFA) 90 mcg/actuation inhaler Inhale 2 puffs into the lungs every 6 (six) hours as needed for Wheezing. Rescue    aspirin 81 MG Chew Take 1 tablet (81 mg total) by mouth once daily.    atorvastatin (LIPITOR) 40 MG tablet Take 1 tablet (40 mg total) by mouth once daily.    azelastine (ASTELIN) 137 mcg (0.1 %) nasal spray 1 spray (137 mcg total) by Nasal route 2 (two) times daily.    budesonide-glycopyr-formoterol (BREZTRI AEROSPHERE) 160-9-4.8 mcg/actuation HFAA Inhale 2 puffs into the lungs 2 (two) times daily.    cetirizine (ZYRTEC) 10 MG tablet Take 1 tablet (10 mg total) by mouth once daily.    clopidogreL (PLAVIX) 75 mg tablet Take 1 tablet (75 mg total) by mouth once daily.    ferric citrate (AURYXIA) 210 mg iron Tab Take 420 mg by mouth 3 (three) times daily.    fluticasone propionate (FLONASE) 50 mcg/actuation nasal spray 2 sprays (100 mcg total) by Each Nostril route once daily.    HYDROcodone-acetaminophen (NORCO) 5-325 mg per tablet Take 1 tablet by mouth every 6 (six) hours as needed for Pain.    levothyroxine (SYNTHROID) 150 MCG tablet TAKE ONE TABLET (150MCG) BY MOUTH DAILY BEFORE BREAKFAST    LINZESS 72 mcg Cap capsule TAKE ONE CAPSULE (72 MCG TOTAL) BY MOUTH BEFORE BREAKFAST     melatonin 10 mg Cap Take 10 mg by mouth nightly as needed.    midodrine (PROAMATINE) 10 MG tablet Take 1 tablet (10 mg total) by mouth daily as needed (hypotension).    montelukast (SINGULAIR) 10 mg tablet Take 1 tablet (10 mg total) by mouth every evening.    nitroGLYCERIN (NITROSTAT) 0.4 MG SL tablet Place 1 tablet (0.4 mg total) under the tongue every 5 (five) minutes as needed for Chest pain.    ondansetron (ZOFRAN-ODT) 4 MG TbDL Take 1 tablet (4 mg total) by mouth 1 (one) time if needed (nausea).    ondansetron (ZOFRAN-ODT) 4 MG TbDL Take 1 tablet (4 mg total) by mouth every 8 (eight) hours as needed.    pantoprazole (PROTONIX) 40 MG tablet TAKE ONE TABLET BY MOUTH TWICE DAILY    pregabalin (LYRICA) 50 MG capsule Take 50 mg by mouth once daily.    QUEtiapine (SEROQUEL) 100 MG Tab Take 1 tablet (100 mg total) by mouth once daily. (Patient taking differently: Take 100 mg by mouth nightly.)    QUEtiapine (SEROQUEL) 25 MG Tab Take 1 tablet (25 mg total) by mouth every evening.    rOPINIRole (REQUIP) 2 MG tablet TAKE ONE TABLET (2 MG TOTAL) BY MOUTH THREE TIMES DAILY    senna-docusate 8.6-50 mg (PERICOLACE) 8.6-50 mg per tablet Take 1 tablet by mouth daily as needed for Constipation.    sevelamer carbonate (RENVELA) 800 mg Tab Take 5 tablets (4,000 mg total) by mouth 3 (three) times daily with meals.    torsemide (DEMADEX) 100 MG Tab Take 1 tablet (100 mg total) by mouth 2 (two) times a day.    venlafaxine (EFFEXOR-XR) 150 MG Cp24 TAKE ONE CAPSULE (150MG) BY MOUTH EVERY DAY     No current facility-administered medications for this visit.       This patient has been assessed for risk factors for clearance of surgery with the following stipulations:    ___ No contraindications  ___ Recommendations for antiplatelet/anticoagulant medications:  ___ Hold plavix for 7 days   ___ Cleared for surgery with the following contraindications/precautions:  ___ Low risk ___ , Moderate risk ___ , High risk ___ .   ___ Not cleared  for surgery due to the following reasons:      If you have any questions regarding the above, please contact my office at [phone].    Sincerely,

## 2024-11-21 ENCOUNTER — TELEPHONE (OUTPATIENT)
Dept: FAMILY MEDICINE | Facility: CLINIC | Age: 59
End: 2024-11-21
Payer: MEDICARE

## 2024-11-21 DIAGNOSIS — I25.10 CORONARY ATHEROSCLEROSIS OF NATIVE CORONARY ARTERY: Primary | ICD-10-CM

## 2024-11-21 NOTE — TELEPHONE ENCOUNTER
----- Message from Patito sent at 11/20/2024  2:49 PM CST -----  Contact: Lee Ann Nance from [facility] calling about a medication refill on pantoprazole   [phone]

## 2024-11-22 ENCOUNTER — TELEPHONE (OUTPATIENT)
Dept: FAMILY MEDICINE | Facility: CLINIC | Age: 59
End: 2024-11-22
Payer: MEDICARE

## 2024-11-22 ENCOUNTER — TELEPHONE (OUTPATIENT)
Dept: CARDIOLOGY | Facility: CLINIC | Age: 59
End: 2024-11-22
Payer: MEDICARE

## 2024-11-22 NOTE — TELEPHONE ENCOUNTER
----- Message from Zohra sent at 11/22/2024 10:53 AM CST -----  Contact: [facility]  Type:  Needs Medical Advice    Who Called: Chely from [facility]  Symptoms (please be specific): pt is checking the status of a cardiac clearance. Pt is having schedule for 12/12. Please send over clearance asap.  Fax # is [phone]  Would the patient rather a call back or a response via [facility]? call  Best Call Back Number: [phone]  Additional Information: please advise and thank you.

## 2024-11-22 NOTE — TELEPHONE ENCOUNTER
----- Message from Penny sent at 11/21/2024 11:46 AM CST -----  - 11:23- kathy with [facility] ropinirole, pantoprazole 90 day   [phone]

## 2024-12-02 ENCOUNTER — TELEPHONE (OUTPATIENT)
Dept: CARDIOLOGY | Facility: HOSPITAL | Age: 59
End: 2024-12-02

## 2024-12-02 NOTE — TELEPHONE ENCOUNTER
Left message on voicemail.    Patient advised, test will be at [facility] ([address]  Will need to register on the first floor at the main entrance.  Patient advised that arrival time is 07:30.  Patient advised that they may be here about 3.5-4 hours, and may want to bring something to occupy their time, as there will be periods of waiting.    Patient advised, may take her medications prior to testing if you need to. Advised if medications are taken with food, please keep it light, like toast and juice.    Patient advised to avoid all caffeine 12 hours prior to testing.  This includes chocolate, decaf tea and coffee.    Will provide peanut butter crackers for a snack after stress test.  If patient would prefer something else, please bring a snack from home.    Wear comfortable clothing.  No lotions, oils, or powders to the upper chest area. May wear deodorant.    No metal jewelry, buttons, or zippers to the upper body.  Advised to call the office if any questions.

## 2024-12-03 ENCOUNTER — HOSPITAL ENCOUNTER (OUTPATIENT)
Dept: CARDIOLOGY | Facility: HOSPITAL | Age: 59
Discharge: HOME | End: 2024-12-03
Attending: INTERNAL MEDICINE
Payer: MEDICARE

## 2024-12-03 ENCOUNTER — HOSPITAL ENCOUNTER (OUTPATIENT)
Dept: RADIOLOGY | Facility: HOSPITAL | Age: 59
Discharge: HOME | End: 2024-12-03
Attending: INTERNAL MEDICINE
Payer: MEDICARE

## 2024-12-03 DIAGNOSIS — I25.10 CORONARY ATHEROSCLEROSIS OF NATIVE CORONARY ARTERY: ICD-10-CM

## 2024-12-03 PROCEDURE — 93016 CV STRESS TEST SUPVJ ONLY: CPT | Mod: ,,,

## 2024-12-03 PROCEDURE — A9502 TC99M TETROFOSMIN: HCPCS | Performed by: INTERNAL MEDICINE

## 2024-12-03 PROCEDURE — 93018 CV STRESS TEST I&R ONLY: CPT | Mod: ,,, | Performed by: INTERNAL MEDICINE

## 2024-12-03 PROCEDURE — 63600175 PHARM REV CODE 636 W HCPCS: Performed by: INTERNAL MEDICINE

## 2024-12-03 PROCEDURE — 78452 HT MUSCLE IMAGE SPECT MULT: CPT

## 2024-12-03 PROCEDURE — 78452 HT MUSCLE IMAGE SPECT MULT: CPT | Mod: 26,,, | Performed by: INTERNAL MEDICINE

## 2024-12-03 RX ORDER — REGADENOSON 0.08 MG/ML
0.4 INJECTION, SOLUTION INTRAVENOUS ONCE
Status: COMPLETED | OUTPATIENT
Start: 2024-12-03 | End: 2024-12-03

## 2024-12-03 RX ADMIN — TETROFOSMIN 12.3 MILLICURIE: 1.38 INJECTION, POWDER, LYOPHILIZED, FOR SOLUTION INTRAVENOUS at 08:12

## 2024-12-03 RX ADMIN — REGADENOSON 0.4 MG: 0.08 INJECTION, SOLUTION INTRAVENOUS at 09:12

## 2024-12-03 RX ADMIN — TETROFOSMIN 26.7 MILLICURIE: 1.38 INJECTION, POWDER, LYOPHILIZED, FOR SOLUTION INTRAVENOUS at 09:12

## 2024-12-04 LAB
CV PHARM DOSE: 0.4 MG
CV STRESS BASE HR: 81 BPM
DIASTOLIC BLOOD PRESSURE: 73 MMHG
EJECTION FRACTION- HIGH: 65 %
END DIASTOLIC INDEX-HIGH: 153 ML/M2
END DIASTOLIC INDEX-LOW: 93 ML/M2
END SYSTOLIC INDEX-HIGH: 71 ML/M2
END SYSTOLIC INDEX-LOW: 31 ML/M2
NUC REST DIASTOLIC VOLUME INDEX: 106
NUC REST EJECTION FRACTION: 61
NUC REST SYSTOLIC VOLUME INDEX: 41
NUC STRESS DIASTOLIC VOLUME INDEX: 138
NUC STRESS EJECTION FRACTION: 64 %
NUC STRESS SYSTOLIC VOLUME INDEX: 49
OHS CV CPX 85 PERCENT MAX PREDICTED HEART RATE MALE: 138
OHS CV CPX MAX PREDICTED HEART RATE: 162
OHS CV CPX PATIENT IS FEMALE: 1
OHS CV CPX PATIENT IS MALE: 0
OHS CV CPX PEAK DIASTOLIC BLOOD PRESSURE: 68 MMHG
OHS CV CPX PEAK HEAR RATE: 94 BPM
OHS CV CPX PEAK RATE PRESSURE PRODUCT: NORMAL
OHS CV CPX PEAK SYSTOLIC BLOOD PRESSURE: 136 MMHG
OHS CV CPX PERCENT MAX PREDICTED HEART RATE ACHIEVED: 61
OHS CV CPX RATE PRESSURE PRODUCT PRESENTING: NORMAL
OHS CV INITIAL DOSE: 12.3 MCG/KG/MIN
OHS CV PEAK DOSE: 26.7 MCG/KG/MIN
RETIRED EF AND QEF - SEE NOTES: 53 %
SYSTOLIC BLOOD PRESSURE: 127 MMHG

## 2024-12-10 ENCOUNTER — TELEPHONE (OUTPATIENT)
Dept: CARDIOLOGY | Facility: CLINIC | Age: 59
End: 2024-12-10
Payer: MEDICARE

## 2024-12-10 NOTE — TELEPHONE ENCOUNTER
----- Message from Alina sent at 12/10/2024 10:19 AM CST -----  Contact: self  Type:  Sooner Appointment Request    Caller is requesting a sooner appointment.  Caller declined first available appointment listed below.  Caller will not accept being placed on the waitlist and is requesting a message be sent to doctor.    Name of Caller:  pt   When is the first available appointment?  Na   Symptoms:  needs ckup and clearance for pain med inj  Would the patient rather a call back or a response via [facility]? Call back  Best Call Back Number:  [phone]  Additional Information:  she is wanting to be seen by the doctor or a NP as soon as possible.  She really does not want to see the doctor in [place] prefers to stay in [place] if possible.  Please call back to advise and thanks

## 2024-12-10 NOTE — TELEPHONE ENCOUNTER
The patients insurance has no benefits for services at this location. The patient will have to pay out of pocket for this visit. The patient can contact their insurance carrier to locate providers in their network. By proceeding I acknowledge that I have shared this information with the patient.

## 2024-12-12 ENCOUNTER — TELEPHONE (OUTPATIENT)
Dept: FAMILY MEDICINE | Facility: CLINIC | Age: 59
End: 2024-12-12
Payer: MEDICARE

## 2024-12-13 RX ORDER — QUETIAPINE FUMARATE 100 MG/1
100 TABLET, FILM COATED ORAL DAILY
Start: 2024-12-13 | End: 2025-12-13

## 2024-12-18 DIAGNOSIS — G25.81 RESTLESS LEG: ICD-10-CM

## 2024-12-18 RX ORDER — ROPINIROLE 2 MG/1
TABLET, FILM COATED ORAL
Qty: 90 TABLET | Refills: 0 | Status: SHIPPED | OUTPATIENT
Start: 2024-12-18

## 2024-12-19 ENCOUNTER — TELEPHONE (OUTPATIENT)
Dept: FAMILY MEDICINE | Facility: CLINIC | Age: 59
End: 2024-12-19
Payer: MEDICARE

## 2024-12-23 ENCOUNTER — TELEPHONE (OUTPATIENT)
Dept: FAMILY MEDICINE | Facility: CLINIC | Age: 59
End: 2024-12-23
Payer: MEDICARE

## 2025-01-07 NOTE — TELEPHONE ENCOUNTER
[facility] Emergency Department  [address]  [place] [address]  Phone: [phone]  Fax: [phone]      Attending Physician Attestation    I performed a history and physical examination of the patient and discussed management with the mid level provideer. I reviewed the mid level provider's note and agree with the documented findings and plan of care. Any areas of disagreement are noted on the chart. I was personally present for the key portions of any procedures. I have documented in the chart those procedures where I was not present during the key portions. I have reviewed the emergency nurses triage note. I agree with the chief complaint, past medical history, past surgical history, allergies, medications, social and family history as documented unless otherwise noted below. Documentation of the HPI, Physical Exam and Medical Decision Making performed by mid level providers is based on my personal performance of the HPI, PE and MDM. For Physician Assistant/ Nurse Practitioner cases/documentation I have personally evaluated this patient and have completed at least one if not all key elements of the E/M (history, physical exam, and MDM). Additional findings are as noted.      CHIEF COMPLAINT       Chief Complaint   Patient presents with    Dizziness     After bowel movement, no history of, did hit head but denies any pain or tenderness, no thinners         PAST MEDICAL HISTORY    has a past medical history of Abnormal uterine bleeding, Acute left flank pain, Cervical disc disease, Dysmenorrhea, Flank pain, acute, Herniated disc, Kidney stone, Lumbar disc disease, Migraines, Other hydronephrosis, Other microscopic hematuria, Raynaud's disease, Ureterolithiasis, and Urethra, calculus.    SURGICAL HISTORY      has a past surgical history that includes Adenoidectomy; Lithotripsy (may 2013); Endometrial biopsy (07/08/2016); Lumbar spine surgery (x 4); Hysterectomy (09/08/2016); Cystocopy  Refills sent in   medical records.       Nursing Notes were reviewed.    REVIEW OF SYSTEMS       Review of Systems   Constitutional:  Negative for chills, fatigue and fever.   HENT:  Negative for congestion and sore throat.    Respiratory:  Negative for cough and shortness of breath.    Cardiovascular:  Negative for chest pain and leg swelling.   Gastrointestinal:  Positive for abdominal pain and diarrhea. Negative for nausea and vomiting.   Genitourinary:  Negative for dysuria and hematuria.   Musculoskeletal:  Negative for back pain and neck pain.   Skin:  Negative for rash and wound.   Neurological:  Positive for syncope. Negative for numbness and headaches.   Psychiatric/Behavioral:  Negative for confusion and suicidal ideas.        Except as noted above the remainder of the review of systems was reviewed and negative.       PAST MEDICAL HISTORY     Past Medical History:   Diagnosis Date    Abnormal uterine bleeding 9/7/2016    Acute left flank pain 5/1/2018    Cervical disc disease     Dysmenorrhea 9/7/2016    Flank pain, acute 11/18/2019    Herniated disc     Kidney stone     Lumbar disc disease     Migraines     Other hydronephrosis 11/12/2019    Other microscopic hematuria 5/1/2018    Raynaud's disease     Ureterolithiasis 6/9/2018    Urethra, calculus 6/10/2018         SURGICAL HISTORY       Past Surgical History:   Procedure Laterality Date    ADENOIDECTOMY      COLONOSCOPY  01/03/2022    COLORECTAL CANCER SCREENING, NOT HIGH RISK    COLONOSCOPY N/A 1/3/2022    COLONOSCOPY DIAGNOSTIC performed by Arlene Rai MD at [facility] OR    CYSTO/URETERO/PYELOSCOPY, CALCULUS TX N/A 11/18/2019    HOLMIUM - CYSTOSCOPY, BILATERAL URETEROSCOPY, LITHOTRIPSY, LEFT STONE BASKETING, LEFT STENT EXCHANGE, RIGHT STENT PLACEMENT performed by Sivakumar Driver MD at [facility] OR    CYSTOSCOPY  06/12/2018    with left stent insertion, (left ureter)    CYSTOSCOPY Left 06/18/2018     HOLMIUM - CYSTO, URETEROSCOPY LITHOTRIPSY, STENT EXCHANGE (Left

## 2025-01-13 DIAGNOSIS — I25.119 ATHEROSCLEROSIS OF NATIVE CORONARY ARTERY WITH ANGINA PECTORIS, UNSPECIFIED WHETHER NATIVE OR TRANSPLANTED HEART: ICD-10-CM

## 2025-01-15 RX ORDER — CLOPIDOGREL BISULFATE 75 MG/1
TABLET ORAL
Qty: 90 TABLET | Refills: 11 | Status: SHIPPED | OUTPATIENT
Start: 2025-01-15

## 2025-01-22 DIAGNOSIS — Z12.31 OTHER SCREENING MAMMOGRAM: Primary | ICD-10-CM

## 2025-01-23 ENCOUNTER — OFFICE VISIT (OUTPATIENT)
Dept: PULMONOLOGY | Facility: CLINIC | Age: 60
End: 2025-01-23
Payer: MEDICARE

## 2025-01-23 ENCOUNTER — TELEPHONE (OUTPATIENT)
Dept: PULMONOLOGY | Facility: CLINIC | Age: 60
End: 2025-01-23

## 2025-01-23 ENCOUNTER — HOSPITAL ENCOUNTER (OUTPATIENT)
Dept: RADIOLOGY | Facility: HOSPITAL | Age: 60
Discharge: HOME | End: 2025-01-23
Attending: NURSE PRACTITIONER
Payer: MEDICARE

## 2025-01-23 VITALS
HEIGHT: 68 IN | TEMPERATURE: 99 F | WEIGHT: 222 LBS | DIASTOLIC BLOOD PRESSURE: 78 MMHG | HEART RATE: 92 BPM | BODY MASS INDEX: 33.65 KG/M2 | SYSTOLIC BLOOD PRESSURE: 140 MMHG | OXYGEN SATURATION: 93 %

## 2025-01-23 DIAGNOSIS — R05.9 COUGH, UNSPECIFIED TYPE: ICD-10-CM

## 2025-01-23 DIAGNOSIS — J45.909 ASTHMA, UNSPECIFIED ASTHMA SEVERITY, UNSPECIFIED WHETHER COMPLICATED, UNSPECIFIED WHETHER PERSISTENT: ICD-10-CM

## 2025-01-23 DIAGNOSIS — R06.00 DYSPNEA, UNSPECIFIED TYPE: Primary | ICD-10-CM

## 2025-01-23 PROCEDURE — 71046 X-RAY EXAM CHEST 2 VIEWS: CPT | Mod: TC

## 2025-01-23 PROCEDURE — 99999 PR PBB SHADOW E&M-EST. PATIENT-LVL V: CPT | Mod: PBBFAC,,, | Performed by: NURSE PRACTITIONER

## 2025-01-23 PROCEDURE — 1159F MED LIST DOCD IN RCRD: CPT | Mod: CPTII,S$GLB,, | Performed by: NURSE PRACTITIONER

## 2025-01-23 PROCEDURE — 3077F SYST BP >= 140 MM HG: CPT | Mod: CPTII,S$GLB,, | Performed by: NURSE PRACTITIONER

## 2025-01-23 PROCEDURE — 71046 X-RAY EXAM CHEST 2 VIEWS: CPT | Mod: 26,,, | Performed by: RADIOLOGY

## 2025-01-23 PROCEDURE — 99213 OFFICE O/P EST LOW 20 MIN: CPT | Mod: S$GLB,,, | Performed by: NURSE PRACTITIONER

## 2025-01-23 PROCEDURE — 3008F BODY MASS INDEX DOCD: CPT | Mod: CPTII,S$GLB,, | Performed by: NURSE PRACTITIONER

## 2025-01-23 PROCEDURE — 3078F DIAST BP <80 MM HG: CPT | Mod: CPTII,S$GLB,, | Performed by: NURSE PRACTITIONER

## 2025-01-23 RX ORDER — BUDESONIDE, GLYCOPYRROLATE, AND FORMOTEROL FUMARATE 160; 9; 4.8 UG/1; UG/1; UG/1
2 AEROSOL, METERED RESPIRATORY (INHALATION) 2 TIMES DAILY
Qty: 10.7 G | Refills: 6 | Status: SHIPPED | OUTPATIENT
Start: 2025-01-23

## 2025-01-23 RX ORDER — ALBUTEROL SULFATE 90 UG/1
2 INHALANT RESPIRATORY (INHALATION) EVERY 6 HOURS PRN
Qty: 54 G | Refills: 6 | Status: SHIPPED | OUTPATIENT
Start: 2025-01-23 | End: 2026-01-23

## 2025-01-23 NOTE — PROGRESS NOTES
SUBJECTIVE:    Patient ID: Eugenia Manuel is a 59 y.o. female.    Chief Complaint: Follow-up (Follow up Asthma/Patient having Breathing Issues, Cough with yellow phlegm)    Follow-up      Patient here for first time in a year since her initial appointment. She turned in her oxygen that was ordered in march because she was no wearing it. She is using Breztri once a day not twice. She has been having a cough for several months .  She has not had her PFT ordered last year.  She is an ESRD patient, gets dialysis 3 days a week.      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     Generalized edema     GERD (gastroesophageal reflux disease)     Heart attack 2021    History of traumatic brain injury     Hypertension     Hypothyroidism     Insomnia     Muscle weakness (generalized)     Restless leg syndrome     Senile dementia, uncomplicated     Sleep apnea, unspecified     TIA (transient ischemic attack)     Unsteadiness on feet      Past Surgical History:   Procedure Laterality Date    ADENOIDECTOMY      ANGIOGRAM, CORONARY, WITH LEFT HEART CATHETERIZATION N/A 02/12/2024    Procedure: Angiogram, Coronary, with Left Heart Cath;  Surgeon: Cristian Carlisle MD;  Location: [facility] CATH/EP LAB;  Service: Cardiology;  Laterality: N/A;    APPENDECTOMY      AV FISTULA PLACEMENT Left     CHOLECYSTECTOMY      COLONOSCOPY      10 years ago in [place]    CORONARY STENT PLACEMENT  2021     DIAGNOSTIC LAPAROSCOPY N/A 7/11/2024    Procedure: LAPAROSCOPY, DIAGNOSTIC;  Surgeon: Stephen Benites III, MD;  Location: [facility] OR;  Service: General;  Laterality: N/A;    ESOPHAGOGASTRODUODENOSCOPY N/A 5/20/2024    Procedure: EGD (ESOPHAGOGASTRODUODENOSCOPY);  Surgeon: Carl Conti MD;  Location: [facility] ENDO;  Service: Endoscopy;  Laterality: N/A;    IVUS (INTRAVASCULAR ULTRASOUND) N/A 02/12/2024    Procedure: ULTRASOUND, INTRAVASCULAR;  Surgeon: Cristian Carlisle MD;  Location: [facility] CATH/EP LAB;  Service: Cardiology;  Laterality: N/A;    KNEE SURGERY      knee arthroplasty    LAPAROSCOPIC LYSIS OF ADHESIONS N/A 7/11/2024    Procedure: LYSIS, ADHESIONS, LAPAROSCOPIC;  Surgeon: Stephen Benites III, MD;  Location: [facility] OR;  Service: General;  Laterality: N/A;    LEFT HEART CATHETERIZATION  2021    NECK SURGERY      PARTIAL HYSTERECTOMY      STENT, DRUG ELUTING, MULTI VESSEL, CORONARY  02/12/2024    Procedure: Stent, Drug Eluting, Multi Vessel, Coronary;  Surgeon: Cristian Carlisle MD;  Location: [facility] CATH/EP LAB;  Service: Cardiology;;    TONSILLECTOMY       No family history on file.     Social History:   Marital Status: Single  Occupation: Data Unavailable  Alcohol History:  reports no history of alcohol use.  Tobacco History:  reports that she has never smoked. She has never used smokeless tobacco.  Drug History:  reports no history of drug use.    Review of patient's allergies indicates:   Allergen Reactions    Lisinopril Anaphylaxis       Current Outpatient Medications   Medication Sig Dispense Refill    albuterol (PROVENTIL/VENTOLIN HFA) 90 mcg/actuation inhaler Inhale 2 puffs into the lungs every 6 (six) hours as needed. Rescue 18 g 6    budesonide-glycopyr-formoterol (BREZTRI AEROSPHERE) 160-9-4.8 mcg/actuation HFAA Inhale 2 puffs into the lungs 2 (two) times daily. 10.7 g 6    cetirizine (ZYRTEC) 10 MG tablet Take 1 tablet (10 mg total) by mouth once daily. 90 tablet 3    clonazePAM (KLONOPIN) 0.5 MG  tablet Take 0.5 mg by mouth nightly as needed.      ferric citrate (AURYXIA) 210 mg iron Tab Take 420 mg by mouth 3 (three) times daily. 540 tablet 3    fluticasone propionate (FLONASE) 50 mcg/actuation nasal spray 2 sprays (100 mcg total) by Each Nostril route once daily. 16 g 11    levothyroxine (SYNTHROID) 150 MCG tablet TAKE ONE TABLET (150MCG) BY MOUTH DAILY BEFORE BREAKFAST 240 tablet 0    melatonin 10 mg Cap Take 10 mg by mouth nightly as needed.      midodrine (PROAMATINE) 10 MG tablet Take 1 tablet (10 mg total) by mouth daily as needed (hypotension). 180 tablet 0    montelukast (SINGULAIR) 10 mg tablet Take 1 tablet (10 mg total) by mouth every evening. 30 tablet 6    QUEtiapine (SEROQUEL) 100 MG Tab Take 1 tablet (100 mg total) by mouth once daily. 30 tablet 5    rOPINIRole (REQUIP) 2 MG tablet TAKE ONE TABLET (2 MG TOTAL) BY MOUTH THREE TIMES DAILY 90 tablet 0    senna-docusate 8.6-50 mg (PERICOLACE) 8.6-50 mg per tablet Take 1 tablet by mouth daily as needed for Constipation.      sevelamer carbonate (RENVELA) 800 mg Tab Take 5 tablets (4,000 mg total) by mouth 3 (three) times daily with meals. 1350 tablet 3    torsemide (DEMADEX) 100 MG Tab Take 1 tablet (100 mg total) by mouth 2 (two) times a day. 90 tablet 0    venlafaxine (EFFEXOR-XR) 150 MG Cp24 TAKE ONE CAPSULE (150 MG) BY MOUTH EVERY DAY 90 capsule 0    albuterol (PROVENTIL/VENTOLIN HFA) 90 mcg/actuation inhaler Inhale 2 puffs into the lungs every 6 (six) hours as needed for Wheezing. Rescue 54 g 6    azelastine (ASTELIN) 137 mcg (0.1 %) nasal spray 1 spray (137 mcg total) by Nasal route 2 (two) times daily. (Patient not taking: Reported on 1/23/2025) 30 mL 2    budesonide-glycopyr-formoterol (BREZTRI AEROSPHERE) 160-9-4.8 mcg/actuation HFAA Inhale 2 puffs into the lungs 2 (two) times a day. 10.7 g 6    clopidogreL (PLAVIX) 75 mg tablet TAKE ONE TABLET (75MG) BY MOUTH DAILY AT 9 AM (Patient not taking: Reported on 1/23/2025) 90 tablet 11     "HYDROcodone-acetaminophen (NORCO) 5-325 mg per tablet Take 1 tablet by mouth every 6 (six) hours as needed for Pain. (Patient not taking: Reported on 1/23/2025) 10 tablet 0    nitroGLYCERIN (NITROSTAT) 0.4 MG SL tablet Place 1 tablet (0.4 mg total) under the tongue every 5 (five) minutes as needed for Chest pain. 10 tablet 5    ondansetron (ZOFRAN-ODT) 4 MG TbDL Take 1 tablet (4 mg total) by mouth 1 (one) time if needed (nausea). (Patient not taking: Reported on 1/23/2025) 1 tablet 0    ondansetron (ZOFRAN-ODT) 4 MG TbDL Take 1 tablet (4 mg total) by mouth every 8 (eight) hours as needed. 15 tablet 0     No current facility-administered medications for this visit.       Last Chest xray 01/2024  :MPRESSION:     No Acute Cardiopulmonary Abnormality       Review of Systems  General: Feeling Well.  Eyes: Vision is good.  ENT:  No sinusitis or pharyngitis.   Heart:: HTN  Lungs: cough for several months   GI: No Nausea, vomiting, constipation, diarrhea, or reflux.  : ESRD, does urinate   Musculoskeletal: No joint pain or myalgias.  Skin: No lesions or rashes.  Neuro: No headaches or neuropathy.  Lymph: No edema or adenopathy.  Psych: No anxiety or depression.  Endo: No weight change.    OBJECTIVE:      BP (!) 140/78 (BP Location: Right arm, Patient Position: Sitting)   Pulse 92   Temp 98.7 °F (37.1 °C)   Ht 5' 8" (1.727 m)   Wt 100.7 kg (222 lb)   SpO2 (!) 93%   BMI 33.75 kg/m²     Physical Exam  GENERAL: Older patient in no distress.  HEENT: Pupils equal and reactive. Extraocular movements intact. Nose intact.      Pharynx moist.   NECK: Supple.   HEART: Regular rate and rhythm. Murmur   LUNGS: Clear to auscultation and percussion. Lung excursion symmetrical. No change in fremitus. No adventitial noises.  ABDOMEN: Bowel sounds present. Non-tender, no masses palpated.  EXTREMITIES: Normal muscle tone and joint movement, no cyanosis or clubbing. Left upper arm dialysis graft   LYMPHATICS: No adenopathy palpated, no " edema.  SKIN: Dry, intact, no lesions.   NEURO: Cranial nerves II-XII intact. Motor strength 5/5 bilaterally, upper and lower extremities.  PSYCH: Appropriate affect.    Assessment:       1. Dyspnea, unspecified type    2. Cough, unspecified type            Plan:          PFT , 6 minute walk   Chest xray     Refill Brestri 2 puffs twice a day, rinse after you use  use a spacer   Refill albuterol    Follow up in about 3 months (around 4/23/2025).

## 2025-01-29 DIAGNOSIS — M25.561 PAIN IN RIGHT KNEE: Primary | ICD-10-CM

## 2025-01-29 DIAGNOSIS — M25.562 PAIN IN LEFT KNEE: ICD-10-CM

## 2025-02-13 DIAGNOSIS — G25.81 RESTLESS LEG: ICD-10-CM

## 2025-02-13 RX ORDER — ROPINIROLE 2 MG/1
TABLET, FILM COATED ORAL
Qty: 90 TABLET | Refills: 11 | Status: SHIPPED | OUTPATIENT
Start: 2025-02-13

## 2025-02-13 NOTE — TELEPHONE ENCOUNTER
No care due was identified.  [facility] Embedded Care Due Messages. Reference number: [id number].   2/13/2025 8:07:25 AM CST

## 2025-02-17 DIAGNOSIS — K21.9 GASTROESOPHAGEAL REFLUX DISEASE, UNSPECIFIED WHETHER ESOPHAGITIS PRESENT: ICD-10-CM

## 2025-02-17 RX ORDER — PANTOPRAZOLE SODIUM 40 MG/1
40 TABLET, DELAYED RELEASE ORAL 2 TIMES DAILY
Qty: 180 TABLET | Refills: 11 | Status: SHIPPED | OUTPATIENT
Start: 2025-02-17

## 2025-02-17 NOTE — TELEPHONE ENCOUNTER
No care due was identified.  [facility] Embedded Care Due Messages. Reference number: [id number].   2/17/2025 9:28:55 AM CST

## 2025-02-19 ENCOUNTER — TELEPHONE (OUTPATIENT)
Dept: FAMILY MEDICINE | Facility: CLINIC | Age: 60
End: 2025-02-19
Payer: MEDICARE

## 2025-02-19 DIAGNOSIS — K21.9 GASTROESOPHAGEAL REFLUX DISEASE, UNSPECIFIED WHETHER ESOPHAGITIS PRESENT: ICD-10-CM

## 2025-02-19 DIAGNOSIS — G25.81 RESTLESS LEG: ICD-10-CM

## 2025-02-19 NOTE — TELEPHONE ENCOUNTER
----- Message from Patito sent at 2/19/2025 10:19 AM CST -----  [employer] [facility] for refill on pantoprazole and ropinirol. [phone]

## 2025-02-19 NOTE — TELEPHONE ENCOUNTER
I called pharmacy in regards to refill request; patient had pantoprazole called in on 02/17/2025 and requip called in on 02/13/2025. Verified pharmacy has prescriptions

## 2025-03-14 DIAGNOSIS — F31.9 BIPOLAR AFFECTIVE DISORDER, REMISSION STATUS UNSPECIFIED: ICD-10-CM

## 2025-03-15 NOTE — TELEPHONE ENCOUNTER
No care due was identified.  [facility] Embedded Care Due Messages. Reference number: [id number].   3/14/2025 11:07:15 PM CDT

## 2025-03-17 PROBLEM — I25.118 CORONARY ARTERY DISEASE OF NATIVE ARTERY OF NATIVE HEART WITH STABLE ANGINA PECTORIS: Status: ACTIVE | Noted: 2023-11-30

## 2025-03-17 RX ORDER — QUETIAPINE FUMARATE 25 MG/1
TABLET, FILM COATED ORAL
Qty: 90 TABLET | Refills: 1 | Status: SHIPPED | OUTPATIENT
Start: 2025-03-17

## 2025-03-24 ENCOUNTER — PATIENT MESSAGE (OUTPATIENT)
Dept: FAMILY MEDICINE | Facility: CLINIC | Age: 60
End: 2025-03-24
Payer: MEDICAID

## 2025-04-09 DIAGNOSIS — J45.42 MODERATE PERSISTENT ASTHMA WITH STATUS ASTHMATICUS: ICD-10-CM

## 2025-04-09 DIAGNOSIS — R06.00 DYSPNEA, UNSPECIFIED TYPE: ICD-10-CM

## 2025-04-09 DIAGNOSIS — J96.11 CHRONIC HYPOXEMIC RESPIRATORY FAILURE: ICD-10-CM

## 2025-04-09 DIAGNOSIS — J45.909 ASTHMA, UNSPECIFIED ASTHMA SEVERITY, UNSPECIFIED WHETHER COMPLICATED, UNSPECIFIED WHETHER PERSISTENT: Primary | ICD-10-CM

## 2025-04-09 RX ORDER — ALBUTEROL SULFATE 90 UG/1
2 INHALANT RESPIRATORY (INHALATION) EVERY 6 HOURS PRN
Qty: 54 G | Refills: 6 | Status: SHIPPED | OUTPATIENT
Start: 2025-04-09 | End: 2026-04-09

## 2025-04-10 ENCOUNTER — HOSPITAL ENCOUNTER (OUTPATIENT)
Dept: PULMONOLOGY | Facility: HOSPITAL | Age: 60
Discharge: HOME | End: 2025-04-10
Attending: NURSE PRACTITIONER
Payer: MEDICAID

## 2025-04-10 ENCOUNTER — OFFICE VISIT (OUTPATIENT)
Dept: PULMONOLOGY | Facility: CLINIC | Age: 60
End: 2025-04-10
Payer: MEDICARE

## 2025-04-10 ENCOUNTER — TELEPHONE (OUTPATIENT)
Dept: PULMONOLOGY | Facility: CLINIC | Age: 60
End: 2025-04-10
Payer: MEDICARE

## 2025-04-10 ENCOUNTER — TELEPHONE (OUTPATIENT)
Dept: PULMONOLOGY | Facility: CLINIC | Age: 60
End: 2025-04-10

## 2025-04-10 ENCOUNTER — PATIENT MESSAGE (OUTPATIENT)
Dept: PULMONOLOGY | Facility: CLINIC | Age: 60
End: 2025-04-10

## 2025-04-10 VITALS
DIASTOLIC BLOOD PRESSURE: 82 MMHG | BODY MASS INDEX: 33.4 KG/M2 | SYSTOLIC BLOOD PRESSURE: 138 MMHG | WEIGHT: 212.81 LBS | HEIGHT: 67 IN | HEART RATE: 90 BPM | OXYGEN SATURATION: 96 %

## 2025-04-10 DIAGNOSIS — J42 CHRONIC BRONCHITIS, UNSPECIFIED CHRONIC BRONCHITIS TYPE: ICD-10-CM

## 2025-04-10 DIAGNOSIS — J42 CHRONIC BRONCHITIS, UNSPECIFIED CHRONIC BRONCHITIS TYPE: Primary | ICD-10-CM

## 2025-04-10 DIAGNOSIS — R05.9 COUGH, UNSPECIFIED TYPE: ICD-10-CM

## 2025-04-10 DIAGNOSIS — E66.01 MORBID (SEVERE) OBESITY DUE TO EXCESS CALORIES: ICD-10-CM

## 2025-04-10 DIAGNOSIS — R05.9 COUGH, UNSPECIFIED TYPE: Primary | ICD-10-CM

## 2025-04-10 DIAGNOSIS — N18.6 ESRD (END STAGE RENAL DISEASE): ICD-10-CM

## 2025-04-10 LAB
DLCO SINGLE BREATH LLN: 19.17
DLCO SINGLE BREATH PRE REF: 49.1 %
DLCO SINGLE BREATH REF: 24.9
DLCOC SBVA LLN: 3.23
DLCOC SBVA REF: 4.58
DLCOC SINGLE BREATH LLN: 19.17
DLCOC SINGLE BREATH REF: 24.9
DLCOVA LLN: 3.23
DLCOVA PRE REF: 65.9 %
DLCOVA PRE: 3.02 ML/(MIN*MMHG*L) (ref 3.23–5.92)
DLCOVA REF: 4.58
ERVN2 LLN: -16449.14
ERVN2 PRE REF: 16.8 %
ERVN2 PRE: 0.15 L (ref -16449.14–16450.86)
ERVN2 REF: 0.86
FEF 25 75 LLN: 1.68
FEF 25 75 PRE REF: 48 %
FEF 25 75 REF: 3.08
FEV1 FVC LLN: 67
FEV1 FVC PRE REF: 95.7 %
FEV1 FVC REF: 79
FEV1 LLN: 2.12
FEV1 PRE REF: 67.5 %
FEV1 REF: 2.8
FRCN2 LLN: 2.05
FRCN2 PRE REF: 73.6 %
FRCN2 REF: 2.87
FVC LLN: 2.71
FVC PRE REF: 70 %
FVC REF: 3.56
IVC PRE: 2.59 L (ref 2.71–4.45)
IVC SINGLE BREATH LLN: 2.71
IVC SINGLE BREATH PRE REF: 72.8 %
IVC SINGLE BREATH REF: 3.56
PEF LLN: 5
PEF PRE REF: 81.2 %
PEF REF: 6.87
PRE DLCO: 12.22 ML/(MIN*MMHG) (ref 19.17–30.63)
PRE FEF 25 75: 1.48 L/S (ref 1.68–4.47)
PRE FET 100: 6.37 SEC
PRE FEV1 FVC: 75.68 % (ref 67.3–89.16)
PRE FEV1: 1.89 L (ref 2.12–3.44)
PRE FRC N2: 2.11 L (ref 2.05–3.69)
PRE FVC: 2.49 L (ref 2.71–4.45)
PRE PEF: 5.58 L/S (ref 5–8.75)
RVN2 LLN: 1.43
RVN2 PRE REF: 98 %
RVN2 PRE: 1.97 L (ref 1.43–2.58)
RVN2 REF: 2.01
RVN2TLCN2 LLN: 29.09
RVN2TLCN2 PRE REF: 110.1 %
RVN2TLCN2 PRE: 42.58 % (ref 29.09–48.27)
RVN2TLCN2 REF: 38.68
TLCN2 LLN: 4.45
TLCN2 PRE REF: 84.9 %
TLCN2 PRE: 4.62 L (ref 4.45–6.43)
TLCN2 REF: 5.44
VA PRE: 4.05 L (ref 5.29–5.29)
VA SINGLE BREATH LLN: 5.29
VA SINGLE BREATH PRE REF: 76.6 %
VA SINGLE BREATH REF: 5.29
VCMAXN2 LLN: 2.71
VCMAXN2 PRE REF: 74.5 %
VCMAXN2 PRE: 2.65 L (ref 2.71–4.45)
VCMAXN2 REF: 3.56

## 2025-04-10 PROCEDURE — 94010 BREATHING CAPACITY TEST: CPT | Performed by: CLINIC/CENTER

## 2025-04-10 PROCEDURE — 94729 DIFFUSING CAPACITY: CPT | Performed by: CLINIC/CENTER

## 2025-04-10 PROCEDURE — 99999 PR PBB SHADOW E&M-EST. PATIENT-LVL IV: CPT | Mod: PBBFAC,,, | Performed by: NURSE PRACTITIONER

## 2025-04-10 PROCEDURE — 94010 BREATHING CAPACITY TEST: CPT | Mod: 26,59,, | Performed by: INTERNAL MEDICINE

## 2025-04-10 PROCEDURE — 94618 PULMONARY STRESS TESTING: CPT | Mod: 26,,, | Performed by: INTERNAL MEDICINE

## 2025-04-10 PROCEDURE — 94727 GAS DIL/WSHOT DETER LNG VOL: CPT | Performed by: CLINIC/CENTER

## 2025-04-10 PROCEDURE — 94727 GAS DIL/WSHOT DETER LNG VOL: CPT | Mod: 26,,, | Performed by: INTERNAL MEDICINE

## 2025-04-10 PROCEDURE — 94729 DIFFUSING CAPACITY: CPT | Mod: 26,,, | Performed by: INTERNAL MEDICINE

## 2025-04-10 PROCEDURE — 94618 PULMONARY STRESS TESTING: CPT | Performed by: CLINIC/CENTER

## 2025-04-10 RX ORDER — ONDANSETRON 4 MG/1
4 TABLET, ORALLY DISINTEGRATING ORAL EVERY 8 HOURS PRN
Qty: 20 TABLET | Refills: 0 | Status: SHIPPED | OUTPATIENT
Start: 2025-04-10

## 2025-04-10 RX ORDER — BUDESONIDE 0.5 MG/2ML
0.5 INHALANT ORAL 2 TIMES DAILY
Qty: 120 ML | Refills: 5 | Status: SHIPPED | OUTPATIENT
Start: 2025-04-10 | End: 2026-04-10

## 2025-04-10 NOTE — TELEPHONE ENCOUNTER
Tried calling pt to let her know it would be best to re scheduled todays apt with Emily since she has not had PFT or Walk done that was ordered at her last visit.  These test results are needed before her follow up apt.   Could not leave a message so a portal message was sent with this info.

## 2025-04-10 NOTE — TELEPHONE ENCOUNTER
----- Message from MOHAN Jarquin sent at 4/10/2025  8:09 AM CDT -----  Regarding: RE: no PFTS or Walk  If has not had test no point in coming  ----- Message -----  From: Domenic López MA  Sent: 4/10/2025   8:00 AM CDT  To: MOHAN Fuentes  Subject: no PFTS or Walk                                  Pt is scheduled to see you this morning but she still has not had her PFTs or WALK done that you had ordered a while back.  She NO showed and canceled both of them multiple times.

## 2025-04-10 NOTE — PROGRESS NOTES
SUBJECTIVE:    Patient ID: Eugenia Manuel is a 59 y.o. female.    Chief Complaint: Follow-up, Cough, and Asthma      Patient here today has not had PFT or walk ordered last visit.  She has to have fistulogram of her dialysis graft next week.  She has a dry cough, will cough so much she throws up. She has had some nausea today. She is getting dialysis three days a week.  She stopped using Breztri because she did not feel benefit from it. She would like to try nebulized medication.       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     Generalized edema     GERD (gastroesophageal reflux disease)     Heart attack 2021    History of traumatic brain injury     Hypertension     Hypothyroidism     Insomnia     Muscle weakness (generalized)     Restless leg syndrome     Senile dementia, uncomplicated     Sleep apnea, unspecified     TIA (transient ischemic attack)     Unsteadiness on feet      Past Surgical History:   Procedure Laterality Date    ADENOIDECTOMY      ANGIOGRAM, CORONARY, WITH LEFT HEART CATHETERIZATION N/A 02/12/2024    Procedure: Angiogram, Coronary, with Left Heart Cath;  Surgeon: Cristian Carlisle MD;  Location: [facility] CATH/EP LAB;  Service: Cardiology;  Laterality: N/A;    APPENDECTOMY      AV FISTULA PLACEMENT Left     CHOLECYSTECTOMY      COLONOSCOPY      10 years ago in [place]    CORONARY STENT PLACEMENT  2021    DIAGNOSTIC LAPAROSCOPY N/A 7/11/2024     Procedure: LAPAROSCOPY, DIAGNOSTIC;  Surgeon: Stephen Benites III, MD;  Location: [facility] OR;  Service: General;  Laterality: N/A;    ESOPHAGOGASTRODUODENOSCOPY N/A 5/20/2024    Procedure: EGD (ESOPHAGOGASTRODUODENOSCOPY);  Surgeon: Carl Conti MD;  Location: [facility] ENDO;  Service: Endoscopy;  Laterality: N/A;    IVUS (INTRAVASCULAR ULTRASOUND) N/A 02/12/2024    Procedure: ULTRASOUND, INTRAVASCULAR;  Surgeon: Cristian Carlisle MD;  Location: [facility] CATH/EP LAB;  Service: Cardiology;  Laterality: N/A;    KNEE SURGERY      knee arthroplasty    LAPAROSCOPIC LYSIS OF ADHESIONS N/A 7/11/2024    Procedure: LYSIS, ADHESIONS, LAPAROSCOPIC;  Surgeon: Stephen Benites III, MD;  Location: [facility] OR;  Service: General;  Laterality: N/A;    LEFT HEART CATHETERIZATION  2021    NECK SURGERY      PARTIAL HYSTERECTOMY      STENT, DRUG ELUTING, MULTI VESSEL, CORONARY  02/12/2024    Procedure: Stent, Drug Eluting, Multi Vessel, Coronary;  Surgeon: Cristian Carlisle MD;  Location: [facility] CATH/EP LAB;  Service: Cardiology;;    TONSILLECTOMY       No family history on file.     Social History:   Marital Status: Single  Occupation: Data Unavailable  Alcohol History:  reports no history of alcohol use.  Tobacco History:  reports that she has never smoked. She has never used smokeless tobacco.  Drug History:  reports no history of drug use.    Review of patient's allergies indicates:   Allergen Reactions    Lisinopril Anaphylaxis       Current Outpatient Medications   Medication Sig Dispense Refill    albuterol (PROVENTIL/VENTOLIN HFA) 90 mcg/actuation inhaler Inhale 2 puffs into the lungs every 6 (six) hours as needed for Wheezing. Rescue 54 g 6    ARIPiprazole (ABILIFY) 2 MG Tab Take 5 mg by mouth once daily.      azelastine (ASTELIN) 137 mcg (0.1 %) nasal spray 1 spray (137 mcg total) by Nasal route 2 (two) times daily. 30 mL 2    cetirizine (ZYRTEC) 10 MG tablet Take 1 tablet (10 mg total) by mouth once daily. 90 tablet 3    clopidogreL  (PLAVIX) 75 mg tablet TAKE ONE TABLET (75MG) BY MOUTH DAILY AT 9 AM 90 tablet 11    ferric citrate (AURYXIA) 210 mg iron Tab Take 420 mg by mouth 3 (three) times daily. 540 tablet 3    fluticasone propionate (FLONASE) 50 mcg/actuation nasal spray 2 sprays (100 mcg total) by Each Nostril route once daily. 16 g 11    levothyroxine (SYNTHROID) 150 MCG tablet TAKE ONE TABLET (150MCG) BY MOUTH DAILY BEFORE BREAKFAST 240 tablet 0    melatonin 10 mg Cap Take 10 mg by mouth nightly as needed.      midodrine (PROAMATINE) 10 MG tablet Take 1 tablet (10 mg total) by mouth daily as needed (hypotension). 180 tablet 0    montelukast (SINGULAIR) 10 mg tablet Take 1 tablet (10 mg total) by mouth every evening. 30 tablet 6    nitroGLYCERIN (NITROSTAT) 0.4 MG SL tablet Place 1 tablet (0.4 mg total) under the tongue every 5 (five) minutes as needed for Chest pain. 10 tablet 5    pantoprazole (PROTONIX) 40 MG tablet TAKE ONE TABLET BY MOUTH TWICE DAILY 180 tablet 11    QUEtiapine (SEROQUEL) 25 MG Tab TAKE ONE TABLET (25MG TOTAL) BY MOUTH EVERY MORNING 90 tablet 1    rOPINIRole (REQUIP) 2 MG tablet TAKE ONE TABLET (2MG TOTAL) BY MOUTH THREE TIMES DAILY (NEED APPOINTMENT) 90 tablet 11    senna-docusate 8.6-50 mg (PERICOLACE) 8.6-50 mg per tablet Take 1 tablet by mouth daily as needed for Constipation.      sevelamer carbonate (RENVELA) 800 mg Tab Take 5 tablets (4,000 mg total) by mouth 3 (three) times daily with meals. 1350 tablet 3    tiZANidine 4 mg Cap Take by mouth.      torsemide (DEMADEX) 100 MG Tab Take 1 tablet (100 mg total) by mouth 2 (two) times a day. 90 tablet 0    traZODone (DESYREL) 100 MG tablet Take 100 mg by mouth every evening.      venlafaxine (EFFEXOR-XR) 150 MG Cp24 TAKE ONE CAPSULE (150 MG) BY MOUTH EVERY DAY 90 capsule 0    ondansetron (ZOFRAN-ODT) 4 MG TbDL Take 1 tablet (4 mg total) by mouth every 8 (eight) hours as needed (nausea). 20 tablet 0     No current facility-administered medications for this visit.  "      Last Chest xray 01/2024  :MPRESSION:     No Acute Cardiopulmonary Abnormality         General: tired   Eyes: Vision is good.  ENT:  No sinusitis or pharyngitis.   Heart:: HTN  Lungs: cough   GI: No Nausea, vomiting, constipation, diarrhea, or reflux.  : ESRD, does urinate   Musculoskeletal: No joint pain or myalgias.  Skin: No lesions or rashes.  Neuro: No headaches or neuropathy.  Lymph: No edema or adenopathy.  Psych: No anxiety or depression.  Endo: No weight change.    OBJECTIVE:      /82 (BP Location: Right arm, Patient Position: Sitting)   Pulse 90   Ht 5' 7" (1.702 m)   Wt 96.5 kg (212 lb 12.8 oz)   SpO2 96%   BMI 33.33 kg/m²     Physical Exam  GENERAL: Older patient in no distress.  HEENT: Pupils equal and reactive. Extraocular movements intact. Nose intact.      Pharynx moist.   NECK: Supple.   HEART: Regular rate and rhythm. Murmur   LUNGS: Clear to auscultation and percussion. Lung excursion symmetrical. No change in fremitus. No adventitial noises.  ABDOMEN: Bowel sounds present. Non-tender, no masses palpated.  EXTREMITIES: Normal muscle tone and joint movement, no cyanosis or clubbing. Left upper arm dialysis graft   LYMPHATICS: No adenopathy palpated, no edema.  SKIN: Dry, intact, no lesions.   NEURO: Cranial nerves II-XII intact. Motor strength 5/5 bilaterally, upper and lower extremities.  PSYCH: Appropriate affect.    Assessment:       1. Cough, unspecified type    2. ESRD (end stage renal disease)    3. Morbid (severe) obesity due to excess calories    4. Chronic bronchitis, unspecified chronic bronchitis type              Plan:          PFT , 6 minute walk, go downstairs to have now  Sleep elevated   Would like nebulizer  If she is not obstructed will send budesonide in nebulizer to treat bronchitis.   Change the Singulair to night time   Will call with results   Follow up in about 6 months (around 10/10/2025).              "

## 2025-04-10 NOTE — TELEPHONE ENCOUNTER
PFT shows no obstruction, no restriciton, moderate diffusion defect. Will order nebulzier with budesonide twice a day to treat her chronic bronchitis.  Her walk was non hypoxemic.     The patient is aware

## 2025-04-11 DIAGNOSIS — R05.9 COUGH, UNSPECIFIED TYPE: ICD-10-CM

## 2025-04-11 DIAGNOSIS — J42 CHRONIC BRONCHITIS, UNSPECIFIED CHRONIC BRONCHITIS TYPE: ICD-10-CM

## 2025-04-11 DIAGNOSIS — J45.909 ASTHMA, UNSPECIFIED ASTHMA SEVERITY, UNSPECIFIED WHETHER COMPLICATED, UNSPECIFIED WHETHER PERSISTENT: Primary | ICD-10-CM

## 2025-04-11 DIAGNOSIS — J45.42 MODERATE PERSISTENT ASTHMA WITH STATUS ASTHMATICUS: ICD-10-CM

## 2025-04-11 DIAGNOSIS — J96.11 CHRONIC HYPOXEMIC RESPIRATORY FAILURE: ICD-10-CM

## 2025-04-11 RX ORDER — MONTELUKAST SODIUM 10 MG/1
10 TABLET ORAL NIGHTLY
Qty: 90 TABLET | Refills: 6 | OUTPATIENT
Start: 2025-04-11

## 2025-04-11 RX ORDER — BUDESONIDE, GLYCOPYRROLATE, AND FORMOTEROL FUMARATE 160; 9; 4.8 UG/1; UG/1; UG/1
2 AEROSOL, METERED RESPIRATORY (INHALATION) 2 TIMES DAILY
COMMUNITY
End: 2025-04-14

## 2025-04-11 RX ORDER — BUDESONIDE, GLYCOPYRROLATE, AND FORMOTEROL FUMARATE 160; 9; 4.8 UG/1; UG/1; UG/1
2 AEROSOL, METERED RESPIRATORY (INHALATION) 2 TIMES DAILY
Qty: 32.1 G | Refills: 6 | OUTPATIENT
Start: 2025-04-11

## 2025-04-14 RX ORDER — MONTELUKAST SODIUM 10 MG/1
TABLET ORAL
Qty: 90 TABLET | Refills: 6 | Status: SHIPPED | OUTPATIENT
Start: 2025-04-14

## 2025-04-14 RX ORDER — BUDESONIDE, GLYCOPYRROLATE, AND FORMOTEROL FUMARATE 160; 9; 4.8 UG/1; UG/1; UG/1
2 AEROSOL, METERED RESPIRATORY (INHALATION) 2 TIMES DAILY
Qty: 32.1 G | Refills: 6 | Status: SHIPPED | OUTPATIENT
Start: 2025-04-14

## 2025-05-22 ENCOUNTER — HOSPITAL ENCOUNTER (OUTPATIENT)
Dept: RADIOLOGY | Facility: HOSPITAL | Age: 60
Discharge: HOME | End: 2025-05-22
Attending: STUDENT IN AN ORGANIZED HEALTH CARE EDUCATION/TRAINING PROGRAM
Payer: MEDICARE

## 2025-05-22 DIAGNOSIS — M53.3 DISORDER OF SACRUM: Primary | ICD-10-CM

## 2025-05-22 DIAGNOSIS — M25.561 PAIN IN RIGHT KNEE: ICD-10-CM

## 2025-05-22 DIAGNOSIS — M53.3 DISORDER OF SACRUM: ICD-10-CM

## 2025-05-22 DIAGNOSIS — M25.562 PAIN IN LEFT KNEE: ICD-10-CM

## 2025-05-22 PROCEDURE — 72200 X-RAY EXAM SI JOINTS: CPT | Mod: 26,,, | Performed by: RADIOLOGY

## 2025-05-22 PROCEDURE — 73562 X-RAY EXAM OF KNEE 3: CPT | Mod: TC,50,PO

## 2025-05-22 PROCEDURE — 73502 X-RAY EXAM HIP UNI 2-3 VIEWS: CPT | Mod: 26,RT,, | Performed by: RADIOLOGY

## 2025-05-22 PROCEDURE — 72200 X-RAY EXAM SI JOINTS: CPT | Mod: TC,PO

## 2025-05-22 PROCEDURE — 73562 X-RAY EXAM OF KNEE 3: CPT | Mod: 26,50,, | Performed by: RADIOLOGY

## 2025-05-22 PROCEDURE — 73502 X-RAY EXAM HIP UNI 2-3 VIEWS: CPT | Mod: TC,PO,RT

## 2025-06-09 DIAGNOSIS — E03.9 ACQUIRED HYPOTHYROIDISM: ICD-10-CM

## 2025-06-09 RX ORDER — LEVOTHYROXINE SODIUM 150 UG/1
TABLET ORAL
Qty: 240 TABLET | Refills: 11 | OUTPATIENT
Start: 2025-06-09

## 2025-06-09 NOTE — TELEPHONE ENCOUNTER
No care due was identified.  [facility] Embedded Care Due Messages. Reference number: [id number].   6/09/2025 11:02:26 AM CDT

## 2025-07-01 DIAGNOSIS — E03.9 ACQUIRED HYPOTHYROIDISM: ICD-10-CM

## 2025-07-01 NOTE — TELEPHONE ENCOUNTER
Care Due:                  Date            Visit Type   Department     Provider  --------------------------------------------------------------------------------                                             [facility]   [address]  Last Visit: 03-      [facility]  Next Visit: None Scheduled  None         None Found                                                            Last  Test          Frequency    Reason                     Performed    Due Date  --------------------------------------------------------------------------------    Office Visit  15 months..  pantoprazole, venlafaxine  03- 06-    [facility] Embedded Care Due Messages. Reference number: [id number].   7/01/2025 7:04:10 AM CDT

## 2025-07-02 RX ORDER — LEVOTHYROXINE SODIUM 150 UG/1
TABLET ORAL
Qty: 90 TABLET | Refills: 0 | Status: SHIPPED | OUTPATIENT
Start: 2025-07-02

## 2025-07-11 DIAGNOSIS — F31.9 BIPOLAR AFFECTIVE DISORDER, REMISSION STATUS UNSPECIFIED: ICD-10-CM

## 2025-07-11 RX ORDER — VENLAFAXINE HYDROCHLORIDE 150 MG/1
CAPSULE, EXTENDED RELEASE ORAL
Qty: 90 CAPSULE | Refills: 11 | OUTPATIENT
Start: 2025-07-11

## 2025-07-11 NOTE — TELEPHONE ENCOUNTER
No care due was identified.  [facility] Embedded Care Due Messages. Reference number: [id number].   7/11/2025 12:22:05 PM CDT

## 2025-07-22 ENCOUNTER — TELEPHONE (OUTPATIENT)
Dept: PSYCHIATRY | Facility: CLINIC | Age: 60
End: 2025-07-22
Payer: MEDICARE

## 2025-07-22 NOTE — TELEPHONE ENCOUNTER
Returned call to patient regarding NP appointment. Advised patient that our office [facility] to be [facility]. Once the referral is received they will be placed on our wait list. Referrals are worked in order as they are received. Current wait is approximately 8-12 months.  Patient verbalized understanding.

## 2025-07-31 DIAGNOSIS — F31.9 BIPOLAR AFFECTIVE DISORDER, REMISSION STATUS UNSPECIFIED: ICD-10-CM

## 2025-07-31 RX ORDER — VENLAFAXINE HYDROCHLORIDE 150 MG/1
CAPSULE, EXTENDED RELEASE ORAL
Qty: 90 CAPSULE | Refills: 0 | OUTPATIENT
Start: 2025-07-31

## 2025-07-31 NOTE — TELEPHONE ENCOUNTER
No care due was identified.  [facility] Embedded Care Due Messages. Reference number: [id number].   7/31/2025 7:14:15 AM CDT

## 2025-07-31 NOTE — TELEPHONE ENCOUNTER
No care due was identified.  [facility] Embedded Care Due Messages. Reference number: [id number].   7/31/2025 5:48:26 PM CDT

## 2025-08-01 RX ORDER — QUETIAPINE FUMARATE 100 MG/1
100 TABLET, FILM COATED ORAL NIGHTLY
Qty: 90 TABLET | Refills: 3 | OUTPATIENT
Start: 2025-08-01

## 2025-08-06 ENCOUNTER — HOSPITAL ENCOUNTER (EMERGENCY)
Facility: HOSPITAL | Age: 60
Discharge: LEFT BEFORE DISCHARGE | End: 2025-08-06
Payer: MEDICARE

## 2025-08-06 VITALS
SYSTOLIC BLOOD PRESSURE: 131 MMHG | TEMPERATURE: 98 F | RESPIRATION RATE: 18 BRPM | DIASTOLIC BLOOD PRESSURE: 70 MMHG | WEIGHT: 190 LBS | OXYGEN SATURATION: 95 % | BODY MASS INDEX: 29.82 KG/M2 | HEIGHT: 67 IN | HEART RATE: 88 BPM

## 2025-08-06 PROCEDURE — 99900041 HC LEFT WITHOUT BEING SEEN- EMERGENCY

## (undated) DEVICE — NDL SAFETY 21G X 1 1/2 ECLPSE

## (undated) DEVICE — SHEATH INTRODUCER 5FR 10CM

## (undated) DEVICE — CANNULA LAP SEAL Z THRD 5X100

## (undated) DEVICE — GUIDEWIRE RUNTHROUGH NS 180CM

## (undated) DEVICE — SUT SILK 0 STRANDS 30IN BLK

## (undated) DEVICE — NDL PNEUMO INSUFFLATI 120MM

## (undated) DEVICE — TOWEL OR NONABSORB ADH 17X26

## (undated) DEVICE — KIT GELPORT LAPAROSCOPIC ABD

## (undated) DEVICE — GLOVE SENSICARE PI ALOE 6.5

## (undated) DEVICE — CATH EAGLE EYE PLATINUM

## (undated) DEVICE — SET TUBE PNEUMOCLEAR SE HI FLO

## (undated) DEVICE — SUT 1 36IN PDS II

## (undated) DEVICE — CATH SAPPH NC PLUS NC 3X15MM

## (undated) DEVICE — SHEATH INTRODUCER 6FR 11CM

## (undated) DEVICE — SUC YANK POOLE TIP RIGID

## (undated) DEVICE — GLOVE SENSICARE PI ALOE 6

## (undated) DEVICE — DISSECTOR 5MM ENDOPATH

## (undated) DEVICE — KIT ANTIFOG W/SPONG & FLUID

## (undated) DEVICE — CATH LNCHR JR40 6F 110CM

## (undated) DEVICE — APPLICATOR CHLORAPREP ORN 26ML

## (undated) DEVICE — CATH DXTERITY JL35 100CM 5FR

## (undated) DEVICE — SCISSOR 5MMX35CM DIRECT DRIVE

## (undated) DEVICE — PACK CUSTOM ENDO CHOLO SLI

## (undated) DEVICE — GOWN POLY REINF BRTH SLV LG

## (undated) DEVICE — CLOSURE SKIN STERI STRIP 1/2X4

## (undated) DEVICE — KIT GLIDESHEATH SLEND 6FR 10CM

## (undated) DEVICE — KIT MICROINTRODUCE MINI 5X10CM

## (undated) DEVICE — DRESSING ABSRBNT ISLAND 3.6X8

## (undated) DEVICE — TROCAR KII FIOS ZTHREAD 12X100

## (undated) DEVICE — GUIDEWIRE INQWIRE SE 3MM JTIP

## (undated) DEVICE — GLOVE SENSICARE PI GRN 8

## (undated) DEVICE — SYR ONLY LUER LOCK 20CC

## (undated) DEVICE — TUBING SUC UNIV W/CONN 12FT

## (undated) DEVICE — STRAP OR TABLE 5IN X 72IN

## (undated) DEVICE — SLEEVE SCD EXPRESS KNEE MEDIUM

## (undated) DEVICE — IRRIGATOR SUCTION W/TIP

## (undated) DEVICE — TRAY CATH FOL SIL URIMTR 16FR

## (undated) DEVICE — ELECTRODE REM PLYHSV RETURN 9

## (undated) DEVICE — SOL NACL IRR 3000ML

## (undated) DEVICE — SUT CTD VICRYL CT-1 UND BR

## (undated) DEVICE — GLOVE SENSICARE PI ALOE 7.5

## (undated) DEVICE — GOWN POLY REINF BRTH SLV XL

## (undated) DEVICE — SOL IRRI STRL WATER 1000ML

## (undated) DEVICE — BLLN NC RX EUPHORA 30 X 08

## (undated) DEVICE — SPONGE LAP 18X18 PREWASHED

## (undated) DEVICE — BLADE SURG CARBON STEEL SZ11

## (undated) DEVICE — SUT SA85H SILK 2-0

## (undated) DEVICE — INFLATOR ADVANTAGE ENCORE 26

## (undated) DEVICE — SYR 10CC LUER LOCK

## (undated) DEVICE — CATH DXTERITY JR40 100CM 5FR

## (undated) DEVICE — SOL NACL IRR 1000ML BTL

## (undated) DEVICE — SUT MONOCRYL 4-0 PS-2

## (undated) DEVICE — SOL CLEARIFY VISUALIZATION LAP

## (undated) DEVICE — TOWEL OR DISP STRL BLUE 4/PK

## (undated) DEVICE — BLLN NC EUPHORA RX 25 X12